# Patient Record
Sex: MALE | Race: WHITE | NOT HISPANIC OR LATINO | Employment: OTHER | URBAN - METROPOLITAN AREA
[De-identification: names, ages, dates, MRNs, and addresses within clinical notes are randomized per-mention and may not be internally consistent; named-entity substitution may affect disease eponyms.]

---

## 2017-02-02 RX ORDER — ALLOPURINOL 300 MG/1
TABLET ORAL
Qty: 30 TABLET | Refills: 11 | Status: SHIPPED | OUTPATIENT
Start: 2017-02-02 | End: 2018-03-18 | Stop reason: SDUPTHER

## 2017-04-06 ENCOUNTER — OFFICE VISIT (OUTPATIENT)
Dept: FAMILY MEDICINE | Facility: CLINIC | Age: 67
End: 2017-04-06
Payer: COMMERCIAL

## 2017-04-06 VITALS
WEIGHT: 234 LBS | BODY MASS INDEX: 31.01 KG/M2 | HEART RATE: 73 BPM | TEMPERATURE: 99 F | HEIGHT: 73 IN | DIASTOLIC BLOOD PRESSURE: 89 MMHG | SYSTOLIC BLOOD PRESSURE: 162 MMHG | OXYGEN SATURATION: 96 %

## 2017-04-06 DIAGNOSIS — J06.9 UPPER RESPIRATORY TRACT INFECTION, UNSPECIFIED TYPE: ICD-10-CM

## 2017-04-06 DIAGNOSIS — H66.92 LEFT OTITIS MEDIA, UNSPECIFIED CHRONICITY, UNSPECIFIED OTITIS MEDIA TYPE: Primary | ICD-10-CM

## 2017-04-06 PROCEDURE — 1157F ADVNC CARE PLAN IN RCRD: CPT | Mod: S$GLB,,, | Performed by: NURSE PRACTITIONER

## 2017-04-06 PROCEDURE — 3077F SYST BP >= 140 MM HG: CPT | Mod: S$GLB,,, | Performed by: NURSE PRACTITIONER

## 2017-04-06 PROCEDURE — 3079F DIAST BP 80-89 MM HG: CPT | Mod: S$GLB,,, | Performed by: NURSE PRACTITIONER

## 2017-04-06 PROCEDURE — 99213 OFFICE O/P EST LOW 20 MIN: CPT | Mod: S$GLB,,, | Performed by: NURSE PRACTITIONER

## 2017-04-06 PROCEDURE — 1160F RVW MEDS BY RX/DR IN RCRD: CPT | Mod: S$GLB,,, | Performed by: NURSE PRACTITIONER

## 2017-04-06 PROCEDURE — 1126F AMNT PAIN NOTED NONE PRSNT: CPT | Mod: S$GLB,,, | Performed by: NURSE PRACTITIONER

## 2017-04-06 PROCEDURE — 99999 PR PBB SHADOW E&M-EST. PATIENT-LVL IV: CPT | Mod: PBBFAC,,, | Performed by: NURSE PRACTITIONER

## 2017-04-06 PROCEDURE — 1159F MED LIST DOCD IN RCRD: CPT | Mod: S$GLB,,, | Performed by: NURSE PRACTITIONER

## 2017-04-06 RX ORDER — DOXYCYCLINE 100 MG/1
100 CAPSULE ORAL 2 TIMES DAILY
Qty: 20 CAPSULE | Refills: 0 | Status: CANCELLED | OUTPATIENT
Start: 2017-04-06 | End: 2017-04-16

## 2017-04-06 RX ORDER — DOXYCYCLINE 100 MG/1
100 CAPSULE ORAL 2 TIMES DAILY
Qty: 20 CAPSULE | Refills: 0 | Status: SHIPPED | OUTPATIENT
Start: 2017-04-06 | End: 2017-04-16

## 2017-04-06 NOTE — MR AVS SNAPSHOT
[facility]  [address]  [place] [address]  Phone: [phone]  Fax: [phone]                  Roshan Menard   2017 10:00 AM   Office Visit    Description:  Male : 1950   Provider:  Sweetie Mckeon NP   Department:  [facility]           Reason for Visit     Ear Fullness     Nasal Congestion     Cough           Diagnoses this Visit        Comments    Left otitis media, unspecified chronicity, unspecified otitis media type    -  Primary     Upper respiratory tract infection, unspecified type                To Do List           Goals (5 Years of Data)     None       These Medications        Disp Refills Start End    doxycycline (MONODOX) 100 MG capsule 20 capsule 0 2017    Take 1 capsule (100 mg total) by mouth 2 (two) times daily. - Oral    Pharmacy: [id number]  [address] SANCHEZ [address]  [place] Ph #: [phone]         [facility]     [facility] Nurse Care Line -  Assistance  Unless otherwise directed by your provider, please contact [facility], our nurse care line that is available for  assistance.     Registered nurses in the [facility] provide: appointment scheduling, clinical advisement, health education, and other advisory services.  Call: [phone] (toll free)               Medications           Message regarding Medications     Verify the changes and/or additions to your medication regime listed below are the same as discussed with your clinician today.  If any of these changes or additions are incorrect, please notify your healthcare provider.        START taking these NEW medications        Refills    doxycycline (MONODOX) 100 MG capsule 0    Sig: Take 1 capsule (100 mg total) by mouth 2 (two) times daily.    Class: Normal    Route: Oral           Verify that the below list of medications is an accurate representation of  "the medications you are currently taking.  If none reported, the list may be blank. If incorrect, please contact your healthcare provider. Carry this list with you in case of emergency.           Current Medications     albuterol (PROVENTIL/VENTOLIN) 90 mcg/actuation inhaler Inhale 2 puffs into the lungs every 4 (four) hours as needed for Wheezing.    allopurinol (ZYLOPRIM) 300 MG tablet TAKE 1 TABLET BY MOUTH EVERY DAY    aspirin 325 MG tablet Take 162 mg by mouth once. Every day    atenolol (TENORMIN) 50 MG tablet Take 25 mg by mouth once daily.     cetirizine (ZYRTEC) 10 MG tablet Take 1 tablet (10 mg total) by mouth once daily.    clopidogrel (PLAVIX) 75 mg tablet Take 75 mg by mouth once daily.    fish oil-omega-3 fatty acids 300-1,000 mg capsule Take 2 g by mouth once daily.    fluticasone (FLONASE) 50 mcg/actuation nasal spray     fluticasone-salmeterol 100-50 mcg/dose (ADVAIR DISKUS) 100-50 mcg/dose diskus inhaler INHALE ONE PUFF BY MOUTH INTO THE LUNGS TWICE DAILY    furosemide (LASIX) 20 MG tablet Take 20 mg by mouth once daily.     guaifenesin (MUCINEX) 600 mg 12 hr tablet Take 1,200 mg by mouth 2 (two) times daily.    losartan (COZAAR) 100 MG tablet TAKE 1 TABLET BY MOUTH DAILY    multivitamin (ONE DAILY MULTIVITAMIN) per tablet Take 1 tablet by mouth once daily.    naproxen (NAPROSYN) 500 MG tablet Take 1 tablet (500 mg total) by mouth 2 (two) times daily.    NEXIUM 40 mg capsule TAKE 1 CAPSULE BY MOUTH BEFORE BREAKFAST EVERY DAY    potassium chloride SA (K-DUR,KLOR-CON) 10 MEQ tablet     pravastatin (PRAVACHOL) 40 MG tablet Take 40 mg by mouth once daily.    triamcinolone acetonide 0.1% (KENALOG) 0.1 % cream Apply topically 2 (two) times daily.    doxycycline (MONODOX) 100 MG capsule Take 1 capsule (100 mg total) by mouth 2 (two) times daily.           Clinical Reference Information           Your Vitals Were     BP Pulse Temp Height Weight SpO2    162/89 73 98.6 °F (37 °C) 6' 1" (1.854 m) 106.1 kg " (234 lb 0.3 oz) 96%    BMI                30.87 kg/m2          Blood Pressure          Most Recent Value    BP  (!)  162/89      Allergies as of 4/6/2017     Azithromycin    Cefaclor    Iodine And Iodide Containing Products    Ivp Dye  [Iodinated Contrast Media - Iv Dye]    Sulfa (Sulfonamide Antibiotics)      Immunizations Administered on Date of Encounter - 4/6/2017     None      Instructions    Flonase as prescribed  RTC as needed       Language Assistance Services     ATTENTION: Language assistance services are available, free of charge. Please call [phone].      ATENCIÓN: Si habla katlyn, tiene a feliz disposición servicios gratuitos de asistencia lingüística. Llame al [phone].     LYNN Ý: N?u b?n nói Ti?ng Vi?t, có các d?ch v? h? tr? ngôn ng? mi?n phí dành cho b?n. G?i s? [phone].         [facility] complies with applicable Federal civil rights laws and does not discriminate on the basis of race, color, national origin, age, disability, or sex.

## 2017-04-06 NOTE — PROGRESS NOTES
Subjective:       Patient ID: Roshan Menard is a 67 y.o. male.    Chief Complaint: Ear Fullness; Nasal Congestion; and Cough    Otalgia    There is pain in the left ear. This is a new problem. The current episode started in the past 7 days. The problem occurs constantly. The problem has been unchanged. There has been no fever. The pain is mild. Associated symptoms include coughing and rhinorrhea. Pertinent negatives include no abdominal pain, diarrhea, ear discharge, headaches, hearing loss, neck pain, rash, sore throat or vomiting. Treatments tried: cipro. The treatment provided moderate relief. There is no history of a chronic ear infection, hearing loss or a tympanostomy tube.   Sinus Problem   This is a new problem. The current episode started in the past 7 days. The problem is unchanged. There has been no fever. He is experiencing no pain. Associated symptoms include coughing and ear pain. Pertinent negatives include no chills, congestion, diaphoresis, headaches, hoarse voice, neck pain, shortness of breath, sinus pressure, sneezing, sore throat or swollen glands. Past treatments include nothing. The treatment provided no relief.     Past Medical History:   Diagnosis Date    Allergy     Arthritis     Atrial fibrillation     Bronchitis     Cataract     GERD (gastroesophageal reflux disease)     Gout, chronic     Hypertension      Social History     Social History    Marital status:      Spouse name: N/A    Number of children: N/A    Years of education: N/A     Occupational History     [employer]     Social History Main Topics    Smoking status: Former Smoker     Packs/day: 1.50     Years: 22.00     Quit date: 5/7/1992    Smokeless tobacco: Not on file    Alcohol use No    Drug use: No    Sexual activity: Not on file     Social History Narrative     Past Surgical History:   Procedure Laterality Date    BACK SURGERY      CHOLECYSTECTOMY      CORONARY ANGIOPLASTY WITH  STENT PLACEMENT      TONSILLECTOMY      TOTAL KNEE ARTHROPLASTY      VASECTOMY         Review of Systems   Constitutional: Negative.  Negative for chills and diaphoresis.   HENT: Positive for ear pain and rhinorrhea. Negative for congestion, ear discharge, hearing loss, hoarse voice, sinus pressure, sneezing and sore throat.    Eyes: Negative.    Respiratory: Positive for cough. Negative for shortness of breath.    Cardiovascular: Negative.    Gastrointestinal: Negative.  Negative for abdominal pain, diarrhea and vomiting.   Endocrine: Negative.    Genitourinary: Negative.    Musculoskeletal: Negative.  Negative for neck pain.   Skin: Negative.  Negative for rash.   Allergic/Immunologic: Negative.    Neurological: Negative.  Negative for headaches.   Psychiatric/Behavioral: Negative.        Objective:      Physical Exam   Constitutional: He is oriented to person, place, and time. He appears well-developed and well-nourished.   HENT:   Head: Normocephalic.   Right Ear: Hearing, tympanic membrane, external ear and ear canal normal.   Left Ear: Hearing, external ear and ear canal normal. Tympanic membrane is injected.   Nose: Rhinorrhea present. Right sinus exhibits no maxillary sinus tenderness and no frontal sinus tenderness. Left sinus exhibits no maxillary sinus tenderness and no frontal sinus tenderness.   Mouth/Throat: Uvula is midline, oropharynx is clear and moist and mucous membranes are normal.   Eyes: Conjunctivae are normal. Pupils are equal, round, and reactive to light.   Neck: Normal range of motion. Neck supple.   Cardiovascular: Normal rate, regular rhythm and normal heart sounds.    Pulmonary/Chest: Effort normal and breath sounds normal.   Abdominal: Soft. Bowel sounds are normal.   Musculoskeletal: Normal range of motion.   Neurological: He is alert and oriented to person, place, and time.   Skin: Skin is warm and dry.   Psychiatric: He has a normal mood and affect. His behavior is normal.  Judgment and thought content normal.   Nursing note reviewed.      Assessment:       1. Left otitis media, unspecified chronicity, unspecified otitis media type    2. Upper respiratory tract infection, unspecified type        Plan:           Roshan was seen today for ear fullness, nasal congestion and cough.    Diagnoses and all orders for this visit:    Left otitis media, unspecified chronicity, unspecified otitis media type  Upper respiratory tract infection, unspecified type  -     doxycycline (MONODOX) 100 MG capsule; Take 1 capsule (100 mg total) by mouth 2 (two) times daily.  Flonase OTC as prescribed  RTC as needed

## 2017-05-16 ENCOUNTER — OFFICE VISIT (OUTPATIENT)
Dept: FAMILY MEDICINE | Facility: CLINIC | Age: 67
End: 2017-05-16
Payer: COMMERCIAL

## 2017-05-16 VITALS
BODY MASS INDEX: 31.18 KG/M2 | WEIGHT: 235.25 LBS | TEMPERATURE: 99 F | DIASTOLIC BLOOD PRESSURE: 78 MMHG | HEIGHT: 73 IN | SYSTOLIC BLOOD PRESSURE: 145 MMHG | HEART RATE: 59 BPM

## 2017-05-16 DIAGNOSIS — H92.02 OTALGIA, LEFT: ICD-10-CM

## 2017-05-16 DIAGNOSIS — H66.92 RECURRENT OTITIS MEDIA, LEFT: Primary | ICD-10-CM

## 2017-05-16 DIAGNOSIS — H91.92 DECREASED HEARING OF LEFT EAR: ICD-10-CM

## 2017-05-16 PROCEDURE — 1159F MED LIST DOCD IN RCRD: CPT | Mod: S$GLB,,, | Performed by: NURSE PRACTITIONER

## 2017-05-16 PROCEDURE — 99999 PR PBB SHADOW E&M-EST. PATIENT-LVL V: CPT | Mod: PBBFAC,,, | Performed by: NURSE PRACTITIONER

## 2017-05-16 PROCEDURE — 1157F ADVNC CARE PLAN IN RCRD: CPT | Mod: 8P,S$GLB,, | Performed by: NURSE PRACTITIONER

## 2017-05-16 PROCEDURE — 1160F RVW MEDS BY RX/DR IN RCRD: CPT | Mod: S$GLB,,, | Performed by: NURSE PRACTITIONER

## 2017-05-16 PROCEDURE — 3077F SYST BP >= 140 MM HG: CPT | Mod: S$GLB,,, | Performed by: NURSE PRACTITIONER

## 2017-05-16 PROCEDURE — 1125F AMNT PAIN NOTED PAIN PRSNT: CPT | Mod: S$GLB,,, | Performed by: NURSE PRACTITIONER

## 2017-05-16 PROCEDURE — 99213 OFFICE O/P EST LOW 20 MIN: CPT | Mod: S$GLB,,, | Performed by: NURSE PRACTITIONER

## 2017-05-16 PROCEDURE — 3078F DIAST BP <80 MM HG: CPT | Mod: S$GLB,,, | Performed by: NURSE PRACTITIONER

## 2017-05-16 RX ORDER — AMOXICILLIN AND CLAVULANATE POTASSIUM 875; 125 MG/1; MG/1
1 TABLET, FILM COATED ORAL 2 TIMES DAILY
Qty: 20 TABLET | Refills: 0 | Status: SHIPPED | OUTPATIENT
Start: 2017-05-16 | End: 2017-05-26

## 2017-05-16 NOTE — MR AVS SNAPSHOT
[facility] [place]  [address]  [place] [address]  Phone: [phone]  Fax: [phone]                  Roshan Menard   2017 2:00 PM   Office Visit    Description:  Male : 1950   Provider:  Sweetie Mckeon NP   Department:  [facility] [place]           Reason for Visit     Otalgia           Diagnoses this Visit        Comments    Recurrent otitis media, left    -  Primary     Decreased hearing of left ear         Otalgia, left                To Do List           Goals (5 Years of Data)     None       These Medications        Disp Refills Start End    amoxicillin-clavulanate 875-125mg (AUGMENTIN) 875-125 mg per tablet 20 tablet 0 2017    Take 1 tablet by mouth 2 (two) times daily. - Oral    Pharmacy: [facility] [id number]  [place] [address] [facility] [address]  [place] Ph #: [phone]         [facility]     [facility] Nurse Care Line -  Assistance  Unless otherwise directed by your provider, please contact [facility], our nurse care line that is available for  assistance.     Registered nurses in the [facility] provide: appointment scheduling, clinical advisement, health education, and other advisory services.  Call: [phone] (toll free)               Medications           Message regarding Medications     Verify the changes and/or additions to your medication regime listed below are the same as discussed with your clinician today.  If any of these changes or additions are incorrect, please notify your healthcare provider.        START taking these NEW medications        Refills    amoxicillin-clavulanate 875-125mg (AUGMENTIN) 875-125 mg per tablet 0    Sig: Take 1 tablet by mouth 2 (two) times daily.    Class: Normal    Route: Oral           Verify that the below list of medications is an accurate representation of the medications you are currently taking.  If none  "reported, the list may be blank. If incorrect, please contact your healthcare provider. Carry this list with you in case of emergency.           Current Medications     albuterol (PROVENTIL/VENTOLIN) 90 mcg/actuation inhaler Inhale 2 puffs into the lungs every 4 (four) hours as needed for Wheezing.    allopurinol (ZYLOPRIM) 300 MG tablet TAKE 1 TABLET BY MOUTH EVERY DAY    aspirin 325 MG tablet Take 162 mg by mouth once. Every day    atenolol (TENORMIN) 50 MG tablet Take 25 mg by mouth once daily.     cetirizine (ZYRTEC) 10 MG tablet Take 1 tablet (10 mg total) by mouth once daily.    clopidogrel (PLAVIX) 75 mg tablet Take 75 mg by mouth once daily.    fish oil-omega-3 fatty acids 300-1,000 mg capsule Take 2 g by mouth once daily.    fluticasone (FLONASE) 50 mcg/actuation nasal spray     fluticasone-salmeterol 100-50 mcg/dose (ADVAIR DISKUS) 100-50 mcg/dose diskus inhaler INHALE ONE PUFF BY MOUTH INTO THE LUNGS TWICE DAILY    furosemide (LASIX) 20 MG tablet Take 20 mg by mouth once daily.     guaifenesin (MUCINEX) 600 mg 12 hr tablet Take 1,200 mg by mouth 2 (two) times daily.    losartan (COZAAR) 100 MG tablet TAKE 1 TABLET BY MOUTH DAILY    multivitamin (ONE DAILY MULTIVITAMIN) per tablet Take 1 tablet by mouth once daily.    naproxen (NAPROSYN) 500 MG tablet Take 1 tablet (500 mg total) by mouth 2 (two) times daily.    NEXIUM 40 mg capsule TAKE 1 CAPSULE BY MOUTH BEFORE BREAKFAST EVERY DAY    potassium chloride SA (K-DUR,KLOR-CON) 10 MEQ tablet     pravastatin (PRAVACHOL) 40 MG tablet Take 40 mg by mouth once daily.    triamcinolone acetonide 0.1% (KENALOG) 0.1 % cream Apply topically 2 (two) times daily.    amoxicillin-clavulanate 875-125mg (AUGMENTIN) 875-125 mg per tablet Take 1 tablet by mouth 2 (two) times daily.           Clinical Reference Information           Your Vitals Were     BP Pulse Temp Height Weight BMI    145/78 59 98.9 °F (37.2 °C) 6' 1" (1.854 m) 106.7 kg (235 lb 3.7 oz) 31.03 kg/m2      Blood " Pressure          Most Recent Value    BP  (!)  145/78      Allergies as of 5/16/2017     Azithromycin    Cefaclor    Iodine And Iodide Containing Products    Ivp Dye  [Iodinated Contrast Media - Oral And Iv Dye]    Sulfa (Sulfonamide Antibiotics)      Immunizations Administered on Date of Encounter - 5/16/2017     None      Orders Placed During Today's Visit      Normal Orders This Visit    Ambulatory referral to ENT       Language Assistance Services     ATTENTION: Language assistance services are available, free of charge. Please call [phone].      ATENCIÓN: Si ralphla katlyn, tiene a feliz disposición servicios gratuitos de asistencia lingüística. Llame al [phone].     LYNN Ý: N?u b?n nói Ti?ng Vi?t, có các d?ch v? h? tr? ngôn ng? mi?n phí dành cho b?n. G?i s? [phone].         [facility] complies with applicable Federal civil rights laws and does not discriminate on the basis of race, color, national origin, age, disability, or sex.

## 2017-05-16 NOTE — PROGRESS NOTES
Subjective:       Patient ID: Roshan Menard is a 67 y.o. male.    Chief Complaint: Otalgia    Otalgia    There is pain in the left ear. This is a recurrent problem. The current episode started more than 1 month ago (Returned on Sunday). The problem occurs constantly. The problem has been unchanged. There has been no fever. The pain is mild. Pertinent negatives include no abdominal pain, coughing, diarrhea, ear discharge, headaches, hearing loss, neck pain, rash, rhinorrhea, sore throat or vomiting. He has tried antibiotics for the symptoms. The treatment provided mild relief. There is no history of a chronic ear infection, hearing loss or a tympanostomy tube.   Pt states has taken augmentin before, with no side effects/adverse reactions.   Past Medical History:   Diagnosis Date    Allergy     Arthritis     Atrial fibrillation     Bronchitis     Cataract     GERD (gastroesophageal reflux disease)     Gout, chronic     Hypertension      Social History     Social History    Marital status:      Spouse name: N/A    Number of children: N/A    Years of education: N/A     Occupational History     [employer]     Social History Main Topics    Smoking status: Former Smoker     Packs/day: 1.50     Years: 22.00     Quit date: 5/7/1992    Smokeless tobacco: Not on file    Alcohol use No    Drug use: No    Sexual activity: Not on file     Social History Narrative     Past Surgical History:   Procedure Laterality Date    BACK SURGERY      CHOLECYSTECTOMY      CORONARY ANGIOPLASTY WITH STENT PLACEMENT      TONSILLECTOMY      TOTAL KNEE ARTHROPLASTY      VASECTOMY         Review of Systems   Constitutional: Negative.    HENT: Positive for ear pain. Negative for ear discharge, hearing loss, rhinorrhea and sore throat.    Eyes: Negative.    Respiratory: Negative.  Negative for cough.    Cardiovascular: Negative.    Gastrointestinal: Negative.  Negative for abdominal pain, diarrhea and  vomiting.   Endocrine: Negative.    Genitourinary: Negative.    Musculoskeletal: Negative.  Negative for neck pain.   Skin: Negative.  Negative for rash.   Allergic/Immunologic: Negative.    Neurological: Negative.  Negative for headaches.   Psychiatric/Behavioral: Negative.        Objective:      Physical Exam   Constitutional: He is oriented to person, place, and time. He appears well-developed and well-nourished.   HENT:   Head: Normocephalic.   Right Ear: Hearing, tympanic membrane, external ear and ear canal normal.   Left Ear: External ear and ear canal normal. Tympanic membrane is injected and bulging. Decreased hearing is noted.   Nose: Nose normal.   Mouth/Throat: Oropharynx is clear and moist.   Eyes: Conjunctivae are normal. Pupils are equal, round, and reactive to light.   Neck: Normal range of motion. Neck supple.   Cardiovascular: Normal rate, regular rhythm and normal heart sounds.    Pulmonary/Chest: Effort normal and breath sounds normal.   Abdominal: Soft. Bowel sounds are normal.   Musculoskeletal: Normal range of motion.   Neurological: He is alert and oriented to person, place, and time.   Skin: Skin is warm and dry.   Psychiatric: He has a normal mood and affect. His behavior is normal. Judgment and thought content normal.   Nursing note and vitals reviewed.      Assessment:       1. Recurrent otitis media, left    2. Decreased hearing of left ear    3. Otalgia, left        Plan:           Roshan was seen today for otalgia.    Diagnoses and all orders for this visit:    Recurrent otitis media, left  Decreased hearing of left ear  Otalgia, left  -     Ambulatory referral to ENT  -     amoxicillin-clavulanate 875-125mg (AUGMENTIN) 875-125 mg per tablet; Take 1 tablet by mouth 2 (two) times daily.

## 2017-06-22 RX ORDER — FLUTICASONE PROPIONATE 50 MCG
1 SPRAY, SUSPENSION (ML) NASAL DAILY
Qty: 16 G | Refills: 12 | Status: SHIPPED | OUTPATIENT
Start: 2017-06-22 | End: 2018-08-01 | Stop reason: SDUPTHER

## 2017-06-22 NOTE — TELEPHONE ENCOUNTER
----- Message from Daniel Amezquitafield sent at 6/22/2017  2:24 PM CDT -----  Contact: PT  Pt is calling nurse staff regarding a refill RX  1. What is the name of the medication you are requesting? Flutacasone  2. What is the dose?  50 mcg  3. How do you take the medication? Orally, topically, etc?  Orally  4. How often do you take this medication?  Once a day  5. Do you need a 30 day or 90 day supply? 90 day supply  6. How many refills are you requesting? 3 months  7. What is your preferred pharmacy and location of the pharmacy?  [facility].   8. Who can we contact with further questions?  [phone] to be advised

## 2017-07-05 ENCOUNTER — TELEPHONE (OUTPATIENT)
Dept: FAMILY MEDICINE | Facility: CLINIC | Age: 67
End: 2017-07-05

## 2017-07-05 NOTE — TELEPHONE ENCOUNTER
----- Message from Cecily Dinh sent at 7/5/2017  9:09 AM CDT -----  pls work pt in prior to 8/3 for reoccurring ear pain..[phone] (home)

## 2017-07-07 ENCOUNTER — OFFICE VISIT (OUTPATIENT)
Dept: FAMILY MEDICINE | Facility: CLINIC | Age: 67
End: 2017-07-07
Payer: COMMERCIAL

## 2017-07-07 VITALS — WEIGHT: 234.44 LBS | BODY MASS INDEX: 31.07 KG/M2 | HEART RATE: 64 BPM | HEIGHT: 73 IN

## 2017-07-07 DIAGNOSIS — H69.90 ETD (EUSTACHIAN TUBE DYSFUNCTION), UNSPECIFIED LATERALITY: ICD-10-CM

## 2017-07-07 DIAGNOSIS — J32.9 SINUSITIS, UNSPECIFIED CHRONICITY, UNSPECIFIED LOCATION: ICD-10-CM

## 2017-07-07 DIAGNOSIS — J06.9 UPPER RESPIRATORY TRACT INFECTION, UNSPECIFIED TYPE: Primary | ICD-10-CM

## 2017-07-07 PROCEDURE — 1159F MED LIST DOCD IN RCRD: CPT | Mod: S$GLB,,, | Performed by: FAMILY MEDICINE

## 2017-07-07 PROCEDURE — 99214 OFFICE O/P EST MOD 30 MIN: CPT | Mod: S$GLB,,, | Performed by: FAMILY MEDICINE

## 2017-07-07 PROCEDURE — 99999 PR PBB SHADOW E&M-EST. PATIENT-LVL III: CPT | Mod: PBBFAC,,, | Performed by: FAMILY MEDICINE

## 2017-07-07 RX ORDER — PREDNISONE 20 MG/1
20 TABLET ORAL 3 TIMES DAILY
Qty: 20 TABLET | Refills: 0 | Status: SHIPPED | OUTPATIENT
Start: 2017-07-07 | End: 2017-07-17

## 2017-07-07 RX ORDER — ACETAMINOPHEN 160 MG/5ML
200 SUSPENSION, ORAL (FINAL DOSE FORM) ORAL DAILY
COMMUNITY
End: 2022-12-05

## 2017-07-07 RX ORDER — ATORVASTATIN CALCIUM 10 MG/1
TABLET, FILM COATED ORAL
COMMUNITY
Start: 2017-06-13 | End: 2021-09-22 | Stop reason: SDUPTHER

## 2017-07-07 RX ORDER — DOXYCYCLINE HYCLATE 100 MG
100 TABLET ORAL 2 TIMES DAILY
Qty: 20 TABLET | Refills: 0 | Status: SHIPPED | OUTPATIENT
Start: 2017-07-07 | End: 2017-07-17

## 2017-07-07 NOTE — PROGRESS NOTES
Roshan Menard presents with moderate sinusitis ETD and ear pain     Past Medical History:   Diagnosis Date    Allergy     Arthritis     Atrial fibrillation     Bronchitis     Cataract     GERD (gastroesophageal reflux disease)     Gout, chronic     Hypertension      Past Surgical History:   Procedure Laterality Date    BACK SURGERY      CHOLECYSTECTOMY      CORONARY ANGIOPLASTY WITH STENT PLACEMENT      TONSILLECTOMY      TOTAL KNEE ARTHROPLASTY      VASECTOMY       Review of patient's allergies indicates:   Allergen Reactions    Azithromycin     Cefaclor Other (See Comments)     Other reaction(s): Hives    Iodine and iodide containing products      Other reaction(s): Unknown    Ivp dye  [iodinated contrast- oral and iv dye] Other (See Comments)    Sulfa (sulfonamide antibiotics)      Current Outpatient Prescriptions on File Prior to Visit   Medication Sig Dispense Refill    albuterol (PROVENTIL/VENTOLIN) 90 mcg/actuation inhaler Inhale 2 puffs into the lungs every 4 (four) hours as needed for Wheezing.      allopurinol (ZYLOPRIM) 300 MG tablet TAKE 1 TABLET BY MOUTH EVERY DAY 30 tablet 11    atenolol (TENORMIN) 50 MG tablet Take 25 mg by mouth once daily.       clopidogrel (PLAVIX) 75 mg tablet Take 75 mg by mouth once daily.      fish oil-omega-3 fatty acids 300-1,000 mg capsule Take 2 g by mouth once daily.      fluticasone (FLONASE) 50 mcg/actuation nasal spray 1 spray by Each Nare route once daily. 16 g 12    fluticasone-salmeterol 100-50 mcg/dose (ADVAIR DISKUS) 100-50 mcg/dose diskus inhaler INHALE ONE PUFF BY MOUTH INTO THE LUNGS TWICE DAILY 60 each 11    furosemide (LASIX) 20 MG tablet Take 20 mg by mouth once daily.       losartan (COZAAR) 100 MG tablet TAKE 1 TABLET BY MOUTH DAILY 30 tablet 11    multivitamin (ONE DAILY MULTIVITAMIN) per tablet Take 1 tablet by mouth once daily.      naproxen (NAPROSYN) 500 MG tablet Take 1 tablet (500 mg total) by mouth 2 (two) times  daily. 60 tablet 11    NEXIUM 40 mg capsule TAKE 1 CAPSULE BY MOUTH BEFORE BREAKFAST EVERY DAY 30 capsule 10    potassium chloride SA (K-DUR,KLOR-CON) 10 MEQ tablet   4    cetirizine (ZYRTEC) 10 MG tablet Take 1 tablet (10 mg total) by mouth once daily.  0    [DISCONTINUED] aspirin 325 MG tablet Take 162 mg by mouth once. Every day      [DISCONTINUED] guaifenesin (MUCINEX) 600 mg 12 hr tablet Take 1,200 mg by mouth 2 (two) times daily.      [DISCONTINUED] pravastatin (PRAVACHOL) 40 MG tablet Take 40 mg by mouth once daily.      [DISCONTINUED] triamcinolone acetonide 0.1% (KENALOG) 0.1 % cream Apply topically 2 (two) times daily.       No current facility-administered medications on file prior to visit.      Social History     Social History    Marital status:      Spouse name: N/A    Number of children: N/A    Years of education: N/A     Occupational History     [employer]     Social History Main Topics    Smoking status: Former Smoker     Packs/day: 1.50     Years: 22.00     Quit date: 5/7/1992    Smokeless tobacco: Not on file    Alcohol use No    Drug use: No    Sexual activity: Not on file     Other Topics Concern    Not on file     Social History Narrative    No narrative on file     Family History   Problem Relation Age of Onset    Cancer Mother     Early death Mother     Arthritis Father     Diabetes Father     Heart disease Father     Hyperlipidemia Father     Hypertension Father          ROS:  SKIN: No rashes, itching or changes in color or texture of skin.  EYES: Visual acuity fine. No photophobia, ocular pain or diplopia.EARS: Denies ear pain, discharge or vertigo.NOSE: No loss of smell, no epistaxis some postnasal drip.MOUTH & THROAT: No hoarseness or change in voice. No excessive gum bleeding.CHEST: Denies GLASER, cyanosis, wheezing  CARDIOVASCULAR: Denies chest pain, PND, orthopnea or reduced exercise tolerance.  ABDOMEN:  No weight loss.No abdominal pain, no  hematemesis or blood in stool.  URINARY: No flank pain, dysuria or hematuria.  PERIPHERAL VASCULAR: No claudication or cyanosis.  MUSCULOSKELETAL: Negative   NEUROLOGIC: No history of seizures, paralysis, alteration of gait or coordination.    PE: Vital signs as noted  Heent:Normocephalic with no recent cranial trauma,PERRLA,EOMI,conjunctiva clear,fundi reveal no hemmorhage exudate or papilledema.Otic canals clear, tympanic membranes slightly dull bilaterally.Nasal mucosa slightly red and edematous.Posterior pharynx slightly red but without exudate.  Neck:Supple with minimal anterior cervical adenopathy.  Chest:Clear bilateral breath sounds with mild scattered ronchi  Heart:Regular rhthym without murmer  Abdomen:Soft, non tender,no masses, no hepatosplenomegalyExtremeties and Neurologic:Grossly within normal limits  Impression: ETD  Plan: Decong preed taper montelukast   ENT fu   40 min ov rev tx options and later may consider H1 H2 and Montelukast

## 2017-08-14 RX ORDER — ATENOLOL 50 MG/1
TABLET ORAL
Qty: 30 TABLET | Refills: 12 | Status: SHIPPED | OUTPATIENT
Start: 2017-08-14 | End: 2017-11-17

## 2017-08-15 ENCOUNTER — TELEPHONE (OUTPATIENT)
Dept: FAMILY MEDICINE | Facility: CLINIC | Age: 67
End: 2017-08-15

## 2017-08-15 RX ORDER — METOPROLOL SUCCINATE 50 MG/1
50 TABLET, EXTENDED RELEASE ORAL DAILY
Qty: 30 TABLET | Refills: 11 | Status: SHIPPED | OUTPATIENT
Start: 2017-08-15 | End: 2018-08-17 | Stop reason: SDUPTHER

## 2017-08-15 NOTE — TELEPHONE ENCOUNTER
----- Message from Charity Lombardo sent at 8/15/2017  2:01 PM CDT -----  Contact: [facility]  [facility] [phone] ,,, calling about pt atenolol is on back order and they calling to see if their is something else they can use in place,, please call back

## 2017-11-17 ENCOUNTER — HOSPITAL ENCOUNTER (OUTPATIENT)
Dept: RADIOLOGY | Facility: HOSPITAL | Age: 67
Discharge: HOME | End: 2017-11-17
Attending: NURSE PRACTITIONER
Payer: COMMERCIAL

## 2017-11-17 ENCOUNTER — OFFICE VISIT (OUTPATIENT)
Dept: FAMILY MEDICINE | Facility: CLINIC | Age: 67
End: 2017-11-17
Payer: COMMERCIAL

## 2017-11-17 VITALS
DIASTOLIC BLOOD PRESSURE: 80 MMHG | WEIGHT: 238.19 LBS | SYSTOLIC BLOOD PRESSURE: 140 MMHG | BODY MASS INDEX: 31.57 KG/M2 | HEART RATE: 68 BPM | HEIGHT: 73 IN | TEMPERATURE: 98 F

## 2017-11-17 DIAGNOSIS — R06.2 WHEEZING: ICD-10-CM

## 2017-11-17 DIAGNOSIS — J02.9 PHARYNGITIS, UNSPECIFIED ETIOLOGY: ICD-10-CM

## 2017-11-17 DIAGNOSIS — J40 BRONCHITIS: Primary | ICD-10-CM

## 2017-11-17 DIAGNOSIS — R04.2 BLOOD IN SPUTUM: ICD-10-CM

## 2017-11-17 DIAGNOSIS — Z91.09 ENVIRONMENTAL ALLERGIES: ICD-10-CM

## 2017-11-17 PROCEDURE — 71020 XR CHEST PA AND LATERAL: CPT | Mod: TC,PO

## 2017-11-17 PROCEDURE — 99213 OFFICE O/P EST LOW 20 MIN: CPT | Mod: S$GLB,,, | Performed by: NURSE PRACTITIONER

## 2017-11-17 PROCEDURE — 99999 PR PBB SHADOW E&M-EST. PATIENT-LVL V: CPT | Mod: PBBFAC,,, | Performed by: NURSE PRACTITIONER

## 2017-11-17 PROCEDURE — 71020 XR CHEST PA AND LATERAL: CPT | Mod: 26,,, | Performed by: RADIOLOGY

## 2017-11-17 RX ORDER — DOXYCYCLINE 100 MG/1
100 CAPSULE ORAL 2 TIMES DAILY
Qty: 20 CAPSULE | Refills: 0 | Status: SHIPPED | OUTPATIENT
Start: 2017-11-17 | End: 2017-11-27

## 2017-11-17 RX ORDER — METHYLPREDNISOLONE 4 MG/1
TABLET ORAL
Qty: 1 PACKAGE | Refills: 0 | Status: SHIPPED | OUTPATIENT
Start: 2017-11-17 | End: 2017-12-08

## 2017-11-17 NOTE — PROGRESS NOTES
Subjective:       Patient ID: Roshan Menard is a 67 y.o. male.    Chief Complaint: Cough; Nasal Congestion; and Chest Congestion    Wheezing    This is a new problem. The current episode started 1 to 4 weeks ago. The problem occurs daily. The problem has been unchanged. Associated symptoms include ear pain, hemoptysis (takes anticoagulant), rhinorrhea, a sore throat and sputum production. Pertinent negatives include no abdominal pain, chest pain, chills, coryza, coughing, diarrhea, fever, headaches, neck pain, rash, shortness of breath, swollen glands or vomiting. Nothing aggravates the symptoms. He has tried steroid inhaler (States has not used albuterol) for the symptoms. The treatment provided no relief. His past medical history is significant for CAD, chronic lung disease and pneumonia. There is no history of asthma, bronchiolitis, COPD, DVT, heart failure, past MI or PE.   Sinus Problem   This is a chronic problem. The current episode started more than 1 month ago. The problem has been waxing and waning since onset. There has been no fever. The pain is mild. Associated symptoms include congestion, ear pain, sinus pressure and a sore throat. Pertinent negatives include no chills, coughing, diaphoresis, headaches, hoarse voice, neck pain, shortness of breath, sneezing or swollen glands. Past treatments include nothing. The treatment provided no relief (Has seen ENT; states has sinus symptoms constantly).     Past Medical History:   Diagnosis Date    Allergy     Arthritis     Atrial fibrillation     Bronchitis     Cataract     GERD (gastroesophageal reflux disease)     Gout, chronic     Hypertension      Social History     Social History    Marital status:      Spouse name: N/A    Number of children: N/A    Years of education: N/A     Occupational History     [employer]     Social History Main Topics    Smoking status: Former Smoker     Packs/day: 1.50     Years: 22.00     Quit  date: 5/7/1992    Smokeless tobacco: Not on file    Alcohol use No    Drug use: No    Sexual activity: Not on file     Past Surgical History:   Procedure Laterality Date    BACK SURGERY      CHOLECYSTECTOMY      CORONARY ANGIOPLASTY WITH STENT PLACEMENT      TONSILLECTOMY      TOTAL KNEE ARTHROPLASTY      VASECTOMY         Review of Systems   Constitutional: Negative.  Negative for chills, diaphoresis and fever.   HENT: Positive for congestion, ear pain, rhinorrhea, sinus pressure and sore throat. Negative for hoarse voice and sneezing.    Eyes: Negative.    Respiratory: Positive for hemoptysis (takes anticoagulant), sputum production and wheezing. Negative for cough and shortness of breath.    Cardiovascular: Negative.  Negative for chest pain.   Gastrointestinal: Negative.  Negative for abdominal pain, diarrhea and vomiting.   Endocrine: Negative.    Genitourinary: Negative.    Musculoskeletal: Negative.  Negative for neck pain.   Skin: Negative.  Negative for rash.   Allergic/Immunologic: Negative.    Neurological: Negative.  Negative for headaches.   Psychiatric/Behavioral: Negative.        Objective:      Physical Exam   Constitutional: He is oriented to person, place, and time. He appears well-developed and well-nourished.   HENT:   Head: Normocephalic.   Right Ear: Hearing, tympanic membrane, external ear and ear canal normal.   Left Ear: Hearing, tympanic membrane, external ear and ear canal normal.   Nose: Mucosal edema and rhinorrhea present. Right sinus exhibits no maxillary sinus tenderness and no frontal sinus tenderness. Left sinus exhibits no maxillary sinus tenderness.   Mouth/Throat: Uvula is midline and mucous membranes are normal. Posterior oropharyngeal erythema present.   Eyes: Conjunctivae are normal. Pupils are equal, round, and reactive to light.   Neck: Normal range of motion. Neck supple.   Cardiovascular: Normal rate, regular rhythm and normal heart sounds.    Pulmonary/Chest:  Effort normal. He has wheezes in the right upper field, the right lower field, the left upper field and the left lower field.   Abdominal: Soft. Bowel sounds are normal.   Musculoskeletal: Normal range of motion.   Neurological: He is alert and oriented to person, place, and time.   Skin: Skin is warm and dry. Capillary refill takes 2 to 3 seconds.   Psychiatric: He has a normal mood and affect. His behavior is normal. Judgment and thought content normal.   Nursing note and vitals reviewed.      Assessment:       1. Wheezing    2. Blood in sputum    3. Environmental allergies    4.      Pharyngitis, unspecified etiology  5.      Bronchitis  Plan:           Roshan was seen today for cough, nasal congestion and chest congestion.    Diagnoses and all orders for this visit:  Bronchitis  Pharyngitis, unspecified etiology  Wheezing  Blood in sputum  -     X-Ray Chest PA And Lateral; Future  -     CBC auto differential; Future  -     (Magic mouthwash) 1:1:1 Benadryl 12.5mg/5ml liq, aluminum & magnesium hydroxide-simehticone (Maalox), lidocaine viscous 2%; Swish and spit 5 mLs every 8 (eight) hours as needed. Gargle and spit. DO NOT SWALLOW.  -     methylPREDNISolone (MEDROL DOSEPACK) 4 mg tablet; use as directed  -     doxycycline (MONODOX) 100 MG capsule; Take 1 capsule (100 mg total) by mouth 2 (two) times daily.  Albuterol as prescribed  Report to ER immediately if symptoms      Environmental allergies  -     Ambulatory referral to Allergy

## 2017-11-20 ENCOUNTER — OFFICE VISIT (OUTPATIENT)
Dept: FAMILY MEDICINE | Facility: CLINIC | Age: 67
End: 2017-11-20
Payer: COMMERCIAL

## 2017-11-20 VITALS
SYSTOLIC BLOOD PRESSURE: 136 MMHG | WEIGHT: 236.56 LBS | HEART RATE: 63 BPM | HEIGHT: 73 IN | DIASTOLIC BLOOD PRESSURE: 88 MMHG | BODY MASS INDEX: 31.35 KG/M2 | TEMPERATURE: 98 F

## 2017-11-20 DIAGNOSIS — R04.2 HEMOPTYSIS: Primary | ICD-10-CM

## 2017-11-20 PROCEDURE — 99213 OFFICE O/P EST LOW 20 MIN: CPT | Mod: S$GLB,,, | Performed by: FAMILY MEDICINE

## 2017-11-20 PROCEDURE — 99999 PR PBB SHADOW E&M-EST. PATIENT-LVL IV: CPT | Mod: PBBFAC,,, | Performed by: FAMILY MEDICINE

## 2017-11-20 RX ORDER — LEVOFLOXACIN 500 MG/1
500 TABLET, FILM COATED ORAL DAILY
Qty: 7 TABLET | Refills: 0 | Status: SHIPPED | OUTPATIENT
Start: 2017-11-20 | End: 2017-12-21

## 2017-11-20 NOTE — PROGRESS NOTES
Roshan Menard presents with moderate upper respiratory congestion,rhinnorhea,moderate cough past 2-3 days. OTC help slightly. Denies nausea,vomiting,diarrhea or significant fever.    Past Medical History:   Diagnosis Date    Allergy     Arthritis     Atrial fibrillation     Bronchitis     Cataract     GERD (gastroesophageal reflux disease)     Gout, chronic     Hypertension      Past Surgical History:   Procedure Laterality Date    BACK SURGERY      CHOLECYSTECTOMY      CORONARY ANGIOPLASTY WITH STENT PLACEMENT      TONSILLECTOMY      TOTAL KNEE ARTHROPLASTY      VASECTOMY       Review of patient's allergies indicates:   Allergen Reactions    Azithromycin     Cefaclor Other (See Comments)     Other reaction(s): Hives    Iodine and iodide containing products      Other reaction(s): Unknown    Ivp dye  [iodinated contrast- oral and iv dye] Other (See Comments)    Sulfa (sulfonamide antibiotics)      Current Outpatient Prescriptions on File Prior to Visit   Medication Sig Dispense Refill    (Magic mouthwash) 1:1:1 Benadryl 12.5mg/5ml liq, aluminum & magnesium hydroxide-simehticone (Maalox), lidocaine viscous 2% Swish and spit 5 mLs every 8 (eight) hours as needed. Gargle and spit. DO NOT SWALLOW. 360 mL 0    albuterol (PROVENTIL/VENTOLIN) 90 mcg/actuation inhaler Inhale 2 puffs into the lungs every 4 (four) hours as needed for Wheezing.      allopurinol (ZYLOPRIM) 300 MG tablet TAKE 1 TABLET BY MOUTH EVERY DAY 30 tablet 11    atorvastatin (LIPITOR) 10 MG tablet TK 1 T PO QD      clopidogrel (PLAVIX) 75 mg tablet Take 75 mg by mouth once daily.      coenzyme Q10 200 mg capsule Take 200 mg by mouth once daily.      doxycycline (MONODOX) 100 MG capsule Take 1 capsule (100 mg total) by mouth 2 (two) times daily. 20 capsule 0    fish oil-omega-3 fatty acids 300-1,000 mg capsule Take 2 g by mouth once daily.      fluticasone (FLONASE) 50 mcg/actuation nasal spray 1 spray by Each Nare route once  daily. 16 g 12    fluticasone-salmeterol 100-50 mcg/dose (ADVAIR DISKUS) 100-50 mcg/dose diskus inhaler INHALE ONE PUFF BY MOUTH INTO THE LUNGS TWICE DAILY 60 each 11    furosemide (LASIX) 20 MG tablet Take 20 mg by mouth once daily.       losartan (COZAAR) 100 MG tablet TAKE 1 TABLET BY MOUTH DAILY 30 tablet 11    methylPREDNISolone (MEDROL DOSEPACK) 4 mg tablet use as directed 1 Package 0    metoprolol succinate (TOPROL-XL) 50 MG 24 hr tablet Take 1 tablet (50 mg total) by mouth once daily. 30 tablet 11    multivitamin (ONE DAILY MULTIVITAMIN) per tablet Take 1 tablet by mouth once daily.      naproxen (NAPROSYN) 500 MG tablet Take 1 tablet (500 mg total) by mouth 2 (two) times daily. 60 tablet 11    NEXIUM 40 mg capsule TAKE 1 CAPSULE BY MOUTH BEFORE BREAKFAST EVERY DAY 30 capsule 10    potassium chloride SA (K-DUR,KLOR-CON) 10 MEQ tablet   4     No current facility-administered medications on file prior to visit.      Social History     Social History    Marital status:      Spouse name: N/A    Number of children: N/A    Years of education: N/A     Occupational History     [employer]     Social History Main Topics    Smoking status: Former Smoker     Packs/day: 1.50     Years: 22.00     Quit date: 5/7/1992    Smokeless tobacco: Not on file    Alcohol use No    Drug use: No    Sexual activity: Not on file     Other Topics Concern    Not on file     Social History Narrative    No narrative on file     Family History   Problem Relation Age of Onset    Cancer Mother     Early death Mother     Arthritis Father     Diabetes Father     Heart disease Father     Hyperlipidemia Father     Hypertension Father          ROS:  SKIN: No rashes, itching or changes in color or texture of skin.  EYES: Visual acuity fine. No photophobia, ocular pain or diplopia.EARS: Denies ear pain, discharge or vertigo.NOSE: No loss of smell, no epistaxis some postnasal drip.MOUTH & THROAT: No  hoarseness or change in voice. No excessive gum bleeding.CHEST: Denies GLASER, cyanosis, wheezing  CARDIOVASCULAR: Denies chest pain, PND, orthopnea or reduced exercise tolerance.  ABDOMEN:  No weight loss.No abdominal pain, no hematemesis or blood in stool.  URINARY: No flank pain, dysuria or hematuria.  PERIPHERAL VASCULAR: No claudication or cyanosis.  MUSCULOSKELETAL: Negative   NEUROLOGIC: No history of seizures, paralysis, alteration of gait or coordination.    PE: Vital signs as noted  Heent:Normocephalic with no recent cranial trauma,PERRLA,EOMI,conjunctiva clear,fundi reveal no hemmorhage exudate or papilledema.Otic canals clear, tympanic membranes slightly dull bilaterally.Nasal mucosa slightly red and edematous.Posterior pharynx slightly red but without exudate.  Neck:Supple with minimal anterior cervical adenopathy.  Chest:Clear bilateral breath sounds with mild scattered ronchi  Heart:Regular rhthym without murmer  Abdomen:Soft, non tender,no masses, no hepatosplenomegalyExtremeties and Neurologic:Grossly within normal limits  Impression: Upper Respiratory Infection. 465.9  Hemoptysis   Plan: Cont obs-if URI resolves and hemoptysis does will rec CT   May be allegic to doxy-thin red rash

## 2017-11-28 ENCOUNTER — TELEPHONE (OUTPATIENT)
Dept: FAMILY MEDICINE | Facility: CLINIC | Age: 67
End: 2017-11-28

## 2017-11-28 NOTE — TELEPHONE ENCOUNTER
----- Message from Naheed Knutson sent at 11/28/2017 10:34 AM CST -----  Contact: pt  States he needs to speak to [facility] regarding a referral for the pulmonologist. States he spoke with you earlier, and they haven't received the referral. Please call pt at [phone]. Thank you

## 2017-12-18 RX ORDER — LOSARTAN POTASSIUM 100 MG/1
TABLET ORAL
Qty: 30 TABLET | Refills: 12 | Status: SHIPPED | OUTPATIENT
Start: 2017-12-18 | End: 2019-01-07 | Stop reason: SDUPTHER

## 2017-12-18 RX ORDER — NAPROXEN 500 MG/1
TABLET ORAL
Qty: 60 TABLET | Refills: 0 | Status: SHIPPED | OUTPATIENT
Start: 2017-12-18 | End: 2018-03-13 | Stop reason: SDUPTHER

## 2017-12-21 ENCOUNTER — OFFICE VISIT (OUTPATIENT)
Dept: FAMILY MEDICINE | Facility: CLINIC | Age: 67
End: 2017-12-21
Payer: COMMERCIAL

## 2017-12-21 ENCOUNTER — TELEPHONE (OUTPATIENT)
Dept: FAMILY MEDICINE | Facility: CLINIC | Age: 67
End: 2017-12-21

## 2017-12-21 VITALS
HEART RATE: 80 BPM | HEIGHT: 73 IN | SYSTOLIC BLOOD PRESSURE: 107 MMHG | BODY MASS INDEX: 31.35 KG/M2 | DIASTOLIC BLOOD PRESSURE: 59 MMHG | WEIGHT: 236.56 LBS | TEMPERATURE: 98 F

## 2017-12-21 DIAGNOSIS — H66.002 ACUTE SUPPURATIVE OTITIS MEDIA OF LEFT EAR WITHOUT SPONTANEOUS RUPTURE OF TYMPANIC MEMBRANE, RECURRENCE NOT SPECIFIED: ICD-10-CM

## 2017-12-21 DIAGNOSIS — H69.92 EUSTACHIAN TUBE DYSFUNCTION, LEFT: Primary | ICD-10-CM

## 2017-12-21 PROCEDURE — 99213 OFFICE O/P EST LOW 20 MIN: CPT | Mod: S$GLB,,, | Performed by: NURSE PRACTITIONER

## 2017-12-21 PROCEDURE — 99999 PR PBB SHADOW E&M-EST. PATIENT-LVL III: CPT | Mod: PBBFAC,,, | Performed by: NURSE PRACTITIONER

## 2017-12-21 RX ORDER — AMOXICILLIN AND CLAVULANATE POTASSIUM 875; 125 MG/1; MG/1
1 TABLET, FILM COATED ORAL EVERY 12 HOURS
Qty: 20 TABLET | Refills: 0 | Status: SHIPPED | OUTPATIENT
Start: 2017-12-21 | End: 2018-01-04 | Stop reason: SDUPTHER

## 2017-12-21 RX ORDER — POTASSIUM CHLORIDE 750 MG/1
TABLET, EXTENDED RELEASE ORAL
Refills: 1 | COMMUNITY
Start: 2017-12-20 | End: 2021-09-22 | Stop reason: SDUPTHER

## 2017-12-21 RX ORDER — FLUTICASONE FUROATE AND VILANTEROL TRIFENATATE 200; 25 UG/1; UG/1
POWDER RESPIRATORY (INHALATION)
Refills: 6 | COMMUNITY
Start: 2017-11-30 | End: 2020-08-18

## 2017-12-21 RX ORDER — PREDNISONE 20 MG/1
20 TABLET ORAL 2 TIMES DAILY
Qty: 10 TABLET | Refills: 0 | Status: SHIPPED | OUTPATIENT
Start: 2017-12-21 | End: 2017-12-26

## 2017-12-21 RX ORDER — MONTELUKAST SODIUM 10 MG/1
TABLET ORAL
Refills: 6 | COMMUNITY
Start: 2017-11-29 | End: 2019-03-20

## 2017-12-21 NOTE — TELEPHONE ENCOUNTER
----- Message from Charity Lombardo sent at 12/21/2017  6:44 AM CST -----  Contact: pt   Pt is requesting a call to discuss his ear infection,,, Please call pt back at [phone] (m)

## 2017-12-21 NOTE — PROGRESS NOTES
Subjective:       Patient ID: Roshan Menard is a 67 y.o. male.    Chief Complaint: Otalgia and Sinus Problem    Otalgia    There is pain in the left ear. This is a new problem. The current episode started in the past 7 days. The problem occurs constantly. The problem has been gradually worsening. The pain is moderate. Associated symptoms include hearing loss and rhinorrhea. Pertinent negatives include no abdominal pain, coughing, diarrhea, headaches, rash, sore throat or vomiting. Associated symptoms comments: congestion. He has tried acetaminophen for the symptoms. The treatment provided no relief.       Review of Systems   Constitutional: Negative for fatigue, fever and unexpected weight change.   HENT: Positive for ear pain, hearing loss and rhinorrhea. Negative for sore throat.    Eyes: Negative.  Negative for pain and visual disturbance.   Respiratory: Negative for cough and shortness of breath.    Cardiovascular: Negative for chest pain and palpitations.   Gastrointestinal: Negative for abdominal pain, diarrhea, nausea and vomiting.   Genitourinary: Negative for dysuria and frequency.   Musculoskeletal: Negative for arthralgias and myalgias.   Skin: Negative for color change and rash.   Neurological: Negative for dizziness and headaches.   Psychiatric/Behavioral: Negative for sleep disturbance. The patient is not nervous/anxious.        Vitals:    12/21/17 1604   BP: (!) 107/59   Pulse: 80   Temp: 98.3 °F (36.8 °C)       Objective:     Current Outpatient Prescriptions   Medication Sig Dispense Refill    albuterol (PROVENTIL/VENTOLIN) 90 mcg/actuation inhaler Inhale 2 puffs into the lungs every 4 (four) hours as needed for Wheezing.      allopurinol (ZYLOPRIM) 300 MG tablet TAKE 1 TABLET BY MOUTH EVERY DAY 30 tablet 11    atorvastatin (LIPITOR) 10 MG tablet TK 1 T PO QD      BREO ELLIPTA 200-25 mcg/dose DsDv diskus inhaler INL 1 PUFF PO AT THE SAME TIME QD  6    clopidogrel (PLAVIX) 75 mg tablet Take 75  mg by mouth once daily.      coenzyme Q10 200 mg capsule Take 200 mg by mouth once daily.      fish oil-omega-3 fatty acids 300-1,000 mg capsule Take 2 g by mouth once daily.      fluticasone (FLONASE) 50 mcg/actuation nasal spray 1 spray by Each Nare route once daily. 16 g 12    furosemide (LASIX) 20 MG tablet Take 20 mg by mouth once daily.       losartan (COZAAR) 100 MG tablet TAKE 1 TABLET BY MOUTH DAILY 30 tablet 12    metoprolol succinate (TOPROL-XL) 50 MG 24 hr tablet Take 1 tablet (50 mg total) by mouth once daily. 30 tablet 11    montelukast (SINGULAIR) 10 mg tablet   6    multivitamin (ONE DAILY MULTIVITAMIN) per tablet Take 1 tablet by mouth once daily.      naproxen (NAPROSYN) 500 MG tablet TAKE 1 TABLET(500 MG) BY MOUTH TWICE DAILY 60 tablet 0    NEXIUM 40 mg capsule TAKE 1 CAPSULE BY MOUTH BEFORE BREAKFAST EVERY DAY 30 capsule 10    potassium chloride (KLOR-CON) 10 MEQ TbSR TK 1 T PO ONCE A DAY.  1    amoxicillin-clavulanate 875-125mg (AUGMENTIN) 875-125 mg per tablet Take 1 tablet by mouth every 12 (twelve) hours. 20 tablet 0    potassium chloride SA (K-DUR,KLOR-CON) 10 MEQ tablet   4    predniSONE (DELTASONE) 20 MG tablet Take 1 tablet (20 mg total) by mouth 2 (two) times daily. 10 tablet 0     No current facility-administered medications for this visit.        Physical Exam   Constitutional: He is oriented to person, place, and time. He appears well-developed. No distress.   HENT:   Head: Normocephalic and atraumatic.   Right Ear: Tympanic membrane normal.   Left Ear: Tympanic membrane is erythematous and bulging.   Mouth/Throat: Posterior oropharyngeal edema (post nasal mucus) present.   Eyes: EOM are normal. Pupils are equal, round, and reactive to light.   Neck: Normal range of motion. Neck supple.   Cardiovascular: Normal rate and regular rhythm.    Pulmonary/Chest: Effort normal and breath sounds normal.   Musculoskeletal: Normal range of motion.   Neurological: He is alert and  oriented to person, place, and time.   Skin: Skin is warm and dry. No rash noted.   Psychiatric: He has a normal mood and affect. Thought content normal.   Nursing note and vitals reviewed.      Assessment:       1. Eustachian tube dysfunction, left    2. Acute suppurative otitis media of left ear without spontaneous rupture of tympanic membrane, recurrence not specified        Plan:   Eustachian tube dysfunction, left    Acute suppurative otitis media of left ear without spontaneous rupture of tympanic membrane, recurrence not specified    Other orders  -     predniSONE (DELTASONE) 20 MG tablet; Take 1 tablet (20 mg total) by mouth 2 (two) times daily.  Dispense: 10 tablet; Refill: 0  -     amoxicillin-clavulanate 875-125mg (AUGMENTIN) 875-125 mg per tablet; Take 1 tablet by mouth every 12 (twelve) hours.  Dispense: 20 tablet; Refill: 0        Return if symptoms worsen or fail to improve.

## 2018-01-04 ENCOUNTER — OFFICE VISIT (OUTPATIENT)
Dept: FAMILY MEDICINE | Facility: CLINIC | Age: 68
End: 2018-01-04
Payer: COMMERCIAL

## 2018-01-04 VITALS
BODY MASS INDEX: 31.47 KG/M2 | TEMPERATURE: 98 F | HEART RATE: 72 BPM | DIASTOLIC BLOOD PRESSURE: 87 MMHG | SYSTOLIC BLOOD PRESSURE: 137 MMHG | WEIGHT: 237.44 LBS | HEIGHT: 73 IN

## 2018-01-04 DIAGNOSIS — L03.90 CELLULITIS, UNSPECIFIED CELLULITIS SITE: Primary | ICD-10-CM

## 2018-01-04 DIAGNOSIS — J06.9 UPPER RESPIRATORY TRACT INFECTION, UNSPECIFIED TYPE: ICD-10-CM

## 2018-01-04 PROCEDURE — 99213 OFFICE O/P EST LOW 20 MIN: CPT | Mod: S$GLB,,, | Performed by: FAMILY MEDICINE

## 2018-01-04 PROCEDURE — 99999 PR PBB SHADOW E&M-EST. PATIENT-LVL III: CPT | Mod: PBBFAC,,, | Performed by: FAMILY MEDICINE

## 2018-01-04 RX ORDER — AMOXICILLIN AND CLAVULANATE POTASSIUM 875; 125 MG/1; MG/1
1 TABLET, FILM COATED ORAL EVERY 12 HOURS
Qty: 20 TABLET | Refills: 0 | Status: SHIPPED | OUTPATIENT
Start: 2018-01-04 | End: 2018-04-05 | Stop reason: SDUPTHER

## 2018-01-04 NOTE — PROGRESS NOTES
Roshan Menard presents with moderate red tender swollen middle toe Also co  upper respiratory congestion,rhinnorhea,moderate cough past 2-3 days. OTC help slightly. Denies nausea,vomiting,diarrhea or significant fever.    Past Medical History:   Diagnosis Date    Allergy     Arthritis     Atrial fibrillation     Bronchitis     Cataract     GERD (gastroesophageal reflux disease)     Gout, chronic     Hypertension      Past Surgical History:   Procedure Laterality Date    BACK SURGERY      CHOLECYSTECTOMY      CORONARY ANGIOPLASTY WITH STENT PLACEMENT      TONSILLECTOMY      TOTAL KNEE ARTHROPLASTY      VASECTOMY       Review of patient's allergies indicates:   Allergen Reactions    Azithromycin     Cefaclor Other (See Comments)     Other reaction(s): Hives    Iodine and iodide containing products      Other reaction(s): Unknown    Ivp dye  [iodinated contrast- oral and iv dye] Other (See Comments)    Sulfa (sulfonamide antibiotics)     Doxycycline Rash     Current Outpatient Prescriptions on File Prior to Visit   Medication Sig Dispense Refill    albuterol (PROVENTIL/VENTOLIN) 90 mcg/actuation inhaler Inhale 2 puffs into the lungs every 4 (four) hours as needed for Wheezing.      allopurinol (ZYLOPRIM) 300 MG tablet TAKE 1 TABLET BY MOUTH EVERY DAY 30 tablet 11    atorvastatin (LIPITOR) 10 MG tablet TK 1 T PO QD      BREO ELLIPTA 200-25 mcg/dose DsDv diskus inhaler INL 1 PUFF PO AT THE SAME TIME QD  6    clopidogrel (PLAVIX) 75 mg tablet Take 75 mg by mouth once daily.      coenzyme Q10 200 mg capsule Take 200 mg by mouth once daily.      fish oil-omega-3 fatty acids 300-1,000 mg capsule Take 2 g by mouth once daily.      fluticasone (FLONASE) 50 mcg/actuation nasal spray 1 spray by Each Nare route once daily. 16 g 12    furosemide (LASIX) 20 MG tablet Take 20 mg by mouth once daily.       losartan (COZAAR) 100 MG tablet TAKE 1 TABLET BY MOUTH DAILY 30 tablet 12    metoprolol  succinate (TOPROL-XL) 50 MG 24 hr tablet Take 1 tablet (50 mg total) by mouth once daily. 30 tablet 11    montelukast (SINGULAIR) 10 mg tablet   6    multivitamin (ONE DAILY MULTIVITAMIN) per tablet Take 1 tablet by mouth once daily.      naproxen (NAPROSYN) 500 MG tablet TAKE 1 TABLET(500 MG) BY MOUTH TWICE DAILY 60 tablet 0    NEXIUM 40 mg capsule TAKE 1 CAPSULE BY MOUTH BEFORE BREAKFAST EVERY DAY 30 capsule 10    potassium chloride (KLOR-CON) 10 MEQ TbSR TK 1 T PO ONCE A DAY.  1    amoxicillin-clavulanate 875-125mg (AUGMENTIN) 875-125 mg per tablet Take 1 tablet by mouth every 12 (twelve) hours. 20 tablet 0    [DISCONTINUED] potassium chloride SA (K-DUR,KLOR-CON) 10 MEQ tablet   4     No current facility-administered medications on file prior to visit.      Social History     Social History    Marital status:      Spouse name: N/A    Number of children: N/A    Years of education: N/A     Occupational History     [employer]     Social History Main Topics    Smoking status: Former Smoker     Packs/day: 1.50     Years: 22.00     Quit date: 5/7/1992    Smokeless tobacco: Not on file    Alcohol use No    Drug use: No    Sexual activity: Not on file     Other Topics Concern    Not on file     Social History Narrative    No narrative on file     Family History   Problem Relation Age of Onset    Cancer Mother     Early death Mother     Arthritis Father     Diabetes Father     Heart disease Father     Hyperlipidemia Father     Hypertension Father          ROS:  SKIN: No rashes, itching or changes in color or texture of skin.  EYES: Visual acuity fine. No photophobia, ocular pain or diplopia.EARS: Denies ear pain, discharge or vertigo.NOSE: No loss of smell, no epistaxis some postnasal drip.MOUTH & THROAT: No hoarseness or change in voice. No excessive gum bleeding.CHEST: Denies GLASER, cyanosis, wheezing  CARDIOVASCULAR: Denies chest pain, PND, orthopnea or reduced exercise  tolerance.  ABDOMEN:  No weight loss.No abdominal pain, no hematemesis or blood in stool.  URINARY: No flank pain, dysuria or hematuria.  PERIPHERAL VASCULAR: No claudication or cyanosis.  MUSCULOSKELETAL: Negative   NEUROLOGIC: No history of seizures, paralysis, alteration of gait or coordination.    PE: Vital signs as noted  Heent:Normocephalic with no recent cranial trauma,PERRLA,EOMI,conjunctiva clear,fundi reveal no hemmorhage exudate or papilledema.Otic canals clear, tympanic membranes slightly dull bilaterally.Nasal mucosa slightly red and edematous.Posterior pharynx slightly red but without exudate.  Neck:Supple with minimal anterior cervical adenopathy.  Chest:Clear bilateral breath sounds with mild scattered ronchi  Heart:Regular rhthym without murmer  Abdomen:Soft, non tender,no masses, no hepatosplenomegalyExtremeties and Neurologic:Grossly within normal limits  Impression: Cellulitis  Upper Respiratory Infection. 465.9  Plan:

## 2018-03-13 RX ORDER — NAPROXEN 500 MG/1
TABLET ORAL
Qty: 60 TABLET | Refills: 12 | Status: SHIPPED | OUTPATIENT
Start: 2018-03-13 | End: 2019-07-10 | Stop reason: SDUPTHER

## 2018-03-19 RX ORDER — ALLOPURINOL 300 MG/1
TABLET ORAL
Qty: 30 TABLET | Refills: 12 | Status: SHIPPED | OUTPATIENT
Start: 2018-03-19 | End: 2019-04-18 | Stop reason: SDUPTHER

## 2018-04-05 ENCOUNTER — OFFICE VISIT (OUTPATIENT)
Dept: FAMILY MEDICINE | Facility: CLINIC | Age: 68
End: 2018-04-05
Payer: COMMERCIAL

## 2018-04-05 VITALS
DIASTOLIC BLOOD PRESSURE: 89 MMHG | SYSTOLIC BLOOD PRESSURE: 151 MMHG | HEIGHT: 73 IN | BODY MASS INDEX: 32.21 KG/M2 | TEMPERATURE: 98 F | HEART RATE: 74 BPM | WEIGHT: 243.06 LBS

## 2018-04-05 DIAGNOSIS — L97.519 ULCER OF TOE OF RIGHT FOOT, UNSPECIFIED ULCER STAGE: ICD-10-CM

## 2018-04-05 DIAGNOSIS — L03.031 CELLULITIS OF RIGHT TOE: Primary | ICD-10-CM

## 2018-04-05 PROCEDURE — 3077F SYST BP >= 140 MM HG: CPT | Mod: CPTII,S$GLB,, | Performed by: NURSE PRACTITIONER

## 2018-04-05 PROCEDURE — 99214 OFFICE O/P EST MOD 30 MIN: CPT | Mod: S$GLB,,, | Performed by: NURSE PRACTITIONER

## 2018-04-05 PROCEDURE — 3079F DIAST BP 80-89 MM HG: CPT | Mod: CPTII,S$GLB,, | Performed by: NURSE PRACTITIONER

## 2018-04-05 PROCEDURE — 99999 PR PBB SHADOW E&M-EST. PATIENT-LVL III: CPT | Mod: PBBFAC,,, | Performed by: NURSE PRACTITIONER

## 2018-04-05 RX ORDER — AMOXICILLIN AND CLAVULANATE POTASSIUM 875; 125 MG/1; MG/1
1 TABLET, FILM COATED ORAL EVERY 12 HOURS
Qty: 20 TABLET | Refills: 0 | Status: SHIPPED | OUTPATIENT
Start: 2018-04-05 | End: 2018-07-23

## 2018-04-05 RX ORDER — UREA 40 %
CREAM (GRAM) TOPICAL 2 TIMES DAILY PRN
Refills: 0 | COMMUNITY
Start: 2018-01-22

## 2018-04-05 RX ORDER — MUPIROCIN 20 MG/G
OINTMENT TOPICAL
Refills: 0 | COMMUNITY
Start: 2018-01-19 | End: 2019-03-20

## 2018-04-05 NOTE — PROGRESS NOTES
Subjective:       Patient ID: Roshan Menard is a 68 y.o. male.    Chief Complaint: Toe Pain    Toe Pain    The incident occurred 12 to 24 hours ago. There was no injury mechanism. Pain location: left 2nd toe. The quality of the pain is described as aching. The pain is moderate. The pain has been constant since onset. Associated symptoms comments: Erythema, edema. The symptoms are aggravated by weight bearing and palpation. He has tried nothing for the symptoms.       Review of Systems   Constitutional: Negative for fatigue, fever and unexpected weight change.   HENT: Negative for ear pain and sore throat.    Eyes: Negative.  Negative for pain and visual disturbance.   Respiratory: Negative for cough and shortness of breath.    Cardiovascular: Negative for chest pain and palpitations.   Gastrointestinal: Negative for abdominal pain, diarrhea, nausea and vomiting.   Genitourinary: Negative for dysuria and frequency.   Musculoskeletal: Positive for arthralgias. Negative for myalgias.   Skin: Positive for wound ( left 2nd toe). Negative for color change and rash.   Neurological: Negative for dizziness and headaches.   Psychiatric/Behavioral: Negative for sleep disturbance. The patient is not nervous/anxious.        Vitals:    04/05/18 1528   BP: (!) 151/89   Pulse: 74   Temp: 97.9 °F (36.6 °C)       Objective:     Current Outpatient Prescriptions   Medication Sig Dispense Refill    albuterol (PROVENTIL/VENTOLIN) 90 mcg/actuation inhaler Inhale 2 puffs into the lungs every 4 (four) hours as needed for Wheezing.      allopurinol (ZYLOPRIM) 300 MG tablet TAKE 1 TABLET BY MOUTH EVERY DAY 30 tablet 12    atorvastatin (LIPITOR) 10 MG tablet TK 1 T PO QD      BREO ELLIPTA 200-25 mcg/dose DsDv diskus inhaler INL 1 PUFF PO AT THE SAME TIME QD  6    clopidogrel (PLAVIX) 75 mg tablet Take 75 mg by mouth once daily.      coenzyme Q10 200 mg capsule Take 200 mg by mouth once daily.      fish oil-omega-3 fatty acids 300-1,000  mg capsule Take 2 g by mouth once daily.      fluticasone (FLONASE) 50 mcg/actuation nasal spray 1 spray by Each Nare route once daily. 16 g 12    furosemide (LASIX) 20 MG tablet Take 20 mg by mouth once daily.       losartan (COZAAR) 100 MG tablet TAKE 1 TABLET BY MOUTH DAILY 30 tablet 12    metoprolol succinate (TOPROL-XL) 50 MG 24 hr tablet Take 1 tablet (50 mg total) by mouth once daily. 30 tablet 11    montelukast (SINGULAIR) 10 mg tablet   6    multivitamin (ONE DAILY MULTIVITAMIN) per tablet Take 1 tablet by mouth once daily.      mupirocin (BACTROBAN) 2 % ointment DIAMANTE AA ON THE SKIN TID  0    naproxen (NAPROSYN) 500 MG tablet TAKE 1 TABLET(500 MG) BY MOUTH TWICE DAILY 60 tablet 12    NEXIUM 40 mg capsule TAKE 1 CAPSULE BY MOUTH BEFORE BREAKFAST EVERY DAY 30 capsule 10    potassium chloride (KLOR-CON) 10 MEQ TbSR TK 1 T PO ONCE A DAY.  1    urea (CARMOL) 40 % Crea DIAMANTE AA ON SKIN BID  0    amoxicillin-clavulanate 875-125mg (AUGMENTIN) 875-125 mg per tablet Take 1 tablet by mouth every 12 (twelve) hours. 20 tablet 0     No current facility-administered medications for this visit.        Physical Exam   Constitutional: He is oriented to person, place, and time. He appears well-developed. No distress.   HENT:   Head: Normocephalic and atraumatic.   Eyes: EOM are normal. Pupils are equal, round, and reactive to light.   Neck: Normal range of motion. Neck supple.   Cardiovascular: Normal rate and regular rhythm.    Pulmonary/Chest: Effort normal and breath sounds normal.   Musculoskeletal: Normal range of motion.   Feet:   Left Foot:   Skin Integrity: Positive for skin breakdown and erythema.   Neurological: He is alert and oriented to person, place, and time.   Skin: Skin is warm and dry. No rash noted.   Psychiatric: He has a normal mood and affect. Thought content normal.   Nursing note and vitals reviewed.                  Assessment:       1. Cellulitis of right toe    2. Ulcer of toe of right  foot, unspecified ulcer stage        Plan:   Cellulitis of right toe  -     Ambulatory referral to Podiatry    Ulcer of toe of right foot, unspecified ulcer stage  -     Ambulatory referral to Podiatry    Other orders  -     amoxicillin-clavulanate 875-125mg (AUGMENTIN) 875-125 mg per tablet; Take 1 tablet by mouth every 12 (twelve) hours.  Dispense: 20 tablet; Refill: 0        Follow-up if symptoms worsen or fail to improve.

## 2018-04-07 ENCOUNTER — OFFICE VISIT (OUTPATIENT)
Dept: FAMILY MEDICINE | Facility: CLINIC | Age: 68
End: 2018-04-07
Payer: COMMERCIAL

## 2018-04-07 VITALS
TEMPERATURE: 98 F | HEART RATE: 79 BPM | BODY MASS INDEX: 32.23 KG/M2 | WEIGHT: 238 LBS | SYSTOLIC BLOOD PRESSURE: 140 MMHG | HEIGHT: 72 IN | DIASTOLIC BLOOD PRESSURE: 74 MMHG

## 2018-04-07 DIAGNOSIS — I25.10 CORONARY ARTERY DISEASE INVOLVING NATIVE HEART WITHOUT ANGINA PECTORIS, UNSPECIFIED VESSEL OR LESION TYPE: ICD-10-CM

## 2018-04-07 DIAGNOSIS — L97.519 ULCER OF TOE OF RIGHT FOOT, UNSPECIFIED ULCER STAGE: Primary | ICD-10-CM

## 2018-04-07 DIAGNOSIS — L03.031 CELLULITIS OF RIGHT TOE: ICD-10-CM

## 2018-04-07 PROCEDURE — 99999 PR PBB SHADOW E&M-EST. PATIENT-LVL IV: CPT | Mod: PBBFAC,,, | Performed by: FAMILY MEDICINE

## 2018-04-07 PROCEDURE — 99214 OFFICE O/P EST MOD 30 MIN: CPT | Mod: S$GLB,,, | Performed by: FAMILY MEDICINE

## 2018-04-07 PROCEDURE — 3077F SYST BP >= 140 MM HG: CPT | Mod: CPTII,S$GLB,, | Performed by: FAMILY MEDICINE

## 2018-04-07 PROCEDURE — 3078F DIAST BP <80 MM HG: CPT | Mod: CPTII,S$GLB,, | Performed by: FAMILY MEDICINE

## 2018-04-07 RX ORDER — CLINDAMYCIN HYDROCHLORIDE 300 MG/1
300 CAPSULE ORAL 3 TIMES DAILY
Qty: 30 CAPSULE | Refills: 0 | Status: SHIPPED | OUTPATIENT
Start: 2018-04-07 | End: 2018-07-23

## 2018-04-07 NOTE — PROGRESS NOTES
He believes he had a contusion on the toe right foot back in January.  He was also in a cold area and thought he may have had frostbite on the second toe.  He states he also had a biopsy by the dermatologist on the toe.  Since then he has had a persistent lesion.  He's had some worsening of symptoms with some redness and swelling at the second toe during the past week.  Started on antibiotics by the nurse practitioner 2 days ago.  He has an appointment pending Monday with the podiatrist.  He does have chronic numbness in the right foot which   he relates to previous back problem. states he has hemoglobin A1c about 6 months ago which was 5.3. He apparently had some elevation of glucose in the past associated with steroids for an unrelated illness, but apparently has not been diagnosed with diabetes.  He does have coronary disease and previous smoking history.  He denies obvious claudication.  No fever or chills.    Past Medical History:  Past Medical History:   Diagnosis Date    Allergy     Arthritis     Atrial fibrillation     Bronchitis     Cataract     Coronary artery disease     GERD (gastroesophageal reflux disease)     Gout, chronic     Hypertension      Past Surgical History:   Procedure Laterality Date    BACK SURGERY      CHOLECYSTECTOMY      CORONARY ANGIOPLASTY WITH STENT PLACEMENT      TONSILLECTOMY      TOTAL KNEE ARTHROPLASTY      VASECTOMY       Social History     Social History    Marital status:      Spouse name: N/A    Number of children: N/A    Years of education: N/A     Occupational History     [employer]     Social History Main Topics    Smoking status: Former Smoker     Packs/day: 1.50     Years: 22.00     Quit date: 5/7/1992    Smokeless tobacco: Not on file    Alcohol use No    Drug use: No    Sexual activity: Not on file     Other Topics Concern    Not on file     Social History Narrative    No narrative on file     Family History   Problem  Relation Age of Onset    Cancer Mother     Early death Mother     Arthritis Father     Diabetes Father     Heart disease Father     Hyperlipidemia Father     Hypertension Father      Review of patient's allergies indicates:   Allergen Reactions    Azithromycin     Cefaclor Other (See Comments)     Other reaction(s): Hives    Iodine and iodide containing products      Other reaction(s): Unknown    Ivp dye  [iodinated contrast- oral and iv dye] Other (See Comments)    Sulfa (sulfonamide antibiotics)     Doxycycline Rash     Current Outpatient Prescriptions on File Prior to Visit   Medication Sig Dispense Refill    albuterol (PROVENTIL/VENTOLIN) 90 mcg/actuation inhaler Inhale 2 puffs into the lungs every 4 (four) hours as needed for Wheezing.      allopurinol (ZYLOPRIM) 300 MG tablet TAKE 1 TABLET BY MOUTH EVERY DAY 30 tablet 12    amoxicillin-clavulanate 875-125mg (AUGMENTIN) 875-125 mg per tablet Take 1 tablet by mouth every 12 (twelve) hours. 20 tablet 0    atorvastatin (LIPITOR) 10 MG tablet TK 1 T PO QD      BREO ELLIPTA 200-25 mcg/dose DsDv diskus inhaler INL 1 PUFF PO AT THE SAME TIME QD  6    clopidogrel (PLAVIX) 75 mg tablet Take 75 mg by mouth once daily.      coenzyme Q10 200 mg capsule Take 200 mg by mouth once daily.      fish oil-omega-3 fatty acids 300-1,000 mg capsule Take 2 g by mouth once daily.      fluticasone (FLONASE) 50 mcg/actuation nasal spray 1 spray by Each Nare route once daily. 16 g 12    furosemide (LASIX) 20 MG tablet Take 20 mg by mouth once daily.       losartan (COZAAR) 100 MG tablet TAKE 1 TABLET BY MOUTH DAILY 30 tablet 12    metoprolol succinate (TOPROL-XL) 50 MG 24 hr tablet Take 1 tablet (50 mg total) by mouth once daily. 30 tablet 11    montelukast (SINGULAIR) 10 mg tablet   6    multivitamin (ONE DAILY MULTIVITAMIN) per tablet Take 1 tablet by mouth once daily.      mupirocin (BACTROBAN) 2 % ointment DIAMANTE AA ON THE SKIN TID  0    naproxen (NAPROSYN)  500 MG tablet TAKE 1 TABLET(500 MG) BY MOUTH TWICE DAILY 60 tablet 12    NEXIUM 40 mg capsule TAKE 1 CAPSULE BY MOUTH BEFORE BREAKFAST EVERY DAY 30 capsule 10    potassium chloride (KLOR-CON) 10 MEQ TbSR TK 1 T PO ONCE A DAY.  1    urea (CARMOL) 40 % Crea DIAMANTE AA ON SKIN BID  0     No current facility-administered medications on file prior to visit.            ROS:  GENERAL: No fever, chills,  or significant weight changes.   CARDIOVASCULAR: Denies chest pain, PND, orthopnea.      OBJECTIVE:   Vitals:    04/07/18 0957   BP: (!) 140/74   Pulse: 79   Temp: 97.8 °F (36.6 °C)   Weight: 108 kg (238 lb)   Height: 6' (1.829 m)     Wt Readings from Last 3 Encounters:   04/07/18 108 kg (238 lb)   04/05/18 110.2 kg (243 lb 0.9 oz)   01/04/18 107.7 kg (237 lb 7 oz)       APPEARANCE: Well nourished, well developed, in no acute distress.   MENTAL STATUS: Alert. Oriented x 3.   He has diminished sensation at the right foot.  He has palpable posterior tibial pulse.  Unable to palpate dorsalis pedis pulse.  He has swelling and redness and erythema of the second toe.  Not tender to palpation.  He does have onychomycosis changes.  Ulceration on the tip of the toe with eschar          Roshan was seen today for infection.    Diagnoses and all orders for this visit:    Ulcer of toe of right foot, unspecified ulcer stage  -     Hemoglobin A1c; Future  -     Basic metabolic panel; Future  -     US Lower Extrem Arteries Bilat with VY (xpd); Future  -     Hemoglobin A1c  -     Basic metabolic panel    Cellulitis of right toe  -     Hemoglobin A1c; Future  -     Basic metabolic panel; Future  -     US Lower Extrem Arteries Bilat with VY (xpd); Future  -     Hemoglobin A1c  -     Basic metabolic panel    Coronary artery disease involving native heart without angina pectoris, unspecified vessel or lesion type    Other orders  -     clindamycin (CLEOCIN) 300 MG capsule; Take 1 capsule (300 mg total) by mouth 3 (three) times daily.  -      lactobacillus comb no.10 (PROBIOTIC) 20 billion cell Cap; Take 1 capsule by mouth once daily. Or as directed on bottle      We'll add clindamycin for coverage for possible MRSA.  Continue Augmentin.  Add probiotic.  Vascular and laboratory evaluation as above.  We don't have x-ray today Saturday available in the clinic however he already has an appointment with the podiatrist Monday. Follow-up ER if increasing symptoms or fever.

## 2018-04-09 ENCOUNTER — HOSPITAL ENCOUNTER (OUTPATIENT)
Dept: RADIOLOGY | Facility: HOSPITAL | Age: 68
Discharge: HOME | End: 2018-04-09
Attending: PODIATRIST
Payer: COMMERCIAL

## 2018-04-09 ENCOUNTER — OFFICE VISIT (OUTPATIENT)
Dept: PODIATRY | Facility: CLINIC | Age: 68
End: 2018-04-09
Payer: COMMERCIAL

## 2018-04-09 VITALS — HEIGHT: 73 IN | BODY MASS INDEX: 32.2 KG/M2 | WEIGHT: 242.94 LBS

## 2018-04-09 DIAGNOSIS — L97.512 RIGHT SECOND TOE ULCER, WITH FAT LAYER EXPOSED: ICD-10-CM

## 2018-04-09 DIAGNOSIS — L03.031 CELLULITIS OF RIGHT TOE: ICD-10-CM

## 2018-04-09 DIAGNOSIS — G60.9 IDIOPATHIC PERIPHERAL NEUROPATHY: ICD-10-CM

## 2018-04-09 DIAGNOSIS — L03.031 CELLULITIS OF RIGHT TOE: Primary | ICD-10-CM

## 2018-04-09 LAB
BUN SERPL-MCNC: 19 MG/DL (ref 7–25)
BUN/CREAT SERPL: ABNORMAL (CALC) (ref 6–22)
CALCIUM SERPL-MCNC: 9.6 MG/DL (ref 8.6–10.3)
CHLORIDE SERPL-SCNC: 103 MMOL/L (ref 98–110)
CO2 SERPL-SCNC: 27 MMOL/L (ref 20–31)
CREAT SERPL-MCNC: 1.02 MG/DL (ref 0.7–1.25)
GFR SERPL CREATININE-BSD FRML MDRD: 76 ML/MIN/1.73M2
GLUCOSE SERPL-MCNC: 105 MG/DL (ref 65–99)
HBA1C MFR BLD: 5.1 % OF TOTAL HGB
POTASSIUM SERPL-SCNC: 4.5 MMOL/L (ref 3.5–5.3)
SODIUM SERPL-SCNC: 140 MMOL/L (ref 135–146)

## 2018-04-09 PROCEDURE — 99999 PR PBB SHADOW E&M-EST. PATIENT-LVL III: CPT | Mod: PBBFAC,,, | Performed by: PODIATRIST

## 2018-04-09 PROCEDURE — 99213 OFFICE O/P EST LOW 20 MIN: CPT | Mod: 25,S$GLB,, | Performed by: PODIATRIST

## 2018-04-09 PROCEDURE — 73630 X-RAY EXAM OF FOOT: CPT | Mod: TC,PO,RT

## 2018-04-09 PROCEDURE — 11042 DBRDMT SUBQ TIS 1ST 20SQCM/<: CPT | Mod: T6,S$GLB,, | Performed by: PODIATRIST

## 2018-04-09 PROCEDURE — 73630 X-RAY EXAM OF FOOT: CPT | Mod: 26,RT,, | Performed by: RADIOLOGY

## 2018-04-09 NOTE — LETTER
April 13, 2018      Juanjose Nair NP at [facility]  [address] [address] [facility] [address]           [facility]  [address] [facility] [address] [place] [address]  Phone: [phone]          Patient: Roshan Menard   MR Number: [id number]   YOB: 1950   Date of Visit: 4/9/2018       Dear Juanjose Nair:    Thank you for referring Roshan Menard to me for evaluation. Attached you will find relevant portions of my assessment and plan of care.    If you have questions, please do not hesitate to call me. I look forward to following Roshan Menard along with you.    Sincerely,    Alfonso Rivera, DOLORES    Enclosure  CC:  No Recipients    If you would like to receive this communication electronically, please contact [email or web address] or [phone] to request more information on [employer] access.    For providers and/or their staff who would like to refer a patient to [facility], please contact us through our one-stop-shop provider referral line, [facility], at [phone].    If you feel you have received this communication in error or would no longer like to receive these types of communications, please e-mail [email or web address]

## 2018-04-09 NOTE — LETTER
April 9, 2018    Roshan Menard  [address]  [facility] [address]         [facility]  [address] [facility] [address] [place] [address]  Phone: [phone] April 9, 2018     Patient: Roshan Menard   YOB: 1950   Date of Visit: 4/9/2018       To Whom It May Concern:    It is my medical opinion that Roshan Menard may return to light duty immediately with the following restrictions: right foot will need to wear post op shoe and walk only short distances at a time for the next week.    If you have any questions or concerns, please don't hesitate to call.    Sincerely,        Alfonso Rivera DPM

## 2018-04-10 ENCOUNTER — TELEPHONE (OUTPATIENT)
Dept: RADIOLOGY | Facility: HOSPITAL | Age: 68
End: 2018-04-10

## 2018-04-11 ENCOUNTER — HOSPITAL ENCOUNTER (OUTPATIENT)
Dept: RADIOLOGY | Facility: HOSPITAL | Age: 68
Discharge: HOME | End: 2018-04-11
Attending: FAMILY MEDICINE
Payer: COMMERCIAL

## 2018-04-11 DIAGNOSIS — L03.031 CELLULITIS OF RIGHT TOE: ICD-10-CM

## 2018-04-11 DIAGNOSIS — L97.519 ULCER OF TOE OF RIGHT FOOT, UNSPECIFIED ULCER STAGE: ICD-10-CM

## 2018-04-11 PROCEDURE — 93922 UPR/L XTREMITY ART 2 LEVELS: CPT | Mod: 26,,, | Performed by: RADIOLOGY

## 2018-04-11 PROCEDURE — 93922 UPR/L XTREMITY ART 2 LEVELS: CPT | Mod: TC,PO

## 2018-04-11 PROCEDURE — 93925 LOWER EXTREMITY STUDY: CPT | Mod: 26,,, | Performed by: RADIOLOGY

## 2018-04-13 NOTE — PROGRESS NOTES
Subjective:      Patient ID: Roshan Menard is a 68 y.o. male.    Chief Complaint: Foot Problem (Cellulitis of right toe, PCP-Juanjose Nair NP, A1c-5.8-10/07/2014)    Roshan is a 68 y.o. male who presents to the podiatry clinic  with complaint of  right foot pain and redness to the second toe. He first hit the toe back in December and the end of the toe turned dark, he though maybe he developed frostbite or gangrene. He saw a dermatologist who took a skin biopsy and sent him to wound care. Within the last couple of days the toe become more swollen and red and he was started on augmentin and clindamycin.         Review of Systems   Constitution: Negative for chills and fever.   Cardiovascular: Negative for claudication and leg swelling.   Respiratory: Negative for shortness of breath.    Skin: Positive for nail changes and poor wound healing. Negative for itching and rash.   Musculoskeletal: Negative for muscle cramps, muscle weakness and myalgias.   Gastrointestinal: Negative for nausea and vomiting.   Neurological: Positive for numbness. Negative for focal weakness and loss of balance.           Objective:      Physical Exam   Constitutional: He is oriented to person, place, and time. He appears well-developed and well-nourished. No distress.   Cardiovascular:   Pulses:       Dorsalis pedis pulses are 2+ on the right side, and 2+ on the left side.        Posterior tibial pulses are 2+ on the right side, and 2+ on the left side.   < 3 sec capillary refill time to toes 1-5 bilateral. Toes and feet are warm to touch proximally with normal distal cooling b/l. There is some hair growth on the feet and toes b/l. There is no edema b/l. No spider veins or varicosities present b/l.      Musculoskeletal:   Semi reducible contractures noted at the PIPJ and MTPJ of toes 2-5 bilateral.    Equinus noted b/l ankles with < 10 deg DF noted. MMT 5/5 in DF/PF/Inv/Ev resistance with no reproduction of pain in any direction. Passive  range of motion of ankle and pedal joints is painless b/l.     Feet:   Right Foot:   Protective Sensation: 10 sites tested. 4 sites sensed.   Left Foot:   Protective Sensation: 10 sites tested. 5 sites sensed.   Neurological: He is alert and oriented to person, place, and time. He has normal strength. He displays no atrophy and no tremor. A sensory deficit is present. He exhibits normal muscle tone.   Negative tinel sign bilateral. Decreased vibratory sensation bilateral.   Skin: Skin is warm and dry. No abrasion, no bruising, no burn, no ecchymosis, no laceration, no lesion, no petechiae and no rash noted. He is not diaphoretic. There is erythema. No cyanosis. No pallor. Nails show no clubbing.   Skin temperature, texture and turgor within normal limits.    Right second toe there is a distal hemorrhagic blister/callus formation with a lytic nail only attached by a thin layer of surrounding skin, there is underlying serous drainage. After debridement the nail came off and there was a distal ulceration 0.3x0.3x0.1 cm with serous drainage and slight surrounding erythema and edema, the wound did not probe deep.    Psychiatric: He has a normal mood and affect. His behavior is normal.             Assessment:       Encounter Diagnoses   Name Primary?    Cellulitis of right toe Yes    Idiopathic peripheral neuropathy     Right second toe ulcer, with fat layer exposed          Plan:       Roshan was seen today for foot problem.    Diagnoses and all orders for this visit:    Cellulitis of right toe  -     X-Ray Foot Complete Right; Future    Idiopathic peripheral neuropathy    Right second toe ulcer, with fat layer exposed  -     Debridement      I counseled the patient on his conditions, their implications and medical management.    Shoe inspection. Patient instructed on proper foot hygeine. We discussed wearing proper shoe gear, daily foot inspections, never walking without protective shoe gear, never putting sharp  instruments to feet    Wound was debrided at bedside, see attached procedure note.    Dressed with neosporin and gauze/coban. Using a crest pad to offload the end of the toe.     He will change dressings daily with band aid and neosporin, take the antibiotics as prescribed. X-ray to rule out signs of osteo.     Ambulate in darco shoe for offloading.     Return in 1 week for follow up wound care.    Alfonso Rivera DPM

## 2018-04-13 NOTE — PROCEDURES
"Wound Debridement  Date/Time: 4/9/2018 10:00 AM  Performed by: BO RAMOS  Authorized by: BO RAMOS     Time out: Immediately prior to procedure a "time out" was called to verify the correct patient, procedure, equipment, support staff and site/side marked as required.    Consent Done?:  Yes (Verbal)  Local anesthesia used?: No      Wound Details:    Location:  Right foot    Location:  Right 2nd Toe    Type of Debridement:  Excisional       Length (cm):  0.3       Area (sq cm):  0.09       Width (cm):  0.3       Percent Debrided (%):  100       Depth (cm):  0.1       Total Area Debrided (sq cm):  0.09    Depth of debridement:  Subcutaneous tissue    Tissue debrided:  Dermis and Subcutaneous    Devitalized tissue debrided:  Sough and Callus (nail)    Instruments:  Nippers, Blade and Forceps    Bleeding:  Minimal  Hemostasis Achieved: Yes    Method Used:  Pressure  Patient tolerance:  Patient tolerated the procedure well with no immediate complications      "

## 2018-04-16 ENCOUNTER — OFFICE VISIT (OUTPATIENT)
Dept: PODIATRY | Facility: CLINIC | Age: 68
End: 2018-04-16
Payer: COMMERCIAL

## 2018-04-16 VITALS — HEIGHT: 73 IN | WEIGHT: 242.94 LBS | BODY MASS INDEX: 32.2 KG/M2

## 2018-04-16 DIAGNOSIS — Z87.2 HEALED ULCER OF RIGHT FOOT ON EXAMINATION: ICD-10-CM

## 2018-04-16 DIAGNOSIS — G60.9 IDIOPATHIC PERIPHERAL NEUROPATHY: ICD-10-CM

## 2018-04-16 DIAGNOSIS — B35.1 ONYCHOMYCOSIS DUE TO DERMATOPHYTE: ICD-10-CM

## 2018-04-16 DIAGNOSIS — M76.822 POSTERIOR TIBIAL TENDON DYSFUNCTION (PTTD) OF LEFT LOWER EXTREMITY: ICD-10-CM

## 2018-04-16 DIAGNOSIS — L03.031 CELLULITIS OF RIGHT TOE: Primary | ICD-10-CM

## 2018-04-16 DIAGNOSIS — M21.6X1 ACQUIRED EQUINUS DEFORMITY OF BOTH FEET: ICD-10-CM

## 2018-04-16 DIAGNOSIS — M21.6X2 ACQUIRED EQUINUS DEFORMITY OF BOTH FEET: ICD-10-CM

## 2018-04-16 PROCEDURE — 99213 OFFICE O/P EST LOW 20 MIN: CPT | Mod: 25,S$GLB,, | Performed by: PODIATRIST

## 2018-04-16 PROCEDURE — 99999 PR PBB SHADOW E&M-EST. PATIENT-LVL III: CPT | Mod: PBBFAC,,, | Performed by: PODIATRIST

## 2018-04-16 PROCEDURE — 11721 DEBRIDE NAIL 6 OR MORE: CPT | Mod: Q9,S$GLB,, | Performed by: PODIATRIST

## 2018-04-16 NOTE — PROGRESS NOTES
Subjective:      Patient ID: Roshan Menard is a 68 y.o. male.    Chief Complaint: Follow-up (1 week recheck-right 2nd toe wound, PCP-(-4/7/18), A1c-5.8-10/07/14)    Roshan is a 68 y.o. male who presents to the podiatry clinic  with complaint of  right foot pain and redness to the second toe. He first hit the toe back in December and the end of the toe turned dark, he though maybe he developed frostbite or gangrene. He saw a dermatologist who took a skin biopsy and sent him to wound care. Within the last couple of days the toe become more swollen and red and he was started on augmentin and clindamycin.     4/16/18: Patient returns for 1 week follow up right second toe ulcer. He has been putting a band aid and neosporin on the area daily. Still has about 2 days of antibiotics. Overll he feels it is much improved, he is doing well. He does relate having difficult to cut thick and elongated nails. He also relates that he has a left foot collapsing arches and is looking for recommendations, no acute pain noted.      Review of Systems   Constitution: Negative for chills and fever.   Cardiovascular: Negative for claudication and leg swelling.   Respiratory: Negative for shortness of breath.    Skin: Positive for nail changes and poor wound healing. Negative for itching and rash.   Musculoskeletal: Negative for muscle cramps, muscle weakness and myalgias.   Gastrointestinal: Negative for nausea and vomiting.   Neurological: Positive for numbness. Negative for focal weakness and loss of balance.           Objective:      Physical Exam   Constitutional: He is oriented to person, place, and time. He appears well-developed and well-nourished. No distress.   Cardiovascular:   Pulses:       Dorsalis pedis pulses are 2+ on the right side, and 2+ on the left side.        Posterior tibial pulses are 2+ on the right side, and 2+ on the left side.   < 3 sec capillary refill time to toes 1-5 bilateral. Toes and feet are  warm to touch proximally distal cooling b/l. There is no hair growth on the feet and toes b/l. There is mild edema b/l with varicosities present b/l.      Musculoskeletal:   Semi reducible contractures noted at the PIPJ and MTPJ of toes 2-5 bilateral.    Left foot medial arch collapse with abducted forefoot, non reducible.Prominent medial talar head. No pain with palpation along the PT tendon or with ROM of STJ or ankle joint.     Equinus noted b/l ankles with < 10 deg DF noted. MMT 5/5 in DF/PF/Inv/Ev resistance with no reproduction of pain in any direction. Passive range of motion of ankle and pedal joints is painless b/l.     Feet:   Right Foot:   Protective Sensation: 10 sites tested. 4 sites sensed.   Left Foot:   Protective Sensation: 10 sites tested. 5 sites sensed.   Neurological: He is alert and oriented to person, place, and time. He has normal strength. He displays no atrophy and no tremor. A sensory deficit is present. He exhibits normal muscle tone.   Negative tinel sign bilateral. Decreased vibratory sensation bilateral.   Skin: Skin is warm and dry. No abrasion, no bruising, no burn, no ecchymosis, no laceration, no lesion, no petechiae and no rash noted. He is not diaphoretic. There is erythema. No cyanosis. No pallor. Nails show no clubbing.   Skin is thin and atrophic bilateral.    Right second the distal ulcer is now healed with fragile epithelial skin covering. The nail has been removed and the underlying bed has healed well. There is minor resolving erythema to the toe. No drainage or malodor.     Nails 1-5 left and 1,3-5 right are thick 3-4 mm, long 3-6 mm, and discolored with subungual debris     Psychiatric: He has a normal mood and affect. His behavior is normal.             Assessment:       Encounter Diagnoses   Name Primary?    Cellulitis of right toe Yes    Idiopathic peripheral neuropathy     Healed ulcer of right foot on examination     Posterior tibial tendon dysfunction (PTTD) of  left lower extremity     Acquired equinus deformity of both feet     Onychomycosis due to dermatophyte          Plan:       Roshan was seen today for follow-up.    Diagnoses and all orders for this visit:    Cellulitis of right toe    Idiopathic peripheral neuropathy    Healed ulcer of right foot on examination    Posterior tibial tendon dysfunction (PTTD) of left lower extremity    Acquired equinus deformity of both feet    Onychomycosis due to dermatophyte      I counseled the patient on his conditions, their implications and medical management.    Shoe inspection. Patient instructed on proper foot hygeine. We discussed wearing proper shoe gear, daily foot inspections, never walking without protective shoe gear, never putting sharp instruments to feet    Will use Crest pads to offload the hammertoes.    May return to normal shoe    Finish prescribed antibiotics    Recommend supportive shoe, discussed getting prescription AFO if the flattening progresses or he starts experiencing side effects.    Shoe inspection. Patient instructed on proper foot hygeine. We discussed wearing proper shoe gear, daily foot inspections, never walking without protective shoe gear, never putting sharp instruments to feet    - With patient's permission, nails were aggressively reduced and debrided x 9 to their soft tissue attachment mechanically and with electric , removing all offending nail and debris. Patient relates relief following the procedure. He will continue to monitor the areas daily, inspect his feet, wear protective shoe gear when ambulatory, moisturizer to maintain skin integrity and follow in this office in approximately 3 months, sooner p.r.n.    Return in 3 months for routine care, sooner SHAHLA Rivera DPM

## 2018-07-23 ENCOUNTER — OFFICE VISIT (OUTPATIENT)
Dept: PODIATRY | Facility: CLINIC | Age: 68
End: 2018-07-23
Payer: COMMERCIAL

## 2018-07-23 VITALS — BODY MASS INDEX: 31.38 KG/M2 | HEIGHT: 73 IN | WEIGHT: 236.75 LBS

## 2018-07-23 DIAGNOSIS — M20.42 HAMMER TOES OF BOTH FEET: ICD-10-CM

## 2018-07-23 DIAGNOSIS — I73.9 PERIPHERAL VASCULAR DISEASE: ICD-10-CM

## 2018-07-23 DIAGNOSIS — L84 CORN OR CALLUS: ICD-10-CM

## 2018-07-23 DIAGNOSIS — M21.6X1 ACQUIRED EQUINUS DEFORMITY OF BOTH FEET: ICD-10-CM

## 2018-07-23 DIAGNOSIS — M21.6X2 ACQUIRED EQUINUS DEFORMITY OF BOTH FEET: ICD-10-CM

## 2018-07-23 DIAGNOSIS — M20.41 HAMMER TOES OF BOTH FEET: ICD-10-CM

## 2018-07-23 DIAGNOSIS — G60.9 IDIOPATHIC PERIPHERAL NEUROPATHY: Primary | ICD-10-CM

## 2018-07-23 DIAGNOSIS — B35.1 ONYCHOMYCOSIS DUE TO DERMATOPHYTE: ICD-10-CM

## 2018-07-23 DIAGNOSIS — M21.42 ACQUIRED RIGID FLAT FOOT, LEFT: ICD-10-CM

## 2018-07-23 PROCEDURE — 99499 UNLISTED E&M SERVICE: CPT | Mod: S$GLB,,, | Performed by: PODIATRIST

## 2018-07-23 PROCEDURE — 99999 PR PBB SHADOW E&M-EST. PATIENT-LVL III: CPT | Mod: PBBFAC,,, | Performed by: PODIATRIST

## 2018-07-23 PROCEDURE — 11057 PARNG/CUTG B9 HYPRKR LES >4: CPT | Mod: Q9,S$GLB,, | Performed by: PODIATRIST

## 2018-07-23 PROCEDURE — 11721 DEBRIDE NAIL 6 OR MORE: CPT | Mod: 59,Q9,S$GLB, | Performed by: PODIATRIST

## 2018-07-23 NOTE — PROGRESS NOTES
Subjective:      Patient ID: Roshan Menard is a 68 y.o. male.    Chief Complaint: Nail Care; Foot Problem (f/u - hammertoe - right 2nd ); and Other Misc (PCP:  Dr Medrano (Dr Burgess) 4/7/18)    Roshan is a 68 y.o. male who presents to the podiatry clinic  with complaint of  right foot pain and redness to the second toe. He first hit the toe back in December and the end of the toe turned dark, he though maybe he developed frostbite or gangrene. He saw a dermatologist who took a skin biopsy and sent him to wound care. Within the last couple of days the toe become more swollen and red and he was started on augmentin and clindamycin.     4/16/18: Patient returns for 1 week follow up right second toe ulcer. He has been putting a band aid and neosporin on the area daily. Still has about 2 days of antibiotics. Overll he feels it is much improved, he is doing well. He does relate having difficult to cut thick and elongated nails. He also relates that he has a left foot collapsing arches and is looking for recommendations, no acute pain noted.    7/23/18: Return for routine care secondary to peripheral neuropathy with a history of ulcerations to the right second toe. No new pedal complaints.       Review of Systems   Constitution: Negative for chills and fever.   Cardiovascular: Negative for claudication and leg swelling.   Respiratory: Negative for shortness of breath.    Skin: Positive for nail changes, poor wound healing and suspicious lesions. Negative for itching and rash.   Musculoskeletal: Negative for muscle cramps, muscle weakness and myalgias.   Gastrointestinal: Negative for nausea and vomiting.   Neurological: Positive for numbness. Negative for focal weakness and loss of balance.           Objective:      Physical Exam   Constitutional: He is oriented to person, place, and time. He appears well-developed and well-nourished. No distress.   Cardiovascular:   Pulses:       Dorsalis pedis pulses are 2+ on the right  side, and 2+ on the left side.        Posterior tibial pulses are 2+ on the right side, and 2+ on the left side.   < 3 sec capillary refill time to toes 1-5 bilateral. Toes and feet are warm to touch proximally distal cooling b/l. There is no hair growth on the feet and toes b/l. There is mild edema b/l with varicosities present b/l.      Musculoskeletal:   Semi reducible contractures noted at the PIPJ and MTPJ of toes 2-5 bilateral.    Left foot medial arch collapse with abducted forefoot, non reducible.Prominent medial talar head. No pain with palpation along the PT tendon or with ROM of STJ or ankle joint.     Equinus noted b/l ankles with < 10 deg DF noted. MMT 5/5 in DF/PF/Inv/Ev resistance with no reproduction of pain in any direction. Passive range of motion of ankle and pedal joints is painless b/l.     Feet:   Right Foot:   Protective Sensation: 10 sites tested. 4 sites sensed.   Left Foot:   Protective Sensation: 10 sites tested. 5 sites sensed.   Neurological: He is alert and oriented to person, place, and time. He has normal strength. He displays no atrophy and no tremor. A sensory deficit is present. He exhibits normal muscle tone.   Negative tinel sign bilateral. Decreased vibratory sensation bilateral.   Skin: Skin is warm and dry. Lesion noted. No abrasion, no bruising, no burn, no ecchymosis, no laceration, no petechiae and no rash noted. He is not diaphoretic. No cyanosis or erythema. No pallor. Nails show no clubbing.   Skin is thin and atrophic bilateral.    Hyperkeratotic lesions a the following points: Right distal second toe (with underlying discoloration but skin intact) and hallux and plantar first metatarsal head. Left plantar second metatarsal head and plantar midfoot. (5 total)    Nails 1-5 left and 1,3-5 right are thick 3-4 mm, long 3-6 mm, and discolored with subungual debris     Psychiatric: He has a normal mood and affect. His behavior is normal.             Assessment:        Encounter Diagnoses   Name Primary?    Idiopathic peripheral neuropathy Yes    Onychomycosis due to dermatophyte     Acquired equinus deformity of both feet     Acquired rigid flat foot, left     Corn or callus     Hammer toes of both feet     Peripheral vascular disease          Plan:       Roshan was seen today for nail care, foot problem and other misc.    Diagnoses and all orders for this visit:    Idiopathic peripheral neuropathy    Onychomycosis due to dermatophyte    Acquired equinus deformity of both feet    Acquired rigid flat foot, left    Corn or callus    Hammer toes of both feet    Peripheral vascular disease      I counseled the patient on his conditions, their implications and medical management.    Shoe inspection. Patient instructed on proper foot hygeine. We discussed wearing proper shoe gear, daily foot inspections, never walking without protective shoe gear, never putting sharp instruments to feet    Will use Crest pads to offload the hammertoes.    Recommend supportive shoe, discussed getting prescription AFO if the flattening progresses or he starts experiencing side effects.    Shoe inspection. Patient instructed on proper foot hygeine. We discussed wearing proper shoe gear, daily foot inspections, never walking without protective shoe gear, never putting sharp instruments to feet    - With patient's permission, nails were aggressively reduced and debrided x 10 to their soft tissue attachment mechanically and with electric , removing all offending nail and debris. Patient relates relief following the procedure. He will continue to monitor the areas daily, inspect his feet, wear protective shoe gear when ambulatory, moisturizer to maintain skin integrity and follow in this office in approximately 3 months, sooner p.r.n.    With patient's verbal consent the hyperkeratotic lesions x5 bilateral foot were trimmed to healthy appearing skin using a # 15 scalpel. Patient relates relief  of pain following the procedure. Patient tolerated the procedure well without complications. No anesthesia or hemostasis required. No blood loss.    Return in 3 months for routine care, sooner PRN    Alfonso Rivera DPM

## 2018-08-02 RX ORDER — FLUTICASONE PROPIONATE 50 UG/1
SPRAY, METERED NASAL
Qty: 15.8 ML | Refills: 12 | Status: SHIPPED | OUTPATIENT
Start: 2018-08-02 | End: 2022-02-15 | Stop reason: ALTCHOICE

## 2018-08-17 RX ORDER — METOPROLOL SUCCINATE 50 MG/1
TABLET, EXTENDED RELEASE ORAL
Qty: 30 TABLET | Refills: 12 | Status: SHIPPED | OUTPATIENT
Start: 2018-08-17 | End: 2019-03-20

## 2018-09-26 ENCOUNTER — TELEPHONE (OUTPATIENT)
Dept: PODIATRY | Facility: CLINIC | Age: 68
End: 2018-09-26

## 2018-09-26 NOTE — TELEPHONE ENCOUNTER
Staff message sent to Ene Forrester  () per Dr Rivera) Q9 Modifier should be on [id number].  Patient informed.

## 2018-09-26 NOTE — TELEPHONE ENCOUNTER
Patient requesting last visit be reviewed for charges as he received a bill for over $500 that the insurance will not cover.  Please advise.

## 2018-10-04 ENCOUNTER — OFFICE VISIT (OUTPATIENT)
Dept: FAMILY MEDICINE | Facility: CLINIC | Age: 68
End: 2018-10-04
Payer: COMMERCIAL

## 2018-10-04 VITALS
HEIGHT: 73 IN | WEIGHT: 232 LBS | BODY MASS INDEX: 30.75 KG/M2 | SYSTOLIC BLOOD PRESSURE: 126 MMHG | TEMPERATURE: 99 F | DIASTOLIC BLOOD PRESSURE: 77 MMHG | HEART RATE: 75 BPM | OXYGEN SATURATION: 95 %

## 2018-10-04 DIAGNOSIS — J06.9 UPPER RESPIRATORY TRACT INFECTION, UNSPECIFIED TYPE: Primary | ICD-10-CM

## 2018-10-04 DIAGNOSIS — H61.23 IMPACTED CERUMEN, BILATERAL: ICD-10-CM

## 2018-10-04 DIAGNOSIS — R05.9 COUGH: ICD-10-CM

## 2018-10-04 PROCEDURE — 99999 PR PBB SHADOW E&M-EST. PATIENT-LVL V: CPT | Mod: PBBFAC,,, | Performed by: NURSE PRACTITIONER

## 2018-10-04 PROCEDURE — 96372 THER/PROPH/DIAG INJ SC/IM: CPT | Mod: S$GLB,,, | Performed by: NURSE PRACTITIONER

## 2018-10-04 PROCEDURE — 99213 OFFICE O/P EST LOW 20 MIN: CPT | Mod: 25,S$GLB,, | Performed by: NURSE PRACTITIONER

## 2018-10-04 PROCEDURE — 3074F SYST BP LT 130 MM HG: CPT | Mod: CPTII,S$GLB,, | Performed by: NURSE PRACTITIONER

## 2018-10-04 PROCEDURE — 3078F DIAST BP <80 MM HG: CPT | Mod: CPTII,S$GLB,, | Performed by: NURSE PRACTITIONER

## 2018-10-04 PROCEDURE — 1101F PT FALLS ASSESS-DOCD LE1/YR: CPT | Mod: CPTII,S$GLB,, | Performed by: NURSE PRACTITIONER

## 2018-10-04 RX ORDER — DEXAMETHASONE SODIUM PHOSPHATE 4 MG/ML
8 INJECTION, SOLUTION INTRA-ARTICULAR; INTRALESIONAL; INTRAMUSCULAR; INTRAVENOUS; SOFT TISSUE
Status: COMPLETED | OUTPATIENT
Start: 2018-10-04 | End: 2018-10-04

## 2018-10-04 RX ADMIN — DEXAMETHASONE SODIUM PHOSPHATE 8 MG: 4 INJECTION, SOLUTION INTRA-ARTICULAR; INTRALESIONAL; INTRAMUSCULAR; INTRAVENOUS; SOFT TISSUE at 11:10

## 2018-10-04 NOTE — PROGRESS NOTES
Subjective:       Patient ID: Roshan Menard is a 68 y.o. male.    Chief Complaint: Fever; Cough; Sore Throat; and Nasal Congestion    URI    This is a new problem. The current episode started in the past 7 days (x 3 d). The problem has been unchanged. There has been no fever. Associated symptoms include congestion, coughing, a plugged ear sensation and wheezing. Pertinent negatives include no abdominal pain, chest pain, diarrhea, dysuria, ear pain, headaches, joint pain, joint swelling, nausea, neck pain, rash, rhinorrhea, sinus pain, sneezing, sore throat, swollen glands or vomiting. He has tried nothing for the symptoms. The treatment provided no relief.     Past Medical History:   Diagnosis Date    Allergy     Arthritis     Atrial fibrillation     Bronchitis     Cataract     Coronary artery disease     GERD (gastroesophageal reflux disease)     Gout, chronic     Hypertension      Social History     Socioeconomic History    Marital status:      Spouse name: Not on file    Number of children: Not on file    Years of education: Not on file    Highest education level: Not on file   Social Needs    Financial resource strain: Not on file    Food insecurity - worry: Not on file    Food insecurity - inability: Not on file    Transportation needs - medical: Not on file    Transportation needs - non-medical: Not on file   Occupational History     Employer: [employer]   Tobacco Use    Smoking status: Former Smoker     Packs/day: 1.50     Years: 22.00     Pack years: 33.00     Last attempt to quit: 1992     Years since quittin.4   Substance and Sexual Activity    Alcohol use: No    Drug use: No    Sexual activity: Not on file   Other Topics Concern    Not on file   Social History Narrative    Not on file     Past Surgical History:   Procedure Laterality Date    BACK SURGERY      CHOLECYSTECTOMY      CORONARY ANGIOPLASTY WITH STENT PLACEMENT      TONSILLECTOMY       TOTAL KNEE ARTHROPLASTY      VASECTOMY         Review of Systems   Constitutional: Negative.    HENT: Positive for congestion. Negative for ear pain, rhinorrhea, sinus pain, sneezing and sore throat.    Eyes: Negative.    Respiratory: Positive for cough and wheezing.    Cardiovascular: Negative.  Negative for chest pain.   Gastrointestinal: Negative.  Negative for abdominal pain, diarrhea, nausea and vomiting.   Endocrine: Negative.    Genitourinary: Negative.  Negative for dysuria.   Musculoskeletal: Negative.  Negative for joint pain and neck pain.   Skin: Negative.  Negative for rash.   Allergic/Immunologic: Negative.    Neurological: Negative.  Negative for headaches.   Psychiatric/Behavioral: Negative.        Objective:      Physical Exam   Constitutional: He is oriented to person, place, and time. He appears well-developed and well-nourished.   HENT:   Head: Normocephalic.   Right Ear: External ear normal.   Left Ear: External ear normal.   Nose: Nose normal.   Mouth/Throat: Oropharynx is clear and moist.   Eyes: Conjunctivae are normal. Pupils are equal, round, and reactive to light.   Neck: Normal range of motion. Neck supple.   Cardiovascular: Normal rate, regular rhythm and normal heart sounds.   Pulmonary/Chest: Effort normal. He has wheezes in the right upper field, the right lower field, the left upper field and the left lower field.   Abdominal: Soft. Bowel sounds are normal.   Musculoskeletal: Normal range of motion.   Neurological: He is alert and oriented to person, place, and time.   Skin: Skin is warm and dry. Capillary refill takes 2 to 3 seconds.   Psychiatric: He has a normal mood and affect. His behavior is normal. Judgment and thought content normal.   Nursing note and vitals reviewed.      Assessment:       1. Upper respiratory tract infection, unspecified type    2. Cough    3. Impacted cerumen, bilateral        Plan:           Roshan was seen today for fever, cough, sore throat and nasal  congestion.    Diagnoses and all orders for this visit:    Upper respiratory tract infection, unspecified type  Cough  -     dexamethasone injection 8 mg; Inject 2 mLs (8 mg total) into the muscle one time.        Declines medication for cough    Impacted cerumen, bilateral  -     Ear wax removal

## 2018-10-08 ENCOUNTER — TELEPHONE (OUTPATIENT)
Dept: FAMILY MEDICINE | Facility: CLINIC | Age: 68
End: 2018-10-08

## 2018-10-08 RX ORDER — CLINDAMYCIN HYDROCHLORIDE 150 MG/1
150 CAPSULE ORAL EVERY 12 HOURS
Qty: 14 CAPSULE | Refills: 0 | Status: SHIPPED | OUTPATIENT
Start: 2018-10-08 | End: 2018-10-15

## 2018-10-08 NOTE — TELEPHONE ENCOUNTER
----- Message from Cecily Dinh sent at 10/8/2018  8:39 AM CDT -----  states that he did have fever, needs rx called in...[phone]    [facility] [employer] [id number] - [place] - [address] AT SEC OF [facility] [address] [facility]  [address]  [place] [id number]  Phone: [phone] Fax: [phone]

## 2018-10-08 NOTE — TELEPHONE ENCOUNTER
----- Message from Rody Islas sent at 10/8/2018 11:01 AM CDT -----  Pt is returning a call from nurse in regards to medication.          Please call pt back at [phone]

## 2018-11-09 ENCOUNTER — OFFICE VISIT (OUTPATIENT)
Dept: FAMILY MEDICINE | Facility: CLINIC | Age: 68
End: 2018-11-09
Payer: COMMERCIAL

## 2018-11-09 ENCOUNTER — LAB VISIT (OUTPATIENT)
Dept: LAB | Facility: HOSPITAL | Age: 68
End: 2018-11-09
Attending: FAMILY MEDICINE
Payer: COMMERCIAL

## 2018-11-09 VITALS
BODY MASS INDEX: 32.1 KG/M2 | TEMPERATURE: 98 F | SYSTOLIC BLOOD PRESSURE: 140 MMHG | WEIGHT: 237 LBS | HEART RATE: 66 BPM | DIASTOLIC BLOOD PRESSURE: 73 MMHG | HEIGHT: 72 IN

## 2018-11-09 DIAGNOSIS — M54.5 LOW BACK PAIN, UNSPECIFIED BACK PAIN LATERALITY, UNSPECIFIED CHRONICITY, WITH SCIATICA PRESENCE UNSPECIFIED: ICD-10-CM

## 2018-11-09 DIAGNOSIS — M54.5 LOW BACK PAIN, UNSPECIFIED BACK PAIN LATERALITY, UNSPECIFIED CHRONICITY, WITH SCIATICA PRESENCE UNSPECIFIED: Primary | ICD-10-CM

## 2018-11-09 LAB
ANION GAP SERPL CALC-SCNC: 8 MMOL/L
BACTERIA #/AREA URNS HPF: NORMAL /HPF
BILIRUB UR QL STRIP: NEGATIVE
BUN SERPL-MCNC: 13 MG/DL
CALCIUM SERPL-MCNC: 9.5 MG/DL
CHLORIDE SERPL-SCNC: 104 MMOL/L
CLARITY UR: CLEAR
CO2 SERPL-SCNC: 28 MMOL/L
COLOR UR: YELLOW
CREAT SERPL-MCNC: 1 MG/DL
EST. GFR  (AFRICAN AMERICAN): >60 ML/MIN/1.73 M^2
EST. GFR  (NON AFRICAN AMERICAN): >60 ML/MIN/1.73 M^2
GLUCOSE SERPL-MCNC: 95 MG/DL
GLUCOSE UR QL STRIP: NEGATIVE
HGB UR QL STRIP: NEGATIVE
KETONES UR QL STRIP: NEGATIVE
LEUKOCYTE ESTERASE UR QL STRIP: ABNORMAL
MICROSCOPIC COMMENT: NORMAL
NITRITE UR QL STRIP: NEGATIVE
PH UR STRIP: 6 [PH] (ref 5–8)
POTASSIUM SERPL-SCNC: 4.4 MMOL/L
PROT UR QL STRIP: NEGATIVE
RBC #/AREA URNS HPF: 0 /HPF (ref 0–4)
SODIUM SERPL-SCNC: 140 MMOL/L
SP GR UR STRIP: 1.01 (ref 1–1.03)
SQUAMOUS #/AREA URNS HPF: NORMAL /HPF
URN SPEC COLLECT METH UR: ABNORMAL
WBC #/AREA URNS HPF: 3 /HPF (ref 0–5)

## 2018-11-09 PROCEDURE — 81000 URINALYSIS NONAUTO W/SCOPE: CPT | Mod: PO

## 2018-11-09 PROCEDURE — 99213 OFFICE O/P EST LOW 20 MIN: CPT | Mod: S$GLB,,, | Performed by: NURSE PRACTITIONER

## 2018-11-09 PROCEDURE — 99999 PR PBB SHADOW E&M-EST. PATIENT-LVL V: CPT | Mod: PBBFAC,,, | Performed by: NURSE PRACTITIONER

## 2018-11-09 PROCEDURE — 3078F DIAST BP <80 MM HG: CPT | Mod: CPTII,S$GLB,, | Performed by: NURSE PRACTITIONER

## 2018-11-09 PROCEDURE — 36415 COLL VENOUS BLD VENIPUNCTURE: CPT | Mod: PO

## 2018-11-09 PROCEDURE — 87086 URINE CULTURE/COLONY COUNT: CPT

## 2018-11-09 PROCEDURE — 80048 BASIC METABOLIC PNL TOTAL CA: CPT

## 2018-11-09 PROCEDURE — 1101F PT FALLS ASSESS-DOCD LE1/YR: CPT | Mod: CPTII,S$GLB,, | Performed by: NURSE PRACTITIONER

## 2018-11-09 PROCEDURE — 3077F SYST BP >= 140 MM HG: CPT | Mod: CPTII,S$GLB,, | Performed by: NURSE PRACTITIONER

## 2018-11-09 RX ORDER — NITROFURANTOIN 25; 75 MG/1; MG/1
100 CAPSULE ORAL 2 TIMES DAILY
Qty: 6 CAPSULE | Refills: 0 | Status: SHIPPED | OUTPATIENT
Start: 2018-11-09 | End: 2018-11-12

## 2018-11-09 NOTE — PROGRESS NOTES
Subjective:       Patient ID: Roshan Menard is a 68 y.o. male.    Chief Complaint: Back Pain    Back Pain   This is a new problem. The current episode started 1 to 4 weeks ago (x 2 w). The problem occurs daily. The problem has been gradually improving since onset. Pain location: left flank. The quality of the pain is described as aching. The pain does not radiate. The pain is at a severity of 2/10. The pain is mild. The symptoms are aggravated by position. Pertinent negatives include no abdominal pain, bladder incontinence, bowel incontinence, chest pain, dysuria, fever, headaches, leg pain, numbness, paresis, paresthesias, pelvic pain, perianal numbness, tingling, weakness or weight loss. He has tried nothing for the symptoms. The treatment provided no relief (States saw urology 1 m ago and had US renal done; states renal systs were stable).     Past Medical History:   Diagnosis Date    Allergy     Arthritis     Atrial fibrillation     Bronchitis     Cataract     Coronary artery disease     GERD (gastroesophageal reflux disease)     Gout, chronic     Hypertension      Social History     Socioeconomic History    Marital status:      Spouse name: Not on file    Number of children: Not on file    Years of education: Not on file    Highest education level: Not on file   Social Needs    Financial resource strain: Not on file    Food insecurity - worry: Not on file    Food insecurity - inability: Not on file    Transportation needs - medical: Not on file    Transportation needs - non-medical: Not on file   Occupational History     Employer: [employer]   Tobacco Use    Smoking status: Former Smoker     Packs/day: 1.50     Years: 22.00     Pack years: 33.00     Last attempt to quit: 1992     Years since quittin.5   Substance and Sexual Activity    Alcohol use: No    Drug use: No    Sexual activity: Not on file   Other Topics Concern    Not on file   Social History  Narrative    Not on file     Past Surgical History:   Procedure Laterality Date    BACK SURGERY      CHOLECYSTECTOMY      CORONARY ANGIOPLASTY WITH STENT PLACEMENT      TONSILLECTOMY      TOTAL KNEE ARTHROPLASTY      VASECTOMY         Review of Systems   Constitutional: Negative.  Negative for fever and weight loss.   HENT: Negative.    Eyes: Negative.    Respiratory: Negative.    Cardiovascular: Negative.  Negative for chest pain.   Gastrointestinal: Negative.  Negative for abdominal pain and bowel incontinence.   Endocrine: Negative.    Genitourinary: Negative.  Negative for bladder incontinence, dysuria and pelvic pain.   Musculoskeletal: Positive for back pain.   Skin: Negative.    Allergic/Immunologic: Negative.    Neurological: Negative.  Negative for tingling, weakness, numbness, headaches and paresthesias.   Psychiatric/Behavioral: Negative.        Objective:      Physical Exam   Constitutional: He is oriented to person, place, and time. He appears well-developed and well-nourished.   HENT:   Head: Normocephalic.   Right Ear: External ear normal.   Left Ear: External ear normal.   Nose: Nose normal.   Mouth/Throat: Oropharynx is clear and moist.   Eyes: Conjunctivae are normal. Pupils are equal, round, and reactive to light.   Neck: Normal range of motion. Neck supple.   Cardiovascular: Normal rate, regular rhythm and normal heart sounds.   Pulmonary/Chest: Effort normal and breath sounds normal.   Abdominal: Soft. Bowel sounds are normal. There is no tenderness. There is no CVA tenderness.   Musculoskeletal: Normal range of motion.        Arms:  Neurological: He is alert and oriented to person, place, and time.   Skin: Skin is warm and dry. Capillary refill takes 2 to 3 seconds.   Psychiatric: He has a normal mood and affect. His behavior is normal. Judgment and thought content normal.   Nursing note and vitals reviewed.      Assessment:       1. Low back pain, unspecified back pain laterality,  unspecified chronicity, with sciatica presence unspecified        Plan:           Roshan was seen today for back pain.    Diagnoses and all orders for this visit:    Low back pain, unspecified back pain laterality, unspecified chronicity, with sciatica presence unspecified  -     Urinalysis  -     Urine culture  -     Basic metabolic panel; Future

## 2018-11-10 LAB — BACTERIA UR CULT: NO GROWTH

## 2019-01-03 ENCOUNTER — PATIENT OUTREACH (OUTPATIENT)
Dept: ADMINISTRATIVE | Facility: HOSPITAL | Age: 69
End: 2019-01-03

## 2019-01-03 ENCOUNTER — OFFICE VISIT (OUTPATIENT)
Dept: FAMILY MEDICINE | Facility: CLINIC | Age: 69
End: 2019-01-03
Payer: COMMERCIAL

## 2019-01-03 ENCOUNTER — HOSPITAL ENCOUNTER (OUTPATIENT)
Dept: RADIOLOGY | Facility: HOSPITAL | Age: 69
Discharge: HOME | End: 2019-01-03
Attending: NURSE PRACTITIONER
Payer: COMMERCIAL

## 2019-01-03 VITALS
TEMPERATURE: 98 F | WEIGHT: 237.19 LBS | DIASTOLIC BLOOD PRESSURE: 75 MMHG | BODY MASS INDEX: 31.44 KG/M2 | HEIGHT: 73 IN | SYSTOLIC BLOOD PRESSURE: 146 MMHG | HEART RATE: 67 BPM

## 2019-01-03 DIAGNOSIS — I10 ESSENTIAL HYPERTENSION: ICD-10-CM

## 2019-01-03 DIAGNOSIS — J20.9 ACUTE BRONCHITIS, UNSPECIFIED ORGANISM: Primary | ICD-10-CM

## 2019-01-03 DIAGNOSIS — R05.9 COUGH: ICD-10-CM

## 2019-01-03 PROCEDURE — 99999 PR PBB SHADOW E&M-EST. PATIENT-LVL III: ICD-10-PCS | Mod: PBBFAC,,, | Performed by: NURSE PRACTITIONER

## 2019-01-03 PROCEDURE — 71046 X-RAY EXAM CHEST 2 VIEWS: CPT | Mod: TC,PO

## 2019-01-03 PROCEDURE — 1101F PR PT FALLS ASSESS DOC 0-1 FALLS W/OUT INJ PAST YR: ICD-10-PCS | Mod: CPTII,S$GLB,, | Performed by: NURSE PRACTITIONER

## 2019-01-03 PROCEDURE — 99213 PR OFFICE/OUTPT VISIT, EST, LEVL III, 20-29 MIN: ICD-10-PCS | Mod: S$GLB,,, | Performed by: NURSE PRACTITIONER

## 2019-01-03 PROCEDURE — 71046 X-RAY EXAM CHEST 2 VIEWS: CPT | Mod: 26,,, | Performed by: RADIOLOGY

## 2019-01-03 PROCEDURE — 3077F SYST BP >= 140 MM HG: CPT | Mod: CPTII,S$GLB,, | Performed by: NURSE PRACTITIONER

## 2019-01-03 PROCEDURE — 71046 XR CHEST PA AND LATERAL: ICD-10-PCS | Mod: 26,,, | Performed by: RADIOLOGY

## 2019-01-03 PROCEDURE — 1101F PT FALLS ASSESS-DOCD LE1/YR: CPT | Mod: CPTII,S$GLB,, | Performed by: NURSE PRACTITIONER

## 2019-01-03 PROCEDURE — 3077F PR MOST RECENT SYSTOLIC BLOOD PRESSURE >= 140 MM HG: ICD-10-PCS | Mod: CPTII,S$GLB,, | Performed by: NURSE PRACTITIONER

## 2019-01-03 PROCEDURE — 99213 OFFICE O/P EST LOW 20 MIN: CPT | Mod: S$GLB,,, | Performed by: NURSE PRACTITIONER

## 2019-01-03 PROCEDURE — 99999 PR PBB SHADOW E&M-EST. PATIENT-LVL III: CPT | Mod: PBBFAC,,, | Performed by: NURSE PRACTITIONER

## 2019-01-03 PROCEDURE — 3078F PR MOST RECENT DIASTOLIC BLOOD PRESSURE < 80 MM HG: ICD-10-PCS | Mod: CPTII,S$GLB,, | Performed by: NURSE PRACTITIONER

## 2019-01-03 PROCEDURE — 3078F DIAST BP <80 MM HG: CPT | Mod: CPTII,S$GLB,, | Performed by: NURSE PRACTITIONER

## 2019-01-03 RX ORDER — ZOSTER VACCINE RECOMBINANT, ADJUVANTED 50 MCG/0.5
KIT INTRAMUSCULAR
Refills: 0 | COMMUNITY
Start: 2018-11-23 | End: 2019-03-20

## 2019-01-03 RX ORDER — AMOXICILLIN AND CLAVULANATE POTASSIUM 875; 125 MG/1; MG/1
1 TABLET, FILM COATED ORAL EVERY 12 HOURS
Qty: 14 TABLET | Refills: 0 | Status: SHIPPED | OUTPATIENT
Start: 2019-01-03 | End: 2019-01-10

## 2019-01-03 NOTE — PROGRESS NOTES
"Subjective:      Patient ID: Roshan Menard is a 68 y.o. male.    Chief Complaint: Sinus Problem; Cough; and Dizziness    Cough   This is a new problem. The current episode started 1 to 4 weeks ago. The problem has been gradually improving. The cough is productive of sputum. Associated symptoms include postnasal drip, rhinorrhea and wheezing. Pertinent negatives include no chest pain, chills, ear congestion, ear pain, fever, headaches, nasal congestion, rash, sore throat, shortness of breath, sweats or weight loss. Associated symptoms comments: Dizziness described as a "spinning" sensation 2 days ago.  Now resolved. Nothing aggravates the symptoms. He has tried a beta-agonist inhaler and steroid inhaler (has taken a partial rx of Augmentin.  Saline sinus rinse) for the symptoms. The treatment provided mild relief. His past medical history is significant for bronchitis.     Review of Systems   Constitutional: Negative for chills, fatigue, fever and weight loss.   HENT: Positive for postnasal drip and rhinorrhea. Negative for congestion, ear pain, sinus pressure, sinus pain and sore throat.    Respiratory: Positive for cough and wheezing. Negative for shortness of breath.    Cardiovascular: Negative for chest pain, palpitations and leg swelling.   Skin: Negative for rash and wound.   Neurological: Positive for dizziness (resolved now). Negative for weakness, numbness and headaches.   Psychiatric/Behavioral: The patient is not nervous/anxious.        Objective:     BP (!) 146/75   Pulse 67   Temp 97.8 °F (36.6 °C) (Oral)   Ht 6' 1" (1.854 m)   Wt 107.6 kg (237 lb 3.2 oz)   BMI 31.29 kg/m²     Physical Exam   Constitutional: He is oriented to person, place, and time. He appears well-developed and well-nourished.   HENT:   Head: Normocephalic.   Right Ear: Tympanic membrane normal.   Left Ear: Tympanic membrane normal.   Nose: Rhinorrhea present. No mucosal edema. Right sinus exhibits no maxillary sinus tenderness " and no frontal sinus tenderness. Left sinus exhibits no maxillary sinus tenderness and no frontal sinus tenderness.   Mouth/Throat: Uvula is midline, oropharynx is clear and moist and mucous membranes are normal.   Clear postnasal drainage noted to posterior oropharynx   Eyes: Pupils are equal, round, and reactive to light.   Neck: Normal range of motion. Neck supple.   Cardiovascular: Normal rate, regular rhythm and normal heart sounds.   Pulmonary/Chest: Effort normal. No respiratory distress. He has no decreased breath sounds. He has wheezes in the right upper field, the right middle field, the right lower field and the left lower field. He has rhonchi in the right middle field. He has no rales.   Neurological: He is alert and oriented to person, place, and time.   Skin: Skin is warm and dry. No rash noted.   Psychiatric: He has a normal mood and affect. His behavior is normal. Judgment and thought content normal.   Vitals reviewed.    Assessment:     1. Acute bronchitis, unspecified organism    2. Essential hypertension        Plan:     Problem List Items Addressed This Visit     None      Visit Diagnoses     Acute bronchitis, unspecified organism    -  Primary    Relevant Medications    amoxicillin-clavulanate 875-125mg (AUGMENTIN) 875-125 mg per tablet    Other Relevant Orders    X-Ray Chest PA And Lateral    Essential hypertension          Mucinex 1200 mg BID  Reduce sodium intake and return to clinic in 2 weeks for blood pressure reevaluation  Report to ER if symptoms worsen    Follow-up in about 2 weeks (around 1/17/2019), or if symptoms worsen or fail to improve, for blood pressure evaluation.        Parts of this note was dictated using voice recognition software. Please excuse any grammatical or typographical errors.

## 2019-01-03 NOTE — LETTER
January 9, 2019        [address]  [facility] [address]      Dear Mr. Menard,    You have an upcoming appointment with Cristopher Medrano MD on 1/17/19 @ 7:20 am.      Your chart is indicating you may be due for the following and I will be happy to assist you in scheduling any needed appointments:  Health Maintenance Due   Topic    TETANUS VACCINE     Abdominal Aortic Aneurysm Screening     Colonoscopy      If you have had any of the above done at another facility, please bring the records or information with you so that your record at [facility] will be complete.    We will be happy to assist you with scheduling any necessary appointments or you may contact the [facility] appointment desk at [phone] to schedule at your convenience.     Thank you for choosing [facility] for your healthcare needs,      BRYCE Mix  Care Coordination Department  [facility] [address]  [phone]

## 2019-01-03 NOTE — PROGRESS NOTES
Health Maintenance reviewed. Pre visit chart audit letter sent. Colonoscopy referral pended.

## 2019-01-07 RX ORDER — LOSARTAN POTASSIUM 100 MG/1
TABLET ORAL
Qty: 30 TABLET | Refills: 12 | Status: SHIPPED | OUTPATIENT
Start: 2019-01-07 | End: 2020-01-13

## 2019-01-09 NOTE — PROGRESS NOTES
Portal letter unread concerning overdue health maintenance. Pre visit chart audit and appt letter mailed.

## 2019-01-26 LAB
CHOL/HDLC RATIO: 3.9
CHOLESTEROL, TOTAL: 140
HDLC SERPL-MCNC: 36 MG/DL
LDLC SERPL CALC-MCNC: 77 MG/DL
NONHDLC SERPL-MCNC: 104 MG/DL
TRIGL SERPL-MCNC: 170 MG/DL

## 2019-02-25 ENCOUNTER — OFFICE VISIT (OUTPATIENT)
Dept: FAMILY MEDICINE | Facility: CLINIC | Age: 69
End: 2019-02-25
Payer: COMMERCIAL

## 2019-02-25 ENCOUNTER — HOSPITAL ENCOUNTER (OUTPATIENT)
Dept: RADIOLOGY | Facility: HOSPITAL | Age: 69
Discharge: HOME | End: 2019-02-25
Attending: NURSE PRACTITIONER
Payer: COMMERCIAL

## 2019-02-25 VITALS
SYSTOLIC BLOOD PRESSURE: 138 MMHG | HEIGHT: 73 IN | DIASTOLIC BLOOD PRESSURE: 74 MMHG | BODY MASS INDEX: 30.62 KG/M2 | WEIGHT: 231 LBS | TEMPERATURE: 99 F | HEART RATE: 97 BPM | OXYGEN SATURATION: 96 %

## 2019-02-25 DIAGNOSIS — R06.2 WHEEZING: ICD-10-CM

## 2019-02-25 DIAGNOSIS — J18.9 PNEUMONIA OF LEFT LUNG DUE TO INFECTIOUS ORGANISM, UNSPECIFIED PART OF LUNG: Primary | ICD-10-CM

## 2019-02-25 DIAGNOSIS — Z87.01 HISTORY OF PNEUMONIA: ICD-10-CM

## 2019-02-25 DIAGNOSIS — R50.9 FEVER, UNSPECIFIED FEVER CAUSE: ICD-10-CM

## 2019-02-25 DIAGNOSIS — R05.9 COUGH: ICD-10-CM

## 2019-02-25 LAB
INFLUENZA A, MOLECULAR: NEGATIVE
INFLUENZA B, MOLECULAR: NEGATIVE
SPECIMEN SOURCE: NORMAL

## 2019-02-25 PROCEDURE — 3075F SYST BP GE 130 - 139MM HG: CPT | Mod: CPTII,S$GLB,, | Performed by: NURSE PRACTITIONER

## 2019-02-25 PROCEDURE — 3078F DIAST BP <80 MM HG: CPT | Mod: CPTII,S$GLB,, | Performed by: NURSE PRACTITIONER

## 2019-02-25 PROCEDURE — 1101F PT FALLS ASSESS-DOCD LE1/YR: CPT | Mod: CPTII,S$GLB,, | Performed by: NURSE PRACTITIONER

## 2019-02-25 PROCEDURE — 1101F PR PT FALLS ASSESS DOC 0-1 FALLS W/OUT INJ PAST YR: ICD-10-PCS | Mod: CPTII,S$GLB,, | Performed by: NURSE PRACTITIONER

## 2019-02-25 PROCEDURE — 99213 OFFICE O/P EST LOW 20 MIN: CPT | Mod: S$GLB,,, | Performed by: NURSE PRACTITIONER

## 2019-02-25 PROCEDURE — 99999 PR PBB SHADOW E&M-EST. PATIENT-LVL V: ICD-10-PCS | Mod: PBBFAC,,, | Performed by: NURSE PRACTITIONER

## 2019-02-25 PROCEDURE — 99213 PR OFFICE/OUTPT VISIT, EST, LEVL III, 20-29 MIN: ICD-10-PCS | Mod: S$GLB,,, | Performed by: NURSE PRACTITIONER

## 2019-02-25 PROCEDURE — 3078F PR MOST RECENT DIASTOLIC BLOOD PRESSURE < 80 MM HG: ICD-10-PCS | Mod: CPTII,S$GLB,, | Performed by: NURSE PRACTITIONER

## 2019-02-25 PROCEDURE — 3075F PR MOST RECENT SYSTOLIC BLOOD PRESS GE 130-139MM HG: ICD-10-PCS | Mod: CPTII,S$GLB,, | Performed by: NURSE PRACTITIONER

## 2019-02-25 PROCEDURE — 71046 X-RAY EXAM CHEST 2 VIEWS: CPT | Mod: TC,PO

## 2019-02-25 PROCEDURE — 99999 PR PBB SHADOW E&M-EST. PATIENT-LVL V: CPT | Mod: PBBFAC,,, | Performed by: NURSE PRACTITIONER

## 2019-02-25 PROCEDURE — 87502 INFLUENZA DNA AMP PROBE: CPT | Mod: PO

## 2019-02-25 PROCEDURE — 71046 XR CHEST PA AND LATERAL: ICD-10-PCS | Mod: 26,,, | Performed by: RADIOLOGY

## 2019-02-25 PROCEDURE — 71046 X-RAY EXAM CHEST 2 VIEWS: CPT | Mod: 26,,, | Performed by: RADIOLOGY

## 2019-02-25 RX ORDER — AMOXICILLIN AND CLAVULANATE POTASSIUM 875; 125 MG/1; MG/1
1 TABLET, FILM COATED ORAL EVERY 12 HOURS
Qty: 20 TABLET | Refills: 0 | Status: SHIPPED | OUTPATIENT
Start: 2019-02-25 | End: 2019-02-25

## 2019-02-25 RX ORDER — LEVOFLOXACIN 500 MG/1
500 TABLET, FILM COATED ORAL DAILY
Qty: 10 TABLET | Refills: 0 | Status: SHIPPED | OUTPATIENT
Start: 2019-02-25 | End: 2019-03-07

## 2019-02-25 RX ORDER — BENZONATATE 200 MG/1
200 CAPSULE ORAL 3 TIMES DAILY PRN
Qty: 30 CAPSULE | Refills: 0 | Status: SHIPPED | OUTPATIENT
Start: 2019-02-25 | End: 2019-03-07

## 2019-02-25 NOTE — PROGRESS NOTES
Subjective:       Patient ID: Roshan Menard is a 69 y.o. male.    Chief Complaint: Cough; Nasal Congestion; Fever; and Generalized Body Aches    Fever    This is a new problem. The current episode started in the past 7 days. The problem occurs intermittently. The problem has been resolved. The maximum temperature noted was 100 to 100.9 F. The temperature was taken using an oral thermometer. Associated symptoms include congestion, coughing, muscle aches and wheezing. Pertinent negatives include no abdominal pain, chest pain, diarrhea, ear pain, headaches, nausea, rash, sleepiness, sore throat, urinary pain or vomiting. He has tried nothing for the symptoms. The treatment provided no relief.   Risk factors: no contaminated food, no contaminated water, no hx of cancer, no immunosuppression, no occupational exposure, no recent sickness, no recent travel and no sick contacts      Past Medical History:   Diagnosis Date    Allergy     Arthritis     Atrial fibrillation     Bronchitis     Cataract     Coronary artery disease     GERD (gastroesophageal reflux disease)     Gout, chronic     Hypertension      Social History     Socioeconomic History    Marital status:      Spouse name: Not on file    Number of children: Not on file    Years of education: Not on file    Highest education level: Not on file   Social Needs    Financial resource strain: Not on file    Food insecurity - worry: Not on file    Food insecurity - inability: Not on file    Transportation needs - medical: Not on file    Transportation needs - non-medical: Not on file   Occupational History     Employer: [employer]   Tobacco Use    Smoking status: Former Smoker     Packs/day: 1.50     Years: 22.00     Pack years: 33.00     Last attempt to quit: 1992     Years since quittin.8   Substance and Sexual Activity    Alcohol use: No    Drug use: No    Sexual activity: Not on file   Other Topics Concern    Not  on file   Social History Narrative    Not on file     Past Surgical History:   Procedure Laterality Date    BACK SURGERY      CHOLECYSTECTOMY      CORONARY ANGIOPLASTY WITH STENT PLACEMENT      TONSILLECTOMY      TOTAL KNEE ARTHROPLASTY      VASECTOMY         Review of Systems   Constitutional: Positive for fever.   HENT: Positive for congestion, postnasal drip, rhinorrhea and sinus pressure. Negative for ear pain and sore throat.    Eyes: Negative.    Respiratory: Positive for cough and wheezing.    Cardiovascular: Negative.  Negative for chest pain.   Gastrointestinal: Negative.  Negative for abdominal pain, diarrhea, nausea and vomiting.   Endocrine: Negative.    Genitourinary: Negative.  Negative for dysuria.   Musculoskeletal: Negative.    Skin: Negative.  Negative for rash.   Allergic/Immunologic: Positive for environmental allergies.   Neurological: Negative.  Negative for headaches.   Psychiatric/Behavioral: Negative.        Objective:      Physical Exam   Constitutional: He is oriented to person, place, and time. He appears well-developed and well-nourished.   HENT:   Head: Normocephalic.   Right Ear: External ear normal.   Left Ear: External ear normal.   Mouth/Throat: Oropharynx is clear and moist.   Eyes: Conjunctivae are normal. Pupils are equal, round, and reactive to light.   Neck: Normal range of motion. Neck supple.   Cardiovascular: Normal rate, regular rhythm and normal heart sounds.   Pulmonary/Chest: Effort normal. He has wheezes.   Abdominal: Soft. Bowel sounds are normal.   Musculoskeletal: Normal range of motion.   Neurological: He is alert and oriented to person, place, and time.   Skin: Skin is warm and dry. Capillary refill takes 2 to 3 seconds.   Psychiatric: He has a normal mood and affect. His behavior is normal. Judgment and thought content normal.   Nursing note and vitals reviewed.      Assessment:       1. Pneumonia of left lung due to infectious organism, unspecified part of  lung   2.    Fever, unspecified fever cause  3.    Cough  4.    Wheezing  5.    History of pneumonia       Plan:           Roshan was seen today for cough, nasal congestion, fever and generalized body aches.    Diagnoses and all orders for this visit:    Pneumonia of left lung due to infectious organism, unspecified part of lung  Fever, unspecified fever cause  Cough  Wheezing  History of pneumonia  -     X-Ray Chest PA And Lateral; Future  -     Influenza A & B by Molecular        -     levoFLOXacin (LEVAQUIN) 500 MG tablet; Take 1 tablet (500 mg total) by mouth once daily. for 10 days  -     benzonatate (TESSALON) 200 MG capsule; Take 1 capsule (200 mg total) by mouth 3 (three) times daily as needed.   Albuterol as prescribed  Hydrate well  Report to ER immediately if symptoms worsen

## 2019-03-15 ENCOUNTER — OFFICE VISIT (OUTPATIENT)
Dept: FAMILY MEDICINE | Facility: CLINIC | Age: 69
End: 2019-03-15
Payer: COMMERCIAL

## 2019-03-15 VITALS
BODY MASS INDEX: 30.99 KG/M2 | SYSTOLIC BLOOD PRESSURE: 158 MMHG | HEIGHT: 73 IN | DIASTOLIC BLOOD PRESSURE: 81 MMHG | TEMPERATURE: 98 F | HEART RATE: 73 BPM | WEIGHT: 233.81 LBS

## 2019-03-15 DIAGNOSIS — R42 DIZZINESS: ICD-10-CM

## 2019-03-15 DIAGNOSIS — R42 VERTIGO: Primary | ICD-10-CM

## 2019-03-15 PROCEDURE — 99213 OFFICE O/P EST LOW 20 MIN: CPT | Mod: S$GLB,,, | Performed by: NURSE PRACTITIONER

## 2019-03-15 PROCEDURE — 1101F PT FALLS ASSESS-DOCD LE1/YR: CPT | Mod: CPTII,S$GLB,, | Performed by: NURSE PRACTITIONER

## 2019-03-15 PROCEDURE — 1101F PR PT FALLS ASSESS DOC 0-1 FALLS W/OUT INJ PAST YR: ICD-10-PCS | Mod: CPTII,S$GLB,, | Performed by: NURSE PRACTITIONER

## 2019-03-15 PROCEDURE — 3079F DIAST BP 80-89 MM HG: CPT | Mod: CPTII,S$GLB,, | Performed by: NURSE PRACTITIONER

## 2019-03-15 PROCEDURE — 3077F SYST BP >= 140 MM HG: CPT | Mod: CPTII,S$GLB,, | Performed by: NURSE PRACTITIONER

## 2019-03-15 PROCEDURE — 99213 PR OFFICE/OUTPT VISIT, EST, LEVL III, 20-29 MIN: ICD-10-PCS | Mod: S$GLB,,, | Performed by: NURSE PRACTITIONER

## 2019-03-15 PROCEDURE — 3077F PR MOST RECENT SYSTOLIC BLOOD PRESSURE >= 140 MM HG: ICD-10-PCS | Mod: CPTII,S$GLB,, | Performed by: NURSE PRACTITIONER

## 2019-03-15 PROCEDURE — 99999 PR PBB SHADOW E&M-EST. PATIENT-LVL V: CPT | Mod: PBBFAC,,, | Performed by: NURSE PRACTITIONER

## 2019-03-15 PROCEDURE — 99999 PR PBB SHADOW E&M-EST. PATIENT-LVL V: ICD-10-PCS | Mod: PBBFAC,,, | Performed by: NURSE PRACTITIONER

## 2019-03-15 PROCEDURE — 3079F PR MOST RECENT DIASTOLIC BLOOD PRESSURE 80-89 MM HG: ICD-10-PCS | Mod: CPTII,S$GLB,, | Performed by: NURSE PRACTITIONER

## 2019-03-15 RX ORDER — MECLIZINE HYDROCHLORIDE 25 MG/1
25 TABLET ORAL 2 TIMES DAILY PRN
Qty: 14 TABLET | Refills: 0 | Status: SHIPPED | OUTPATIENT
Start: 2019-03-15 | End: 2019-03-20

## 2019-03-15 NOTE — PROGRESS NOTES
Subjective:       Patient ID: Roshan Menard is a 69 y.o. male.    Chief Complaint: Dizziness    Dizziness:   Chronicity:  New (States began after sinus problems started last month)  Onset:  1 to 4 weeks ago  Progression since onset:  Waxing and waning  Frequency:  Every few hours  Severity:  Mild  Duration:  Very brief  Dizziness characteristics:  Height vertigo   Associated symptoms: ear congestion and aural fullness.no hearing loss, no ear pain, no fever, no headaches, no tinnitus, no nausea, no vomiting, no diaphoresis, no weakness, no visual disturbances, no light-headedness, no syncope, no palpitations, no panic, no facial weakness, no slurred speech, no numbness in extremities and no chest pain.  Aggravated by:  Position changes  Treatments tried:  Nothing  Improvements on treatment:  No reliefno strokes, no cardiac surgery, no neurologic disease, no head trauma, no balance testing, no ear trauma, no ear surgery, no head trauma, no ear infections, no anxiety, no ear tubes, no environmental allergies, no MRI head and no CT head.  Annual exam scheduled with PCP  Past Medical History:   Diagnosis Date    Allergy     Arthritis     Atrial fibrillation     Bronchitis     Cataract     Coronary artery disease     GERD (gastroesophageal reflux disease)     Gout, chronic     Hypertension      Social History     Socioeconomic History    Marital status:      Spouse name: Not on file    Number of children: Not on file    Years of education: Not on file    Highest education level: Not on file   Social Needs    Financial resource strain: Not on file    Food insecurity - worry: Not on file    Food insecurity - inability: Not on file    Transportation needs - medical: Not on file    Transportation needs - non-medical: Not on file   Occupational History     Employer: [employer]   Tobacco Use    Smoking status: Former Smoker     Packs/day: 1.50     Years: 22.00     Pack years: 33.00      Last attempt to quit: 1992     Years since quittin.8   Substance and Sexual Activity    Alcohol use: No    Drug use: No    Sexual activity: Not on file   Other Topics Concern    Not on file   Social History Narrative    Not on file     Past Surgical History:   Procedure Laterality Date    BACK SURGERY      CHOLECYSTECTOMY      CORONARY ANGIOPLASTY WITH STENT PLACEMENT      TONSILLECTOMY      TOTAL KNEE ARTHROPLASTY      VASECTOMY         Review of Systems   Constitutional: Negative.  Negative for diaphoresis and fever.   HENT: Positive for sinus pressure. Negative for ear pain, hearing loss and tinnitus.         Ear fullness   Eyes: Negative.    Respiratory: Negative.    Cardiovascular: Negative.  Negative for chest pain, palpitations and syncope.   Gastrointestinal: Negative.  Negative for nausea and vomiting.   Endocrine: Negative.    Genitourinary: Negative.    Musculoskeletal: Negative.    Skin: Negative.    Allergic/Immunologic: Negative.  Negative for environmental allergies.   Neurological: Positive for dizziness. Negative for weakness, light-headedness and headaches.   Psychiatric/Behavioral: Negative.        Objective:      Physical Exam   Constitutional: He is oriented to person, place, and time. He appears well-developed and well-nourished.   HENT:   Head: Normocephalic.   Right Ear: Hearing, tympanic membrane, external ear and ear canal normal.   Left Ear: Hearing, tympanic membrane, external ear and ear canal normal.   Nose: Rhinorrhea present. Right sinus exhibits no maxillary sinus tenderness and no frontal sinus tenderness. Left sinus exhibits no maxillary sinus tenderness and no frontal sinus tenderness.   Mouth/Throat: Uvula is midline, oropharynx is clear and moist and mucous membranes are normal.   Eyes: Conjunctivae are normal. Pupils are equal, round, and reactive to light.   Neck: Normal range of motion. Neck supple.   Cardiovascular: Normal rate, regular rhythm and normal  heart sounds.   Pulmonary/Chest: Effort normal and breath sounds normal.   Abdominal: Soft. Bowel sounds are normal.   Musculoskeletal: Normal range of motion.   Neurological: He is alert and oriented to person, place, and time.   Skin: Skin is warm and dry. Capillary refill takes 2 to 3 seconds.   Psychiatric: He has a normal mood and affect. His behavior is normal. Judgment and thought content normal.   Nursing note and vitals reviewed.      Assessment:       1. Vertigo    2. Dizziness        Plan:           Roshan was seen today for dizziness.    Diagnoses and all orders for this visit:    Vertigo  Dizziness  -     Ambulatory referral to ENT  -     meclizine (ANTIVERT) 25 mg tablet; Take 1 tablet (25 mg total) by mouth 2 (two) times daily as needed for Dizziness.  F/U with cardiology as scheduled  Report to ER immediately if symptoms worsen

## 2019-03-20 ENCOUNTER — OFFICE VISIT (OUTPATIENT)
Dept: FAMILY MEDICINE | Facility: CLINIC | Age: 69
End: 2019-03-20
Payer: COMMERCIAL

## 2019-03-20 VITALS
HEART RATE: 78 BPM | DIASTOLIC BLOOD PRESSURE: 79 MMHG | HEIGHT: 73 IN | WEIGHT: 235.88 LBS | BODY MASS INDEX: 31.26 KG/M2 | SYSTOLIC BLOOD PRESSURE: 139 MMHG

## 2019-03-20 DIAGNOSIS — Z00.00 ROUTINE CHECK-UP: Primary | ICD-10-CM

## 2019-03-20 DIAGNOSIS — Z13.6 SCREENING FOR AAA (ABDOMINAL AORTIC ANEURYSM): ICD-10-CM

## 2019-03-20 PROCEDURE — 99999 PR PBB SHADOW E&M-EST. PATIENT-LVL III: CPT | Mod: PBBFAC,,, | Performed by: FAMILY MEDICINE

## 2019-03-20 PROCEDURE — 3078F PR MOST RECENT DIASTOLIC BLOOD PRESSURE < 80 MM HG: ICD-10-PCS | Mod: CPTII,S$GLB,, | Performed by: FAMILY MEDICINE

## 2019-03-20 PROCEDURE — 99397 PR PREVENTIVE VISIT,EST,65 & OVER: ICD-10-PCS | Mod: S$GLB,,, | Performed by: FAMILY MEDICINE

## 2019-03-20 PROCEDURE — 3078F DIAST BP <80 MM HG: CPT | Mod: CPTII,S$GLB,, | Performed by: FAMILY MEDICINE

## 2019-03-20 PROCEDURE — 3075F PR MOST RECENT SYSTOLIC BLOOD PRESS GE 130-139MM HG: ICD-10-PCS | Mod: CPTII,S$GLB,, | Performed by: FAMILY MEDICINE

## 2019-03-20 PROCEDURE — 99397 PER PM REEVAL EST PAT 65+ YR: CPT | Mod: S$GLB,,, | Performed by: FAMILY MEDICINE

## 2019-03-20 PROCEDURE — 3075F SYST BP GE 130 - 139MM HG: CPT | Mod: CPTII,S$GLB,, | Performed by: FAMILY MEDICINE

## 2019-03-20 PROCEDURE — 99999 PR PBB SHADOW E&M-EST. PATIENT-LVL III: ICD-10-PCS | Mod: PBBFAC,,, | Performed by: FAMILY MEDICINE

## 2019-03-20 RX ORDER — ATENOLOL 100 MG/1
100 TABLET ORAL DAILY
Qty: 90 TABLET | Refills: 3 | Status: SHIPPED | OUTPATIENT
Start: 2019-03-20 | End: 2020-06-04

## 2019-03-20 RX ORDER — MONTELUKAST SODIUM 10 MG/1
10 TABLET ORAL NIGHTLY
Qty: 90 TABLET | Refills: 3 | Status: SHIPPED | OUTPATIENT
Start: 2019-03-20 | End: 2019-04-19

## 2019-03-20 NOTE — PROGRESS NOTES
The patient presents today for general health evaluation and counseling      Past Medical History:  Past Medical History:   Diagnosis Date    Allergy     Arthritis     Atrial fibrillation     Bronchitis     Cataract     Coronary artery disease     GERD (gastroesophageal reflux disease)     Gout, chronic     Hypertension      Past Surgical History:   Procedure Laterality Date    BACK SURGERY      CHOLECYSTECTOMY      CORONARY ANGIOPLASTY WITH STENT PLACEMENT      TONSILLECTOMY      TOTAL KNEE ARTHROPLASTY      VASECTOMY       Review of patient's allergies indicates:   Allergen Reactions    Azithromycin      Diarrhea      Cefaclor Other (See Comments)     Other reaction(s): Hives    Iodine and iodide containing products      Other reaction(s): Unknown    Ivp dye  [iodinated contrast- oral and iv dye] Other (See Comments)    Sulfa (sulfonamide antibiotics)     Doxycycline Rash     Current Outpatient Medications on File Prior to Visit   Medication Sig Dispense Refill    albuterol (PROVENTIL/VENTOLIN) 90 mcg/actuation inhaler Inhale 2 puffs into the lungs every 4 (four) hours as needed for Wheezing.      allopurinol (ZYLOPRIM) 300 MG tablet TAKE 1 TABLET BY MOUTH EVERY DAY 30 tablet 12    atorvastatin (LIPITOR) 10 MG tablet TK 1 T PO QD      BREO ELLIPTA 200-25 mcg/dose DsDv diskus inhaler INL 1 PUFF PO AT THE SAME TIME QD  6    clopidogrel (PLAVIX) 75 mg tablet Take 75 mg by mouth once daily.      coenzyme Q10 200 mg capsule Take 200 mg by mouth once daily.      fish oil-omega-3 fatty acids 300-1,000 mg capsule Take 2 g by mouth once daily.      FLONASE ALLERGY RELIEF 50 mcg/actuation nasal spray SHAKE LIQUID AND USE 1 SPRAY IN EACH NOSTRIL EVERY DAY 15.8 mL 12    furosemide (LASIX) 20 MG tablet Take 20 mg by mouth once daily.       lactobacillus comb no.10 (PROBIOTIC) 20 billion cell Cap Take 1 capsule by mouth once daily. Or as directed on bottle      losartan (COZAAR) 100 MG tablet  TAKE 1 TABLET BY MOUTH DAILY 30 tablet 12    metoprolol succinate (TOPROL-XL) 50 MG 24 hr tablet TAKE 1 TABLET(50 MG) BY MOUTH EVERY DAY 30 tablet 12    montelukast (SINGULAIR) 10 mg tablet   6    multivitamin (ONE DAILY MULTIVITAMIN) per tablet Take 1 tablet by mouth once daily.      naproxen (NAPROSYN) 500 MG tablet TAKE 1 TABLET(500 MG) BY MOUTH TWICE DAILY 60 tablet 12    NEXIUM 40 mg capsule TAKE 1 CAPSULE BY MOUTH BEFORE BREAKFAST EVERY DAY 30 capsule 10    potassium chloride (KLOR-CON) 10 MEQ TbSR TK 1 T PO ONCE A DAY.  1    urea (CARMOL) 40 % Crea DIAMANTE AA ON SKIN BID  0    [DISCONTINUED] meclizine (ANTIVERT) 25 mg tablet Take 1 tablet (25 mg total) by mouth 2 (two) times daily as needed for Dizziness. 14 tablet 0    [DISCONTINUED] mupirocin (BACTROBAN) 2 % ointment DIAMANTE AA ON THE SKIN TID  0    [DISCONTINUED] SHINGRIX, PF, 50 mcg/0.5 mL injection ADM 0.5ML IM UTD  0     No current facility-administered medications on file prior to visit.      Social History     Socioeconomic History    Marital status:      Spouse name: Not on file    Number of children: Not on file    Years of education: Not on file    Highest education level: Not on file   Social Needs    Financial resource strain: Not on file    Food insecurity - worry: Not on file    Food insecurity - inability: Not on file    Transportation needs - medical: Not on file    Transportation needs - non-medical: Not on file   Occupational History     Employer: [employer]   Tobacco Use    Smoking status: Former Smoker     Packs/day: 1.50     Years: 22.00     Pack years: 33.00     Last attempt to quit: 1992     Years since quittin.8   Substance and Sexual Activity    Alcohol use: No    Drug use: No    Sexual activity: Not on file   Other Topics Concern    Not on file   Social History Narrative    Not on file     Family History   Problem Relation Age of Onset    Cancer Mother     Early death Mother      Arthritis Father     Diabetes Father     Heart disease Father     Hyperlipidemia Father     Hypertension Father          ROS:GENERAL: No fever, chills, fatigability or weight loss.  SKIN: No rashes, itching or changes in color or texture of skin.  HEAD: No headaches or recent head trauma.EYES: Visual acuity fine. No photophobia, ocular pain or diplopia.EARS: Denies ear pain, discharge or vertigo.NOSE: No loss of smell, no epistaxis or postnasal drip.MOUTH & THROAT: No hoarseness or change in voice. No excessive gum bleeding.NODES: Denies swollen glands.  CHEST: Denies GLASER, cyanosis, wheezing, cough and sputum production.  CARDIOVASCULAR: Denies chest pain, PND, orthopnea or reduced exercise tolerance.  ABDOMEN: Appetite fine. No weight loss. Denies diarrhea, abdominal pain, hematemesis or blood in stool.  URINARY: No flank pain, dysuria or hematuria.  PERIPHERAL VASCULAR: No claudication or cyanosis.  MUSCULOSKELETAL: See above.  NEUROLOGIC: No history of seizures, paralysis, alteration of gait or coordination.  PE:   HEAD: Normocephalic, atraumatic.EYES: PERRL. EOMI.   EARS: TM's intact. Light reflex normal. No retraction or perforation.   NOSE: Mucosa pink. Airway clear.MOUTH & THROAT: No tonsillar enlargement. No pharyngeal erythema or exudate. No stridor.  NODES: No cervical, axillary or inguinal lymph node enlargement.  CHEST: Lungs clear to auscultation.  CARDIOVASCULAR: Normal S1, S2. No rubs, murmurs or gallops.  ABDOMEN: Bowel sounds normal. Not distended. Soft. No tenderness or masses.  MUSCULOSKELETAL: No palpable abnormality  NEUROLOGIC: Cranial Nerves: II-XII grossly intact.  Motor: 5/5 strength major flexors/extensors.  DTR's: Knees, Ankles 2+ and equal bilaterally; downgoing toes.  Sensory: Intact to light touch distally.  Gait & Posture: Normal gait and fine motion. No cerebellar signs.     Impression:Routine health check  Plan:Lab eval  Rec diet and ex recs  Rev age appropriate screenings     Health Maintenance Due   Topic Date Due    TETANUS VACCINE  01/08/1969    Abdominal Aortic Aneurysm Screening  01/08/2016    Colonoscopy  06/06/2016

## 2019-03-27 ENCOUNTER — HOSPITAL ENCOUNTER (OUTPATIENT)
Dept: RADIOLOGY | Facility: HOSPITAL | Age: 69
Discharge: HOME | End: 2019-03-27
Attending: FAMILY MEDICINE
Payer: COMMERCIAL

## 2019-03-27 DIAGNOSIS — Z13.6 SCREENING FOR AAA (ABDOMINAL AORTIC ANEURYSM): ICD-10-CM

## 2019-03-27 PROCEDURE — 76775 US EXAM ABDO BACK WALL LIM: CPT | Mod: TC,PO

## 2019-03-27 PROCEDURE — 76775 US EXAM ABDO BACK WALL LIM: CPT | Mod: 26,,, | Performed by: RADIOLOGY

## 2019-03-27 PROCEDURE — 76775 US ABDOMINAL AORTA: ICD-10-PCS | Mod: 26,,, | Performed by: RADIOLOGY

## 2019-03-28 ENCOUNTER — PATIENT OUTREACH (OUTPATIENT)
Dept: ADMINISTRATIVE | Facility: HOSPITAL | Age: 69
End: 2019-03-28

## 2019-04-15 ENCOUNTER — HOSPITAL ENCOUNTER (OUTPATIENT)
Dept: RADIOLOGY | Facility: HOSPITAL | Age: 69
Discharge: HOME | End: 2019-04-15
Attending: FAMILY MEDICINE
Payer: COMMERCIAL

## 2019-04-15 ENCOUNTER — OFFICE VISIT (OUTPATIENT)
Dept: FAMILY MEDICINE | Facility: CLINIC | Age: 69
End: 2019-04-15
Payer: COMMERCIAL

## 2019-04-15 VITALS
HEIGHT: 73 IN | DIASTOLIC BLOOD PRESSURE: 82 MMHG | HEART RATE: 50 BPM | BODY MASS INDEX: 31.28 KG/M2 | TEMPERATURE: 99 F | SYSTOLIC BLOOD PRESSURE: 134 MMHG | WEIGHT: 236 LBS

## 2019-04-15 DIAGNOSIS — R07.9 CHEST PAIN, UNSPECIFIED TYPE: ICD-10-CM

## 2019-04-15 DIAGNOSIS — J06.9 UPPER RESPIRATORY TRACT INFECTION, UNSPECIFIED TYPE: Primary | ICD-10-CM

## 2019-04-15 DIAGNOSIS — I48.0 PAROXYSMAL ATRIAL FIBRILLATION: ICD-10-CM

## 2019-04-15 PROCEDURE — 3075F PR MOST RECENT SYSTOLIC BLOOD PRESS GE 130-139MM HG: ICD-10-PCS | Mod: CPTII,S$GLB,, | Performed by: FAMILY MEDICINE

## 2019-04-15 PROCEDURE — 3079F DIAST BP 80-89 MM HG: CPT | Mod: CPTII,S$GLB,, | Performed by: FAMILY MEDICINE

## 2019-04-15 PROCEDURE — 71046 XR CHEST PA AND LATERAL: ICD-10-PCS | Mod: 26,,, | Performed by: RADIOLOGY

## 2019-04-15 PROCEDURE — 3075F SYST BP GE 130 - 139MM HG: CPT | Mod: CPTII,S$GLB,, | Performed by: FAMILY MEDICINE

## 2019-04-15 PROCEDURE — 99213 PR OFFICE/OUTPT VISIT, EST, LEVL III, 20-29 MIN: ICD-10-PCS | Mod: S$GLB,,, | Performed by: FAMILY MEDICINE

## 2019-04-15 PROCEDURE — 1101F PR PT FALLS ASSESS DOC 0-1 FALLS W/OUT INJ PAST YR: ICD-10-PCS | Mod: CPTII,S$GLB,, | Performed by: FAMILY MEDICINE

## 2019-04-15 PROCEDURE — 71046 X-RAY EXAM CHEST 2 VIEWS: CPT | Mod: 26,,, | Performed by: RADIOLOGY

## 2019-04-15 PROCEDURE — 99999 PR PBB SHADOW E&M-EST. PATIENT-LVL IV: CPT | Mod: PBBFAC,,, | Performed by: FAMILY MEDICINE

## 2019-04-15 PROCEDURE — 3079F PR MOST RECENT DIASTOLIC BLOOD PRESSURE 80-89 MM HG: ICD-10-PCS | Mod: CPTII,S$GLB,, | Performed by: FAMILY MEDICINE

## 2019-04-15 PROCEDURE — 99999 PR PBB SHADOW E&M-EST. PATIENT-LVL IV: ICD-10-PCS | Mod: PBBFAC,,, | Performed by: FAMILY MEDICINE

## 2019-04-15 PROCEDURE — 1101F PT FALLS ASSESS-DOCD LE1/YR: CPT | Mod: CPTII,S$GLB,, | Performed by: FAMILY MEDICINE

## 2019-04-15 PROCEDURE — 71046 X-RAY EXAM CHEST 2 VIEWS: CPT | Mod: TC,PO

## 2019-04-15 PROCEDURE — 99213 OFFICE O/P EST LOW 20 MIN: CPT | Mod: S$GLB,,, | Performed by: FAMILY MEDICINE

## 2019-04-15 NOTE — PROGRESS NOTES
Roshan Menard presents with moderate upper respiratory congestion,rhinnorhea,moderate cough past 2-3 days. OTC help slightly. Denies nausea,vomiting,diarrhea or significant fever.  Also co rt costal pain p contusion   Past Medical History:   Diagnosis Date    Allergy     Arthritis     Atrial fibrillation     Bronchitis     Cataract     Coronary artery disease     GERD (gastroesophageal reflux disease)     Gout, chronic     Hypertension      Past Surgical History:   Procedure Laterality Date    BACK SURGERY      CHOLECYSTECTOMY      CORONARY ANGIOPLASTY WITH STENT PLACEMENT      TONSILLECTOMY      TOTAL KNEE ARTHROPLASTY      VASECTOMY       Review of patient's allergies indicates:   Allergen Reactions    Azithromycin      Diarrhea      Cefaclor Other (See Comments)     Other reaction(s): Hives    Iodine and iodide containing products      Other reaction(s): Unknown    Ivp dye  [iodinated contrast- oral and iv dye] Other (See Comments)    Sulfa (sulfonamide antibiotics)     Doxycycline Rash     Current Outpatient Medications on File Prior to Visit   Medication Sig Dispense Refill    albuterol (PROVENTIL/VENTOLIN) 90 mcg/actuation inhaler Inhale 2 puffs into the lungs every 4 (four) hours as needed for Wheezing.      allopurinol (ZYLOPRIM) 300 MG tablet TAKE 1 TABLET BY MOUTH EVERY DAY 30 tablet 12    atenolol (TENORMIN) 100 MG tablet Take 1 tablet (100 mg total) by mouth once daily. 90 tablet 3    atorvastatin (LIPITOR) 10 MG tablet TK 1 T PO QD      BREO ELLIPTA 200-25 mcg/dose DsDv diskus inhaler INL 1 PUFF PO AT THE SAME TIME QD  6    clopidogrel (PLAVIX) 75 mg tablet Take 75 mg by mouth once daily.      coenzyme Q10 200 mg capsule Take 200 mg by mouth once daily.      fish oil-omega-3 fatty acids 300-1,000 mg capsule Take 2 g by mouth once daily.      FLONASE ALLERGY RELIEF 50 mcg/actuation nasal spray SHAKE LIQUID AND USE 1 SPRAY IN EACH NOSTRIL EVERY DAY 15.8 mL 12     furosemide (LASIX) 20 MG tablet Take 20 mg by mouth once daily.       lactobacillus comb no.10 (PROBIOTIC) 20 billion cell Cap Take 1 capsule by mouth once daily. Or as directed on bottle      losartan (COZAAR) 100 MG tablet TAKE 1 TABLET BY MOUTH DAILY 30 tablet 12    montelukast (SINGULAIR) 10 mg tablet Take 1 tablet (10 mg total) by mouth every evening. 90 tablet 3    multivitamin (ONE DAILY MULTIVITAMIN) per tablet Take 1 tablet by mouth once daily.      naproxen (NAPROSYN) 500 MG tablet TAKE 1 TABLET(500 MG) BY MOUTH TWICE DAILY 60 tablet 12    NEXIUM 40 mg capsule TAKE 1 CAPSULE BY MOUTH BEFORE BREAKFAST EVERY DAY 30 capsule 10    potassium chloride (KLOR-CON) 10 MEQ TbSR TK 1 T PO ONCE A DAY.  1    urea (CARMOL) 40 % Crea DIAMANTE AA ON SKIN BID  0     No current facility-administered medications on file prior to visit.      Social History     Socioeconomic History    Marital status:      Spouse name: Not on file    Number of children: Not on file    Years of education: Not on file    Highest education level: Not on file   Occupational History     Employer: [employer]   Social Needs    Financial resource strain: Not on file    Food insecurity:     Worry: Not on file     Inability: Not on file    Transportation needs:     Medical: Not on file     Non-medical: Not on file   Tobacco Use    Smoking status: Former Smoker     Packs/day: 1.50     Years: 22.00     Pack years: 33.00     Last attempt to quit: 1992     Years since quittin.9   Substance and Sexual Activity    Alcohol use: No    Drug use: No    Sexual activity: Not on file   Lifestyle    Physical activity:     Days per week: Not on file     Minutes per session: Not on file    Stress: Not on file   Relationships    Social connections:     Talks on phone: Not on file     Gets together: Not on file     Attends Judaism service: Not on file     Active member of club or organization: Not on file     Attends  meetings of clubs or organizations: Not on file     Relationship status: Not on file   Other Topics Concern    Not on file   Social History Narrative    Not on file     Family History   Problem Relation Age of Onset    Cancer Mother     Early death Mother     Arthritis Father     Diabetes Father     Heart disease Father     Hyperlipidemia Father     Hypertension Father          ROS:  SKIN: No rashes, itching or changes in color or texture of skin.  EYES: Visual acuity fine. No photophobia, ocular pain or diplopia.EARS: Denies ear pain, discharge or vertigo.NOSE: No loss of smell, no epistaxis some postnasal drip.MOUTH & THROAT: No hoarseness or change in voice. No excessive gum bleeding.CHEST: Denies GLASER, cyanosis, wheezing  CARDIOVASCULAR: Denies chest pain, PND, orthopnea or reduced exercise tolerance.  ABDOMEN:  No weight loss.No abdominal pain, no hematemesis or blood in stool.  URINARY: No flank pain, dysuria or hematuria.  PERIPHERAL VASCULAR: No claudication or cyanosis.  MUSCULOSKELETAL: Negative   NEUROLOGIC: No history of seizures, paralysis, alteration of gait or coordination.    PE: Vital signs as noted  Heent:Normocephalic with no recent cranial trauma,PERRLA,EOMI,conjunctiva clear,fundi reveal no hemmorhage exudate or papilledema.Otic canals clear, tympanic membranes slightly dull bilaterally.Nasal mucosa slightly red and edematous.Posterior pharynx slightly red but without exudate.  Neck:Supple with minimal anterior cervical adenopathy.  Chest:Clear bilateral breath sounds with mild scattered ronchi  Heart:Regular rhthym without murmer  Abdomen:Soft, non tender,no masses, no hepatosplenomegalyExtremeties and Neurologic:Grossly within normal limits  Impression: Upper Respiratory Infection. 465.9  Rib contusion   Plan: CXR  Rest and report any persistence

## 2019-04-19 RX ORDER — ALLOPURINOL 300 MG/1
TABLET ORAL
Qty: 30 TABLET | Refills: 12 | Status: SHIPPED | OUTPATIENT
Start: 2019-04-19 | End: 2020-04-13

## 2019-05-07 DIAGNOSIS — Z12.11 COLON CANCER SCREENING: ICD-10-CM

## 2019-05-14 ENCOUNTER — PATIENT OUTREACH (OUTPATIENT)
Dept: ADMINISTRATIVE | Facility: HOSPITAL | Age: 69
End: 2019-05-14

## 2019-05-14 NOTE — PROGRESS NOTES
Contacted patient to follow up on overdue fit kit. Left message requesting a call back.     Patient states that he will not be doing the fit kit. He has decided to schedule to a colonoscopy

## 2019-07-03 ENCOUNTER — OFFICE VISIT (OUTPATIENT)
Dept: FAMILY MEDICINE | Facility: CLINIC | Age: 69
End: 2019-07-03
Payer: COMMERCIAL

## 2019-07-03 VITALS
HEART RATE: 71 BPM | SYSTOLIC BLOOD PRESSURE: 157 MMHG | TEMPERATURE: 98 F | WEIGHT: 237 LBS | DIASTOLIC BLOOD PRESSURE: 81 MMHG | HEIGHT: 72 IN | BODY MASS INDEX: 32.1 KG/M2

## 2019-07-03 DIAGNOSIS — K57.92 DIVERTICULITIS: Primary | ICD-10-CM

## 2019-07-03 PROCEDURE — 1101F PR PT FALLS ASSESS DOC 0-1 FALLS W/OUT INJ PAST YR: ICD-10-PCS | Mod: CPTII,S$GLB,, | Performed by: NURSE PRACTITIONER

## 2019-07-03 PROCEDURE — 99999 PR PBB SHADOW E&M-EST. PATIENT-LVL III: ICD-10-PCS | Mod: PBBFAC,,, | Performed by: NURSE PRACTITIONER

## 2019-07-03 PROCEDURE — 3079F PR MOST RECENT DIASTOLIC BLOOD PRESSURE 80-89 MM HG: ICD-10-PCS | Mod: CPTII,S$GLB,, | Performed by: NURSE PRACTITIONER

## 2019-07-03 PROCEDURE — 99213 OFFICE O/P EST LOW 20 MIN: CPT | Mod: S$GLB,,, | Performed by: NURSE PRACTITIONER

## 2019-07-03 PROCEDURE — 99213 PR OFFICE/OUTPT VISIT, EST, LEVL III, 20-29 MIN: ICD-10-PCS | Mod: S$GLB,,, | Performed by: NURSE PRACTITIONER

## 2019-07-03 PROCEDURE — 1101F PT FALLS ASSESS-DOCD LE1/YR: CPT | Mod: CPTII,S$GLB,, | Performed by: NURSE PRACTITIONER

## 2019-07-03 PROCEDURE — 99999 PR PBB SHADOW E&M-EST. PATIENT-LVL III: CPT | Mod: PBBFAC,,, | Performed by: NURSE PRACTITIONER

## 2019-07-03 PROCEDURE — 3077F PR MOST RECENT SYSTOLIC BLOOD PRESSURE >= 140 MM HG: ICD-10-PCS | Mod: CPTII,S$GLB,, | Performed by: NURSE PRACTITIONER

## 2019-07-03 PROCEDURE — 3079F DIAST BP 80-89 MM HG: CPT | Mod: CPTII,S$GLB,, | Performed by: NURSE PRACTITIONER

## 2019-07-03 PROCEDURE — 3077F SYST BP >= 140 MM HG: CPT | Mod: CPTII,S$GLB,, | Performed by: NURSE PRACTITIONER

## 2019-07-03 RX ORDER — ACETAMINOPHEN 500 MG
2 TABLET ORAL
COMMUNITY
End: 2019-07-03

## 2019-07-03 RX ORDER — CIPROFLOXACIN 500 MG/1
500 TABLET ORAL 2 TIMES DAILY
Qty: 10 TABLET | Refills: 0 | Status: SHIPPED | OUTPATIENT
Start: 2019-07-03 | End: 2019-07-08

## 2019-07-03 RX ORDER — MONTELUKAST SODIUM 10 MG/1
TABLET ORAL
Refills: 3 | COMMUNITY
Start: 2019-06-26 | End: 2020-09-20

## 2019-07-03 NOTE — PROGRESS NOTES
Subjective:       Patient ID: Roshan Menard is a 69 y.o. male.    Chief Complaint: Abdominal Pain    Abdominal Pain   This is a new problem. The current episode started in the past 7 days. The onset quality is sudden. The problem occurs constantly. The problem has been waxing and waning. The pain is located in the RLQ. The pain is moderate. The quality of the pain is dull and a sensation of fullness. The abdominal pain does not radiate. Pertinent negatives include no arthralgias, diarrhea, dysuria, fever, frequency, headaches, myalgias, nausea or vomiting. The pain is relieved by eating. He has tried nothing for the symptoms.            Review of Systems   Constitutional: Negative for fatigue, fever and unexpected weight change.   HENT: Negative for ear pain and sore throat.    Eyes: Negative.  Negative for pain and visual disturbance.   Respiratory: Negative for cough and shortness of breath.    Cardiovascular: Negative for chest pain and palpitations.   Gastrointestinal: Positive for abdominal pain. Negative for diarrhea, nausea and vomiting.   Genitourinary: Negative for dysuria and frequency.   Musculoskeletal: Negative for arthralgias and myalgias.   Skin: Negative for color change and rash.   Neurological: Negative for dizziness and headaches.   Psychiatric/Behavioral: Negative for sleep disturbance. The patient is not nervous/anxious.        Vitals:    07/03/19 0715   BP: (!) 157/81   Pulse: 71   Temp: 97.8 °F (36.6 °C)       Objective:     Current Outpatient Medications   Medication Sig Dispense Refill    albuterol (PROVENTIL/VENTOLIN) 90 mcg/actuation inhaler Inhale 2 puffs into the lungs every 4 (four) hours as needed for Wheezing.      allopurinol (ZYLOPRIM) 300 MG tablet TAKE 1 TABLET BY MOUTH EVERY DAY 30 tablet 12    atenolol (TENORMIN) 100 MG tablet Take 1 tablet (100 mg total) by mouth once daily. 90 tablet 3    atorvastatin (LIPITOR) 10 MG tablet TK 1 T PO QD      BREO ELLIPTA 200-25 mcg/dose  DsDv diskus inhaler INL 1 PUFF PO AT THE SAME TIME QD  6    clopidogrel (PLAVIX) 75 mg tablet Take 75 mg by mouth once daily.      coenzyme Q10 200 mg capsule Take 200 mg by mouth once daily.      fish oil-omega-3 fatty acids 300-1,000 mg capsule Take 2 g by mouth once daily.      FLONASE ALLERGY RELIEF 50 mcg/actuation nasal spray SHAKE LIQUID AND USE 1 SPRAY IN EACH NOSTRIL EVERY DAY 15.8 mL 12    furosemide (LASIX) 20 MG tablet Take 20 mg by mouth once daily.       lactobacillus comb no.10 (PROBIOTIC) 20 billion cell Cap Take 1 capsule by mouth once daily. Or as directed on bottle      losartan (COZAAR) 100 MG tablet TAKE 1 TABLET BY MOUTH DAILY 30 tablet 12    montelukast (SINGULAIR) 10 mg tablet   3    multivitamin (ONE DAILY MULTIVITAMIN) per tablet Take 1 tablet by mouth once daily.      naproxen (NAPROSYN) 500 MG tablet TAKE 1 TABLET(500 MG) BY MOUTH TWICE DAILY 60 tablet 12    NEXIUM 40 mg capsule TAKE 1 CAPSULE BY MOUTH BEFORE BREAKFAST EVERY DAY 30 capsule 10    potassium chloride (KLOR-CON) 10 MEQ TbSR TK 1 T PO ONCE A DAY.  1    urea (CARMOL) 40 % Crea DIAMANTE AA ON SKIN BID  0    ciprofloxacin HCl (CIPRO) 500 MG tablet Take 1 tablet (500 mg total) by mouth 2 (two) times daily. for 5 days 10 tablet 0     No current facility-administered medications for this visit.        Physical Exam   Constitutional: He is oriented to person, place, and time. He appears well-developed. No distress.   HENT:   Head: Normocephalic and atraumatic.   Eyes: Pupils are equal, round, and reactive to light. EOM are normal.   Neck: Normal range of motion. Neck supple.   Cardiovascular: Normal rate and regular rhythm.   Pulmonary/Chest: Effort normal and breath sounds normal.   Abdominal: Soft. Normal appearance and bowel sounds are normal. There is tenderness in the right lower quadrant.   Musculoskeletal: Normal range of motion.   Neurological: He is alert and oriented to person, place, and time.   Skin: Skin is warm  and dry. No rash noted.   Psychiatric: He has a normal mood and affect. Thought content normal.   Nursing note and vitals reviewed.      Assessment:       1. Diverticulitis        Plan:   Diverticulitis    Other orders  -     ciprofloxacin HCl (CIPRO) 500 MG tablet; Take 1 tablet (500 mg total) by mouth 2 (two) times daily. for 5 days  Dispense: 10 tablet; Refill: 0    increase fluid intake    No follow-ups on file.    There are no Patient Instructions on file for this visit.

## 2019-07-10 RX ORDER — NAPROXEN 500 MG/1
TABLET ORAL
Qty: 60 TABLET | Refills: 12 | Status: SHIPPED | OUTPATIENT
Start: 2019-07-10 | End: 2022-04-29 | Stop reason: ALTCHOICE

## 2019-10-01 ENCOUNTER — PATIENT OUTREACH (OUTPATIENT)
Dept: ADMINISTRATIVE | Facility: HOSPITAL | Age: 69
End: 2019-10-01

## 2019-10-11 ENCOUNTER — PATIENT OUTREACH (OUTPATIENT)
Dept: ADMINISTRATIVE | Facility: HOSPITAL | Age: 69
End: 2019-10-11

## 2019-10-11 NOTE — PROGRESS NOTES
Spoke with pt regarding blood pressure check via HTN report.  Pt decline appt at this time stating he will call back to schedule.

## 2019-12-05 ENCOUNTER — TELEPHONE (OUTPATIENT)
Dept: FAMILY MEDICINE | Facility: CLINIC | Age: 69
End: 2019-12-05

## 2019-12-05 NOTE — TELEPHONE ENCOUNTER
----- Message from Katharine Merrill sent at 12/5/2019  4:38 PM CST -----  Contact: Pt  Type:  Patient Returning Call    Who Called:PT  Who Left Message for Patient:NURSE/MA  Does the patient know what this is regarding?:YES  Would the patient rather a call back or a response via [employer]? CALL BACK  Best Call Back Number:[phone]  Additional Information:

## 2019-12-05 NOTE — TELEPHONE ENCOUNTER
----- Message from Maddy Monica sent at 12/5/2019  3:58 PM CST -----  Type:  Needs Medical Advice    Who Called:  Pt  Roshan  Symptoms (please be specific):     How long has patient had these symptoms:     Pharmacy name and phone #:     Would the patient rather a call back or a response via [facility]?   Call back  Best Call Back Number:   [phone]  Additional Information:  Pt is calling regarding his medical records from outside drs//is calling to ask if you have received any of them//please call/mary ellen/kenny

## 2020-01-07 ENCOUNTER — OFFICE VISIT (OUTPATIENT)
Dept: FAMILY MEDICINE | Facility: CLINIC | Age: 70
End: 2020-01-07
Payer: COMMERCIAL

## 2020-01-07 VITALS
DIASTOLIC BLOOD PRESSURE: 84 MMHG | HEIGHT: 72 IN | WEIGHT: 235.81 LBS | BODY MASS INDEX: 31.94 KG/M2 | SYSTOLIC BLOOD PRESSURE: 137 MMHG | TEMPERATURE: 98 F | HEART RATE: 55 BPM

## 2020-01-07 DIAGNOSIS — M21.962 DEFORMITY OF LEFT FOOT: ICD-10-CM

## 2020-01-07 DIAGNOSIS — K21.9 GASTROESOPHAGEAL REFLUX DISEASE, ESOPHAGITIS PRESENCE NOT SPECIFIED: ICD-10-CM

## 2020-01-07 DIAGNOSIS — I25.10 CORONARY ARTERY DISEASE, ANGINA PRESENCE UNSPECIFIED, UNSPECIFIED VESSEL OR LESION TYPE, UNSPECIFIED WHETHER NATIVE OR TRANSPLANTED HEART: ICD-10-CM

## 2020-01-07 DIAGNOSIS — I48.0 PAROXYSMAL ATRIAL FIBRILLATION: ICD-10-CM

## 2020-01-07 DIAGNOSIS — Z00.00 ROUTINE CHECK-UP: Primary | ICD-10-CM

## 2020-01-07 DIAGNOSIS — M10.9 GOUT, UNSPECIFIED CAUSE, UNSPECIFIED CHRONICITY, UNSPECIFIED SITE: ICD-10-CM

## 2020-01-07 PROCEDURE — 99397 PR PREVENTIVE VISIT,EST,65 & OVER: ICD-10-PCS | Mod: S$GLB,,, | Performed by: FAMILY MEDICINE

## 2020-01-07 PROCEDURE — 3079F DIAST BP 80-89 MM HG: CPT | Mod: CPTII,S$GLB,, | Performed by: FAMILY MEDICINE

## 2020-01-07 PROCEDURE — 3075F SYST BP GE 130 - 139MM HG: CPT | Mod: CPTII,S$GLB,, | Performed by: FAMILY MEDICINE

## 2020-01-07 PROCEDURE — 99999 PR PBB SHADOW E&M-EST. PATIENT-LVL III: ICD-10-PCS | Mod: PBBFAC,,, | Performed by: FAMILY MEDICINE

## 2020-01-07 PROCEDURE — 3079F PR MOST RECENT DIASTOLIC BLOOD PRESSURE 80-89 MM HG: ICD-10-PCS | Mod: CPTII,S$GLB,, | Performed by: FAMILY MEDICINE

## 2020-01-07 PROCEDURE — 99999 PR PBB SHADOW E&M-EST. PATIENT-LVL III: CPT | Mod: PBBFAC,,, | Performed by: FAMILY MEDICINE

## 2020-01-07 PROCEDURE — 3075F PR MOST RECENT SYSTOLIC BLOOD PRESS GE 130-139MM HG: ICD-10-PCS | Mod: CPTII,S$GLB,, | Performed by: FAMILY MEDICINE

## 2020-01-07 PROCEDURE — 99397 PER PM REEVAL EST PAT 65+ YR: CPT | Mod: S$GLB,,, | Performed by: FAMILY MEDICINE

## 2020-01-07 NOTE — PROGRESS NOTES
The patient presents today for general health evaluation and counseling      Past Medical History:  Past Medical History:   Diagnosis Date    Allergy     Arthritis     Atrial fibrillation     Bronchitis     Cataract     Coronary artery disease     GERD (gastroesophageal reflux disease)     Gout, chronic     Hypertension      Past Surgical History:   Procedure Laterality Date    BACK SURGERY      CHOLECYSTECTOMY      CORONARY ANGIOPLASTY WITH STENT PLACEMENT      TONSILLECTOMY      TOTAL KNEE ARTHROPLASTY      VASECTOMY       Review of patient's allergies indicates:   Allergen Reactions    Azithromycin      Diarrhea      Cefaclor Other (See Comments)     Other reaction(s): Hives    Iodine and iodide containing products      Other reaction(s): Unknown    Ivp dye  [iodinated contrast media] Other (See Comments)    Sulfa (sulfonamide antibiotics)     Doxycycline Rash     Current Outpatient Medications on File Prior to Visit   Medication Sig Dispense Refill    albuterol (PROVENTIL/VENTOLIN) 90 mcg/actuation inhaler Inhale 2 puffs into the lungs every 4 (four) hours as needed for Wheezing.      allopurinol (ZYLOPRIM) 300 MG tablet TAKE 1 TABLET BY MOUTH EVERY DAY 30 tablet 12    atenolol (TENORMIN) 100 MG tablet Take 1 tablet (100 mg total) by mouth once daily. 90 tablet 3    atorvastatin (LIPITOR) 10 MG tablet TK 1 T PO QD      BREO ELLIPTA 200-25 mcg/dose DsDv diskus inhaler INL 1 PUFF PO AT THE SAME TIME QD  6    clopidogrel (PLAVIX) 75 mg tablet Take 75 mg by mouth once daily.      coenzyme Q10 200 mg capsule Take 200 mg by mouth once daily.      fish oil-omega-3 fatty acids 300-1,000 mg capsule Take 2 g by mouth once daily.      FLONASE ALLERGY RELIEF 50 mcg/actuation nasal spray SHAKE LIQUID AND USE 1 SPRAY IN EACH NOSTRIL EVERY DAY 15.8 mL 12    furosemide (LASIX) 20 MG tablet Take 20 mg by mouth once daily.       lactobacillus comb no.10 (PROBIOTIC) 20 billion cell Cap Take 1  capsule by mouth once daily. Or as directed on bottle      losartan (COZAAR) 100 MG tablet TAKE 1 TABLET BY MOUTH DAILY 30 tablet 12    montelukast (SINGULAIR) 10 mg tablet   3    multivitamin (ONE DAILY MULTIVITAMIN) per tablet Take 1 tablet by mouth once daily.      naproxen (NAPROSYN) 500 MG tablet TAKE 1 TABLET(500 MG) BY MOUTH TWICE DAILY 60 tablet 12    NEXIUM 40 mg capsule TAKE 1 CAPSULE BY MOUTH BEFORE BREAKFAST EVERY DAY 30 capsule 10    potassium chloride (KLOR-CON) 10 MEQ TbSR TK 1 T PO ONCE A DAY.  1    urea (CARMOL) 40 % Crea DIAMANTE AA ON SKIN BID  0     No current facility-administered medications on file prior to visit.      Social History     Socioeconomic History    Marital status:      Spouse name: Not on file    Number of children: Not on file    Years of education: Not on file    Highest education level: Not on file   Occupational History     Employer: [employer]   Social Needs    Financial resource strain: Not on file    Food insecurity:     Worry: Not on file     Inability: Not on file    Transportation needs:     Medical: Not on file     Non-medical: Not on file   Tobacco Use    Smoking status: Former Smoker     Packs/day: 1.50     Years: 22.00     Pack years: 33.00     Last attempt to quit: 1992     Years since quittin.6   Substance and Sexual Activity    Alcohol use: No    Drug use: No    Sexual activity: Not on file   Lifestyle    Physical activity:     Days per week: Not on file     Minutes per session: Not on file    Stress: Not on file   Relationships    Social connections:     Talks on phone: Not on file     Gets together: Not on file     Attends Judaism service: Not on file     Active member of club or organization: Not on file     Attends meetings of clubs or organizations: Not on file     Relationship status: Not on file   Other Topics Concern    Not on file   Social History Narrative    Not on file     Family History   Problem  Relation Age of Onset    Cancer Mother     Early death Mother     Arthritis Father     Diabetes Father     Heart disease Father     Hyperlipidemia Father     Hypertension Father          ROS:GENERAL: No fever, chills, fatigability or weight loss.  SKIN: No rashes, itching or changes in color or texture of skin.  HEAD: No headaches or recent head trauma.EYES: Visual acuity fine. No photophobia, ocular pain or diplopia.EARS: Denies ear pain, discharge or vertigo.NOSE: No loss of smell, no epistaxis or postnasal drip.MOUTH & THROAT: No hoarseness or change in voice. No excessive gum bleeding.NODES: Denies swollen glands.  CHEST: Denies GLASER, cyanosis, wheezing, cough and sputum production.  CARDIOVASCULAR: Denies chest pain, PND, orthopnea or reduced exercise tolerance.  ABDOMEN: Appetite fine. No weight loss. Denies diarrhea, abdominal pain, hematemesis or blood in stool.  URINARY: No flank pain, dysuria or hematuria.  PERIPHERAL VASCULAR: No claudication or cyanosis.  MUSCULOSKELETAL: See above.  NEUROLOGIC: No history of seizures, paralysis, alteration of gait or coordination.  PE:    HEAD: Normocephalic, atraumatic.EYES: PERRL. EOMI.   EARS: TM's intact. Light reflex normal. No retraction or perforation.   NOSE: Mucosa pink. Airway clear.MOUTH & THROAT: No tonsillar enlargement. No pharyngeal erythema or exudate. No stridor.  NODES: No cervical, axillary or inguinal lymph node enlargement.  CHEST: Lungs clear to auscultation.  CARDIOVASCULAR: Normal S1, S2. No rubs, murmurs or gallops.  ABDOMEN: Bowel sounds normal. Not distended. Soft. No tenderness or masses.  MUSCULOSKELETAL: Left mid foot plantar deformity w callous   NEUROLOGIC: Cranial Nerves: II-XII grossly intact.  Motor: 5/5 strength major flexors/extensors.  DTR's: Knees, Ankles 2+ and equal bilaterally; downgoing toes.  Sensory: Intact to light touch distally.  Gait & Posture: Normal gait and fine motion. No cerebellar signs.     Impression:Routine  health check  Roshan was seen today for annual exam.    Diagnoses and all orders for this visit:    Routine check-up    Coronary artery disease, angina presence unspecified, unspecified vessel or lesion type, unspecified whether native or transplanted heart    Paroxysmal atrial fibrillation    Gastroesophageal reflux disease, esophagitis presence not specified    Gout, unspecified cause, unspecified chronicity, unspecified site    Deformity of left foot      Plan:Lab eval  Rec diet and ex recs  Rev age appropriate screenings    Health Maintenance Due   Topic Date Due    TETANUS VACCINE  01/08/1968    Colonoscopy  06/06/2016

## 2020-01-13 RX ORDER — LOSARTAN POTASSIUM 100 MG/1
TABLET ORAL
Qty: 30 TABLET | Refills: 12 | Status: SHIPPED | OUTPATIENT
Start: 2020-01-13 | End: 2021-03-22

## 2020-02-20 PROBLEM — J45.40 MODERATE PERSISTENT ASTHMA WITHOUT COMPLICATION: Status: ACTIVE | Noted: 2020-02-20

## 2020-02-20 PROBLEM — Z87.891 FORMER SMOKER: Status: ACTIVE | Noted: 2020-02-20

## 2020-04-13 RX ORDER — ALLOPURINOL 300 MG/1
TABLET ORAL
Qty: 90 TABLET | Refills: 4 | Status: SHIPPED | OUTPATIENT
Start: 2020-04-13 | End: 2021-06-14

## 2020-08-04 ENCOUNTER — OFFICE VISIT (OUTPATIENT)
Dept: FAMILY MEDICINE | Facility: CLINIC | Age: 70
End: 2020-08-04
Payer: COMMERCIAL

## 2020-08-04 VITALS
BODY MASS INDEX: 31.74 KG/M2 | SYSTOLIC BLOOD PRESSURE: 164 MMHG | HEIGHT: 72 IN | TEMPERATURE: 99 F | DIASTOLIC BLOOD PRESSURE: 79 MMHG | WEIGHT: 234.38 LBS | HEART RATE: 67 BPM

## 2020-08-04 DIAGNOSIS — J06.9 UPPER RESPIRATORY TRACT INFECTION, UNSPECIFIED TYPE: Primary | ICD-10-CM

## 2020-08-04 DIAGNOSIS — M79.10 MUSCLE PAIN: ICD-10-CM

## 2020-08-04 DIAGNOSIS — R05.9 COUGH: ICD-10-CM

## 2020-08-04 DIAGNOSIS — R19.7 DIARRHEA: ICD-10-CM

## 2020-08-04 PROCEDURE — 1159F MED LIST DOCD IN RCRD: CPT | Mod: S$GLB,,, | Performed by: FAMILY MEDICINE

## 2020-08-04 PROCEDURE — U0003 INFECTIOUS AGENT DETECTION BY NUCLEIC ACID (DNA OR RNA); SEVERE ACUTE RESPIRATORY SYNDROME CORONAVIRUS 2 (SARS-COV-2) (CORONAVIRUS DISEASE [COVID-19]), AMPLIFIED PROBE TECHNIQUE, MAKING USE OF HIGH THROUGHPUT TECHNOLOGIES AS DESCRIBED BY CMS-2020-01-R: HCPCS

## 2020-08-04 PROCEDURE — 3077F SYST BP >= 140 MM HG: CPT | Mod: CPTII,S$GLB,, | Performed by: FAMILY MEDICINE

## 2020-08-04 PROCEDURE — 99213 OFFICE O/P EST LOW 20 MIN: CPT | Mod: S$GLB,,, | Performed by: FAMILY MEDICINE

## 2020-08-04 PROCEDURE — 1101F PT FALLS ASSESS-DOCD LE1/YR: CPT | Mod: CPTII,S$GLB,, | Performed by: FAMILY MEDICINE

## 2020-08-04 PROCEDURE — 99999 PR PBB SHADOW E&M-EST. PATIENT-LVL IV: ICD-10-PCS | Mod: PBBFAC,,, | Performed by: FAMILY MEDICINE

## 2020-08-04 PROCEDURE — 99213 PR OFFICE/OUTPT VISIT, EST, LEVL III, 20-29 MIN: ICD-10-PCS | Mod: S$GLB,,, | Performed by: FAMILY MEDICINE

## 2020-08-04 PROCEDURE — 99999 PR PBB SHADOW E&M-EST. PATIENT-LVL IV: CPT | Mod: PBBFAC,,, | Performed by: FAMILY MEDICINE

## 2020-08-04 PROCEDURE — 1126F AMNT PAIN NOTED NONE PRSNT: CPT | Mod: S$GLB,,, | Performed by: FAMILY MEDICINE

## 2020-08-04 PROCEDURE — 1101F PR PT FALLS ASSESS DOC 0-1 FALLS W/OUT INJ PAST YR: ICD-10-PCS | Mod: CPTII,S$GLB,, | Performed by: FAMILY MEDICINE

## 2020-08-04 PROCEDURE — 3077F PR MOST RECENT SYSTOLIC BLOOD PRESSURE >= 140 MM HG: ICD-10-PCS | Mod: CPTII,S$GLB,, | Performed by: FAMILY MEDICINE

## 2020-08-04 PROCEDURE — 1159F PR MEDICATION LIST DOCUMENTED IN MEDICAL RECORD: ICD-10-PCS | Mod: S$GLB,,, | Performed by: FAMILY MEDICINE

## 2020-08-04 PROCEDURE — 3008F BODY MASS INDEX DOCD: CPT | Mod: CPTII,S$GLB,, | Performed by: FAMILY MEDICINE

## 2020-08-04 PROCEDURE — 3078F DIAST BP <80 MM HG: CPT | Mod: CPTII,S$GLB,, | Performed by: FAMILY MEDICINE

## 2020-08-04 PROCEDURE — 1126F PR PAIN SEVERITY QUANTIFIED, NO PAIN PRESENT: ICD-10-PCS | Mod: S$GLB,,, | Performed by: FAMILY MEDICINE

## 2020-08-04 PROCEDURE — 3008F PR BODY MASS INDEX (BMI) DOCUMENTED: ICD-10-PCS | Mod: CPTII,S$GLB,, | Performed by: FAMILY MEDICINE

## 2020-08-04 PROCEDURE — 3078F PR MOST RECENT DIASTOLIC BLOOD PRESSURE < 80 MM HG: ICD-10-PCS | Mod: CPTII,S$GLB,, | Performed by: FAMILY MEDICINE

## 2020-08-04 RX ORDER — METHYLPREDNISOLONE 4 MG/1
TABLET ORAL
Qty: 1 PACKAGE | Refills: 0 | Status: SHIPPED | OUTPATIENT
Start: 2020-08-04 | End: 2020-08-25

## 2020-08-04 NOTE — PROGRESS NOTES
Roshan Menard presents with moderate upper respiratory congestion,rhinnorhea,moderate cough past 2-3 days. OTC help slightly. Denies nausea,vomiting,diarrhea or significant fever.    Past Medical History:   Diagnosis Date    Allergy     Arthritis     Atrial fibrillation     Bronchitis     Cataract     Coronary artery disease     GERD (gastroesophageal reflux disease)     Gout, chronic     Hypertension      Past Surgical History:   Procedure Laterality Date    BACK SURGERY      CHOLECYSTECTOMY      CORONARY ANGIOPLASTY WITH STENT PLACEMENT      TONSILLECTOMY      TOTAL KNEE ARTHROPLASTY      VASECTOMY       Review of patient's allergies indicates:   Allergen Reactions    Azithromycin      Diarrhea      Cefaclor Other (See Comments)     Other reaction(s): Hives    Iodine and iodide containing products      Other reaction(s): Unknown    Ivp dye  [iodinated contrast media] Other (See Comments)    Sulfa (sulfonamide antibiotics)     Doxycycline Rash     Current Outpatient Medications on File Prior to Visit   Medication Sig Dispense Refill    albuterol (PROVENTIL/VENTOLIN HFA) 90 mcg/actuation inhaler Inhale 2 puffs into the lungs every 6 (six) hours as needed for Wheezing or Shortness of Breath. Rescue 18 g 11    albuterol (PROVENTIL/VENTOLIN) 90 mcg/actuation inhaler Inhale 2 puffs into the lungs every 4 (four) hours as needed for Wheezing.      albuterol-ipratropium (DUO-NEB) 2.5 mg-0.5 mg/3 mL nebulizer solution Take 3 mLs by nebulization every 6 (six) hours as needed for Wheezing or Shortness of Breath. Rescue 120 vial 5    allopurinoL (ZYLOPRIM) 300 MG tablet TAKE 1 TABLET BY MOUTH EVERY DAY 90 tablet 4    atenoloL (TENORMIN) 100 MG tablet TAKE 1 TABLET(100 MG) BY MOUTH EVERY DAY 90 tablet 3    atorvastatin (LIPITOR) 10 MG tablet TK 1 T PO QD      BREO ELLIPTA 200-25 mcg/dose DsDv diskus inhaler INL 1 PUFF PO AT THE SAME TIME QD  6    clopidogrel (PLAVIX) 75 mg tablet Take 75 mg by  mouth once daily.      coenzyme Q10 200 mg capsule Take 200 mg by mouth once daily.      fish oil-omega-3 fatty acids 300-1,000 mg capsule Take by mouth once daily.       FLONASE ALLERGY RELIEF 50 mcg/actuation nasal spray SHAKE LIQUID AND USE 1 SPRAY IN EACH NOSTRIL EVERY DAY 15.8 mL 12    furosemide (LASIX) 20 MG tablet Take 10 mg by mouth once daily.       lactobacillus comb no.10 (PROBIOTIC) 20 billion cell Cap Take 1 capsule by mouth once daily. Or as directed on bottle      losartan (COZAAR) 100 MG tablet TAKE 1 TABLET BY MOUTH DAILY 30 tablet 12    montelukast (SINGULAIR) 10 mg tablet   3    multivitamin (ONE DAILY MULTIVITAMIN) per tablet Take 1 tablet by mouth once daily.      naproxen (NAPROSYN) 500 MG tablet TAKE 1 TABLET(500 MG) BY MOUTH TWICE DAILY 60 tablet 12    NEXIUM 40 mg capsule TAKE 1 CAPSULE BY MOUTH BEFORE BREAKFAST EVERY DAY 30 capsule 10    potassium chloride (KLOR-CON) 10 MEQ TbSR TK 1 T PO ONCE A DAY.  1    tiotropium bromide (SPIRIVA RESPIMAT) 2.5 mcg/actuation Mist Inhale 2 puffs into the lungs once daily. Controller 4 g 11    tiotropium bromide (SPIRIVA WITH HANDIHALER INHL) Inhale into the lungs.      urea (CARMOL) 40 % Crea DIAMANTE AA ON SKIN BID  0     No current facility-administered medications on file prior to visit.      Social History     Socioeconomic History    Marital status:      Spouse name: Not on file    Number of children: Not on file    Years of education: Not on file    Highest education level: Not on file   Occupational History     Employer: [employer]   Social Needs    Financial resource strain: Not on file    Food insecurity     Worry: Not on file     Inability: Not on file    Transportation needs     Medical: Not on file     Non-medical: Not on file   Tobacco Use    Smoking status: Former Smoker     Packs/day: 1.50     Years: 22.00     Pack years: 33.00     Quit date: 1992     Years since quittin.2   Substance and  Sexual Activity    Alcohol use: No    Drug use: No    Sexual activity: Not on file   Lifestyle    Physical activity     Days per week: Not on file     Minutes per session: Not on file    Stress: Not on file   Relationships    Social connections     Talks on phone: Not on file     Gets together: Not on file     Attends Religion service: Not on file     Active member of club or organization: Not on file     Attends meetings of clubs or organizations: Not on file     Relationship status: Not on file   Other Topics Concern    Not on file   Social History Narrative    Not on file     Family History   Problem Relation Age of Onset    Cancer Mother     Early death Mother     Arthritis Father     Diabetes Father     Heart disease Father     Hyperlipidemia Father     Hypertension Father          ROS:  SKIN: No rashes, itching or changes in color or texture of skin.  EYES: Visual acuity fine. No photophobia, ocular pain or diplopia.EARS: Denies ear pain, discharge or vertigo.NOSE: No loss of smell, no epistaxis some postnasal drip.MOUTH & THROAT: No hoarseness or change in voice. No excessive gum bleeding.CHEST: Denies GLASER, cyanosis, wheezing  CARDIOVASCULAR: Denies chest pain, PND, orthopnea or reduced exercise tolerance.  ABDOMEN:  No weight loss.No abdominal pain, no hematemesis or blood in stool.  URINARY: No flank pain, dysuria or hematuria.  PERIPHERAL VASCULAR: No claudication or cyanosis.  MUSCULOSKELETAL: Negative   NEUROLOGIC: No history of seizures, paralysis, alteration of gait or coordination.    PE: Vital signs as noted  Heent:Normocephalic with no recent cranial trauma,PERRLA,EOMI,conjunctiva clear,fundi reveal no hemmorhage exudate or papilledema.Otic canals clear, tympanic membranes slightly dull bilaterally.Nasal mucosa slightly red and edematous.Posterior pharynx slightly red but without exudate.  Neck:Supple with minimal anterior cervical adenopathy.  Chest:Clear bilateral breath sounds  with mild scattered ronchi  Heart:Regular rhthym without murmer  Abdomen:Soft, non tender,no masses, no hepatosplenomegalyExtremeties and Neurologic:Grossly within normal limits  Impression: Upper Respiratory Infection. 465.9  Plan: Covid test   Medrol dspk

## 2020-08-05 LAB — SARS-COV-2 RNA RESP QL NAA+PROBE: NOT DETECTED

## 2020-09-24 ENCOUNTER — PATIENT OUTREACH (OUTPATIENT)
Dept: ADMINISTRATIVE | Facility: HOSPITAL | Age: 70
End: 2020-09-24

## 2020-09-24 NOTE — PROGRESS NOTES
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  Talked to pt on phone, currently working. Will check bp and enter in portal when gets home.

## 2020-10-13 ENCOUNTER — PATIENT OUTREACH (OUTPATIENT)
Dept: ADMINISTRATIVE | Facility: HOSPITAL | Age: 70
End: 2020-10-13

## 2020-10-21 ENCOUNTER — PATIENT OUTREACH (OUTPATIENT)
Dept: ADMINISTRATIVE | Facility: HOSPITAL | Age: 70
End: 2020-10-21

## 2020-10-21 NOTE — PROGRESS NOTES
Working [employer] HTN Report:     Last primary care BP was > 140/90. Called pt for updated home reading. No answer, left message.

## 2020-11-07 ENCOUNTER — TELEPHONE (OUTPATIENT)
Dept: FAMILY MEDICINE | Facility: CLINIC | Age: 70
End: 2020-11-07

## 2020-11-07 NOTE — TELEPHONE ENCOUNTER
Called patient to get updated BP reading for [employer] uncontrolled HTN report. Patient reported his bp as 134/78. Remote BP entered

## 2021-01-06 ENCOUNTER — PATIENT MESSAGE (OUTPATIENT)
Dept: FAMILY MEDICINE | Facility: CLINIC | Age: 71
End: 2021-01-06

## 2021-01-10 ENCOUNTER — IMMUNIZATION (OUTPATIENT)
Dept: FAMILY MEDICINE | Facility: CLINIC | Age: 71
End: 2021-01-10
Payer: COMMERCIAL

## 2021-01-10 DIAGNOSIS — Z23 NEED FOR VACCINATION: ICD-10-CM

## 2021-01-10 PROCEDURE — 91300 COVID-19, MRNA, LNP-S, PF, 30 MCG/0.3 ML DOSE VACCINE: CPT | Mod: PBBFAC | Performed by: FAMILY MEDICINE

## 2021-01-31 ENCOUNTER — IMMUNIZATION (OUTPATIENT)
Dept: FAMILY MEDICINE | Facility: CLINIC | Age: 71
End: 2021-01-31
Payer: COMMERCIAL

## 2021-01-31 DIAGNOSIS — Z23 NEED FOR VACCINATION: Primary | ICD-10-CM

## 2021-01-31 PROCEDURE — 0002A COVID-19, MRNA, LNP-S, PF, 30 MCG/0.3 ML DOSE VACCINE: CPT | Mod: PBBFAC | Performed by: INTERNAL MEDICINE

## 2021-01-31 PROCEDURE — 91300 COVID-19, MRNA, LNP-S, PF, 30 MCG/0.3 ML DOSE VACCINE: CPT | Mod: PBBFAC | Performed by: INTERNAL MEDICINE

## 2021-02-18 ENCOUNTER — PATIENT MESSAGE (OUTPATIENT)
Dept: FAMILY MEDICINE | Facility: CLINIC | Age: 71
End: 2021-02-18

## 2021-02-25 ENCOUNTER — OFFICE VISIT (OUTPATIENT)
Dept: FAMILY MEDICINE | Facility: CLINIC | Age: 71
End: 2021-02-25
Payer: COMMERCIAL

## 2021-02-25 ENCOUNTER — LAB VISIT (OUTPATIENT)
Dept: LAB | Facility: HOSPITAL | Age: 71
End: 2021-02-25
Attending: INTERNAL MEDICINE
Payer: COMMERCIAL

## 2021-02-25 VITALS
WEIGHT: 236.38 LBS | BODY MASS INDEX: 32.02 KG/M2 | TEMPERATURE: 99 F | HEART RATE: 60 BPM | DIASTOLIC BLOOD PRESSURE: 78 MMHG | SYSTOLIC BLOOD PRESSURE: 155 MMHG | HEIGHT: 72 IN | OXYGEN SATURATION: 98 %

## 2021-02-25 DIAGNOSIS — I20.9 ANGINAL SYNDROME: ICD-10-CM

## 2021-02-25 DIAGNOSIS — E78.5 HYPERLIPEMIA: ICD-10-CM

## 2021-02-25 DIAGNOSIS — J45.40 MODERATE PERSISTENT ASTHMA WITHOUT COMPLICATION: ICD-10-CM

## 2021-02-25 DIAGNOSIS — Z01.818 PREOPERATIVE CLEARANCE: Primary | ICD-10-CM

## 2021-02-25 DIAGNOSIS — I48.0 PAROXYSMAL ATRIAL FIBRILLATION: ICD-10-CM

## 2021-02-25 DIAGNOSIS — G62.9 PERIPHERAL NERVE DISORDER: Primary | ICD-10-CM

## 2021-02-25 DIAGNOSIS — I10 ESSENTIAL HYPERTENSION: ICD-10-CM

## 2021-02-25 LAB
BASOPHILS # BLD AUTO: 0.08 K/UL (ref 0–0.2)
BASOPHILS NFR BLD: 1.1 % (ref 0–1.9)
DIFFERENTIAL METHOD: ABNORMAL
EOSINOPHIL # BLD AUTO: 0.3 K/UL (ref 0–0.5)
EOSINOPHIL NFR BLD: 3.6 % (ref 0–8)
ERYTHROCYTE [DISTWIDTH] IN BLOOD BY AUTOMATED COUNT: 14 % (ref 11.5–14.5)
ESTIMATED AVG GLUCOSE: 103 MG/DL (ref 68–131)
HBA1C MFR BLD: 5.2 % (ref 4–5.6)
HCT VFR BLD AUTO: 46.1 % (ref 40–54)
HGB BLD-MCNC: 14.5 G/DL (ref 14–18)
IMM GRANULOCYTES # BLD AUTO: 0.01 K/UL (ref 0–0.04)
IMM GRANULOCYTES NFR BLD AUTO: 0.1 % (ref 0–0.5)
LYMPHOCYTES # BLD AUTO: 1.1 K/UL (ref 1–4.8)
LYMPHOCYTES NFR BLD: 14 % (ref 18–48)
MCH RBC QN AUTO: 29.1 PG (ref 27–31)
MCHC RBC AUTO-ENTMCNC: 31.5 G/DL (ref 32–36)
MCV RBC AUTO: 93 FL (ref 82–98)
MONOCYTES # BLD AUTO: 0.9 K/UL (ref 0.3–1)
MONOCYTES NFR BLD: 11.4 % (ref 4–15)
NEUTROPHILS # BLD AUTO: 5.3 K/UL (ref 1.8–7.7)
NEUTROPHILS NFR BLD: 69.8 % (ref 38–73)
NRBC BLD-RTO: 0 /100 WBC
PLATELET # BLD AUTO: 268 K/UL (ref 150–350)
PMV BLD AUTO: 10.9 FL (ref 9.2–12.9)
RBC # BLD AUTO: 4.98 M/UL (ref 4.6–6.2)
WBC # BLD AUTO: 7.55 K/UL (ref 3.9–12.7)

## 2021-02-25 PROCEDURE — 99214 OFFICE O/P EST MOD 30 MIN: CPT | Mod: S$GLB,,, | Performed by: NURSE PRACTITIONER

## 2021-02-25 PROCEDURE — 1101F PT FALLS ASSESS-DOCD LE1/YR: CPT | Mod: CPTII,S$GLB,, | Performed by: NURSE PRACTITIONER

## 2021-02-25 PROCEDURE — 36415 COLL VENOUS BLD VENIPUNCTURE: CPT | Mod: PO

## 2021-02-25 PROCEDURE — 3077F SYST BP >= 140 MM HG: CPT | Mod: CPTII,S$GLB,, | Performed by: NURSE PRACTITIONER

## 2021-02-25 PROCEDURE — 85025 COMPLETE CBC W/AUTO DIFF WBC: CPT

## 2021-02-25 PROCEDURE — 1126F PR PAIN SEVERITY QUANTIFIED, NO PAIN PRESENT: ICD-10-PCS | Mod: S$GLB,,, | Performed by: NURSE PRACTITIONER

## 2021-02-25 PROCEDURE — 3288F PR FALLS RISK ASSESSMENT DOCUMENTED: ICD-10-PCS | Mod: CPTII,S$GLB,, | Performed by: NURSE PRACTITIONER

## 2021-02-25 PROCEDURE — 3078F PR MOST RECENT DIASTOLIC BLOOD PRESSURE < 80 MM HG: ICD-10-PCS | Mod: CPTII,S$GLB,, | Performed by: NURSE PRACTITIONER

## 2021-02-25 PROCEDURE — 1126F AMNT PAIN NOTED NONE PRSNT: CPT | Mod: S$GLB,,, | Performed by: NURSE PRACTITIONER

## 2021-02-25 PROCEDURE — 1101F PR PT FALLS ASSESS DOC 0-1 FALLS W/OUT INJ PAST YR: ICD-10-PCS | Mod: CPTII,S$GLB,, | Performed by: NURSE PRACTITIONER

## 2021-02-25 PROCEDURE — 82607 VITAMIN B-12: CPT

## 2021-02-25 PROCEDURE — 99999 PR PBB SHADOW E&M-EST. PATIENT-LVL V: ICD-10-PCS | Mod: PBBFAC,,, | Performed by: NURSE PRACTITIONER

## 2021-02-25 PROCEDURE — 99999 PR PBB SHADOW E&M-EST. PATIENT-LVL V: CPT | Mod: PBBFAC,,, | Performed by: NURSE PRACTITIONER

## 2021-02-25 PROCEDURE — 3078F DIAST BP <80 MM HG: CPT | Mod: CPTII,S$GLB,, | Performed by: NURSE PRACTITIONER

## 2021-02-25 PROCEDURE — 3008F BODY MASS INDEX DOCD: CPT | Mod: CPTII,S$GLB,, | Performed by: NURSE PRACTITIONER

## 2021-02-25 PROCEDURE — 3077F PR MOST RECENT SYSTOLIC BLOOD PRESSURE >= 140 MM HG: ICD-10-PCS | Mod: CPTII,S$GLB,, | Performed by: NURSE PRACTITIONER

## 2021-02-25 PROCEDURE — 3288F FALL RISK ASSESSMENT DOCD: CPT | Mod: CPTII,S$GLB,, | Performed by: NURSE PRACTITIONER

## 2021-02-25 PROCEDURE — 1159F MED LIST DOCD IN RCRD: CPT | Mod: S$GLB,,, | Performed by: NURSE PRACTITIONER

## 2021-02-25 PROCEDURE — 1159F PR MEDICATION LIST DOCUMENTED IN MEDICAL RECORD: ICD-10-PCS | Mod: S$GLB,,, | Performed by: NURSE PRACTITIONER

## 2021-02-25 PROCEDURE — 99214 PR OFFICE/OUTPT VISIT, EST, LEVL IV, 30-39 MIN: ICD-10-PCS | Mod: S$GLB,,, | Performed by: NURSE PRACTITIONER

## 2021-02-25 PROCEDURE — 80061 LIPID PANEL: CPT

## 2021-02-25 PROCEDURE — 80053 COMPREHEN METABOLIC PANEL: CPT

## 2021-02-25 PROCEDURE — 82746 ASSAY OF FOLIC ACID SERUM: CPT

## 2021-02-25 PROCEDURE — 83036 HEMOGLOBIN GLYCOSYLATED A1C: CPT

## 2021-02-25 PROCEDURE — 3008F PR BODY MASS INDEX (BMI) DOCUMENTED: ICD-10-PCS | Mod: CPTII,S$GLB,, | Performed by: NURSE PRACTITIONER

## 2021-02-26 LAB
ALBUMIN SERPL BCP-MCNC: 3.9 G/DL (ref 3.5–5.2)
ALP SERPL-CCNC: 93 U/L (ref 55–135)
ALT SERPL W/O P-5'-P-CCNC: 22 U/L (ref 10–44)
ANION GAP SERPL CALC-SCNC: 12 MMOL/L (ref 8–16)
AST SERPL-CCNC: 30 U/L (ref 10–40)
BILIRUB SERPL-MCNC: 0.6 MG/DL (ref 0.1–1)
BUN SERPL-MCNC: 15 MG/DL (ref 8–23)
CALCIUM SERPL-MCNC: 9.3 MG/DL (ref 8.7–10.5)
CHLORIDE SERPL-SCNC: 105 MMOL/L (ref 95–110)
CHOLEST SERPL-MCNC: 142 MG/DL (ref 120–199)
CHOLEST/HDLC SERPL: 4.1 {RATIO} (ref 2–5)
CO2 SERPL-SCNC: 25 MMOL/L (ref 23–29)
CREAT SERPL-MCNC: 0.9 MG/DL (ref 0.5–1.4)
EST. GFR  (AFRICAN AMERICAN): >60 ML/MIN/1.73 M^2
EST. GFR  (NON AFRICAN AMERICAN): >60 ML/MIN/1.73 M^2
FOLATE SERPL-MCNC: 14.3 NG/ML (ref 4–24)
GLUCOSE SERPL-MCNC: 91 MG/DL (ref 70–110)
HDLC SERPL-MCNC: 35 MG/DL (ref 40–75)
HDLC SERPL: 24.6 % (ref 20–50)
LDLC SERPL CALC-MCNC: 72.8 MG/DL (ref 63–159)
NONHDLC SERPL-MCNC: 107 MG/DL
POTASSIUM SERPL-SCNC: 4.3 MMOL/L (ref 3.5–5.1)
PROT SERPL-MCNC: 6.8 G/DL (ref 6–8.4)
SODIUM SERPL-SCNC: 142 MMOL/L (ref 136–145)
TRIGL SERPL-MCNC: 171 MG/DL (ref 30–150)
VIT B12 SERPL-MCNC: 710 PG/ML (ref 210–950)

## 2021-03-03 ENCOUNTER — TELEPHONE (OUTPATIENT)
Dept: FAMILY MEDICINE | Facility: CLINIC | Age: 71
End: 2021-03-03

## 2021-03-03 ENCOUNTER — PATIENT MESSAGE (OUTPATIENT)
Dept: FAMILY MEDICINE | Facility: CLINIC | Age: 71
End: 2021-03-03

## 2021-03-09 ENCOUNTER — PATIENT OUTREACH (OUTPATIENT)
Dept: ADMINISTRATIVE | Facility: HOSPITAL | Age: 71
End: 2021-03-09

## 2021-03-19 ENCOUNTER — PATIENT MESSAGE (OUTPATIENT)
Dept: ADMINISTRATIVE | Facility: HOSPITAL | Age: 71
End: 2021-03-19

## 2021-03-19 DIAGNOSIS — I10 ESSENTIAL HYPERTENSION: Primary | ICD-10-CM

## 2021-03-20 ENCOUNTER — PATIENT MESSAGE (OUTPATIENT)
Dept: FAMILY MEDICINE | Facility: CLINIC | Age: 71
End: 2021-03-20

## 2021-03-22 ENCOUNTER — PATIENT OUTREACH (OUTPATIENT)
Dept: ADMINISTRATIVE | Facility: HOSPITAL | Age: 71
End: 2021-03-22

## 2021-03-22 ENCOUNTER — TELEPHONE (OUTPATIENT)
Dept: ADMINISTRATIVE | Facility: HOSPITAL | Age: 71
End: 2021-03-22

## 2021-03-22 VITALS — DIASTOLIC BLOOD PRESSURE: 82 MMHG | SYSTOLIC BLOOD PRESSURE: 139 MMHG

## 2021-03-22 RX ORDER — LOSARTAN POTASSIUM 100 MG/1
TABLET ORAL
Qty: 30 TABLET | Refills: 0 | Status: SHIPPED | OUTPATIENT
Start: 2021-03-22 | End: 2021-04-19

## 2021-06-13 DIAGNOSIS — I10 ESSENTIAL HYPERTENSION: ICD-10-CM

## 2021-06-14 RX ORDER — ALLOPURINOL 300 MG/1
TABLET ORAL
Qty: 90 TABLET | Refills: 0 | Status: SHIPPED | OUTPATIENT
Start: 2021-06-14 | End: 2021-12-14

## 2021-06-14 RX ORDER — ATENOLOL 100 MG/1
TABLET ORAL
Qty: 90 TABLET | Refills: 0 | Status: SHIPPED | OUTPATIENT
Start: 2021-06-14 | End: 2021-09-08 | Stop reason: DRUGHIGH

## 2021-06-14 RX ORDER — LOSARTAN POTASSIUM 100 MG/1
TABLET ORAL
Qty: 30 TABLET | Refills: 0 | Status: SHIPPED | OUTPATIENT
Start: 2021-06-14 | End: 2021-08-02

## 2021-06-28 ENCOUNTER — OFFICE VISIT (OUTPATIENT)
Dept: FAMILY MEDICINE | Facility: CLINIC | Age: 71
End: 2021-06-28
Payer: COMMERCIAL

## 2021-06-28 VITALS
WEIGHT: 241 LBS | BODY MASS INDEX: 31.94 KG/M2 | TEMPERATURE: 98 F | HEART RATE: 79 BPM | HEIGHT: 73 IN | DIASTOLIC BLOOD PRESSURE: 85 MMHG | SYSTOLIC BLOOD PRESSURE: 142 MMHG

## 2021-06-28 DIAGNOSIS — I25.10 CORONARY ARTERY DISEASE INVOLVING NATIVE CORONARY ARTERY OF NATIVE HEART WITHOUT ANGINA PECTORIS: ICD-10-CM

## 2021-06-28 DIAGNOSIS — M10.9 GOUT, UNSPECIFIED CAUSE, UNSPECIFIED CHRONICITY, UNSPECIFIED SITE: ICD-10-CM

## 2021-06-28 DIAGNOSIS — K21.9 GASTROESOPHAGEAL REFLUX DISEASE, UNSPECIFIED WHETHER ESOPHAGITIS PRESENT: ICD-10-CM

## 2021-06-28 DIAGNOSIS — I10 ESSENTIAL HYPERTENSION: ICD-10-CM

## 2021-06-28 DIAGNOSIS — M14.671 CHARCOT'S JOINT OF RIGHT FOOT: ICD-10-CM

## 2021-06-28 DIAGNOSIS — E78.2 MIXED HYPERLIPIDEMIA: ICD-10-CM

## 2021-06-28 DIAGNOSIS — J45.40 MODERATE PERSISTENT ASTHMA WITHOUT COMPLICATION: Primary | ICD-10-CM

## 2021-06-28 DIAGNOSIS — I48.0 PAROXYSMAL ATRIAL FIBRILLATION: ICD-10-CM

## 2021-06-28 PROCEDURE — 1159F PR MEDICATION LIST DOCUMENTED IN MEDICAL RECORD: ICD-10-PCS | Mod: S$GLB,,, | Performed by: INTERNAL MEDICINE

## 2021-06-28 PROCEDURE — 3077F PR MOST RECENT SYSTOLIC BLOOD PRESSURE >= 140 MM HG: ICD-10-PCS | Mod: CPTII,S$GLB,, | Performed by: INTERNAL MEDICINE

## 2021-06-28 PROCEDURE — 3079F PR MOST RECENT DIASTOLIC BLOOD PRESSURE 80-89 MM HG: ICD-10-PCS | Mod: CPTII,S$GLB,, | Performed by: INTERNAL MEDICINE

## 2021-06-28 PROCEDURE — 3077F SYST BP >= 140 MM HG: CPT | Mod: CPTII,S$GLB,, | Performed by: INTERNAL MEDICINE

## 2021-06-28 PROCEDURE — 99214 OFFICE O/P EST MOD 30 MIN: CPT | Mod: S$GLB,,, | Performed by: INTERNAL MEDICINE

## 2021-06-28 PROCEDURE — 99999 PR PBB SHADOW E&M-EST. PATIENT-LVL IV: ICD-10-PCS | Mod: PBBFAC,,, | Performed by: INTERNAL MEDICINE

## 2021-06-28 PROCEDURE — 3079F DIAST BP 80-89 MM HG: CPT | Mod: CPTII,S$GLB,, | Performed by: INTERNAL MEDICINE

## 2021-06-28 PROCEDURE — 3288F FALL RISK ASSESSMENT DOCD: CPT | Mod: CPTII,S$GLB,, | Performed by: INTERNAL MEDICINE

## 2021-06-28 PROCEDURE — 1159F MED LIST DOCD IN RCRD: CPT | Mod: S$GLB,,, | Performed by: INTERNAL MEDICINE

## 2021-06-28 PROCEDURE — 1101F PT FALLS ASSESS-DOCD LE1/YR: CPT | Mod: CPTII,S$GLB,, | Performed by: INTERNAL MEDICINE

## 2021-06-28 PROCEDURE — 1126F AMNT PAIN NOTED NONE PRSNT: CPT | Mod: S$GLB,,, | Performed by: INTERNAL MEDICINE

## 2021-06-28 PROCEDURE — 99214 PR OFFICE/OUTPT VISIT, EST, LEVL IV, 30-39 MIN: ICD-10-PCS | Mod: S$GLB,,, | Performed by: INTERNAL MEDICINE

## 2021-06-28 PROCEDURE — 3288F PR FALLS RISK ASSESSMENT DOCUMENTED: ICD-10-PCS | Mod: CPTII,S$GLB,, | Performed by: INTERNAL MEDICINE

## 2021-06-28 PROCEDURE — 3008F PR BODY MASS INDEX (BMI) DOCUMENTED: ICD-10-PCS | Mod: CPTII,S$GLB,, | Performed by: INTERNAL MEDICINE

## 2021-06-28 PROCEDURE — 3008F BODY MASS INDEX DOCD: CPT | Mod: CPTII,S$GLB,, | Performed by: INTERNAL MEDICINE

## 2021-06-28 PROCEDURE — 99999 PR PBB SHADOW E&M-EST. PATIENT-LVL IV: CPT | Mod: PBBFAC,,, | Performed by: INTERNAL MEDICINE

## 2021-06-28 PROCEDURE — 1126F PR PAIN SEVERITY QUANTIFIED, NO PAIN PRESENT: ICD-10-PCS | Mod: S$GLB,,, | Performed by: INTERNAL MEDICINE

## 2021-06-28 PROCEDURE — 1101F PR PT FALLS ASSESS DOC 0-1 FALLS W/OUT INJ PAST YR: ICD-10-PCS | Mod: CPTII,S$GLB,, | Performed by: INTERNAL MEDICINE

## 2021-06-28 RX ORDER — ASPIRIN 81 MG/1
81 TABLET ORAL
COMMUNITY
End: 2023-06-22

## 2021-07-01 ENCOUNTER — PATIENT OUTREACH (OUTPATIENT)
Dept: ADMINISTRATIVE | Facility: HOSPITAL | Age: 71
End: 2021-07-01

## 2021-07-28 ENCOUNTER — TELEPHONE (OUTPATIENT)
Dept: CARDIOLOGY | Facility: CLINIC | Age: 71
End: 2021-07-28

## 2021-07-28 ENCOUNTER — TELEPHONE (OUTPATIENT)
Dept: FAMILY MEDICINE | Facility: CLINIC | Age: 71
End: 2021-07-28

## 2021-08-02 ENCOUNTER — OFFICE VISIT (OUTPATIENT)
Dept: FAMILY MEDICINE | Facility: CLINIC | Age: 71
End: 2021-08-02
Payer: COMMERCIAL

## 2021-08-02 ENCOUNTER — PATIENT MESSAGE (OUTPATIENT)
Dept: FAMILY MEDICINE | Facility: CLINIC | Age: 71
End: 2021-08-02

## 2021-08-02 VITALS
HEART RATE: 84 BPM | WEIGHT: 242 LBS | DIASTOLIC BLOOD PRESSURE: 83 MMHG | BODY MASS INDEX: 32.07 KG/M2 | SYSTOLIC BLOOD PRESSURE: 139 MMHG | TEMPERATURE: 98 F | HEIGHT: 73 IN

## 2021-08-02 DIAGNOSIS — I65.23 BILATERAL CAROTID ARTERY STENOSIS: ICD-10-CM

## 2021-08-02 DIAGNOSIS — I10 ESSENTIAL HYPERTENSION: ICD-10-CM

## 2021-08-02 DIAGNOSIS — I48.0 PAROXYSMAL ATRIAL FIBRILLATION: Primary | ICD-10-CM

## 2021-08-02 PROCEDURE — 3288F PR FALLS RISK ASSESSMENT DOCUMENTED: ICD-10-PCS | Mod: CPTII,S$GLB,, | Performed by: INTERNAL MEDICINE

## 2021-08-02 PROCEDURE — 1126F AMNT PAIN NOTED NONE PRSNT: CPT | Mod: CPTII,S$GLB,, | Performed by: INTERNAL MEDICINE

## 2021-08-02 PROCEDURE — 3075F SYST BP GE 130 - 139MM HG: CPT | Mod: CPTII,S$GLB,, | Performed by: INTERNAL MEDICINE

## 2021-08-02 PROCEDURE — 3079F DIAST BP 80-89 MM HG: CPT | Mod: CPTII,S$GLB,, | Performed by: INTERNAL MEDICINE

## 2021-08-02 PROCEDURE — 3079F PR MOST RECENT DIASTOLIC BLOOD PRESSURE 80-89 MM HG: ICD-10-PCS | Mod: CPTII,S$GLB,, | Performed by: INTERNAL MEDICINE

## 2021-08-02 PROCEDURE — 3008F BODY MASS INDEX DOCD: CPT | Mod: CPTII,S$GLB,, | Performed by: INTERNAL MEDICINE

## 2021-08-02 PROCEDURE — 3075F PR MOST RECENT SYSTOLIC BLOOD PRESS GE 130-139MM HG: ICD-10-PCS | Mod: CPTII,S$GLB,, | Performed by: INTERNAL MEDICINE

## 2021-08-02 PROCEDURE — 1101F PT FALLS ASSESS-DOCD LE1/YR: CPT | Mod: CPTII,S$GLB,, | Performed by: INTERNAL MEDICINE

## 2021-08-02 PROCEDURE — 3008F PR BODY MASS INDEX (BMI) DOCUMENTED: ICD-10-PCS | Mod: CPTII,S$GLB,, | Performed by: INTERNAL MEDICINE

## 2021-08-02 PROCEDURE — 1126F PR PAIN SEVERITY QUANTIFIED, NO PAIN PRESENT: ICD-10-PCS | Mod: CPTII,S$GLB,, | Performed by: INTERNAL MEDICINE

## 2021-08-02 PROCEDURE — 3288F FALL RISK ASSESSMENT DOCD: CPT | Mod: CPTII,S$GLB,, | Performed by: INTERNAL MEDICINE

## 2021-08-02 PROCEDURE — 1159F PR MEDICATION LIST DOCUMENTED IN MEDICAL RECORD: ICD-10-PCS | Mod: CPTII,S$GLB,, | Performed by: INTERNAL MEDICINE

## 2021-08-02 PROCEDURE — 99213 PR OFFICE/OUTPT VISIT, EST, LEVL III, 20-29 MIN: ICD-10-PCS | Mod: S$GLB,,, | Performed by: INTERNAL MEDICINE

## 2021-08-02 PROCEDURE — 99999 PR PBB SHADOW E&M-EST. PATIENT-LVL V: CPT | Mod: PBBFAC,,, | Performed by: INTERNAL MEDICINE

## 2021-08-02 PROCEDURE — 1101F PR PT FALLS ASSESS DOC 0-1 FALLS W/OUT INJ PAST YR: ICD-10-PCS | Mod: CPTII,S$GLB,, | Performed by: INTERNAL MEDICINE

## 2021-08-02 PROCEDURE — 99213 OFFICE O/P EST LOW 20 MIN: CPT | Mod: S$GLB,,, | Performed by: INTERNAL MEDICINE

## 2021-08-02 PROCEDURE — 1159F MED LIST DOCD IN RCRD: CPT | Mod: CPTII,S$GLB,, | Performed by: INTERNAL MEDICINE

## 2021-08-02 PROCEDURE — 99999 PR PBB SHADOW E&M-EST. PATIENT-LVL V: ICD-10-PCS | Mod: PBBFAC,,, | Performed by: INTERNAL MEDICINE

## 2021-08-02 PROCEDURE — 3044F PR MOST RECENT HEMOGLOBIN A1C LEVEL <7.0%: ICD-10-PCS | Mod: CPTII,S$GLB,, | Performed by: INTERNAL MEDICINE

## 2021-08-02 PROCEDURE — 3044F HG A1C LEVEL LT 7.0%: CPT | Mod: CPTII,S$GLB,, | Performed by: INTERNAL MEDICINE

## 2021-08-02 RX ORDER — TELMISARTAN 80 MG/1
TABLET ORAL DAILY
COMMUNITY
End: 2021-09-22 | Stop reason: SDUPTHER

## 2021-09-15 ENCOUNTER — PATIENT OUTREACH (OUTPATIENT)
Dept: ADMINISTRATIVE | Facility: HOSPITAL | Age: 71
End: 2021-09-15

## 2021-09-15 ENCOUNTER — TELEPHONE (OUTPATIENT)
Dept: FAMILY MEDICINE | Facility: CLINIC | Age: 71
End: 2021-09-15

## 2021-09-30 ENCOUNTER — IMMUNIZATION (OUTPATIENT)
Dept: FAMILY MEDICINE | Facility: CLINIC | Age: 71
End: 2021-09-30
Payer: COMMERCIAL

## 2021-09-30 DIAGNOSIS — Z23 NEED FOR VACCINATION: Primary | ICD-10-CM

## 2021-09-30 PROCEDURE — 91300 COVID-19, MRNA, LNP-S, PF, 30 MCG/0.3 ML DOSE VACCINE: CPT | Mod: PBBFAC | Performed by: FAMILY MEDICINE

## 2021-09-30 PROCEDURE — 0003A COVID-19, MRNA, LNP-S, PF, 30 MCG/0.3 ML DOSE VACCINE: CPT | Mod: PBBFAC | Performed by: FAMILY MEDICINE

## 2021-10-04 ENCOUNTER — PATIENT OUTREACH (OUTPATIENT)
Dept: ADMINISTRATIVE | Facility: HOSPITAL | Age: 71
End: 2021-10-04

## 2021-10-04 ENCOUNTER — PATIENT MESSAGE (OUTPATIENT)
Dept: ADMINISTRATIVE | Facility: HOSPITAL | Age: 71
End: 2021-10-04

## 2021-10-12 ENCOUNTER — TELEPHONE (OUTPATIENT)
Dept: FAMILY MEDICINE | Facility: CLINIC | Age: 71
End: 2021-10-12

## 2021-10-12 ENCOUNTER — PATIENT OUTREACH (OUTPATIENT)
Dept: ADMINISTRATIVE | Facility: HOSPITAL | Age: 71
End: 2021-10-12

## 2021-10-14 PROBLEM — I48.0 PAF (PAROXYSMAL ATRIAL FIBRILLATION): Status: ACTIVE | Noted: 2021-10-14

## 2021-11-12 ENCOUNTER — PATIENT OUTREACH (OUTPATIENT)
Dept: ADMINISTRATIVE | Facility: HOSPITAL | Age: 71
End: 2021-11-12
Payer: MEDICARE

## 2021-12-14 RX ORDER — ALLOPURINOL 300 MG/1
TABLET ORAL
Qty: 90 TABLET | Refills: 0 | Status: SHIPPED | OUTPATIENT
Start: 2021-12-14 | End: 2022-05-25

## 2022-01-03 ENCOUNTER — OFFICE VISIT (OUTPATIENT)
Dept: FAMILY MEDICINE | Facility: CLINIC | Age: 72
End: 2022-01-03
Payer: COMMERCIAL

## 2022-01-03 ENCOUNTER — HOSPITAL ENCOUNTER (OUTPATIENT)
Dept: RADIOLOGY | Facility: HOSPITAL | Age: 72
Discharge: HOME | End: 2022-01-03
Attending: NURSE PRACTITIONER
Payer: COMMERCIAL

## 2022-01-03 VITALS
TEMPERATURE: 98 F | WEIGHT: 250.19 LBS | HEIGHT: 73 IN | SYSTOLIC BLOOD PRESSURE: 177 MMHG | BODY MASS INDEX: 33.16 KG/M2 | DIASTOLIC BLOOD PRESSURE: 83 MMHG | HEART RATE: 66 BPM

## 2022-01-03 DIAGNOSIS — R07.81 RIB PAIN ON LEFT SIDE: ICD-10-CM
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
## 2022-01-03 PROCEDURE — 99999 PR PBB SHADOW E&M-EST. PATIENT-LVL V: ICD-10-PCS | Mod: PBBFAC,,, | Performed by: NURSE PRACTITIONER

## 2022-01-03 PROCEDURE — 1125F AMNT PAIN NOTED PAIN PRSNT: CPT | Mod: CPTII,S$GLB,, | Performed by: NURSE PRACTITIONER

## 2022-01-03 PROCEDURE — 1101F PT FALLS ASSESS-DOCD LE1/YR: CPT | Mod: CPTII,S$GLB,, | Performed by: NURSE PRACTITIONER

## 2022-01-03 PROCEDURE — 1125F PR PAIN SEVERITY QUANTIFIED, PAIN PRESENT: ICD-10-PCS | Mod: CPTII,S$GLB,, | Performed by: NURSE PRACTITIONER

## 2022-01-03 PROCEDURE — 1101F PR PT FALLS ASSESS DOC 0-1 FALLS W/OUT INJ PAST YR: ICD-10-PCS | Mod: CPTII,S$GLB,, | Performed by: NURSE PRACTITIONER

## 2022-01-03 PROCEDURE — 3008F PR BODY MASS INDEX (BMI) DOCUMENTED: ICD-10-PCS | Mod: CPTII,S$GLB,, | Performed by: NURSE PRACTITIONER

## 2022-01-03 PROCEDURE — 99213 OFFICE O/P EST LOW 20 MIN: CPT | Mod: S$GLB,,, | Performed by: NURSE PRACTITIONER

## 2022-01-03 PROCEDURE — 71100 XR RIBS 2 VIEW LEFT: ICD-10-PCS | Mod: 26,LT,, | Performed by: RADIOLOGY

## 2022-01-03 PROCEDURE — 3288F PR FALLS RISK ASSESSMENT DOCUMENTED: ICD-10-PCS | Mod: CPTII,S$GLB,, | Performed by: NURSE PRACTITIONER

## 2022-01-03 PROCEDURE — 1159F MED LIST DOCD IN RCRD: CPT | Mod: CPTII,S$GLB,, | Performed by: NURSE PRACTITIONER

## 2022-01-03 PROCEDURE — 1160F RVW MEDS BY RX/DR IN RCRD: CPT | Mod: CPTII,S$GLB,, | Performed by: NURSE PRACTITIONER

## 2022-01-03 PROCEDURE — 3079F PR MOST RECENT DIASTOLIC BLOOD PRESSURE 80-89 MM HG: ICD-10-PCS | Mod: CPTII,S$GLB,, | Performed by: NURSE PRACTITIONER

## 2022-01-03 PROCEDURE — 1159F PR MEDICATION LIST DOCUMENTED IN MEDICAL RECORD: ICD-10-PCS | Mod: CPTII,S$GLB,, | Performed by: NURSE PRACTITIONER

## 2022-01-03 PROCEDURE — 3288F FALL RISK ASSESSMENT DOCD: CPT | Mod: CPTII,S$GLB,, | Performed by: NURSE PRACTITIONER

## 2022-01-03 PROCEDURE — 71100 X-RAY EXAM RIBS UNI 2 VIEWS: CPT | Mod: 26,LT,, | Performed by: RADIOLOGY

## 2022-01-03 PROCEDURE — 3079F DIAST BP 80-89 MM HG: CPT | Mod: CPTII,S$GLB,, | Performed by: NURSE PRACTITIONER

## 2022-01-03 PROCEDURE — 99213 PR OFFICE/OUTPT VISIT, EST, LEVL III, 20-29 MIN: ICD-10-PCS | Mod: S$GLB,,, | Performed by: NURSE PRACTITIONER

## 2022-01-03 PROCEDURE — 3077F SYST BP >= 140 MM HG: CPT | Mod: CPTII,S$GLB,, | Performed by: NURSE PRACTITIONER

## 2022-01-03 PROCEDURE — 1160F PR REVIEW ALL MEDS BY PRESCRIBER/CLIN PHARMACIST DOCUMENTED: ICD-10-PCS | Mod: CPTII,S$GLB,, | Performed by: NURSE PRACTITIONER

## 2022-01-03 PROCEDURE — 71100 X-RAY EXAM RIBS UNI 2 VIEWS: CPT | Mod: TC,PO,LT

## 2022-01-03 PROCEDURE — 99999 PR PBB SHADOW E&M-EST. PATIENT-LVL V: CPT | Mod: PBBFAC,,, | Performed by: NURSE PRACTITIONER

## 2022-01-03 PROCEDURE — 3008F BODY MASS INDEX DOCD: CPT | Mod: CPTII,S$GLB,, | Performed by: NURSE PRACTITIONER

## 2022-01-03 PROCEDURE — 3077F PR MOST RECENT SYSTOLIC BLOOD PRESSURE >= 140 MM HG: ICD-10-PCS | Mod: CPTII,S$GLB,, | Performed by: NURSE PRACTITIONER

## 2022-01-03 NOTE — PROGRESS NOTES
Subjective:       Patient ID: Roshan Menard is a 71 y.o. male.    Chief Complaint: Fall (Fell on [place] day. Left side)    Fall  The accident occurred more than 1 week ago. The fall occurred while walking. He fell from a height of 1 to 2 ft. He landed on carpet. There was no blood loss. Point of impact: left ribs. Pain location: left ribs. The pain is mild (Improving per pt ). The symptoms are aggravated by movement. Pertinent negatives include no abdominal pain, bowel incontinence, fever, headaches, hearing loss, hematuria, loss of consciousness, nausea, numbness, tingling, visual change or vomiting. He has tried NSAID for the symptoms. The treatment provided moderate relief.     Past Medical History:   Diagnosis Date    Allergy     Anticoagulant long-term use     Arthritis     Asthma     Atrial fibrillation     Bronchitis     Cataract     Coronary artery disease     stent    General anesthetics causing adverse effect in therapeutic use     hard to awaken    GERD (gastroesophageal reflux disease)     Gout, chronic     Hypertension      Social History     Socioeconomic History    Marital status:    Occupational History     Employer: [employer]   Tobacco Use    Smoking status: Former Smoker     Packs/day: 1.50     Years: 22.00     Pack years: 33.00     Types: Cigarettes     Quit date: 1992     Years since quittin.6    Smokeless tobacco: Never Used   Substance and Sexual Activity    Alcohol use: No    Drug use: No    Sexual activity: Never     Past Surgical History:   Procedure Laterality Date    ANGIOGRAM, CORONARY, WITH LEFT HEART CATHETERIZATION  2021    Procedure: Angiogram, Coronary, with Left Heart Cath;  Surgeon: Vincent Gonzalez MD;  Location: [facility];  Service: Cardiology;;    BACK SURGERY      L3-5; ruptured disc; laminectomy x 2 surgery    CHOLECYSTECTOMY      CORONARY ANGIOPLASTY WITH STENT PLACEMENT      EYE SURGERY  2012    Cateract     JOINT REPLACEMENT  '08    RT  KNEE    SPINE SURGERY  1988/2004    Lamanectomy '88/[facility]apps '04    TONSILLECTOMY      TOTAL KNEE ARTHROPLASTY      VASECTOMY         Review of Systems   Constitutional: Negative.  Negative for fever.   HENT: Negative.    Eyes: Negative.    Respiratory: Negative.    Cardiovascular: Negative.    Gastrointestinal: Negative.  Negative for abdominal pain, bowel incontinence, nausea and vomiting.   Endocrine: Negative.    Genitourinary: Negative.  Negative for hematuria.   Musculoskeletal:        Left ribs   Integumentary:  Negative.   Allergic/Immunologic: Negative.    Neurological: Negative.  Negative for tingling, loss of consciousness, numbness and headaches.   Psychiatric/Behavioral: Negative.          Objective:      Physical Exam  Vitals and nursing note reviewed.   Constitutional:       Appearance: Normal appearance.   HENT:      Head: Normocephalic.      Right Ear: Tympanic membrane, ear canal and external ear normal.      Left Ear: Tympanic membrane, ear canal and external ear normal.      Nose: Nose normal.      Mouth/Throat:      Mouth: Mucous membranes are moist.      Pharynx: Oropharynx is clear.   Eyes:      Conjunctiva/sclera: Conjunctivae normal.      Pupils: Pupils are equal, round, and reactive to light.   Cardiovascular:      Rate and Rhythm: Normal rate and regular rhythm.      Pulses: Normal pulses.      Heart sounds: Normal heart sounds.   Pulmonary:      Effort: Pulmonary effort is normal.      Breath sounds: Normal breath sounds.   Chest:      Chest wall: Tenderness present. No mass, lacerations, deformity, swelling, crepitus or edema. There is no dullness to percussion.       Abdominal:      General: Bowel sounds are normal.      Palpations: Abdomen is soft.   Musculoskeletal:         General: Normal range of motion.      Cervical back: Normal range of motion and neck supple.   Skin:     General: Skin is warm and dry.      Capillary Refill: Capillary refill takes 2  to 3 seconds.   Neurological:      Mental Status: He is alert and oriented to person, place, and time.   Psychiatric:         Mood and Affect: Mood normal.         Behavior: Behavior normal.         Thought Content: Thought content normal.         Judgment: Judgment normal.         Assessment:       Problem List Items Addressed This Visit    None     Visit Diagnoses     Fall, subsequent encounter    -  Primary    Rib pain on left side        Relevant Orders    X-Ray Ribs 2 View Left          Plan:           Roshan was seen today for fall.    Diagnoses and all orders for this visit:    Fall, subsequent encounter  Rib pain on left side  -     X-Ray Ribs 2 View Left; Future  Deep breathing exercises daily and PRN  Report to ER immediately if symptoms worsen

## 2022-01-14 ENCOUNTER — TELEPHONE (OUTPATIENT)
Dept: FAMILY MEDICINE | Facility: CLINIC | Age: 72
End: 2022-01-14
Payer: MEDICARE

## 2022-01-14 NOTE — TELEPHONE ENCOUNTER
----- Message from Chelsie Stockton sent at 1/14/2022 11:09 AM CST -----  Contact: [phone]  Patient is returning a phone call.    Who left a message for the patient: The doctor's office    Does patient know what this is regarding:  Returning a phone call    Would you like a call back, or a response through your [facility] portal?: Callback    Comments:  Roshan is requesting a callback.

## 2022-01-14 NOTE — TELEPHONE ENCOUNTER
Spoke with pt regarding recommendations to go to the ER. Pt declined stating that bp was now 132/64. Pt stated that if bp were to go up again he would report to ER.

## 2022-01-14 NOTE — TELEPHONE ENCOUNTER
----- Message from Shannan Luna sent at 1/14/2022  1:22 PM CST -----  .Type:  Patient Returning Call    Who Called:NABIL THOMPSON [[id number]]  Who Left Message for Patient: Yevgeniy  Does the patient know what this is regarding?:  Would the patient rather a call back or a response via [facility]? call  Best Call Back Number:[phone] (home)   Additional Information:

## 2022-01-14 NOTE — TELEPHONE ENCOUNTER
----- Message from Lexi Armas sent at 1/14/2022 10:44 AM CST -----  Contact: Roshan ross [phone]  Patient is returning a phone call.  Who left a message for the patient: Yevgeniy  Does patient know what this is regarding:    Would you like a call back, or a response through your [facility] portal?call back  Comments: Pt was returning the nurses call

## 2022-01-18 ENCOUNTER — TELEPHONE (OUTPATIENT)
Dept: FAMILY MEDICINE | Facility: CLINIC | Age: 72
End: 2022-01-18
Payer: MEDICARE

## 2022-01-18 ENCOUNTER — LAB VISIT (OUTPATIENT)
Dept: LAB | Facility: HOSPITAL | Age: 72
End: 2022-01-18
Attending: INTERNAL MEDICINE
Payer: COMMERCIAL

## 2022-01-18 ENCOUNTER — CLINICAL SUPPORT (OUTPATIENT)
Dept: FAMILY MEDICINE | Facility: CLINIC | Age: 72
End: 2022-01-18
Payer: MEDICARE

## 2022-01-18 DIAGNOSIS — Z51.81 ENCOUNTER FOR THERAPEUTIC DRUG MONITORING: Primary | ICD-10-CM

## 2022-01-18 DIAGNOSIS — U07.1 COVID: ICD-10-CM

## 2022-01-18 DIAGNOSIS — U07.1 COVID: Primary | ICD-10-CM

## 2022-01-18 LAB
ANION GAP SERPL CALC-SCNC: 10 MMOL/L (ref 8–16)
BASOPHILS # BLD AUTO: 0.02 K/UL (ref 0–0.2)
BASOPHILS NFR BLD: 0.2 % (ref 0–1.9)
BUN SERPL-MCNC: 24 MG/DL (ref 8–23)
CALCIUM SERPL-MCNC: 9.1 MG/DL (ref 8.7–10.5)
CHLORIDE SERPL-SCNC: 103 MMOL/L (ref 95–110)
CO2 SERPL-SCNC: 24 MMOL/L (ref 23–29)
CREAT SERPL-MCNC: 1.2 MG/DL (ref 0.5–1.4)
DIFFERENTIAL METHOD: ABNORMAL
EOSINOPHIL # BLD AUTO: 0 K/UL (ref 0–0.5)
EOSINOPHIL NFR BLD: 0 % (ref 0–8)
ERYTHROCYTE [DISTWIDTH] IN BLOOD BY AUTOMATED COUNT: 14.2 % (ref 11.5–14.5)
EST. GFR  (AFRICAN AMERICAN): >60 ML/MIN/1.73 M^2
EST. GFR  (NON AFRICAN AMERICAN): >60 ML/MIN/1.73 M^2
GLUCOSE SERPL-MCNC: 120 MG/DL (ref 70–110)
HCT VFR BLD AUTO: 44.2 % (ref 40–54)
HGB BLD-MCNC: 14.2 G/DL (ref 14–18)
IMM GRANULOCYTES # BLD AUTO: 0.06 K/UL (ref 0–0.04)
IMM GRANULOCYTES NFR BLD AUTO: 0.6 % (ref 0–0.5)
LYMPHOCYTES # BLD AUTO: 0.6 K/UL (ref 1–4.8)
LYMPHOCYTES NFR BLD: 5.8 % (ref 18–48)
MCH RBC QN AUTO: 28.7 PG (ref 27–31)
MCHC RBC AUTO-ENTMCNC: 32.1 G/DL (ref 32–36)
MCV RBC AUTO: 89 FL (ref 82–98)
MONOCYTES # BLD AUTO: 0.2 K/UL (ref 0.3–1)
MONOCYTES NFR BLD: 1.8 % (ref 4–15)
NEUTROPHILS # BLD AUTO: 8.9 K/UL (ref 1.8–7.7)
NEUTROPHILS NFR BLD: 92.2 % (ref 38–73)
NRBC BLD-RTO: 0 /100 WBC
PLATELET # BLD AUTO: 282 K/UL (ref 150–450)
PMV BLD AUTO: 10 FL (ref 9.2–12.9)
POTASSIUM SERPL-SCNC: 4.4 MMOL/L (ref 3.5–5.1)
RBC # BLD AUTO: 4.95 M/UL (ref 4.6–6.2)
SODIUM SERPL-SCNC: 137 MMOL/L (ref 136–145)
WBC # BLD AUTO: 9.68 K/UL (ref 3.9–12.7)

## 2022-01-18 PROCEDURE — 85610 PROTHROMBIN TIME: CPT | Performed by: INTERNAL MEDICINE

## 2022-01-18 PROCEDURE — U0003 INFECTIOUS AGENT DETECTION BY NUCLEIC ACID (DNA OR RNA); SEVERE ACUTE RESPIRATORY SYNDROME CORONAVIRUS 2 (SARS-COV-2) (CORONAVIRUS DISEASE [COVID-19]), AMPLIFIED PROBE TECHNIQUE, MAKING USE OF HIGH THROUGHPUT TECHNOLOGIES AS DESCRIBED BY CMS-2020-01-R: HCPCS | Performed by: INTERNAL MEDICINE

## 2022-01-18 PROCEDURE — 36415 COLL VENOUS BLD VENIPUNCTURE: CPT | Mod: PO | Performed by: INTERNAL MEDICINE

## 2022-01-18 PROCEDURE — 85025 COMPLETE CBC W/AUTO DIFF WBC: CPT | Mod: PO | Performed by: INTERNAL MEDICINE

## 2022-01-18 PROCEDURE — U0005 INFEC AGEN DETEC AMPLI PROBE: HCPCS | Performed by: INTERNAL MEDICINE

## 2022-01-18 PROCEDURE — 85730 THROMBOPLASTIN TIME PARTIAL: CPT | Performed by: INTERNAL MEDICINE

## 2022-01-18 PROCEDURE — 80048 BASIC METABOLIC PNL TOTAL CA: CPT | Performed by: INTERNAL MEDICINE

## 2022-01-18 NOTE — TELEPHONE ENCOUNTER
----- Message from Clarisse Cole sent at 1/18/2022  9:55 AM CST -----  Contact: Roshan  Patient would like a call back at [phone] in regard to getting the covid test. He states that it is for a procedure that he has Friday morning.      Thanks

## 2022-01-18 NOTE — TELEPHONE ENCOUNTER
----- Message from Clarisse Cole sent at 1/18/2022  9:55 AM CST -----  Contact: Roshan  Patient would like a call back at [phone] in regard to getting the covid test. He states that it is for a procedure that he has Friday morning.      Thanks

## 2022-01-19 LAB
APTT BLDCRRT: 29.4 SEC (ref 21–32)
INR PPP: 1.1 (ref 0.8–1.2)
PROTHROMBIN TIME: 11.7 SEC (ref 9–12.5)
SARS-COV-2 RNA RESP QL NAA+PROBE: NOT DETECTED
SARS-COV-2- CYCLE NUMBER: NORMAL

## 2022-02-15 ENCOUNTER — OFFICE VISIT (OUTPATIENT)
Dept: FAMILY MEDICINE | Facility: CLINIC | Age: 72
End: 2022-02-15
Payer: MEDICARE

## 2022-02-15 VITALS
HEIGHT: 73 IN | RESPIRATION RATE: 18 BRPM | SYSTOLIC BLOOD PRESSURE: 129 MMHG | TEMPERATURE: 98 F | BODY MASS INDEX: 32.5 KG/M2 | WEIGHT: 245.19 LBS | DIASTOLIC BLOOD PRESSURE: 72 MMHG | HEART RATE: 63 BPM

## 2022-02-15 DIAGNOSIS — I10 ESSENTIAL HYPERTENSION: Primary | ICD-10-CM

## 2022-02-15 PROCEDURE — 99999 PR PBB SHADOW E&M-EST. PATIENT-LVL V: ICD-10-PCS | Mod: PBBFAC,,, | Performed by: STUDENT IN AN ORGANIZED HEALTH CARE EDUCATION/TRAINING PROGRAM

## 2022-02-15 PROCEDURE — 99999 PR PBB SHADOW E&M-EST. PATIENT-LVL V: CPT | Mod: PBBFAC,,, | Performed by: STUDENT IN AN ORGANIZED HEALTH CARE EDUCATION/TRAINING PROGRAM

## 2022-02-15 PROCEDURE — 99215 OFFICE O/P EST HI 40 MIN: CPT | Mod: PBBFAC,PO | Performed by: STUDENT IN AN ORGANIZED HEALTH CARE EDUCATION/TRAINING PROGRAM

## 2022-02-15 PROCEDURE — 99213 OFFICE O/P EST LOW 20 MIN: CPT | Mod: S$PBB,,, | Performed by: STUDENT IN AN ORGANIZED HEALTH CARE EDUCATION/TRAINING PROGRAM

## 2022-02-15 PROCEDURE — 99213 PR OFFICE/OUTPT VISIT, EST, LEVL III, 20-29 MIN: ICD-10-PCS | Mod: S$PBB,,, | Performed by: STUDENT IN AN ORGANIZED HEALTH CARE EDUCATION/TRAINING PROGRAM

## 2022-02-15 RX ORDER — PREDNISONE 20 MG/1
10 TABLET ORAL DAILY
COMMUNITY
End: 2022-04-29

## 2022-02-15 NOTE — PROGRESS NOTES
Problem List Items Addressed This Visit        Cardiac/Vascular    Essential hypertension - Primary    Overview     -at goal today in clinic at home -180s/50-70s (however takes BP after exertion)   - Current Hypertension Medications:   Hypertension Medications             carvediloL (COREG) 25 MG tablet Take 1 tablet (25 mg total) by mouth 2 (two) times daily with meals.    cloNIDine (CATAPRES) 0.1 MG tablet Take 1 tablet (0.1 mg total) by mouth every 6 (six) hours as needed (As needed for systolic BP >165).    furosemide (LASIX) 20 MG tablet Take 0.5 tablets (10 mg total) by mouth once daily.    telmisartan (MICARDIS) 80 MG Tab Take 1 tablet (80 mg total) by mouth once daily.        -continue lifestyle modification with low sodium diet and exercise   -discussed hypertension disease course and importance of treating high blood pressure  -patient understood and advised of risk of untreated blood pressure.  ER precautions were given   for symptoms of hypertensive urgency and emergency.                     Follow up in about 6 weeks (around 3/29/2022).    Roxanna John MD  _________________________________________________________________________      Patient ID: Roshan Menard is a 72 y.o. male.    Chief Complaint:  BP follow up  Reports BP has been mildly elevated at home. Follows with cardiology. Hx of a fib.     Has ?papilledema L eye - follows with Dr. Berger; currently on prednisone taper. Has follow up on Friday.    Denies fevers, chills, chest pain, SOB, fatigue, abdominal pain, nausea, vomiting, dysuria, hematuria, hematochezia, or melena.     Past medical histories reviewed, including past medical, surgical, family and social histories.      Current Outpatient Medications on File Prior to Visit   Medication Sig Dispense Refill    albuterol (PROVENTIL/VENTOLIN HFA) 90 mcg/actuation inhaler Inhale 2 puffs into the lungs every 6 (six) hours as needed for Wheezing or Shortness of Breath. Rescue 18 g  11    albuterol-ipratropium (DUO-NEB) 2.5 mg-0.5 mg/3 mL nebulizer solution Take 3 mLs by nebulization every 6 (six) hours as needed for Wheezing or Shortness of Breath. Rescue 120 vial 5    allopurinoL (ZYLOPRIM) 300 MG tablet TAKE 1 TABLET BY MOUTH EVERY DAY 90 tablet 0    apixaban (ELIQUIS) 5 mg Tab Take 1 tablet (5 mg total) by mouth 2 (two) times daily. 180 tablet 3    aspirin (ECOTRIN) 81 MG EC tablet Take 81 mg by mouth every Mon, Wed, Fri.      atorvastatin (LIPITOR) 10 MG tablet TK 1 T PO QD 90 tablet 3    carvediloL (COREG) 25 MG tablet Take 1 tablet (25 mg total) by mouth 2 (two) times daily with meals. 60 tablet 11    cloNIDine (CATAPRES) 0.1 MG tablet Take 1 tablet (0.1 mg total) by mouth every 6 (six) hours as needed (As needed for systolic BP >165). 84 tablet 0    coenzyme Q10 200 mg capsule Take 200 mg by mouth once daily.      fluticasone furoate-vilanteroL (BREO ELLIPTA) 200-25 mcg/dose DsDv diskus inhaler INHALE 1 PUFF BY MOUTH AT THE SAME TIME EVERY DAY 1 each 5    furosemide (LASIX) 20 MG tablet Take 0.5 tablets (10 mg total) by mouth once daily. 90 tablet 3    lactobacillus comb no.10 (PROBIOTIC) 20 billion cell Cap Take 1 capsule by mouth once daily. Or as directed on bottle      montelukast (SINGULAIR) 10 mg tablet Take 1 tablet (10 mg total) by mouth once daily. 30 tablet 11    multivitamin (THERAGRAN) per tablet Take 1 tablet by mouth once daily.      naproxen (NAPROSYN) 500 MG tablet TAKE 1 TABLET(500 MG) BY MOUTH TWICE DAILY (Patient taking differently: Take 500 mg by mouth 2 (two) times daily as needed.) 60 tablet 12    omeprazole (PRILOSEC) 10 MG capsule Take 10 mg by mouth once daily.      potassium chloride (KLOR-CON) 10 MEQ TbSR TK 1 T PO ONCE A DAY. 90 tablet 3    predniSONE (DELTASONE) 20 MG tablet Take 20 mg by mouth once daily.      SPIRIVA RESPIMAT 2.5 mcg/actuation inhaler USE 2 INHALATIONS BY MOUTH ONCE DAILY INTO THE LUNGS. CONTROLLER 4 g 11    telmisartan  (MICARDIS) 80 MG Tab Take 1 tablet (80 mg total) by mouth once daily. (Patient taking differently: Take 80 mg by mouth once daily. 80 mg am and 40 mg hs) 90 tablet 3    urea (CARMOL) 40 % Crea 2 (two) times daily as needed.  0    [DISCONTINUED] amiodarone (PACERONE) 400 MG tablet Take 1 tablet (400 mg total) by mouth once daily. (Patient not taking: Reported on 2/15/2022) 30 tablet 11    [DISCONTINUED] amLODIPine (NORVASC) 5 MG tablet Take 1 tablet (5 mg total) by mouth every evening. for 21 days 21 tablet 0    [DISCONTINUED] fish oil-omega-3 fatty acids 300-1,000 mg capsule Take by mouth once daily.       [DISCONTINUED] FLONASE ALLERGY RELIEF 50 mcg/actuation nasal spray SHAKE LIQUID AND USE 1 SPRAY IN EACH NOSTRIL EVERY DAY (Patient not taking: Reported on 2/15/2022) 15.8 mL 12     No current facility-administered medications on file prior to visit.       Review of Systems   12 point review of systems negative except for listed in HPI.     Objective:    Nursing note and vitals reviewed.  Vitals:    02/15/22 1047   BP: 129/72   Pulse: 63   Resp: 18   Temp: 98.2 °F (36.8 °C)     Body mass index is 32.35 kg/m².     Physical Exam   Constitutional: oriented to person, place, and time. well-developed and well-nourished. No distress.   HENT: WNL  Head: Normocephalic and atraumatic.   Eyes: EOM are normal.   Neck: Normal range of motion. Neck supple.   Cardiovascular: Normal rate  Pulmonary/Chest: Effort normal. No respiratory distress.   Musculoskeletal: Normal range of motion. no edema.   Neurological: CN II-XII intact  Skin: warm and dry.   Psychiatric: normal mood and affect. behavior is normal.           We Offer Telehealth & Same Day Appointments!   Book your Telehealth appointment with me through my nurse or   Clinic appointments on [employer]!  [phone]     To Schedule appointments online, go to [employer]: [email or web address]

## 2022-03-16 ENCOUNTER — TELEPHONE (OUTPATIENT)
Dept: FAMILY MEDICINE | Facility: CLINIC | Age: 72
End: 2022-03-16
Payer: MEDICARE

## 2022-03-16 ENCOUNTER — OFFICE VISIT (OUTPATIENT)
Dept: FAMILY MEDICINE | Facility: CLINIC | Age: 72
End: 2022-03-16
Payer: MEDICARE

## 2022-03-16 VITALS
DIASTOLIC BLOOD PRESSURE: 75 MMHG | HEIGHT: 73 IN | TEMPERATURE: 98 F | WEIGHT: 254 LBS | BODY MASS INDEX: 33.66 KG/M2 | HEART RATE: 60 BPM | SYSTOLIC BLOOD PRESSURE: 149 MMHG

## 2022-03-16 DIAGNOSIS — Z13.220 ENCOUNTER FOR LIPID SCREENING FOR CARDIOVASCULAR DISEASE: ICD-10-CM

## 2022-03-16 DIAGNOSIS — I48.11 LONGSTANDING PERSISTENT ATRIAL FIBRILLATION: ICD-10-CM

## 2022-03-16 DIAGNOSIS — I65.23 BILATERAL CAROTID ARTERY STENOSIS: ICD-10-CM

## 2022-03-16 DIAGNOSIS — Z13.6 ENCOUNTER FOR LIPID SCREENING FOR CARDIOVASCULAR DISEASE: ICD-10-CM

## 2022-03-16 DIAGNOSIS — I10 ESSENTIAL HYPERTENSION: Primary | ICD-10-CM

## 2022-03-16 DIAGNOSIS — I25.10 CORONARY ARTERY DISEASE INVOLVING NATIVE CORONARY ARTERY OF NATIVE HEART WITHOUT ANGINA PECTORIS: ICD-10-CM

## 2022-03-16 DIAGNOSIS — I10 ESSENTIAL HYPERTENSION: ICD-10-CM

## 2022-03-16 DIAGNOSIS — E78.2 MIXED HYPERLIPIDEMIA: ICD-10-CM

## 2022-03-16 DIAGNOSIS — M31.6 TEMPORAL ARTERITIS: ICD-10-CM

## 2022-03-16 PROCEDURE — 99215 OFFICE O/P EST HI 40 MIN: CPT | Mod: PBBFAC,PO | Performed by: INTERNAL MEDICINE

## 2022-03-16 PROCEDURE — 99214 PR OFFICE/OUTPT VISIT, EST, LEVL IV, 30-39 MIN: ICD-10-PCS | Mod: S$PBB,,, | Performed by: INTERNAL MEDICINE

## 2022-03-16 PROCEDURE — 99214 OFFICE O/P EST MOD 30 MIN: CPT | Mod: S$PBB,,, | Performed by: INTERNAL MEDICINE

## 2022-03-16 PROCEDURE — 99999 PR PBB SHADOW E&M-EST. PATIENT-LVL V: ICD-10-PCS | Mod: PBBFAC,,, | Performed by: INTERNAL MEDICINE

## 2022-03-16 PROCEDURE — 99999 PR PBB SHADOW E&M-EST. PATIENT-LVL V: CPT | Mod: PBBFAC,,, | Performed by: INTERNAL MEDICINE

## 2022-03-16 RX ORDER — SPIRONOLACTONE 25 MG/1
25 TABLET ORAL DAILY
Qty: 30 TABLET | Refills: 1 | Status: SHIPPED | OUTPATIENT
Start: 2022-03-16 | End: 2022-04-29 | Stop reason: ALTCHOICE

## 2022-03-16 NOTE — PATIENT INSTRUCTIONS
-continue carvedilol as prescribed. Decrease telmisartan to 80 mg at night. Add spironolactone 25 mg once daily in the morning.

## 2022-03-16 NOTE — PROGRESS NOTES
Assessment/Plan:    Problem List Items Addressed This Visit        Cardiac/Vascular    Coronary artery disease involving native coronary artery of native heart without angina pectoris    Overview     -followed by external cardiology, Dr. Martinez  -requesting to switch to a cardiologist in [place]  -hx of PCI with LUIS  -remains on ASA and eliquis (hx of a fib)  -also on statin  -denies recent symptoms of angina or dyspnea           Relevant Orders    Ambulatory referral/consult to Cardiology    Lipid Panel    Atrial fibrillation    Overview     -followed by cardiology  -currently on atenolol and eliquis for anticoagulation  -s/p recent cardioversion, failed  -asymptomatic  -wanting to switch to [facility] cardiology           Relevant Orders    Ambulatory referral/consult to Cardiology    Essential hypertension - Primary    Overview     -above goal today and has been elevated recently  -currently on coreg 25 mg BID, telmisartan 80/40 mg BID, clonidine PRN  -intolerant to amlodipine  -plan to continue coreg, telmisartan 80 QD, and start aldactone 25 mg QD  -follow up with BP check in 1 week; BMP in 2 weeks  -continue lifestyle modification with low sodium diet and exercise   -discussed hypertension disease course and importance of treating high blood pressure  -patient understood and advised of risk of untreated blood pressure.  ER precautions were given   for symptoms of hypertensive urgency and emergency.             Relevant Medications    spironolactone (ALDACTONE) 25 MG tablet    Other Relevant Orders    Basic Metabolic Panel    Ambulatory referral/consult to Cardiology    Mixed hyperlipidemia    Overview     -chronic condition. Currently stable.    -reports compliance with hyperlipidemia treatment as prescribed  -denies any known adverse effects of medications  -recent labs listed below:  Lab Results   Component Value Date    CHOL 142 02/25/2021     Lab Results   Component Value Date    HDL 35 (L) 02/25/2021     Lab  Results   Component Value Date    LDLCALC 72.8 02/25/2021     Lab Results   Component Value Date    TRIG 171 (H) 02/25/2021     Lab Results   Component Value Date    ALT 37 01/14/2022    AST 45 01/14/2022    ALKPHOS 147 (H) 01/14/2022    BILITOT 0.6 01/14/2022              Bilateral carotid artery stenosis    Overview     -carotid USJuly 2021- mild atherosclerotic plaquing, <50% plaquing, no hemodynamically significant stenosis              Immunology/Multi System    Temporal arteritis    Overview     -recent vision changes, referred to retinal specialist  -s/p temporal biopsy normal, however improving with prednisone  -has follow up with retinal specialist soon  -requesting records             Other Visit Diagnoses     Encounter for lipid screening for cardiovascular disease        Relevant Orders    Lipid Panel          Follow up in about 1 week (around 3/23/2022) for blood pressure check.    Catrina Yanez MD  _____________________________________________________________________________________________________________________________________________________    CC: follow up of chronic medical conditions     HPI:    Patient is in clinic today as an established patient here for follow up of chronic medical conditions.    HTN: The patient has a diagnosis of hypertension and the blood pressure has been high on recent checks.  The patient has been compliant on the medicine listed below and has not had problems with side effects.  The patient has had no headache, chest pain, shortness of breath, dizziness, palpitations. He has had some recent vision changes and was referred by his eye doctor to a retinal specialist. There was concern for GCA. S/p temporal biopsy and started on pred. Biopsy was normal but having improvement with pred. Reports a headache in temporal region when this all started but this has resolved.     No other complaints today. Remaining chronic conditions have been reviewed and remain stable.  Further detail as stated above.       Past Medical History:  Past Medical History:   Diagnosis Date    Allergy     Anticoagulant long-term use     Arthritis     Asthma     Atrial fibrillation     Bronchitis     Cataract     Coronary artery disease     stent    General anesthetics causing adverse effect in therapeutic use     hard to awaken    GERD (gastroesophageal reflux disease)     Gout, chronic     Hypertension      Past Surgical History:   Procedure Laterality Date    ANGIOGRAM, CORONARY, WITH LEFT HEART CATHETERIZATION  2021    Procedure: Angiogram, Coronary, with Left Heart Cath;  Surgeon: Vincent Gonzalez MD;  Location: [facility];  Service: Cardiology;;    BACK SURGERY      L3-5; ruptured disc; laminectomy x 2 surgery    CHOLECYSTECTOMY      CORONARY ANGIOPLASTY WITH STENT PLACEMENT      EYE SURGERY      Cateract    JOINT REPLACEMENT      RT  KNEE    SPINE SURGERY      Lamanectomy /    TONSILLECTOMY      TOTAL KNEE ARTHROPLASTY      VASECTOMY       Review of patient's allergies indicates:   Allergen Reactions    Sulfa (sulfonamide antibiotics)      Hives    Azithromycin      Diarrhea      Cefaclor Other (See Comments)     Other reaction(s): Hives    Iodine and iodide containing products      Other reaction(s): Unknown    Ivp dye  [iodinated contrast media] Other (See Comments)    Doxycycline Rash     Social History     Tobacco Use    Smoking status: Former Smoker     Packs/day: 1.50     Years: 22.00     Pack years: 33.00     Types: Cigarettes     Quit date: 1992     Years since quittin.8    Smokeless tobacco: Never Used   Substance Use Topics    Alcohol use: No    Drug use: No     Family History   Problem Relation Age of Onset    Cancer Mother     Early death Mother     Arthritis Father     Diabetes Father     Heart disease Father     Hyperlipidemia Father     Hypertension Father      Current Outpatient Medications on File Prior  to Visit   Medication Sig Dispense Refill    albuterol (PROVENTIL/VENTOLIN HFA) 90 mcg/actuation inhaler Inhale 2 puffs into the lungs every 6 (six) hours as needed for Wheezing or Shortness of Breath. Rescue 18 g 11    albuterol-ipratropium (DUO-NEB) 2.5 mg-0.5 mg/3 mL nebulizer solution Take 3 mLs by nebulization every 6 (six) hours as needed for Wheezing or Shortness of Breath. Rescue 120 vial 5    allopurinoL (ZYLOPRIM) 300 MG tablet TAKE 1 TABLET BY MOUTH EVERY DAY 90 tablet 0    apixaban (ELIQUIS) 5 mg Tab Take 1 tablet (5 mg total) by mouth 2 (two) times daily. 180 tablet 3    aspirin (ECOTRIN) 81 MG EC tablet Take 81 mg by mouth every Mon, Wed, Fri.      atorvastatin (LIPITOR) 10 MG tablet TK 1 T PO QD 90 tablet 3    carvediloL (COREG) 25 MG tablet Take 1 tablet (25 mg total) by mouth 2 (two) times daily with meals. 60 tablet 11    cloNIDine (CATAPRES) 0.1 MG tablet Take 1 tablet (0.1 mg total) by mouth every 6 (six) hours as needed (As needed for systolic BP >165). 84 tablet 0    coenzyme Q10 200 mg capsule Take 200 mg by mouth once daily.      fluticasone furoate-vilanteroL (BREO ELLIPTA) 200-25 mcg/dose DsDv diskus inhaler INHALE 1 PUFF BY MOUTH AT THE SAME TIME EVERY DAY 1 each 5    furosemide (LASIX) 20 MG tablet Take 0.5 tablets (10 mg total) by mouth once daily. 90 tablet 3    lactobacillus comb no.10 (PROBIOTIC) 20 billion cell Cap Take 1 capsule by mouth once daily. Or as directed on bottle      montelukast (SINGULAIR) 10 mg tablet Take 1 tablet (10 mg total) by mouth once daily. 30 tablet 11    multivitamin (THERAGRAN) per tablet Take 1 tablet by mouth once daily.      naproxen (NAPROSYN) 500 MG tablet TAKE 1 TABLET(500 MG) BY MOUTH TWICE DAILY (Patient taking differently: Take 500 mg by mouth 2 (two) times daily as needed.) 60 tablet 12    omeprazole (PRILOSEC) 10 MG capsule Take 10 mg by mouth once daily.      potassium chloride (KLOR-CON) 10 MEQ TbSR TK 1 T PO ONCE A DAY. 90  "tablet 3    predniSONE (DELTASONE) 20 MG tablet Take 20 mg by mouth once daily.      SPIRIVA RESPIMAT 2.5 mcg/actuation inhaler USE 2 INHALATIONS BY MOUTH ONCE DAILY INTO THE LUNGS. CONTROLLER 4 g 11    telmisartan (MICARDIS) 80 MG Tab Take 1 tablet (80 mg total) by mouth once daily. (Patient taking differently: Take 80 mg by mouth once daily. 80 mg am and 40 mg hs) 90 tablet 3    urea (CARMOL) 40 % Crea 2 (two) times daily as needed.  0     No current facility-administered medications on file prior to visit.       Review of Systems   Constitutional: Negative for chills, diaphoresis, fatigue and fever.   HENT: Negative for congestion, ear pain, postnasal drip, sinus pain and sore throat.    Eyes: Negative for pain and redness.   Respiratory: Negative for cough, chest tightness and shortness of breath.    Cardiovascular: Negative for chest pain and leg swelling.   Gastrointestinal: Negative for abdominal pain, constipation, diarrhea, nausea and vomiting.   Genitourinary: Negative for dysuria and hematuria.   Musculoskeletal: Negative for arthralgias and joint swelling.   Skin: Negative for rash.   Neurological: Negative for dizziness, syncope and headaches.       Vitals:    03/16/22 0950   BP: (!) 149/75   Pulse: 60   Temp: 98.1 °F (36.7 °C)   Weight: 115.2 kg (254 lb)   Height: 6' 1" (1.854 m)       Wt Readings from Last 3 Encounters:   03/16/22 115.2 kg (254 lb)   02/15/22 111.2 kg (245 lb 3.2 oz)   01/14/22 110 kg (242 lb 8.1 oz)       Physical Exam  Constitutional:       General: He is not in acute distress.     Appearance: Normal appearance. He is well-developed.   HENT:      Head: Normocephalic and atraumatic.   Eyes:      Conjunctiva/sclera: Conjunctivae normal.   Cardiovascular:      Rate and Rhythm: Normal rate and regular rhythm.      Pulses: Normal pulses.      Heart sounds: Murmur heard.   Pulmonary:      Effort: Pulmonary effort is normal. No respiratory distress.      Breath sounds: Normal breath " sounds.   Abdominal:      General: Bowel sounds are normal. There is no distension.      Palpations: Abdomen is soft.      Tenderness: There is no abdominal tenderness.   Musculoskeletal:         General: Normal range of motion.      Cervical back: Normal range of motion and neck supple.   Skin:     General: Skin is warm and dry.      Findings: No rash.   Neurological:      General: No focal deficit present.      Mental Status: He is alert and oriented to person, place, and time.   Psychiatric:         Mood and Affect: Mood normal.         Behavior: Behavior normal.         Health Maintenance   Topic Date Due    Lipid Panel  02/25/2022    High Dose Statin  03/16/2023    TETANUS VACCINE  10/09/2031    Hepatitis C Screening  Completed    Abdominal Aortic Aneurysm Screening  Completed

## 2022-03-16 NOTE — TELEPHONE ENCOUNTER
Care Due:                  Date            Visit Type   Department     Provider  --------------------------------------------------------------------------------                                EP -                              PRIMARY      [facility]  Last Visit: 03-      CARE ([facility])   MEDICINE       Catrina Yanez                              [facility]                              PRIMARY      [facility]  Next Visit: 04-      CARE ([facility])   MEDICINE       Catrina  Yanez                                                            Last  Test          Frequency    Reason                     Performed    Due Date  --------------------------------------------------------------------------------    Uric Acid...  12 months..  allopurinoL..............  Not Found    Overdue    Powered by [employer] by [employer]. Reference number: [id number].   3/16/2022 10:54:30 AM CDT

## 2022-03-17 RX ORDER — SPIRONOLACTONE 25 MG/1
TABLET ORAL
Qty: 90 TABLET | OUTPATIENT
Start: 2022-03-17

## 2022-03-20 PROBLEM — M31.6 TEMPORAL ARTERITIS: Status: ACTIVE | Noted: 2022-03-20

## 2022-03-21 ENCOUNTER — OFFICE VISIT (OUTPATIENT)
Dept: CARDIOLOGY | Facility: CLINIC | Age: 72
End: 2022-03-21
Payer: MEDICARE

## 2022-03-21 VITALS
HEIGHT: 73 IN | BODY MASS INDEX: 33.28 KG/M2 | HEART RATE: 81 BPM | SYSTOLIC BLOOD PRESSURE: 166 MMHG | RESPIRATION RATE: 16 BRPM | OXYGEN SATURATION: 96 % | WEIGHT: 251.13 LBS | DIASTOLIC BLOOD PRESSURE: 86 MMHG

## 2022-03-21 DIAGNOSIS — I48.11 LONGSTANDING PERSISTENT ATRIAL FIBRILLATION: ICD-10-CM

## 2022-03-21 DIAGNOSIS — R94.39 ABNORMAL CARDIOVASCULAR STRESS TEST: ICD-10-CM

## 2022-03-21 DIAGNOSIS — Z87.891 FORMER SMOKER: ICD-10-CM

## 2022-03-21 DIAGNOSIS — I35.0 NONRHEUMATIC AORTIC VALVE STENOSIS: ICD-10-CM

## 2022-03-21 DIAGNOSIS — E78.2 MIXED HYPERLIPIDEMIA: ICD-10-CM

## 2022-03-21 DIAGNOSIS — I10 ESSENTIAL HYPERTENSION: Primary | ICD-10-CM

## 2022-03-21 DIAGNOSIS — I25.10 CORONARY ARTERY DISEASE INVOLVING NATIVE CORONARY ARTERY OF NATIVE HEART WITHOUT ANGINA PECTORIS: ICD-10-CM

## 2022-03-21 DIAGNOSIS — I48.0 PAF (PAROXYSMAL ATRIAL FIBRILLATION): ICD-10-CM

## 2022-03-21 DIAGNOSIS — G47.33 OBSTRUCTIVE SLEEP APNEA SYNDROME: ICD-10-CM

## 2022-03-21 DIAGNOSIS — J44.9 CHRONIC OBSTRUCTIVE PULMONARY DISEASE, UNSPECIFIED COPD TYPE: ICD-10-CM

## 2022-03-21 DIAGNOSIS — I65.23 BILATERAL CAROTID ARTERY STENOSIS: ICD-10-CM

## 2022-03-21 PROCEDURE — 99204 OFFICE O/P NEW MOD 45 MIN: CPT | Mod: S$PBB,,, | Performed by: INTERNAL MEDICINE

## 2022-03-21 PROCEDURE — 99999 PR PBB SHADOW E&M-EST. PATIENT-LVL V: ICD-10-PCS | Mod: PBBFAC,,, | Performed by: INTERNAL MEDICINE

## 2022-03-21 PROCEDURE — 99204 PR OFFICE/OUTPT VISIT, NEW, LEVL IV, 45-59 MIN: ICD-10-PCS | Mod: S$PBB,,, | Performed by: INTERNAL MEDICINE

## 2022-03-21 PROCEDURE — 99215 OFFICE O/P EST HI 40 MIN: CPT | Mod: PBBFAC | Performed by: INTERNAL MEDICINE

## 2022-03-21 PROCEDURE — 99999 PR PBB SHADOW E&M-EST. PATIENT-LVL V: CPT | Mod: PBBFAC,,, | Performed by: INTERNAL MEDICINE

## 2022-03-21 RX ORDER — HYDRALAZINE HYDROCHLORIDE 50 MG/1
50 TABLET, FILM COATED ORAL EVERY 12 HOURS
Qty: 60 TABLET | Refills: 11 | Status: SHIPPED | OUTPATIENT
Start: 2022-03-21 | End: 2022-03-21

## 2022-03-21 RX ORDER — HYDRALAZINE HYDROCHLORIDE 25 MG/1
25 TABLET, FILM COATED ORAL EVERY 12 HOURS
Qty: 60 TABLET | Refills: 11 | Status: SHIPPED | OUTPATIENT
Start: 2022-03-21 | End: 2022-04-29 | Stop reason: ALTCHOICE

## 2022-03-21 NOTE — PROGRESS NOTES
Subjective:   Patient ID:  Roshan Menard is a 72 y.o. male who presents for follow-up of No chief complaint on file.  This pleasant patient here in the office for evaluation.  History of temporal arteritis, paroxysmal atrial fibrillation and cardioversion the past with YRN.  History of essential hypertension coronary disease without angina and evidence of aortic stenosis of a mild-to-moderate degree.  This was seen on prior YRN.  Patient is a former smoker no history of drug or alcohol abuse no syncope or near syncopal episodes.  Patient has a history of hypertension on multiple medications and is on full anticoagulation due to PAF.  This visit finds patient feeling well.  His medical problems include cardiac disease with stenting of circumflex coronary artery 2 years ago repeat angiogram last year showed patent stent.  The patient has history of obesity lost over 100 lb.  Patient has cardiac disease as well including significant calcification of the aorta calcification the aortic valve and repeat echo be done.  Prior echo done last year showed evidence of at least moderate aortic stenosis.  Today's physical exam shows the patient has brief murmur but also brisk carotid upstroke which would argue against severe aortic stenosis.  Overall LV function was preserved therefore I would disagree that he has low flow state.      Review of Systems   Constitutional: Negative for chills, diaphoresis, night sweats, weight gain and weight loss.   HENT: Negative for congestion, hoarse voice, sore throat and stridor.    Eyes: Negative for double vision and pain.   Cardiovascular: Negative for chest pain, claudication, cyanosis, dyspnea on exertion, irregular heartbeat, leg swelling, near-syncope, orthopnea, palpitations, paroxysmal nocturnal dyspnea and syncope.   Respiratory: Negative for cough, hemoptysis, shortness of breath, sleep disturbances due to breathing, snoring, sputum production and wheezing.    Endocrine: Negative  for cold intolerance, heat intolerance and polydipsia.   Hematologic/Lymphatic: Negative for bleeding problem. Does not bruise/bleed easily.   Skin: Negative for color change, dry skin and rash.   Musculoskeletal: Negative for joint swelling and muscle cramps.   Gastrointestinal: Negative for bloating, abdominal pain, constipation, diarrhea, dysphagia, melena, nausea and vomiting.   Genitourinary: Negative for flank pain and urgency.   Neurological: Negative for dizziness, focal weakness, headaches, light-headedness, loss of balance, seizures and weakness.   Psychiatric/Behavioral: Negative for altered mental status and memory loss. The patient is not nervous/anxious.      Family History   Problem Relation Age of Onset    Cancer Mother     Early death Mother     Arthritis Father     Diabetes Father     Heart disease Father     Hyperlipidemia Father     Hypertension Father      Past Medical History:   Diagnosis Date    Allergy     Anticoagulant long-term use     Arthritis     Asthma     Atrial fibrillation     Bronchitis     Cataract     Coronary artery disease     stent    General anesthetics causing adverse effect in therapeutic use     hard to awaken    GERD (gastroesophageal reflux disease)     Gout, chronic     Hypertension      Social History     Socioeconomic History    Marital status:    Occupational History     Employer: [employer]   Tobacco Use    Smoking status: Former Smoker     Packs/day: 1.50     Years: 22.00     Pack years: 33.00     Types: Cigarettes     Quit date: 1992     Years since quittin.8    Smokeless tobacco: Never Used   Substance and Sexual Activity    Alcohol use: No    Drug use: No    Sexual activity: Never     Current Outpatient Medications on File Prior to Visit   Medication Sig Dispense Refill    albuterol (PROVENTIL/VENTOLIN HFA) 90 mcg/actuation inhaler Inhale 2 puffs into the lungs every 6 (six) hours as needed for Wheezing or  Shortness of Breath. Rescue 18 g 11    albuterol-ipratropium (DUO-NEB) 2.5 mg-0.5 mg/3 mL nebulizer solution Take 3 mLs by nebulization every 6 (six) hours as needed for Wheezing or Shortness of Breath. Rescue 120 vial 5    allopurinoL (ZYLOPRIM) 300 MG tablet TAKE 1 TABLET BY MOUTH EVERY DAY 90 tablet 0    apixaban (ELIQUIS) 5 mg Tab Take 1 tablet (5 mg total) by mouth 2 (two) times daily. 180 tablet 3    aspirin (ECOTRIN) 81 MG EC tablet Take 81 mg by mouth every Mon, Wed, Fri.      atorvastatin (LIPITOR) 10 MG tablet TK 1 T PO QD 90 tablet 3    carvediloL (COREG) 25 MG tablet Take 1 tablet (25 mg total) by mouth 2 (two) times daily with meals. 60 tablet 11    cloNIDine (CATAPRES) 0.1 MG tablet Take 1 tablet (0.1 mg total) by mouth every 6 (six) hours as needed (As needed for systolic BP >165). 84 tablet 0    coenzyme Q10 200 mg capsule Take 200 mg by mouth once daily.      fluticasone furoate-vilanteroL (BREO ELLIPTA) 200-25 mcg/dose DsDv diskus inhaler INHALE 1 PUFF BY MOUTH AT THE SAME TIME EVERY DAY 1 each 5    furosemide (LASIX) 20 MG tablet Take 0.5 tablets (10 mg total) by mouth once daily. 90 tablet 3    lactobacillus comb no.10 (PROBIOTIC) 20 billion cell Cap Take 1 capsule by mouth once daily. Or as directed on bottle      montelukast (SINGULAIR) 10 mg tablet Take 1 tablet (10 mg total) by mouth once daily. 30 tablet 11    multivitamin (THERAGRAN) per tablet Take 1 tablet by mouth once daily.      naproxen (NAPROSYN) 500 MG tablet TAKE 1 TABLET(500 MG) BY MOUTH TWICE DAILY (Patient taking differently: Take 500 mg by mouth 2 (two) times daily as needed.) 60 tablet 12    omeprazole (PRILOSEC) 10 MG capsule Take 10 mg by mouth once daily.      potassium chloride (KLOR-CON) 10 MEQ TbSR TK 1 T PO ONCE A DAY. 90 tablet 3    predniSONE (DELTASONE) 20 MG tablet Take 20 mg by mouth once daily.      SPIRIVA RESPIMAT 2.5 mcg/actuation inhaler USE 2 INHALATIONS BY MOUTH ONCE DAILY INTO THE LUNGS.  CONTROLLER 4 g 11    spironolactone (ALDACTONE) 25 MG tablet Take 1 tablet (25 mg total) by mouth once daily. 30 tablet 1    telmisartan (MICARDIS) 80 MG Tab Take 1 tablet (80 mg total) by mouth once daily. (Patient taking differently: Take 80 mg by mouth once daily. 80 mg am and 40 mg hs) 90 tablet 3    urea (CARMOL) 40 % Crea 2 (two) times daily as needed.  0     No current facility-administered medications on file prior to visit.     Review of patient's allergies indicates:   Allergen Reactions    Sulfa (sulfonamide antibiotics)      Hives    Azithromycin      Diarrhea      Cefaclor Other (See Comments)     Other reaction(s): Hives    Iodine and iodide containing products      Other reaction(s): Unknown    Ivp dye  [iodinated contrast media] Other (See Comments)    Doxycycline Rash       Objective:     Physical Exam  Eyes:      Pupils: Pupils are equal, round, and reactive to light.   Neck:      Trachea: No tracheal deviation.   Cardiovascular:      Rate and Rhythm: Normal rate and regular rhythm.      Pulses: Intact distal pulses.           Carotid pulses are 2+ on the right side and 2+ on the left side.       Radial pulses are 2+ on the right side and 2+ on the left side.        Femoral pulses are 2+ on the right side and 2+ on the left side.       Popliteal pulses are 2+ on the right side and 2+ on the left side.        Dorsalis pedis pulses are 2+ on the right side and 2+ on the left side.        Posterior tibial pulses are 2+ on the right side and 2+ on the left side.      Heart sounds: Normal heart sounds. No murmur heard.    No friction rub. No gallop.   Pulmonary:      Effort: Pulmonary effort is normal. No respiratory distress.      Breath sounds: Normal breath sounds. No stridor. No wheezing or rales.   Chest:      Chest wall: No tenderness.   Abdominal:      General: There is no distension.      Tenderness: There is no abdominal tenderness. There is no rebound.   Musculoskeletal:          General: No tenderness.      Cervical back: Normal range of motion.   Skin:     General: Skin is warm and dry.   Neurological:      Mental Status: He is alert and oriented to person, place, and time.     Summary YRN from 10/28/2021    · The estimated ejection fraction is 60%.  · The left ventricle is normal in size with normal systolic function.  · Normal right ventricular size with normal right ventricular systolic function.  · Normal appearing left atrial appendage. No thrombus is present in the appendage.  · Aortic valve is trileaflet with moderate calcification and some restrictive motion  · Mild mitral regurgitation.  · A 150 J synchronized cardioversion was successfully performed with restoration of normal sinus rhythm.      Ref Range & Units 1 yr ago   (2/25/21) 3 yr ago   (1/26/19) 9 yr ago   (1/28/13) 10 yr ago   (1/27/12) 11 yr ago   (3/22/11) 11 yr ago   (6/7/10) 13 yr ago   (8/28/08)   Cholesterol 120 - 199 mg/dL 142   174 CM  167 CM  170 CM  175 CM  164 CM    Comment: The National Cholesterol Education Program (NCEP) has set the   following guidelines (reference ranges) for Cholesterol:   Optimal.....................<200 mg/dL   Borderline High.............200-239 mg/dL   High........................> or = 240 mg/dL    Triglycerides 30 - 150 mg/dL 171 High   170 R  229 High  CM  413 High  CM  307 High  CM  291 High  CM  294 High  CM    Comment: The National Cholesterol Education Program (NCEP) has set the   following guidelines (reference values) for triglycerides:   Normal......................<150 mg/dL   Borderline High.............150-199 mg/dL   High........................200-499 mg/dL    HDL 40 - 75 mg/dL 35 Low   36 R  29 Low  CM  27 Low  CM  31 Low  CM  29 Low  CM  32 Low  CM    Comment: The National Cholesterol Education Program (NCEP) has set the   following guidelines (reference values) for HDL Cholesterol:   Low...............<40 mg/dL   Optimal...........>60 mg/dL    LDL Cholesterol 63.0 -  159.0 mg/dL 72.8  77 R  99.0 R, CM  57.0 Low  R, CM  77.6           Assessment:     1. PAF (paroxysmal atrial fibrillation)    2. Obstructive sleep apnea syndrome    3. Coronary artery disease involving native coronary artery of native heart without angina pectoris    4. Chronic obstructive pulmonary disease, unspecified COPD type    5. Essential hypertension    6. Abnormal cardiovascular stress test    7. Mixed hyperlipidemia    8. Longstanding persistent atrial fibrillation    9. Former smoker        Plan:     PAF (paroxysmal atrial fibrillation)    Obstructive sleep apnea syndrome    Coronary artery disease involving native coronary artery of native heart without angina pectoris    Chronic obstructive pulmonary disease, unspecified COPD type    Essential hypertension    Abnormal cardiovascular stress test    Mixed hyperlipidemia    Longstanding persistent atrial fibrillation    Former smoker      Impression 1. Paroxysmal atrial fibrillation I would continue long-term anticoagulation with Eliquis and not stop that medication   2. Hyperlipidemia stable will repeat lipid profile to art decide if he needs more Lipitor higher dose  3. Former smoker stable off cigarettes  4. Coronary disease stable last angiogram showed stable coronary anatomy.  The patient has had no evidence of angina.  5. Dyspnea secondary to significant lung disease and is on Breo and Spiriva  6. Aortic stenosis probably moderate to severe at this point and will recheck cardiac echo  7. Hypertension:  I will add hydralazine 25 mg b.i.d. to current regimen to see if it all overall improved his blood pressure he continues with telmisartan 80 mg daily, carvedilol 25 b.i.d. prolactin 25 mg daily he had difficulty with amlodipine with peripheral swelling.    All considerations the patient is doing fairly well and will review for evidence of aortic stenosis hyperlipidemia and coronary disease.  I will see the patient back after testing is performed with  cardiac echo.  Also the patient has had intermittent palpitations and Holter monitor will be done.

## 2022-03-23 ENCOUNTER — TELEPHONE (OUTPATIENT)
Dept: FAMILY MEDICINE | Facility: CLINIC | Age: 72
End: 2022-03-23
Payer: MEDICARE

## 2022-03-23 ENCOUNTER — PATIENT OUTREACH (OUTPATIENT)
Dept: ADMINISTRATIVE | Facility: HOSPITAL | Age: 72
End: 2022-03-23
Payer: MEDICARE

## 2022-03-23 VITALS — SYSTOLIC BLOOD PRESSURE: 138 MMHG | DIASTOLIC BLOOD PRESSURE: 72 MMHG

## 2022-03-30 ENCOUNTER — TELEPHONE (OUTPATIENT)
Dept: CARDIOLOGY | Facility: CLINIC | Age: 72
End: 2022-03-30
Payer: MEDICARE

## 2022-03-30 ENCOUNTER — HOSPITAL ENCOUNTER (OUTPATIENT)
Dept: CARDIOLOGY | Facility: HOSPITAL | Age: 72
Discharge: HOME | End: 2022-03-30
Attending: INTERNAL MEDICINE
Payer: MEDICARE

## 2022-03-30 VITALS
HEART RATE: 70 BPM | WEIGHT: 251 LBS | HEIGHT: 73 IN | BODY MASS INDEX: 33.27 KG/M2 | DIASTOLIC BLOOD PRESSURE: 72 MMHG | SYSTOLIC BLOOD PRESSURE: 138 MMHG

## 2022-03-30 DIAGNOSIS — I48.0 PAF (PAROXYSMAL ATRIAL FIBRILLATION): ICD-10-CM

## 2022-03-30 DIAGNOSIS — G47.33 OBSTRUCTIVE SLEEP APNEA SYNDROME: ICD-10-CM

## 2022-03-30 DIAGNOSIS — E78.2 MIXED HYPERLIPIDEMIA: ICD-10-CM

## 2022-03-30 DIAGNOSIS — I25.10 CORONARY ARTERY DISEASE INVOLVING NATIVE CORONARY ARTERY OF NATIVE HEART WITHOUT ANGINA PECTORIS: ICD-10-CM

## 2022-03-30 DIAGNOSIS — I48.11 LONGSTANDING PERSISTENT ATRIAL FIBRILLATION: ICD-10-CM

## 2022-03-30 DIAGNOSIS — R94.39 ABNORMAL CARDIOVASCULAR STRESS TEST: ICD-10-CM

## 2022-03-30 DIAGNOSIS — I10 ESSENTIAL HYPERTENSION: ICD-10-CM

## 2022-03-30 DIAGNOSIS — J44.9 CHRONIC OBSTRUCTIVE PULMONARY DISEASE, UNSPECIFIED COPD TYPE: ICD-10-CM

## 2022-03-30 DIAGNOSIS — I65.23 BILATERAL CAROTID ARTERY STENOSIS: ICD-10-CM

## 2022-03-30 DIAGNOSIS — I35.0 NONRHEUMATIC AORTIC VALVE STENOSIS: ICD-10-CM

## 2022-03-30 DIAGNOSIS — Z87.891 FORMER SMOKER: ICD-10-CM

## 2022-03-30 LAB
AORTIC ROOT ANNULUS: 3.09 CM
AV INDEX (PROSTH): 0.32
AV MEAN GRADIENT: 29 MMHG
AV PEAK GRADIENT: 49 MMHG
AV VALVE AREA: 1.3 CM2
AV VELOCITY RATIO: 0.32
BSA FOR ECHO PROCEDURE: 2.42 M2
CV ECHO LV RWT: 0.44 CM
DOP CALC AO PEAK VEL: 3.49 M/S
DOP CALC AO VTI: 87.3 CM
DOP CALC LVOT AREA: 4 CM2
DOP CALC LVOT DIAMETER: 2.26 CM
DOP CALC LVOT PEAK VEL: 1.1 M/S
DOP CALC LVOT STROKE VOLUME: 113.07 CM3
DOP CALC RVOT PEAK VEL: 0.82 M/S
DOP CALC RVOT VTI: 17.1 CM
DOP CALCLVOT PEAK VEL VTI: 28.2 CM
E WAVE DECELERATION TIME: 319.89 MSEC
E/A RATIO: 0.63
E/E' RATIO: 10.4 M/S
ECHO EF ESTIMATED: 66 %
ECHO LV POSTERIOR WALL: 1.1 CM (ref 0.6–1.1)
EJECTION FRACTION: 65 %
FRACTIONAL SHORTENING: 37 % (ref 28–44)
INTERVENTRICULAR SEPTUM: 1.11 CM (ref 0.6–1.1)
IVRT: 105.61 MSEC
LA MAJOR: 5.89 CM
LA MINOR: 5.81 CM
LA WIDTH: 4.28 CM
LEFT ATRIUM SIZE: 4.6 CM
LEFT ATRIUM VOLUME INDEX MOD: 22.3 ML/M2
LEFT ATRIUM VOLUME INDEX: 41.3 ML/M2
LEFT ATRIUM VOLUME MOD: 52.82 CM3
LEFT ATRIUM VOLUME: 97.89 CM3
LEFT INTERNAL DIMENSION IN SYSTOLE: 3.16 CM (ref 2.1–4)
LEFT VENTRICLE DIASTOLIC VOLUME INDEX: 49.87 ML/M2
LEFT VENTRICLE DIASTOLIC VOLUME: 118.19 ML
LEFT VENTRICLE MASS INDEX: 88 G/M2
LEFT VENTRICLE SYSTOLIC VOLUME INDEX: 16.7 ML/M2
LEFT VENTRICLE SYSTOLIC VOLUME: 39.64 ML
LEFT VENTRICULAR INTERNAL DIMENSION IN DIASTOLE: 5 CM (ref 3.5–6)
LEFT VENTRICULAR MASS: 208.44 G
LV LATERAL E/E' RATIO: 9.75 M/S
LV SEPTAL E/E' RATIO: 11.14 M/S
LVOT MG: 2.84 MMHG
LVOT MV: 0.8 CM/S
MV PEAK A VEL: 1.23 M/S
MV PEAK E VEL: 0.78 M/S
MV STENOSIS PRESSURE HALF TIME: 92.77 MS
MV VALVE AREA P 1/2 METHOD: 2.37 CM2
PISA TR MAX VEL: 2.71 M/S
PULM VEIN S/D RATIO: 1.33
PV MEAN GRADIENT: 1.67 MMHG
PV PEAK D VEL: 0.16 M/S
PV PEAK S VEL: 0.21 M/S
PV PEAK VELOCITY: 0.98 CM/S
RA MAJOR: 5.73 CM
RA PRESSURE: 3 MMHG
RA WIDTH: 3.47 CM
RIGHT VENTRICULAR END-DIASTOLIC DIMENSION: 2.71 CM
SINUS: 2.91 CM
STJ: 3.05 CM
TDI LATERAL: 0.08 M/S
TDI SEPTAL: 0.07 M/S
TDI: 0.08 M/S
TR MAX PG: 29 MMHG
TRICUSPID ANNULAR PLANE SYSTOLIC EXCURSION: 2.27 CM
TV REST PULMONARY ARTERY PRESSURE: 32 MMHG

## 2022-03-30 PROCEDURE — 93226 XTRNL ECG REC<48 HR SCAN A/R: CPT | Mod: PO

## 2022-03-30 PROCEDURE — 93306 TTE W/DOPPLER COMPLETE: CPT | Mod: PO

## 2022-03-30 PROCEDURE — 93227 XTRNL ECG REC<48 HR R&I: CPT | Mod: ,,, | Performed by: INTERNAL MEDICINE

## 2022-03-30 PROCEDURE — 93227 HOLTER MONITOR - 48 HOUR (CUPID ONLY): ICD-10-PCS | Mod: ,,, | Performed by: INTERNAL MEDICINE

## 2022-03-30 PROCEDURE — 93306 ECHO (CUPID ONLY): ICD-10-PCS | Mod: 26,,, | Performed by: INTERNAL MEDICINE

## 2022-03-30 PROCEDURE — 93306 TTE W/DOPPLER COMPLETE: CPT | Mod: 26,,, | Performed by: INTERNAL MEDICINE

## 2022-03-30 NOTE — TELEPHONE ENCOUNTER
----- Message from Perry Osborne MD sent at 3/30/2022  2:28 PM CDT -----  Normal heart function and evidence of moderate aortic stenosis and will follow-up with cardiac echo yearly.

## 2022-04-01 LAB
OHS CV EVENT MONITOR DAY: 2
OHS CV HOLTER LENGTH DECIMAL HOURS: 96
OHS CV HOLTER LENGTH HOURS: 48
OHS CV HOLTER LENGTH MINUTES: 0
OHS CV HOLTER SINUS AVERAGE HR: 71
OHS CV HOLTER SINUS MAX HR: 106
OHS CV HOLTER SINUS MIN HR: 49

## 2022-04-04 ENCOUNTER — PATIENT OUTREACH (OUTPATIENT)
Dept: ADMINISTRATIVE | Facility: HOSPITAL | Age: 72
End: 2022-04-04
Payer: MEDICARE

## 2022-04-05 ENCOUNTER — TELEPHONE (OUTPATIENT)
Dept: CARDIOLOGY | Facility: CLINIC | Age: 72
End: 2022-04-05
Payer: MEDICARE

## 2022-04-05 NOTE — TELEPHONE ENCOUNTER
----- Message from Perry Osborne MD sent at 4/4/2022  8:18 AM CDT -----  Mild numbers of extra heartbeats no significant arrhythmias will discuss further next visit

## 2022-04-06 ENCOUNTER — PATIENT MESSAGE (OUTPATIENT)
Dept: FAMILY MEDICINE | Facility: CLINIC | Age: 72
End: 2022-04-06
Payer: MEDICARE

## 2022-04-07 NOTE — TELEPHONE ENCOUNTER
Patient recently established with [facility].  Please ask him to reach out to their staff to see if his cardiologist would like for him to try something other than Eliquis.

## 2022-04-11 ENCOUNTER — PATIENT MESSAGE (OUTPATIENT)
Dept: CARDIOLOGY | Facility: CLINIC | Age: 72
End: 2022-04-11
Payer: MEDICARE

## 2022-04-18 ENCOUNTER — OFFICE VISIT (OUTPATIENT)
Dept: FAMILY MEDICINE | Facility: CLINIC | Age: 72
End: 2022-04-18
Payer: MEDICARE

## 2022-04-18 VITALS
BODY MASS INDEX: 33.4 KG/M2 | SYSTOLIC BLOOD PRESSURE: 139 MMHG | WEIGHT: 252 LBS | DIASTOLIC BLOOD PRESSURE: 79 MMHG | HEART RATE: 72 BPM | HEIGHT: 73 IN | TEMPERATURE: 98 F

## 2022-04-18 DIAGNOSIS — I48.11 LONGSTANDING PERSISTENT ATRIAL FIBRILLATION: ICD-10-CM

## 2022-04-18 DIAGNOSIS — M31.6 TEMPORAL ARTERITIS: ICD-10-CM

## 2022-04-18 DIAGNOSIS — I10 ESSENTIAL HYPERTENSION: Primary | ICD-10-CM

## 2022-04-18 PROCEDURE — 99999 PR PBB SHADOW E&M-EST. PATIENT-LVL IV: ICD-10-PCS | Mod: PBBFAC,,, | Performed by: INTERNAL MEDICINE

## 2022-04-18 PROCEDURE — 99214 OFFICE O/P EST MOD 30 MIN: CPT | Mod: S$PBB,,, | Performed by: INTERNAL MEDICINE

## 2022-04-18 PROCEDURE — 99214 OFFICE O/P EST MOD 30 MIN: CPT | Mod: PBBFAC,PO | Performed by: INTERNAL MEDICINE

## 2022-04-18 PROCEDURE — 99214 PR OFFICE/OUTPT VISIT, EST, LEVL IV, 30-39 MIN: ICD-10-PCS | Mod: S$PBB,,, | Performed by: INTERNAL MEDICINE

## 2022-04-18 PROCEDURE — 99999 PR PBB SHADOW E&M-EST. PATIENT-LVL IV: CPT | Mod: PBBFAC,,, | Performed by: INTERNAL MEDICINE

## 2022-04-25 ENCOUNTER — PATIENT MESSAGE (OUTPATIENT)
Dept: FAMILY MEDICINE | Facility: CLINIC | Age: 72
End: 2022-04-25
Payer: MEDICARE

## 2022-04-26 ENCOUNTER — PATIENT MESSAGE (OUTPATIENT)
Dept: CARDIOLOGY | Facility: CLINIC | Age: 72
End: 2022-04-26
Payer: MEDICARE

## 2022-04-26 DIAGNOSIS — I48.0 PAF (PAROXYSMAL ATRIAL FIBRILLATION): Primary | ICD-10-CM

## 2022-04-26 NOTE — PROGRESS NOTES
Subjective:   Patient ID:  Roshan Menard is a 72 y.o. male who presents for follow-up of No chief complaint on file.  This pleasant patient here in the office for evaluation.  History of temporal arteritis, paroxysmal atrial fibrillation and cardioversion the past with YRN.  History of essential hypertension coronary disease without angina and evidence of aortic stenosis of a mild-to-moderate degree.  This was seen on prior YRN.  Patient is a former smoker no history of drug or alcohol abuse no syncope or near syncopal episodes.  Patient has a history of hypertension on multiple medications and is on full anticoagulation due to PAF.  This visit finds patient feeling well.  His medical problems include cardiac disease with stenting of circumflex coronary artery 2 years ago repeat angiogram last year showed patent stent.  The patient has history of obesity lost over 100 lb.  Patient has cardiac disease as well including significant calcification of the aorta calcification the aortic valve and repeat echo be done.  Prior echo done last year showed evidence of at least moderate aortic stenosis.  Today's physical exam shows the patient has brief murmur but also brisk carotid upstroke which would argue against severe aortic stenosis.  Overall LV function was preserved therefore I would disagree that he has low flow state.       Today patient feels generally well.  Blood pressure is a little bit elevated and he is not taking carvedilol more than once a day.  Will discontinue the medications that he feels like he has a brain fog when he takes the medicines.  Will increase hydralazine to 25 mg q.8 hours and otherwise stable continue with Micardis.      Review of Systems   Constitutional: Negative for chills, diaphoresis, night sweats, weight gain and weight loss.   HENT: Negative for congestion, hoarse voice, sore throat and stridor.    Eyes: Negative for double vision and pain.   Cardiovascular: Negative for chest pain,  claudication, cyanosis, dyspnea on exertion, irregular heartbeat, leg swelling, near-syncope, orthopnea, palpitations, paroxysmal nocturnal dyspnea and syncope.   Respiratory: Negative for cough, hemoptysis, shortness of breath, sleep disturbances due to breathing, snoring, sputum production and wheezing.    Endocrine: Negative for cold intolerance, heat intolerance and polydipsia.   Hematologic/Lymphatic: Negative for bleeding problem. Does not bruise/bleed easily.   Skin: Negative for color change, dry skin and rash.   Musculoskeletal: Negative for joint swelling and muscle cramps.   Gastrointestinal: Negative for bloating, abdominal pain, constipation, diarrhea, dysphagia, melena, nausea and vomiting.   Genitourinary: Negative for flank pain and urgency.   Neurological: Negative for dizziness, focal weakness, headaches, light-headedness, loss of balance, seizures and weakness.   Psychiatric/Behavioral: Negative for altered mental status and memory loss. The patient is not nervous/anxious.      Family History   Problem Relation Age of Onset    Cancer Mother     Early death Mother     Arthritis Father     Diabetes Father     Heart disease Father     Hyperlipidemia Father     Hypertension Father      Past Medical History:   Diagnosis Date    Allergy     Anticoagulant long-term use     Arthritis     Asthma     Atrial fibrillation     Bronchitis     Cataract     Coronary artery disease     stent    General anesthetics causing adverse effect in therapeutic use     hard to awaken    GERD (gastroesophageal reflux disease)     Gout, chronic     Hypertension      Social History     Socioeconomic History    Marital status:    Occupational History     Employer: [employer]   Tobacco Use    Smoking status: Former Smoker     Packs/day: 1.50     Years: 22.00     Pack years: 33.00     Types: Cigarettes     Quit date: 1992     Years since quittin.9    Smokeless tobacco: Never  Used   Substance and Sexual Activity    Alcohol use: No    Drug use: No    Sexual activity: Never     Current Outpatient Medications on File Prior to Visit   Medication Sig Dispense Refill    albuterol (PROVENTIL/VENTOLIN HFA) 90 mcg/actuation inhaler Inhale 2 puffs into the lungs every 6 (six) hours as needed for Wheezing or Shortness of Breath. Rescue 18 g 11    albuterol-ipratropium (DUO-NEB) 2.5 mg-0.5 mg/3 mL nebulizer solution Take 3 mLs by nebulization every 6 (six) hours as needed for Wheezing or Shortness of Breath. Rescue 120 vial 5    allopurinoL (ZYLOPRIM) 300 MG tablet TAKE 1 TABLET BY MOUTH EVERY DAY 90 tablet 0    apixaban (ELIQUIS) 5 mg Tab Take 1 tablet (5 mg total) by mouth 2 (two) times daily. 180 tablet 3    aspirin (ECOTRIN) 81 MG EC tablet Take 81 mg by mouth every Mon, Wed, Fri.      atorvastatin (LIPITOR) 10 MG tablet TK 1 T PO QD 90 tablet 3    carvediloL (COREG) 25 MG tablet Take 1 tablet (25 mg total) by mouth 2 (two) times daily with meals. 60 tablet 11    cloNIDine (CATAPRES) 0.1 MG tablet Take 1 tablet (0.1 mg total) by mouth every 6 (six) hours as needed (As needed for systolic BP >165). 84 tablet 0    coenzyme Q10 200 mg capsule Take 200 mg by mouth once daily.      fluticasone furoate-vilanteroL (BREO ELLIPTA) 200-25 mcg/dose DsDv diskus inhaler INHALE 1 PUFF BY MOUTH AT THE SAME TIME EVERY DAY 1 each 5    furosemide (LASIX) 20 MG tablet Take 0.5 tablets (10 mg total) by mouth once daily. 90 tablet 3    hydrALAZINE (APRESOLINE) 25 MG tablet Take 1 tablet (25 mg total) by mouth every 12 (twelve) hours. (Patient taking differently: Take 25 mg by mouth nightly.) 60 tablet 11    lactobacillus comb no.10 (PROBIOTIC) 20 billion cell Cap Take 1 capsule by mouth once daily. Or as directed on bottle      montelukast (SINGULAIR) 10 mg tablet Take 1 tablet (10 mg total) by mouth once daily. 30 tablet 11    multivitamin (THERAGRAN) per tablet Take 1 tablet by mouth once daily.       naproxen (NAPROSYN) 500 MG tablet TAKE 1 TABLET(500 MG) BY MOUTH TWICE DAILY (Patient taking differently: Take 500 mg by mouth 2 (two) times daily as needed.) 60 tablet 12    omeprazole (PRILOSEC) 10 MG capsule Take 10 mg by mouth once daily.      potassium chloride (KLOR-CON) 10 MEQ TbSR TK 1 T PO ONCE A DAY. 90 tablet 3    predniSONE (DELTASONE) 20 MG tablet Take 10 mg by mouth once daily.      SPIRIVA RESPIMAT 2.5 mcg/actuation inhaler USE 2 INHALATIONS BY MOUTH ONCE DAILY INTO THE LUNGS. CONTROLLER 4 g 11    spironolactone (ALDACTONE) 25 MG tablet Take 1 tablet (25 mg total) by mouth once daily. (Patient not taking: Reported on 4/18/2022) 30 tablet 1    telmisartan (MICARDIS) 80 MG Tab Take 1 tablet (80 mg total) by mouth once daily. (Patient taking differently: Take 80 mg by mouth once daily. 80 mg PM) 90 tablet 3    urea (CARMOL) 40 % Crea 2 (two) times daily as needed.  0     No current facility-administered medications on file prior to visit.     Review of patient's allergies indicates:   Allergen Reactions    Sulfa (sulfonamide antibiotics)      Hives    Azithromycin      Diarrhea      Cefaclor Other (See Comments)     Other reaction(s): Hives    Iodine and iodide containing products      Other reaction(s): Unknown    Ivp dye  [iodinated contrast media] Other (See Comments)    Doxycycline Rash       Objective:     Physical Exam  Eyes:      Pupils: Pupils are equal, round, and reactive to light.   Neck:      Trachea: No tracheal deviation.   Cardiovascular:      Rate and Rhythm: Normal rate and regular rhythm.      Pulses: Intact distal pulses.           Carotid pulses are 2+ on the right side and 2+ on the left side.       Radial pulses are 2+ on the right side and 2+ on the left side.        Femoral pulses are 2+ on the right side and 2+ on the left side.       Popliteal pulses are 2+ on the right side and 2+ on the left side.        Dorsalis pedis pulses are 2+ on the right side and 2+  on the left side.        Posterior tibial pulses are 2+ on the right side and 2+ on the left side.      Heart sounds: Normal heart sounds. No murmur heard.    No friction rub. No gallop.   Pulmonary:      Effort: Pulmonary effort is normal. No respiratory distress.      Breath sounds: Normal breath sounds. No stridor. No wheezing or rales.   Chest:      Chest wall: No tenderness.   Abdominal:      General: There is no distension.      Tenderness: There is no abdominal tenderness. There is no rebound.   Musculoskeletal:         General: No tenderness.      Cervical back: Normal range of motion.   Skin:     General: Skin is warm and dry.   Neurological:      Mental Status: He is alert and oriented to person, place, and time.       Cardiac echo 03/30/2022:  · left ventricle is normal in size with normal systolic function.  · The estimated ejection fraction is 65%.  · Grade I left ventricular diastolic dysfunction.  · Normal right ventricular size with normal right ventricular systolic function.  · Mild left atrial enlargement.  · There is moderate aortic valve stenosis.  · Aortic valve area is 1.30 cm2; peak velocity is 3.49 m/s; mean gradient is 29 mmHg.  · The estimated PA systolic pressure is 32 mmHg.   120 - 199 mg/dL 147  142 CM   174 CM  167 CM  170 CM  175 CM    Comment: The National Cholesterol Education Program (NCEP) has set the   following guidelines (reference ranges) for Cholesterol:   Optimal.....................<200 mg/dL   Borderline High.............200-239 mg/dL   High........................> or = 240 mg/dL    Triglycerides 30 - 150 mg/dL 179 High   171 High  CM  170 R  229 High  CM  413 High  CM  307 High  CM  291 High  CM    Comment: The National Cholesterol Education Program (NCEP) has set the   following guidelines (reference values) for triglycerides:   Normal......................<150 mg/dL   Borderline High.............150-199 mg/dL   High........................200-499 mg/dL    HDL 40 - 75  mg/dL 35 Low   35 Low  CM  36 R  29 Low  CM  27 Low  CM  31 Low  CM  29 Low  CM    Comment: The National Cholesterol Education Program (NCEP) has set the   following guidelines (reference values) for HDL Cholesterol:   Low...............<40 mg/dL   Optimal...........>60 mg/dL    LDL Cholesterol 63.0 - 159.0 mg/dL 76.2  72.8 CM  77 R  99.0 R, CM  57.0 Low  R, CM  77.6 R, CM  87.8 R, CM    Comment: The National Cholesterol Education Program (NCEP) has set the   following guidelines (reference values) for LDL Cholesterol:   Optimal.......................<130 mg/dL   Borderline High...............130-159 mg/dL   High..........................160-189 mg/dL   Very High.....................>190 mg/dL    HDL/Cholesterol Ratio 20.0 - 50.0 % 23.8  24.6   16.7 Low  R  16.2 Low  R  18.2 R  16.6 Low  R    Total Cholesterol/HDL Ratio 2.0 - 5.0 4.2  4.1   6.0 High  R  6.2 High  R  5.5 R  6.0 High     Non-HDL Cholesterol mg/dL 112  107 CM  104 R        Comment: Risk category and Non-HDL cholesterol goals:          Assessment:     1. Coronary artery disease involving native coronary artery of native heart without angina pectoris    2. Longstanding persistent atrial fibrillation    3. Essential hypertension    4. Mixed hyperlipidemia    5. Bilateral carotid artery stenosis    6. Abnormal cardiovascular stress test    7. PAF (paroxysmal atrial fibrillation)        Plan:     Coronary artery disease involving native coronary artery of native heart without angina pectoris    Longstanding persistent atrial fibrillation    Essential hypertension    Mixed hyperlipidemia    Bilateral carotid artery stenosis    Abnormal cardiovascular stress test    PAF (paroxysmal atrial fibrillation)    Impression 1. Essential hypertension will increase hydralazine continue Micardis follow-up evaluation blood pressure in 2 weeks  2. PAF stable in sinus rhythm today and doing well and continues on Eliquis.  Otherwise stable doing well no acute changes no  significant symptoms follow-up evaluation in 2 weeks.  Symptoms.  Patient is stable and all questions answered today.  Wife was present today and her questions were answered as well.

## 2022-04-29 ENCOUNTER — OFFICE VISIT (OUTPATIENT)
Dept: CARDIOLOGY | Facility: CLINIC | Age: 72
End: 2022-04-29
Payer: MEDICARE

## 2022-04-29 ENCOUNTER — HOSPITAL ENCOUNTER (OUTPATIENT)
Dept: CARDIOLOGY | Facility: HOSPITAL | Age: 72
Discharge: HOME | End: 2022-04-29
Attending: INTERNAL MEDICINE
Payer: MEDICARE

## 2022-04-29 VITALS
HEIGHT: 73 IN | HEART RATE: 76 BPM | DIASTOLIC BLOOD PRESSURE: 86 MMHG | OXYGEN SATURATION: 97 % | SYSTOLIC BLOOD PRESSURE: 158 MMHG | WEIGHT: 252.38 LBS | BODY MASS INDEX: 33.45 KG/M2

## 2022-04-29 DIAGNOSIS — I65.23 BILATERAL CAROTID ARTERY STENOSIS: ICD-10-CM

## 2022-04-29 DIAGNOSIS — I10 ESSENTIAL HYPERTENSION: Primary | ICD-10-CM

## 2022-04-29 DIAGNOSIS — I48.0 PAF (PAROXYSMAL ATRIAL FIBRILLATION): ICD-10-CM

## 2022-04-29 DIAGNOSIS — R94.39 ABNORMAL CARDIOVASCULAR STRESS TEST: ICD-10-CM

## 2022-04-29 DIAGNOSIS — E78.2 MIXED HYPERLIPIDEMIA: ICD-10-CM

## 2022-04-29 DIAGNOSIS — J44.9 CHRONIC OBSTRUCTIVE PULMONARY DISEASE, UNSPECIFIED COPD TYPE: ICD-10-CM

## 2022-04-29 DIAGNOSIS — I35.0 NONRHEUMATIC AORTIC VALVE STENOSIS: ICD-10-CM

## 2022-04-29 DIAGNOSIS — G47.33 OBSTRUCTIVE SLEEP APNEA SYNDROME: ICD-10-CM

## 2022-04-29 DIAGNOSIS — I48.11 LONGSTANDING PERSISTENT ATRIAL FIBRILLATION: ICD-10-CM

## 2022-04-29 DIAGNOSIS — Z87.891 FORMER SMOKER: ICD-10-CM

## 2022-04-29 DIAGNOSIS — I25.10 CORONARY ARTERY DISEASE INVOLVING NATIVE CORONARY ARTERY OF NATIVE HEART WITHOUT ANGINA PECTORIS: ICD-10-CM

## 2022-04-29 PROCEDURE — 93246 EXT ECG>7D<15D RECORDING: CPT | Mod: PO

## 2022-04-29 PROCEDURE — 99999 PR PBB SHADOW E&M-EST. PATIENT-LVL IV: CPT | Mod: PBBFAC,,, | Performed by: INTERNAL MEDICINE

## 2022-04-29 PROCEDURE — 99214 OFFICE O/P EST MOD 30 MIN: CPT | Mod: PBBFAC,PO | Performed by: INTERNAL MEDICINE

## 2022-04-29 PROCEDURE — 99214 PR OFFICE/OUTPT VISIT, EST, LEVL IV, 30-39 MIN: ICD-10-PCS | Mod: S$PBB,,, | Performed by: INTERNAL MEDICINE

## 2022-04-29 PROCEDURE — 93248 CV CARDIAC MONITOR - 3-15 DAY ADULT (CUPID ONLY): ICD-10-PCS | Mod: ,,, | Performed by: STUDENT IN AN ORGANIZED HEALTH CARE EDUCATION/TRAINING PROGRAM

## 2022-04-29 PROCEDURE — 99999 PR PBB SHADOW E&M-EST. PATIENT-LVL IV: ICD-10-PCS | Mod: PBBFAC,,, | Performed by: INTERNAL MEDICINE

## 2022-04-29 PROCEDURE — 99214 OFFICE O/P EST MOD 30 MIN: CPT | Mod: S$PBB,,, | Performed by: INTERNAL MEDICINE

## 2022-04-29 PROCEDURE — 93248 EXT ECG>7D<15D REV&INTERPJ: CPT | Mod: ,,, | Performed by: STUDENT IN AN ORGANIZED HEALTH CARE EDUCATION/TRAINING PROGRAM

## 2022-04-29 RX ORDER — PREDNISONE 2.5 MG/1
5 TABLET ORAL DAILY
COMMUNITY
Start: 2022-04-23

## 2022-04-29 RX ORDER — HYDRALAZINE HYDROCHLORIDE 25 MG/1
25 TABLET, FILM COATED ORAL 3 TIMES DAILY
Qty: 90 TABLET | Refills: 11 | Status: SHIPPED | OUTPATIENT
Start: 2022-04-29 | End: 2022-05-20

## 2022-05-19 NOTE — PROGRESS NOTES
Subjective:   Patient ID:  Roshan Menard is a 72 y.o. male who presents for follow-up of No chief complaint on file.  This pleasant patient here in the office for evaluation.  History of temporal arteritis, paroxysmal atrial fibrillation and cardioversion the past with YRN.  History of essential hypertension coronary disease without angina and evidence of aortic stenosis of a mild-to-moderate degree.  This was seen on prior YRN.  Patient is a former smoker no history of drug or alcohol abuse no syncope or near syncopal episodes.  Patient has a history of hypertension on multiple medications and is on full anticoagulation due to PAF.  This visit finds patient feeling well.  His medical problems include cardiac disease with stenting of circumflex coronary artery 2 years ago repeat angiogram last year showed patent stent.  The patient has history of obesity lost over 100 lb.  Patient has cardiac disease as well including significant calcification of the aorta calcification the aortic valve and repeat echo be done.  Prior echo done last year showed evidence of at least moderate aortic stenosis.  Today's physical exam shows the patient has brief murmur but also brisk carotid upstroke which would argue against severe aortic stenosis.  Overall LV function was preserved therefore I would disagree that he has low flow state.        Today patient feels generally well.  Blood pressure is a little bit elevated and he is not taking carvedilol more than once a day.  Will discontinue the medications that he feels like he has a brain fog when he takes the medicines.  Will increase hydralazine to 25 mg q.8 hours and otherwise stable continue with Micardis.    The patient presents the office blood pressures been under higher values.  This reason will add hydralazine and now 50 mg q.8 hours he continues on Micardis 80 mg daily he was intolerant of carvedilol.  Monitor did not show significant arrhythmias.  Intermittent mild short  episodes of sinus tachycardia noted.      Review of Systems   Constitutional: Negative for chills, diaphoresis, night sweats, weight gain and weight loss.   HENT: Negative for congestion, hoarse voice, sore throat and stridor.    Eyes: Negative for double vision and pain.   Cardiovascular: Negative for chest pain, claudication, cyanosis, dyspnea on exertion, irregular heartbeat, leg swelling, near-syncope, orthopnea, palpitations, paroxysmal nocturnal dyspnea and syncope.   Respiratory: Negative for cough, hemoptysis, shortness of breath, sleep disturbances due to breathing, snoring, sputum production and wheezing.    Endocrine: Negative for cold intolerance, heat intolerance and polydipsia.   Hematologic/Lymphatic: Negative for bleeding problem. Does not bruise/bleed easily.   Skin: Negative for color change, dry skin and rash.   Musculoskeletal: Negative for joint swelling and muscle cramps.   Gastrointestinal: Negative for bloating, abdominal pain, constipation, diarrhea, dysphagia, melena, nausea and vomiting.   Genitourinary: Negative for flank pain and urgency.   Neurological: Negative for dizziness, focal weakness, headaches, light-headedness, loss of balance, seizures and weakness.   Psychiatric/Behavioral: Negative for altered mental status and memory loss. The patient is not nervous/anxious.      Family History   Problem Relation Age of Onset    Cancer Mother     Early death Mother     Arthritis Father     Diabetes Father     Heart disease Father     Hyperlipidemia Father     Hypertension Father      Past Medical History:   Diagnosis Date    Allergy     Anticoagulant long-term use     Arthritis     Asthma     Atrial fibrillation     Bronchitis     Cataract     Coronary artery disease     stent    General anesthetics causing adverse effect in therapeutic use     hard to awaken    GERD (gastroesophageal reflux disease)     Gout, chronic     Hypertension      Social History      Socioeconomic History    Marital status:    Occupational History     Employer: [employer]   Tobacco Use    Smoking status: Former Smoker     Packs/day: 1.50     Years: 22.00     Pack years: 33.00     Types: Cigarettes     Quit date: 1992     Years since quittin.0    Smokeless tobacco: Never Used   Substance and Sexual Activity    Alcohol use: No    Drug use: No    Sexual activity: Never     Current Outpatient Medications on File Prior to Visit   Medication Sig Dispense Refill    albuterol (PROVENTIL/VENTOLIN HFA) 90 mcg/actuation inhaler Inhale 2 puffs into the lungs every 6 (six) hours as needed for Wheezing or Shortness of Breath. Rescue (Patient not taking: Reported on 2022) 18 g 11    albuterol-ipratropium (DUO-NEB) 2.5 mg-0.5 mg/3 mL nebulizer solution Take 3 mLs by nebulization every 6 (six) hours as needed for Wheezing or Shortness of Breath. Rescue (Patient not taking: Reported on 2022) 120 vial 5    allopurinoL (ZYLOPRIM) 300 MG tablet TAKE 1 TABLET BY MOUTH EVERY DAY 90 tablet 0    apixaban (ELIQUIS) 5 mg Tab Take 1 tablet (5 mg total) by mouth 2 (two) times daily. 180 tablet 3    aspirin (ECOTRIN) 81 MG EC tablet Take 81 mg by mouth every Mon, Wed, Fri.      atorvastatin (LIPITOR) 10 MG tablet TK 1 T PO QD 90 tablet 3    cloNIDine (CATAPRES) 0.1 MG tablet Take 1 tablet (0.1 mg total) by mouth every 6 (six) hours as needed (As needed for systolic BP >165). (Patient not taking: Reported on 2022) 84 tablet 0    coenzyme Q10 200 mg capsule Take 200 mg by mouth once daily.      fluticasone furoate-vilanteroL (BREO ELLIPTA) 200-25 mcg/dose DsDv diskus inhaler INHALE 1 PUFF BY MOUTH AT THE SAME TIME EVERY DAY 1 each 5    furosemide (LASIX) 20 MG tablet Take 0.5 tablets (10 mg total) by mouth once daily. 90 tablet 3    hydrALAZINE (APRESOLINE) 25 MG tablet Take 1 tablet (25 mg total) by mouth 3 (three) times daily. 90 tablet 11    lactobacillus  comb no.10 (PROBIOTIC) 20 billion cell Cap Take 1 capsule by mouth once daily. Or as directed on bottle      montelukast (SINGULAIR) 10 mg tablet Take 1 tablet (10 mg total) by mouth once daily. 30 tablet 11    multivitamin (THERAGRAN) per tablet Take 1 tablet by mouth once daily.      omeprazole (PRILOSEC) 10 MG capsule Take 10 mg by mouth once daily.      potassium chloride (KLOR-CON) 10 MEQ TbSR TK 1 T PO ONCE A DAY. 90 tablet 3    predniSONE (DELTASONE) 5 MG tablet Take 5 mg by mouth once daily.      SPIRIVA RESPIMAT 2.5 mcg/actuation inhaler USE 2 INHALATIONS BY MOUTH ONCE DAILY INTO THE LUNGS. CONTROLLER 4 g 11    telmisartan (MICARDIS) 80 MG Tab Take 1 tablet (80 mg total) by mouth once daily. (Patient taking differently: Take 80 mg by mouth once daily. 80 mg PM) 90 tablet 3    urea (CARMOL) 40 % Crea 2 (two) times daily as needed.  0     No current facility-administered medications on file prior to visit.     Review of patient's allergies indicates:   Allergen Reactions    Sulfa (sulfonamide antibiotics)      Hives    Azithromycin      Diarrhea      Cefaclor Other (See Comments)     Other reaction(s): Hives    Iodine and iodide containing products      Other reaction(s): Unknown    Ivp dye  [iodinated contrast media] Other (See Comments)    Doxycycline Rash       Objective:     Physical Exam  Eyes:      Pupils: Pupils are equal, round, and reactive to light.   Neck:      Trachea: No tracheal deviation.   Cardiovascular:      Rate and Rhythm: Normal rate and regular rhythm.      Pulses: Intact distal pulses.           Carotid pulses are 2+ on the right side and 2+ on the left side.       Radial pulses are 2+ on the right side and 2+ on the left side.        Femoral pulses are 2+ on the right side and 2+ on the left side.       Popliteal pulses are 2+ on the right side and 2+ on the left side.        Dorsalis pedis pulses are 2+ on the right side and 2+ on the left side.        Posterior tibial  pulses are 2+ on the right side and 2+ on the left side.      Heart sounds: Normal heart sounds. No murmur heard.    No friction rub. No gallop.   Pulmonary:      Effort: Pulmonary effort is normal. No respiratory distress.      Breath sounds: Normal breath sounds. No stridor. No wheezing or rales.   Chest:      Chest wall: No tenderness.   Abdominal:      General: There is no distension.      Tenderness: There is no abdominal tenderness. There is no rebound.   Musculoskeletal:         General: No tenderness.      Cervical back: Normal range of motion.   Skin:     General: Skin is warm and dry.   Neurological:      Mental Status: He is alert and oriented to person, place, and time.        120 - 199 mg/dL 147  142 CM   174 CM  167 CM  170 CM  175 CM    Comment: The National Cholesterol Education Program (NCEP) has set the   following guidelines (reference ranges) for Cholesterol:   Optimal.....................<200 mg/dL   Borderline High.............200-239 mg/dL   High........................> or = 240 mg/dL    Triglycerides 30 - 150 mg/dL 179 High   171 High  CM  170 R  229 High  CM  413 High  CM  307 High  CM  291 High  CM    Comment: The National Cholesterol Education Program (NCEP) has set the   following guidelines (reference values) for triglycerides:   Normal......................<150 mg/dL   Borderline High.............150-199 mg/dL   High........................200-499 mg/dL    HDL 40 - 75 mg/dL 35 Low   35 Low  CM  36 R  29 Low  CM  27 Low  CM  31 Low  CM  29 Low  CM    Comment: The National Cholesterol Education Program (NCEP) has set the   following guidelines (reference values) for HDL Cholesterol:   Low...............<40 mg/dL   Optimal...........>60 mg/dL    LDL Cholesterol 63.0 - 159.0 mg/dL 76.2  72.8 CM  77 R  99.0 R, CM  57.0 Low  R, CM  77.6 R, CM  87.8 R, CM    Comment: The National Cholesterol Education Program (NCEP) has set the   following guidelines (reference values) for LDL Cholesterol:         Assessment:     1. Coronary artery disease involving native coronary artery of native heart without angina pectoris    2. Longstanding persistent atrial fibrillation    3. Essential hypertension    4. Mixed hyperlipidemia    5. Bilateral carotid artery stenosis    6. Abnormal cardiovascular stress test    7. PAF (paroxysmal atrial fibrillation)        Plan:     Coronary artery disease involving native coronary artery of native heart without angina pectoris    Longstanding persistent atrial fibrillation    Essential hypertension    Mixed hyperlipidemia    Bilateral carotid artery stenosis    Abnormal cardiovascular stress test    PAF (paroxysmal atrial fibrillation)    Impression 1 hypertension will increase hydralazine and continue current medicines cardiac follow-up evaluation within to 1 month.  2. Hyperlipidemia stable    3 arrhythmias stable no significant arrhythmias seen on 15 day monitor.  No evidence of atrial fibrillation.

## 2022-05-20 ENCOUNTER — OFFICE VISIT (OUTPATIENT)
Dept: CARDIOLOGY | Facility: CLINIC | Age: 72
End: 2022-05-20
Payer: MEDICARE

## 2022-05-20 VITALS
OXYGEN SATURATION: 96 % | WEIGHT: 252.69 LBS | HEIGHT: 73 IN | HEART RATE: 102 BPM | RESPIRATION RATE: 16 BRPM | BODY MASS INDEX: 33.49 KG/M2 | SYSTOLIC BLOOD PRESSURE: 160 MMHG | DIASTOLIC BLOOD PRESSURE: 94 MMHG

## 2022-05-20 DIAGNOSIS — I35.0 NONRHEUMATIC AORTIC VALVE STENOSIS: ICD-10-CM

## 2022-05-20 DIAGNOSIS — I65.23 BILATERAL CAROTID ARTERY STENOSIS: ICD-10-CM

## 2022-05-20 DIAGNOSIS — R94.39 ABNORMAL CARDIOVASCULAR STRESS TEST: ICD-10-CM

## 2022-05-20 DIAGNOSIS — G47.33 OBSTRUCTIVE SLEEP APNEA SYNDROME: ICD-10-CM

## 2022-05-20 DIAGNOSIS — Z87.891 FORMER SMOKER: ICD-10-CM

## 2022-05-20 DIAGNOSIS — E78.2 MIXED HYPERLIPIDEMIA: ICD-10-CM

## 2022-05-20 DIAGNOSIS — I48.11 LONGSTANDING PERSISTENT ATRIAL FIBRILLATION: ICD-10-CM

## 2022-05-20 DIAGNOSIS — J44.9 CHRONIC OBSTRUCTIVE PULMONARY DISEASE, UNSPECIFIED COPD TYPE: ICD-10-CM

## 2022-05-20 DIAGNOSIS — I10 ESSENTIAL HYPERTENSION: ICD-10-CM

## 2022-05-20 DIAGNOSIS — I48.0 PAF (PAROXYSMAL ATRIAL FIBRILLATION): ICD-10-CM

## 2022-05-20 DIAGNOSIS — I25.10 CORONARY ARTERY DISEASE INVOLVING NATIVE CORONARY ARTERY OF NATIVE HEART WITHOUT ANGINA PECTORIS: Primary | ICD-10-CM

## 2022-05-20 PROCEDURE — 99214 PR OFFICE/OUTPT VISIT, EST, LEVL IV, 30-39 MIN: ICD-10-PCS | Mod: S$PBB,,, | Performed by: INTERNAL MEDICINE

## 2022-05-20 PROCEDURE — 99999 PR PBB SHADOW E&M-EST. PATIENT-LVL V: CPT | Mod: PBBFAC,,, | Performed by: INTERNAL MEDICINE

## 2022-05-20 PROCEDURE — 99215 OFFICE O/P EST HI 40 MIN: CPT | Mod: PBBFAC,PO | Performed by: INTERNAL MEDICINE

## 2022-05-20 PROCEDURE — 99999 PR PBB SHADOW E&M-EST. PATIENT-LVL V: ICD-10-PCS | Mod: PBBFAC,,, | Performed by: INTERNAL MEDICINE

## 2022-05-20 PROCEDURE — 99214 OFFICE O/P EST MOD 30 MIN: CPT | Mod: S$PBB,,, | Performed by: INTERNAL MEDICINE

## 2022-05-20 RX ORDER — HYDRALAZINE HYDROCHLORIDE 50 MG/1
50 TABLET, FILM COATED ORAL 3 TIMES DAILY
Qty: 90 TABLET | Refills: 11 | Status: SHIPPED | OUTPATIENT
Start: 2022-05-20 | End: 2022-06-17

## 2022-05-25 ENCOUNTER — TELEPHONE (OUTPATIENT)
Dept: CARDIOLOGY | Facility: CLINIC | Age: 72
End: 2022-05-25
Payer: MEDICARE

## 2022-05-25 LAB
OHS CV HOLTER SINUS AVERAGE HR: 84
OHS CV HOLTER SINUS MAX HR: 138
OHS CV HOLTER SINUS MIN HR: 54

## 2022-05-25 RX ORDER — ALLOPURINOL 300 MG/1
TABLET ORAL
Qty: 90 TABLET | Refills: 1 | Status: SHIPPED | OUTPATIENT
Start: 2022-05-25 | End: 2023-04-25

## 2022-05-25 NOTE — TELEPHONE ENCOUNTER
----- Message from Perry Osborne MD sent at 5/25/2022  8:02 AM CDT -----  Moderate PVCs no high-grade ectopy no VT no significant arrhythmias follow-up at next visit for possible change of medications

## 2022-05-25 NOTE — TELEPHONE ENCOUNTER
Refill Routing Note   Medication(s) are not appropriate for processing by [facility] for the following reason(s):      - Required laboratory values are outdated  - Medication not previously prescribed by PCP    [facility] action(s):  Defer          Medication reconciliation completed: No     Appointments  past 12m or future 3m with PCP    Date Provider   Last Visit   4/7/2018 Steven Burgess MD   Next Visit   Visit date not found Steven Burgess MD   ED visits in past 90 days: 0        Note composed:1:30 PM 05/25/2022

## 2022-05-25 NOTE — TELEPHONE ENCOUNTER
Care Due:                  Date            Visit Type   Department     Provider  --------------------------------------------------------------------------------                                EP -                              PRIMARY      [facility] FAMILY  Last Visit: 04-      CARE ([facility])   MEDICINE       Catrina Yanez  Next Visit: None Scheduled  None         None Found                                                            Last  Test          Frequency    Reason                     Performed    Due Date  --------------------------------------------------------------------------------    Uric Acid...  12 months..  allopurinoL..............  Not Found    Overdue    [employer] Embedded Care Gaps. Reference number: [id number]. 5/25/2022   12:25:45 PM CDT

## 2022-05-26 ENCOUNTER — TELEPHONE (OUTPATIENT)
Dept: CARDIOLOGY | Facility: CLINIC | Age: 72
End: 2022-05-26
Payer: MEDICARE

## 2022-05-26 NOTE — TELEPHONE ENCOUNTER
----- Message from Bill Clemens sent at 5/26/2022  9:01 AM CDT -----  Contact: [phone]  PT is calling in, he is returning call, please call back, thanks

## 2022-05-27 ENCOUNTER — PATIENT MESSAGE (OUTPATIENT)
Dept: FAMILY MEDICINE | Facility: CLINIC | Age: 72
End: 2022-05-27
Payer: MEDICARE

## 2022-06-16 NOTE — PROGRESS NOTES
Subjective:   Patient ID:  Roshan Menard is a 72 y.o. male who presents for follow-up of No chief complaint on file.  This pleasant patient here in the office for evaluation.  History of temporal arteritis, paroxysmal atrial fibrillation and cardioversion the past with YRN.  History of essential hypertension coronary disease without angina and evidence of aortic stenosis of a mild-to-moderate degree.  This was seen on prior YRN.  Patient is a former smoker no history of drug or alcohol abuse no syncope or near syncopal episodes.  Patient has a history of hypertension on multiple medications and is on full anticoagulation due to PAF.  This visit finds patient feeling well.  His medical problems include cardiac disease with stenting of circumflex coronary artery 2 years ago repeat angiogram last year showed patent stent.  The patient has history of obesity lost over 100 lb.  Patient has cardiac disease as well including significant calcification of the aorta calcification the aortic valve and repeat echo be done.  Prior echo done last year showed evidence of at least moderate aortic stenosis.  Today's physical exam shows the patient has brief murmur but also brisk carotid upstroke which would argue against severe aortic stenosis.  Overall LV function was preserved therefore I would disagree that he has low flow state.        Today patient feels generally well.  Blood pressure is a little bit elevated and he is not taking carvedilol more than once a day.  Will discontinue the medications that he feels like he has a brain fog when he takes the medicines.  Will increase hydralazine to 25 mg q.8 hours and otherwise stable continue with Micardis.     The patient presents the office blood pressures been under higher values.  This reason will add hydralazine and now 50 mg q.8 hours he continues on Micardis 80 mg daily he was intolerant of carvedilol.  Monitor did not show significant arrhythmias.  Intermittent mild short  episodes of sinus tachycardia noted.       Patient presents the office feeling generally well.  Blood pressure still elevated will increase hydralazine 100 mg b.i.d.      Review of Systems   Constitutional: Negative for chills, diaphoresis, night sweats, weight gain and weight loss.   HENT: Negative for congestion, hoarse voice, sore throat and stridor.    Eyes: Negative for double vision and pain.   Cardiovascular: Negative for chest pain, claudication, cyanosis, dyspnea on exertion, irregular heartbeat, leg swelling, near-syncope, orthopnea, palpitations, paroxysmal nocturnal dyspnea and syncope.   Respiratory: Negative for cough, hemoptysis, shortness of breath, sleep disturbances due to breathing, snoring, sputum production and wheezing.    Endocrine: Negative for cold intolerance, heat intolerance and polydipsia.   Hematologic/Lymphatic: Negative for bleeding problem. Does not bruise/bleed easily.   Skin: Negative for color change, dry skin and rash.   Musculoskeletal: Negative for joint swelling and muscle cramps.   Gastrointestinal: Negative for bloating, abdominal pain, constipation, diarrhea, dysphagia, melena, nausea and vomiting.   Genitourinary: Negative for flank pain and urgency.   Neurological: Negative for dizziness, focal weakness, headaches, light-headedness, loss of balance, seizures and weakness.   Psychiatric/Behavioral: Negative for altered mental status and memory loss. The patient is not nervous/anxious.      Family History   Problem Relation Age of Onset    Cancer Mother     Early death Mother     Arthritis Father     Diabetes Father     Heart disease Father     Hyperlipidemia Father     Hypertension Father      Past Medical History:   Diagnosis Date    Allergy     Anticoagulant long-term use     Arthritis     Asthma     Atrial fibrillation     Bronchitis     Cataract     Coronary artery disease     stent    General anesthetics causing adverse effect in therapeutic use      hard to awaken    GERD (gastroesophageal reflux disease)     Gout, chronic     Hypertension      Social History     Socioeconomic History    Marital status:    Occupational History     Employer: [employer]   Tobacco Use    Smoking status: Former Smoker     Packs/day: 1.50     Years: 22.00     Pack years: 33.00     Types: Cigarettes     Quit date: 1992     Years since quittin.1    Smokeless tobacco: Never Used   Substance and Sexual Activity    Alcohol use: No    Drug use: No    Sexual activity: Never     Current Outpatient Medications on File Prior to Visit   Medication Sig Dispense Refill    albuterol (PROVENTIL/VENTOLIN HFA) 90 mcg/actuation inhaler Inhale 2 puffs into the lungs every 6 (six) hours as needed for Wheezing or Shortness of Breath. Rescue 18 g 11    albuterol-ipratropium (DUO-NEB) 2.5 mg-0.5 mg/3 mL nebulizer solution Take 3 mLs by nebulization every 6 (six) hours as needed for Wheezing or Shortness of Breath. Rescue 120 vial 5    allopurinoL (ZYLOPRIM) 300 MG tablet TAKE 1 TABLET BY MOUTH EVERY DAY 90 tablet 1    apixaban (ELIQUIS) 5 mg Tab Take 1 tablet (5 mg total) by mouth 2 (two) times daily. 180 tablet 3    aspirin (ECOTRIN) 81 MG EC tablet Take 81 mg by mouth every Mon, Wed, Fri.      atorvastatin (LIPITOR) 10 MG tablet TK 1 T PO QD 90 tablet 3    cloNIDine (CATAPRES) 0.1 MG tablet Take 1 tablet (0.1 mg total) by mouth every 6 (six) hours as needed (As needed for systolic BP >165). 84 tablet 0    coenzyme Q10 200 mg capsule Take 200 mg by mouth once daily.      fluticasone furoate-vilanteroL (BREO ELLIPTA) 200-25 mcg/dose DsDv diskus inhaler INHALE 1 PUFF BY MOUTH AT THE SAME TIME EVERY DAY 60 each 0    furosemide (LASIX) 20 MG tablet Take 0.5 tablets (10 mg total) by mouth once daily. 90 tablet 3    hydrALAZINE (APRESOLINE) 50 MG tablet Take 1 tablet (50 mg total) by mouth 3 (three) times daily. 90 tablet 11    lactobacillus comb no.10  (PROBIOTIC) 20 billion cell Cap Take 1 capsule by mouth once daily. Or as directed on bottle      montelukast (SINGULAIR) 10 mg tablet Take 1 tablet (10 mg total) by mouth once daily. 30 tablet 11    multivitamin (THERAGRAN) per tablet Take 1 tablet by mouth once daily.      omeprazole (PRILOSEC) 10 MG capsule Take 10 mg by mouth once daily.      potassium chloride (KLOR-CON) 10 MEQ TbSR TK 1 T PO ONCE A DAY. 90 tablet 3    predniSONE (DELTASONE) 5 MG tablet Take 5 mg by mouth once daily.      SPIRIVA RESPIMAT 2.5 mcg/actuation inhaler USE 2 INHALATIONS BY MOUTH ONCE DAILY INTO THE LUNGS. CONTROLLER 4 g 11    telmisartan (MICARDIS) 80 MG Tab Take 1 tablet (80 mg total) by mouth once daily. (Patient taking differently: Take 80 mg by mouth once daily. 80 mg PM) 90 tablet 3    urea (CARMOL) 40 % Crea 2 (two) times daily as needed.  0     No current facility-administered medications on file prior to visit.     Review of patient's allergies indicates:   Allergen Reactions    Sulfa (sulfonamide antibiotics)      Hives    Azithromycin      Diarrhea      Cefaclor Other (See Comments)     Other reaction(s): Hives    Iodine and iodide containing products      Other reaction(s): Unknown    Ivp dye  [iodinated contrast media] Other (See Comments)    Doxycycline Rash       Objective:     Physical Exam  Eyes:      Pupils: Pupils are equal, round, and reactive to light.   Neck:      Trachea: No tracheal deviation.   Cardiovascular:      Rate and Rhythm: Normal rate and regular rhythm.      Pulses: Intact distal pulses.           Carotid pulses are 2+ on the right side and 2+ on the left side.       Radial pulses are 2+ on the right side and 2+ on the left side.        Femoral pulses are 2+ on the right side and 2+ on the left side.       Popliteal pulses are 2+ on the right side and 2+ on the left side.        Dorsalis pedis pulses are 2+ on the right side and 2+ on the left side.        Posterior tibial pulses are  2+ on the right side and 2+ on the left side.      Heart sounds: Normal heart sounds. No murmur heard.    No friction rub. No gallop.   Pulmonary:      Effort: Pulmonary effort is normal. No respiratory distress.      Breath sounds: Normal breath sounds. No stridor. No wheezing or rales.   Chest:      Chest wall: No tenderness.   Abdominal:      General: There is no distension.      Tenderness: There is no abdominal tenderness. There is no rebound.   Musculoskeletal:         General: No tenderness.      Cervical back: Normal range of motion.   Skin:     General: Skin is warm and dry.   Neurological:      Mental Status: He is alert and oriented to person, place, and time.         Assessment:     1. Coronary artery disease involving native coronary artery of native heart without angina pectoris    2. Bilateral carotid artery stenosis    3. Chronic obstructive pulmonary disease, unspecified COPD type    4. Abnormal cardiovascular stress test    5. Former smoker    6. PAF (paroxysmal atrial fibrillation)    7. Obstructive sleep apnea syndrome    8. Mixed hyperlipidemia        Plan:     Coronary artery disease involving native coronary artery of native heart without angina pectoris    Bilateral carotid artery stenosis    Chronic obstructive pulmonary disease, unspecified COPD type    Abnormal cardiovascular stress test    Former smoker    PAF (paroxysmal atrial fibrillation)    Obstructive sleep apnea syndrome    Mixed hyperlipidemia      Impression hypertension will increase hydralazine as above.  2. Paroxysmal AFib stable on apixaban.  Follow-up evaluation in 4-6 weeks.

## 2022-06-17 ENCOUNTER — OFFICE VISIT (OUTPATIENT)
Dept: CARDIOLOGY | Facility: CLINIC | Age: 72
End: 2022-06-17
Payer: MEDICARE

## 2022-06-17 VITALS
OXYGEN SATURATION: 96 % | BODY MASS INDEX: 33.8 KG/M2 | SYSTOLIC BLOOD PRESSURE: 160 MMHG | HEIGHT: 73 IN | WEIGHT: 255 LBS | DIASTOLIC BLOOD PRESSURE: 90 MMHG | HEART RATE: 92 BPM

## 2022-06-17 DIAGNOSIS — I65.23 BILATERAL CAROTID ARTERY STENOSIS: ICD-10-CM

## 2022-06-17 DIAGNOSIS — E78.2 MIXED HYPERLIPIDEMIA: ICD-10-CM

## 2022-06-17 DIAGNOSIS — I48.11 LONGSTANDING PERSISTENT ATRIAL FIBRILLATION: ICD-10-CM

## 2022-06-17 DIAGNOSIS — I35.0 NONRHEUMATIC AORTIC VALVE STENOSIS: ICD-10-CM

## 2022-06-17 DIAGNOSIS — Z87.891 FORMER SMOKER: ICD-10-CM

## 2022-06-17 DIAGNOSIS — I10 ESSENTIAL HYPERTENSION: ICD-10-CM

## 2022-06-17 DIAGNOSIS — I48.0 PAF (PAROXYSMAL ATRIAL FIBRILLATION): ICD-10-CM

## 2022-06-17 DIAGNOSIS — R94.39 ABNORMAL CARDIOVASCULAR STRESS TEST: ICD-10-CM

## 2022-06-17 DIAGNOSIS — I25.10 CORONARY ARTERY DISEASE INVOLVING NATIVE CORONARY ARTERY OF NATIVE HEART WITHOUT ANGINA PECTORIS: Primary | ICD-10-CM

## 2022-06-17 DIAGNOSIS — G47.33 OBSTRUCTIVE SLEEP APNEA SYNDROME: ICD-10-CM

## 2022-06-17 DIAGNOSIS — J44.9 CHRONIC OBSTRUCTIVE PULMONARY DISEASE, UNSPECIFIED COPD TYPE: ICD-10-CM

## 2022-06-17 PROCEDURE — 99999 PR PBB SHADOW E&M-EST. PATIENT-LVL V: ICD-10-PCS | Mod: PBBFAC,,, | Performed by: INTERNAL MEDICINE

## 2022-06-17 PROCEDURE — 99999 PR PBB SHADOW E&M-EST. PATIENT-LVL V: CPT | Mod: PBBFAC,,, | Performed by: INTERNAL MEDICINE

## 2022-06-17 PROCEDURE — 99214 OFFICE O/P EST MOD 30 MIN: CPT | Mod: S$PBB,,, | Performed by: INTERNAL MEDICINE

## 2022-06-17 PROCEDURE — 99214 PR OFFICE/OUTPT VISIT, EST, LEVL IV, 30-39 MIN: ICD-10-PCS | Mod: S$PBB,,, | Performed by: INTERNAL MEDICINE

## 2022-06-17 PROCEDURE — 99215 OFFICE O/P EST HI 40 MIN: CPT | Mod: PBBFAC,PO | Performed by: INTERNAL MEDICINE

## 2022-06-17 RX ORDER — HYDRALAZINE HYDROCHLORIDE 100 MG/1
100 TABLET, FILM COATED ORAL EVERY 12 HOURS
Qty: 60 TABLET | Refills: 11 | Status: SHIPPED | OUTPATIENT
Start: 2022-06-17 | End: 2022-08-19

## 2022-07-05 ENCOUNTER — OFFICE VISIT (OUTPATIENT)
Dept: PRIMARY CARE CLINIC | Facility: CLINIC | Age: 72
End: 2022-07-05
Payer: MEDICARE

## 2022-07-05 VITALS
HEART RATE: 83 BPM | SYSTOLIC BLOOD PRESSURE: 155 MMHG | WEIGHT: 258.06 LBS | HEIGHT: 73 IN | TEMPERATURE: 98 F | BODY MASS INDEX: 34.2 KG/M2 | DIASTOLIC BLOOD PRESSURE: 75 MMHG | OXYGEN SATURATION: 99 %

## 2022-07-05 DIAGNOSIS — M31.6 TEMPORAL ARTERITIS: ICD-10-CM

## 2022-07-05 DIAGNOSIS — J45.40 MODERATE PERSISTENT ASTHMA WITHOUT COMPLICATION: ICD-10-CM

## 2022-07-05 DIAGNOSIS — I10 ESSENTIAL HYPERTENSION: ICD-10-CM

## 2022-07-05 DIAGNOSIS — I35.0 NONRHEUMATIC AORTIC VALVE STENOSIS: ICD-10-CM

## 2022-07-05 DIAGNOSIS — E78.2 MIXED HYPERLIPIDEMIA: Primary | ICD-10-CM

## 2022-07-05 DIAGNOSIS — I48.0 PAF (PAROXYSMAL ATRIAL FIBRILLATION): ICD-10-CM

## 2022-07-05 DIAGNOSIS — I25.10 CORONARY ARTERY DISEASE INVOLVING NATIVE CORONARY ARTERY OF NATIVE HEART WITHOUT ANGINA PECTORIS: ICD-10-CM

## 2022-07-05 PROCEDURE — 99214 OFFICE O/P EST MOD 30 MIN: CPT | Mod: S$PBB,,, | Performed by: INTERNAL MEDICINE

## 2022-07-05 PROCEDURE — 99999 PR PBB SHADOW E&M-EST. PATIENT-LVL IV: ICD-10-PCS | Mod: PBBFAC,,, | Performed by: INTERNAL MEDICINE

## 2022-07-05 PROCEDURE — 99999 PR PBB SHADOW E&M-EST. PATIENT-LVL IV: CPT | Mod: PBBFAC,,, | Performed by: INTERNAL MEDICINE

## 2022-07-05 PROCEDURE — 99214 OFFICE O/P EST MOD 30 MIN: CPT | Mod: PBBFAC,PN | Performed by: INTERNAL MEDICINE

## 2022-07-05 PROCEDURE — 99214 PR OFFICE/OUTPT VISIT, EST, LEVL IV, 30-39 MIN: ICD-10-PCS | Mod: S$PBB,,, | Performed by: INTERNAL MEDICINE

## 2022-07-05 NOTE — PROGRESS NOTES
Assessment/Plan:    Problem List Items Addressed This Visit        Pulmonary    Moderate persistent asthma without complication    Overview     -managed by external pulmonology, Dr. Wright  -remains on breo and spiriva  -respiratory symptoms stable              Cardiac/Vascular    Coronary artery disease involving native coronary artery of native heart without angina pectoris    Overview     -followed by cardiology  -hx of PCI with LUIS  -remains on ASA and eliquis (hx of a fib)  -also on statin  -denies recent symptoms of angina or dyspnea           Essential hypertension    Overview     Hypertension Medications             furosemide (LASIX) 20 MG tablet Take 0.5 tablets (10 mg total) by mouth once daily.    hydrALAZINE (APRESOLINE) 100 MG tablet Take 1 tablet (100 mg total) by mouth every 12 (twelve) hours.    telmisartan (MICARDIS) 80 MG Tab Take 1 tablet (80 mg total) by mouth once daily.      -above goal today  -intolerant to amlodipine and beta blocker before  -add afternoon dose of Hydralazine 50 mg   -follow up with cardiology in several weeks  -continue lifestyle modification with low sodium diet and exercise   -discussed hypertension disease course and importance of treating high blood pressure  -patient understood and advised of risk of untreated blood pressure.  ER precautions were given   for symptoms of hypertensive urgency and emergency.           Mixed hyperlipidemia - Primary    Overview     Hyperlipidemia Medications             atorvastatin (LIPITOR) 10 MG tablet TK 1 T PO QD      -chronic condition. Currently stable.    -reports compliance with hyperlipidemia treatment as prescribed  -denies any known adverse effects of medications  -most recent labs listed below:  Lab Results   Component Value Date    CHOL 147 03/30/2022     Lab Results   Component Value Date    HDL 35 (L) 03/30/2022     Lab Results   Component Value Date    LDLCALC 76.2 03/30/2022     Lab Results   Component Value Date     TRIG 179 (H) 03/30/2022     Lab Results   Component Value Date    ALT 37 01/14/2022    AST 45 01/14/2022    ALKPHOS 147 (H) 01/14/2022    BILITOT 0.6 01/14/2022              Nonrheumatic aortic valve stenosis    Overview     -moderate AS on TTE March 2022  -followed by cardiology           PAF (paroxysmal atrial fibrillation)    Overview     -followed by cardiology  -intolerant of beta blocker   -eliquis for anticoagulation  -s/p recent cardioversion, failed  -asymptomatic              Immunology/Multi System    Temporal arteritis    Overview     -recent vision changes, referred to retinal specialist  -s/p temporal biopsy normal, however improved with prednisone  -follow up with eye specialist next month                 Follow up in about 6 months (around 1/5/2023).    Catrina Yanez MD  _____________________________________________________________________________________________________________________________________________________    CC: follow up of chronic medical conditions     HPI:    Patient is in clinic today as an established patient here for follow up of chronic medical conditions.    HTN: The patient has a diagnosis of hypertension and the blood pressure has been high on recent checks.  The patient has been compliant on the medicine listed below and has not had problems with side effects.  The patient has had no headache, vision changes, chest pain, shortness of breath, dizziness, palpitations.  Denies any identifiable exacerbating or relieving factors.    CAD/Afib: followed by cardiology. Doing well. No recent complaints of angina or SOB. Compliant with medications.    No other new complaints today.  Remaining chronic conditions have been reviewed and remain stable. Further detail as stated above.      Past Medical History:  Past Medical History:   Diagnosis Date    Allergy     Anticoagulant long-term use     Arthritis     Asthma     Atrial fibrillation     Bronchitis     Cataract     Coronary  artery disease     stent    General anesthetics causing adverse effect in therapeutic use     hard to awaken    GERD (gastroesophageal reflux disease)     Gout, chronic     Hypertension      Past Surgical History:   Procedure Laterality Date    ANGIOGRAM, CORONARY, WITH LEFT HEART CATHETERIZATION  2021    Procedure: Angiogram, Coronary, with Left Heart Cath;  Surgeon: Vincent Gonzalez MD;  Location: [facility] C[facility];  Service: Cardiology;;    BACK SURGERY      L3-5; ruptured disc; laminectomy x 2 surgery    CHOLECYSTECTOMY      CORONARY ANGIOPLASTY WITH STENT PLACEMENT      EYE SURGERY      Cateract    JOINT REPLACEMENT      RT  KNEE    SPINE SURGERY      Lamanectomy /Capps     TONSILLECTOMY      TOTAL KNEE ARTHROPLASTY      VASECTOMY       Review of patient's allergies indicates:   Allergen Reactions    Sulfa (sulfonamide antibiotics)      Hives    Azithromycin      Diarrhea      Cefaclor Other (See Comments)     Other reaction(s): Hives    Iodine and iodide containing products      Other reaction(s): Unknown    Ivp dye  [iodinated contrast media] Other (See Comments)    Doxycycline Rash     Social History     Tobacco Use    Smoking status: Former Smoker     Packs/day: 1.50     Years: 22.00     Pack years: 33.00     Types: Cigarettes     Quit date: 1992     Years since quittin.1    Smokeless tobacco: Never Used   Substance Use Topics    Alcohol use: No    Drug use: No     Family History   Problem Relation Age of Onset    Cancer Mother     Early death Mother     Arthritis Father     Diabetes Father     Heart disease Father     Hyperlipidemia Father     Hypertension Father      Current Outpatient Medications on File Prior to Visit   Medication Sig Dispense Refill    albuterol (PROVENTIL/VENTOLIN HFA) 90 mcg/actuation inhaler Inhale 2 puffs into the lungs every 6 (six) hours as needed for Wheezing or Shortness of Breath. Rescue 18 g 11     albuterol-ipratropium (DUO-NEB) 2.5 mg-0.5 mg/3 mL nebulizer solution Take 3 mLs by nebulization every 6 (six) hours as needed for Wheezing or Shortness of Breath. Rescue 120 vial 5    allopurinoL (ZYLOPRIM) 300 MG tablet TAKE 1 TABLET BY MOUTH EVERY DAY 90 tablet 1    apixaban (ELIQUIS) 5 mg Tab Take 1 tablet (5 mg total) by mouth 2 (two) times daily. 180 tablet 3    aspirin (ECOTRIN) 81 MG EC tablet Take 81 mg by mouth every Mon, Wed, Fri.      atorvastatin (LIPITOR) 10 MG tablet TK 1 T PO QD 90 tablet 3    coenzyme Q10 200 mg capsule Take 200 mg by mouth once daily.      fluticasone furoate-vilanteroL (BREO ELLIPTA) 200-25 mcg/dose DsDv diskus inhaler INHALE 1 PUFF BY MOUTH AT THE SAME TIME EVERY DAY 60 each 0    furosemide (LASIX) 20 MG tablet Take 0.5 tablets (10 mg total) by mouth once daily. 90 tablet 3    hydrALAZINE (APRESOLINE) 100 MG tablet Take 1 tablet (100 mg total) by mouth every 12 (twelve) hours. (Patient taking differently: Take 100 mg by mouth 2 (two) times a day.) 60 tablet 11    lactobacillus comb no.10 (PROBIOTIC) 20 billion cell Cap Take 1 capsule by mouth once daily. Or as directed on bottle      montelukast (SINGULAIR) 10 mg tablet Take 1 tablet (10 mg total) by mouth once daily. 30 tablet 1    multivitamin (THERAGRAN) per tablet Take 1 tablet by mouth once daily.      omeprazole (PRILOSEC) 10 MG capsule Take 10 mg by mouth once daily.      potassium chloride (KLOR-CON) 10 MEQ TbSR TK 1 T PO ONCE A DAY. 90 tablet 3    predniSONE (DELTASONE) 5 MG tablet Take 5 mg by mouth once daily.      SPIRIVA RESPIMAT 2.5 mcg/actuation inhaler USE 2 INHALATIONS BY MOUTH ONCE DAILY INTO THE LUNGS. CONTROLLER 4 g 11    telmisartan (MICARDIS) 80 MG Tab Take 1 tablet (80 mg total) by mouth once daily. (Patient taking differently: Take 80 mg by mouth once daily. 80 mg PM) 90 tablet 3    urea (CARMOL) 40 % Crea 2 (two) times daily as needed.  0     No current facility-administered medications  "on file prior to visit.       Review of Systems   Constitutional: Negative for chills, diaphoresis, fatigue and fever.   HENT: Negative for congestion, ear pain, postnasal drip, sinus pain and sore throat.    Eyes: Negative for pain and redness.   Respiratory: Negative for cough, chest tightness and shortness of breath.    Cardiovascular: Negative for chest pain and leg swelling.   Gastrointestinal: Negative for abdominal pain, constipation, diarrhea, nausea and vomiting.   Genitourinary: Negative for dysuria and hematuria.   Musculoskeletal: Negative for arthralgias and joint swelling.   Skin: Negative for rash.   Neurological: Negative for dizziness, syncope and headaches.       Vitals:    07/05/22 0725   BP: (!) 158/80   Pulse: 83   Temp: 97.9 °F (36.6 °C)   SpO2: 99%   Weight: 117 kg (258 lb 0.8 oz)   Height: 6' 1" (1.854 m)       Wt Readings from Last 3 Encounters:   07/05/22 117 kg (258 lb 0.8 oz)   06/17/22 115.7 kg (255 lb)   05/20/22 114.6 kg (252 lb 11.2 oz)       Physical Exam  Constitutional:       General: He is not in acute distress.     Appearance: Normal appearance. He is well-developed.   HENT:      Head: Normocephalic and atraumatic.   Eyes:      Conjunctiva/sclera: Conjunctivae normal.   Cardiovascular:      Rate and Rhythm: Normal rate and regular rhythm.      Pulses: Normal pulses.      Heart sounds: Murmur heard.   Pulmonary:      Effort: Pulmonary effort is normal. No respiratory distress.      Breath sounds: Normal breath sounds.   Abdominal:      General: Bowel sounds are normal. There is no distension.      Palpations: Abdomen is soft.      Tenderness: There is no abdominal tenderness.   Musculoskeletal:         General: Normal range of motion.      Cervical back: Normal range of motion and neck supple.   Skin:     General: Skin is warm and dry.      Findings: No rash.   Neurological:      General: No focal deficit present.      Mental Status: He is alert and oriented to person, place, and " time.   Psychiatric:         Mood and Affect: Mood normal.         Behavior: Behavior normal.         Health Maintenance   Topic Date Due    Lipid Panel  03/30/2023    High Dose Statin  07/05/2023    TETANUS VACCINE  10/09/2031    Hepatitis C Screening  Completed    Abdominal Aortic Aneurysm Screening  Completed

## 2022-08-15 ENCOUNTER — LAB VISIT (OUTPATIENT)
Dept: LAB | Facility: HOSPITAL | Age: 72
End: 2022-08-15
Attending: OPHTHALMOLOGY
Payer: MEDICARE

## 2022-08-15 DIAGNOSIS — M31.6 GIANT CELL ARTERITIS: Primary | ICD-10-CM

## 2022-08-15 LAB
CRP SERPL-MCNC: 1.1 MG/L (ref 0–8.2)
ERYTHROCYTE [SEDIMENTATION RATE] IN BLOOD BY WESTERGREN METHOD: 11 MM/HR (ref 0–10)

## 2022-08-15 PROCEDURE — 86140 C-REACTIVE PROTEIN: CPT | Performed by: OPHTHALMOLOGY

## 2022-08-15 PROCEDURE — 85651 RBC SED RATE NONAUTOMATED: CPT | Mod: PO | Performed by: OPHTHALMOLOGY

## 2022-08-15 PROCEDURE — 36415 COLL VENOUS BLD VENIPUNCTURE: CPT | Mod: PO | Performed by: OPHTHALMOLOGY

## 2022-08-19 ENCOUNTER — OFFICE VISIT (OUTPATIENT)
Dept: CARDIOLOGY | Facility: CLINIC | Age: 72
End: 2022-08-19
Payer: MEDICARE

## 2022-08-19 VITALS
SYSTOLIC BLOOD PRESSURE: 154 MMHG | OXYGEN SATURATION: 96 % | WEIGHT: 257 LBS | HEART RATE: 99 BPM | BODY MASS INDEX: 34.06 KG/M2 | HEIGHT: 73 IN | DIASTOLIC BLOOD PRESSURE: 88 MMHG

## 2022-08-19 DIAGNOSIS — Z01.818 PRE-OP TESTING: ICD-10-CM

## 2022-08-19 DIAGNOSIS — I25.10 CORONARY ARTERY DISEASE INVOLVING NATIVE CORONARY ARTERY OF NATIVE HEART WITHOUT ANGINA PECTORIS: Primary | ICD-10-CM

## 2022-08-19 DIAGNOSIS — I48.11 LONGSTANDING PERSISTENT ATRIAL FIBRILLATION: ICD-10-CM

## 2022-08-19 DIAGNOSIS — E78.2 MIXED HYPERLIPIDEMIA: ICD-10-CM

## 2022-08-19 DIAGNOSIS — I35.0 NONRHEUMATIC AORTIC VALVE STENOSIS: ICD-10-CM

## 2022-08-19 DIAGNOSIS — Z20.822 ENCOUNTER FOR LABORATORY TESTING FOR COVID-19 VIRUS: ICD-10-CM

## 2022-08-19 DIAGNOSIS — Z87.891 FORMER SMOKER: ICD-10-CM

## 2022-08-19 DIAGNOSIS — Z01.810 PREOP CARDIOVASCULAR EXAM: ICD-10-CM

## 2022-08-19 DIAGNOSIS — J44.9 CHRONIC OBSTRUCTIVE PULMONARY DISEASE, UNSPECIFIED COPD TYPE: ICD-10-CM

## 2022-08-19 DIAGNOSIS — I48.0 PAF (PAROXYSMAL ATRIAL FIBRILLATION): ICD-10-CM

## 2022-08-19 DIAGNOSIS — I10 ESSENTIAL HYPERTENSION: ICD-10-CM

## 2022-08-19 DIAGNOSIS — G47.33 OBSTRUCTIVE SLEEP APNEA SYNDROME: ICD-10-CM

## 2022-08-19 DIAGNOSIS — R94.39 ABNORMAL CARDIOVASCULAR STRESS TEST: ICD-10-CM

## 2022-08-19 DIAGNOSIS — I65.23 BILATERAL CAROTID ARTERY STENOSIS: ICD-10-CM

## 2022-08-19 DIAGNOSIS — I35.0 NONRHEUMATIC AORTIC (VALVE) STENOSIS: ICD-10-CM

## 2022-08-19 PROCEDURE — 99999 PR PBB SHADOW E&M-EST. PATIENT-LVL V: CPT | Mod: PBBFAC,,, | Performed by: INTERNAL MEDICINE

## 2022-08-19 PROCEDURE — 99215 OFFICE O/P EST HI 40 MIN: CPT | Mod: PBBFAC,PO | Performed by: INTERNAL MEDICINE

## 2022-08-19 PROCEDURE — 99999 PR PBB SHADOW E&M-EST. PATIENT-LVL V: ICD-10-PCS | Mod: PBBFAC,,, | Performed by: INTERNAL MEDICINE

## 2022-08-19 PROCEDURE — 99214 OFFICE O/P EST MOD 30 MIN: CPT | Mod: S$PBB,,, | Performed by: INTERNAL MEDICINE

## 2022-08-19 PROCEDURE — 99214 PR OFFICE/OUTPT VISIT, EST, LEVL IV, 30-39 MIN: ICD-10-PCS | Mod: S$PBB,,, | Performed by: INTERNAL MEDICINE

## 2022-08-19 RX ORDER — HYDRALAZINE HYDROCHLORIDE 100 MG/1
100 TABLET, FILM COATED ORAL EVERY 8 HOURS
Qty: 90 TABLET | Refills: 11 | Status: SHIPPED | OUTPATIENT
Start: 2022-08-19 | End: 2023-03-02

## 2022-08-19 NOTE — PROGRESS NOTES
Subjective:   Patient ID:  Roshan Menard is a 72 y.o. male who presents for follow-up of No chief complaint on file.  This pleasant patient here in the office for evaluation.  History of temporal arteritis, paroxysmal atrial fibrillation and cardioversion the past with YRN.  History of essential hypertension coronary disease without angina and evidence of aortic stenosis of a mild-to-moderate degree.  This was seen on prior YRN.  Patient is a former smoker no history of drug or alcohol abuse no syncope or near syncopal episodes.  Patient has a history of hypertension on multiple medications and is on full anticoagulation due to PAF.  This visit finds patient feeling well.  His medical problems include cardiac disease with stenting of circumflex coronary artery 2 years ago repeat angiogram last year showed patent stent.  The patient has history of obesity lost over 100 lb.  Patient has cardiac disease as well including significant calcification of the aorta calcification the aortic valve and repeat echo be done.  Prior echo done last year showed evidence of at least moderate aortic stenosis.  Today's physical exam shows the patient has brief murmur but also brisk carotid upstroke which would argue against severe aortic stenosis.  Overall LV function was preserved therefore I would disagree that he has low flow state.        Today patient feels generally well.  Blood pressure is a little bit elevated and he is not taking carvedilol more than once a day.  Will discontinue the medications that he feels like he has a brain fog when he takes the medicines.  Will increase hydralazine to 25 mg q.8 hours and otherwise stable continue with Micardis.     The patient presents the office blood pressures been under higher values.  This reason will add hydralazine and now 50 mg q.8 hours he continues on Micardis 80 mg daily he was intolerant of carvedilol.  Monitor did not show significant arrhythmias.  Intermittent mild short  episodes of sinus tachycardia noted.        Patient presents the office feeling generally well.  Blood pressure still elevated will increase hydralazine 100 mg b.i.d.       Patient still is elevated blood pressure he had a trial of amlodipine from another physician and he was stop it because it did make him feel well.  I will increase hydralazine to mg 3 times a day follow-up evaluation blood pressure and less than 1 month.  Otherwise stable no acute symptoms chest pain shortness.      Review of Systems   Constitutional: Negative for chills, diaphoresis, night sweats, weight gain and weight loss.   HENT: Negative for congestion, hoarse voice, sore throat and stridor.    Eyes: Negative for double vision and pain.   Cardiovascular: Negative for chest pain, claudication, cyanosis, dyspnea on exertion, irregular heartbeat, leg swelling, near-syncope, orthopnea, palpitations, paroxysmal nocturnal dyspnea and syncope.   Respiratory: Negative for cough, hemoptysis, shortness of breath, sleep disturbances due to breathing, snoring, sputum production and wheezing.    Endocrine: Negative for cold intolerance, heat intolerance and polydipsia.   Hematologic/Lymphatic: Negative for bleeding problem. Does not bruise/bleed easily.   Skin: Negative for color change, dry skin and rash.   Musculoskeletal: Negative for joint swelling and muscle cramps.   Gastrointestinal: Negative for bloating, abdominal pain, constipation, diarrhea, dysphagia, melena, nausea and vomiting.   Genitourinary: Negative for flank pain and urgency.   Neurological: Negative for dizziness, focal weakness, headaches, light-headedness, loss of balance, seizures and weakness.   Psychiatric/Behavioral: Negative for altered mental status and memory loss. The patient is not nervous/anxious.      Family History   Problem Relation Age of Onset    Cancer Mother     Early death Mother     Arthritis Father     Diabetes Father     Heart disease Father      Hyperlipidemia Father     Hypertension Father      Past Medical History:   Diagnosis Date    Allergy     Anticoagulant long-term use     Arthritis     Asthma     Atrial fibrillation     Bronchitis     Cataract     Coronary artery disease     stent    General anesthetics causing adverse effect in therapeutic use     hard to awaken    GERD (gastroesophageal reflux disease)     Gout, chronic     Hypertension      Social History     Socioeconomic History    Marital status:    Occupational History     Employer: [employer]   Tobacco Use    Smoking status: Former Smoker     Packs/day: 1.50     Years: 22.00     Pack years: 33.00     Types: Cigarettes     Quit date: 1992     Years since quittin.3    Smokeless tobacco: Never Used   Substance and Sexual Activity    Alcohol use: No    Drug use: No    Sexual activity: Never     Current Outpatient Medications on File Prior to Visit   Medication Sig Dispense Refill    albuterol (PROVENTIL/VENTOLIN HFA) 90 mcg/actuation inhaler Inhale 2 puffs into the lungs every 6 (six) hours as needed for Wheezing or Shortness of Breath. Rescue 18 g 11    albuterol-ipratropium (DUO-NEB) 2.5 mg-0.5 mg/3 mL nebulizer solution Take 3 mLs by nebulization every 6 (six) hours as needed for Wheezing or Shortness of Breath. Rescue 120 vial 5    allopurinoL (ZYLOPRIM) 300 MG tablet TAKE 1 TABLET BY MOUTH EVERY DAY 90 tablet 1    amLODIPine (NORVASC) 2.5 MG tablet Take 1 tablet (2.5 mg total) by mouth once daily. 30 tablet 11    apixaban (ELIQUIS) 5 mg Tab Take 1 tablet (5 mg total) by mouth 2 (two) times daily. 180 tablet 3    aspirin (ECOTRIN) 81 MG EC tablet Take 81 mg by mouth every Mon, Wed, Fri.      atorvastatin (LIPITOR) 10 MG tablet TK 1 T PO QD 90 tablet 3    coenzyme Q10 200 mg capsule Take 200 mg by mouth once daily.      fluticasone furoate-vilanteroL (BREO ELLIPTA) 200-25 mcg/dose DsDv diskus inhaler INHALE 1 PUFF BY MOUTH AT THE SAME  TIME EVERY DAY 60 each 5    furosemide (LASIX) 20 MG tablet Take 1 tablet (20 mg total) by mouth once daily. 90 tablet 3    hydrALAZINE (APRESOLINE) 100 MG tablet Take 1 tablet (100 mg total) by mouth every 12 (twelve) hours. (Patient taking differently: Take 100 mg by mouth 2 (two) times a day.) 60 tablet 11    lactobacillus comb no.10 (PROBIOTIC) 20 billion cell Cap Take 1 capsule by mouth once daily. Or as directed on bottle      montelukast (SINGULAIR) 10 mg tablet Take 1 tablet (10 mg total) by mouth once daily. 30 tablet 1    multivitamin (THERAGRAN) per tablet Take 1 tablet by mouth once daily.      omeprazole (PRILOSEC) 10 MG capsule Take 10 mg by mouth once daily.      potassium chloride (KLOR-CON) 10 MEQ TbSR TK 1 T PO ONCE A DAY. 90 tablet 3    predniSONE (DELTASONE) 5 MG tablet Take 5 mg by mouth once daily.      telmisartan (MICARDIS) 80 MG Tab Take 1 tablet (80 mg total) by mouth once daily. (Patient taking differently: Take 80 mg by mouth once daily. 80 mg PM) 90 tablet 3    tiotropium bromide (SPIRIVA RESPIMAT) 2.5 mcg/actuation inhaler Inhale 2 puffs into the lungs Daily. Controller 4 g 11    urea (CARMOL) 40 % Crea 2 (two) times daily as needed.  0     No current facility-administered medications on file prior to visit.     Review of patient's allergies indicates:   Allergen Reactions    Sulfa (sulfonamide antibiotics)      Hives    Azithromycin      Diarrhea      Cefaclor Other (See Comments)     Other reaction(s): Hives    Iodine and iodide containing products      Other reaction(s): Unknown    Ivp dye  [iodinated contrast media] Other (See Comments)    Doxycycline Rash       Objective:     Physical Exam  Eyes:      Pupils: Pupils are equal, round, and reactive to light.   Neck:      Trachea: No tracheal deviation.   Cardiovascular:      Rate and Rhythm: Normal rate and regular rhythm.      Pulses: Intact distal pulses.           Carotid pulses are 2+ on the right side and 2+ on  the left side.       Radial pulses are 2+ on the right side and 2+ on the left side.        Femoral pulses are 2+ on the right side and 2+ on the left side.       Popliteal pulses are 2+ on the right side and 2+ on the left side.        Dorsalis pedis pulses are 2+ on the right side and 2+ on the left side.        Posterior tibial pulses are 2+ on the right side and 2+ on the left side.      Heart sounds: Normal heart sounds. No murmur heard.    No friction rub. No gallop.   Pulmonary:      Effort: Pulmonary effort is normal. No respiratory distress.      Breath sounds: Normal breath sounds. No stridor. No wheezing or rales.   Chest:      Chest wall: No tenderness.   Abdominal:      General: There is no distension.      Tenderness: There is no abdominal tenderness. There is no rebound.   Musculoskeletal:         General: No tenderness.      Cervical back: Normal range of motion.   Skin:     General: Skin is warm and dry.   Neurological:      Mental Status: He is alert and oriented to person, place, and time.       - 199 mg/dL 147  142 CM   174 CM  167 CM  170 CM  175 CM     Comment: The National Cholesterol Education Program (NCEP) has set the   following guidelines (reference ranges) for Cholesterol:   Optimal.....................<200 mg/dL   Borderline High.............200-239 mg/dL   High........................> or = 240 mg/dL    Triglycerides 30 - 150 mg/dL 179 High   171 High  CM  170 R  229 High  CM  413 High  CM  307 High  CM  291 High  CM    Comment: The National Cholesterol Education Program (NCEP) has set the   following guidelines (reference values) for triglycerides:   Normal......................<150 mg/dL   Borderline High.............150-199 mg/dL   High........................200-499 mg/dL    HDL 40 - 75 mg/dL 35 Low   35 Low  CM  36 R  29 Low  CM  27 Low  CM  31 Low  CM  29 Low  CM    Comment: The National Cholesterol Education Program (NCEP) has set the   following guidelines (reference values)  for HDL Cholesterol:   Low...............<40 mg/dL   Optimal...........>60 mg/dL    LDL Cholesterol 63.0 - 159.0 mg/dL 76.2  72.8 CM  77 R  99.0 R, CM  57.0 Low  R, CM  77.6 R, CM  87.8 R, CM        Assessment:     1. Coronary artery disease involving native coronary artery of native heart without angina pectoris    2. Longstanding persistent atrial fibrillation    3. Former smoker    4. Nonrheumatic aortic (valve) stenosis    5. Bilateral carotid artery stenosis    6. PAF (paroxysmal atrial fibrillation)    7. Essential hypertension    8. Chronic obstructive pulmonary disease, unspecified COPD type    9. Obstructive sleep apnea syndrome    10. Nonrheumatic aortic valve stenosis    11. Preop cardiovascular exam    12. Abnormal cardiovascular stress test    13. Pre-op testing    14. Mixed hyperlipidemia    15. Encounter for laboratory testing for COVID-19 virus        Plan:     Coronary artery disease involving native coronary artery of native heart without angina pectoris    Longstanding persistent atrial fibrillation    Former smoker    Nonrheumatic aortic (valve) stenosis    Bilateral carotid artery stenosis    PAF (paroxysmal atrial fibrillation)    Essential hypertension    Chronic obstructive pulmonary disease, unspecified COPD type    Obstructive sleep apnea syndrome    Nonrheumatic aortic valve stenosis    Preop cardiovascular exam    Abnormal cardiovascular stress test    Pre-op testing    Mixed hyperlipidemia    Encounter for laboratory testing for COVID-19 virus    Impression 1. CAD stable no recurrence symptoms on aspirin and beta-blockers.    2. Atrial fibrillation paroxysmal will go ahead and continue with Eliquis  3. Hypertension increasing hydralazine follow-up evaluation within the next month.  Patient continues on Micardis.  No acute change no at additional symptoms noted.

## 2022-09-09 ENCOUNTER — OFFICE VISIT (OUTPATIENT)
Dept: CARDIOLOGY | Facility: CLINIC | Age: 72
End: 2022-09-09
Payer: MEDICARE

## 2022-09-09 ENCOUNTER — HOSPITAL ENCOUNTER (OUTPATIENT)
Dept: CARDIOLOGY | Facility: HOSPITAL | Age: 72
Discharge: HOME | End: 2022-09-09
Attending: INTERNAL MEDICINE
Payer: MEDICARE

## 2022-09-09 VITALS
BODY MASS INDEX: 33.4 KG/M2 | DIASTOLIC BLOOD PRESSURE: 90 MMHG | WEIGHT: 252 LBS | OXYGEN SATURATION: 97 % | HEIGHT: 73 IN | HEART RATE: 83 BPM | SYSTOLIC BLOOD PRESSURE: 140 MMHG

## 2022-09-09 DIAGNOSIS — I48.0 PAF (PAROXYSMAL ATRIAL FIBRILLATION): ICD-10-CM

## 2022-09-09 DIAGNOSIS — R94.39 ABNORMAL CARDIOVASCULAR STRESS TEST: Primary | ICD-10-CM

## 2022-09-09 DIAGNOSIS — I25.10 CORONARY ARTERY DISEASE INVOLVING NATIVE CORONARY ARTERY OF NATIVE HEART WITHOUT ANGINA PECTORIS: ICD-10-CM

## 2022-09-09 DIAGNOSIS — E78.2 MIXED HYPERLIPIDEMIA: ICD-10-CM

## 2022-09-09 DIAGNOSIS — I35.0 NONRHEUMATIC AORTIC VALVE STENOSIS: ICD-10-CM

## 2022-09-09 DIAGNOSIS — I65.23 BILATERAL CAROTID ARTERY STENOSIS: ICD-10-CM

## 2022-09-09 DIAGNOSIS — I10 ESSENTIAL HYPERTENSION: ICD-10-CM

## 2022-09-09 PROCEDURE — 93010 ELECTROCARDIOGRAM REPORT: CPT | Mod: ,,, | Performed by: INTERNAL MEDICINE

## 2022-09-09 PROCEDURE — 99999 PR PBB SHADOW E&M-EST. PATIENT-LVL IV: ICD-10-PCS | Mod: PBBFAC,,, | Performed by: INTERNAL MEDICINE

## 2022-09-09 PROCEDURE — 99214 OFFICE O/P EST MOD 30 MIN: CPT | Mod: PBBFAC,PO | Performed by: INTERNAL MEDICINE

## 2022-09-09 PROCEDURE — 99999 PR PBB SHADOW E&M-EST. PATIENT-LVL IV: CPT | Mod: PBBFAC,,, | Performed by: INTERNAL MEDICINE

## 2022-09-09 PROCEDURE — 93010 EKG 12-LEAD: ICD-10-PCS | Mod: ,,, | Performed by: INTERNAL MEDICINE

## 2022-09-09 PROCEDURE — 99214 PR OFFICE/OUTPT VISIT, EST, LEVL IV, 30-39 MIN: ICD-10-PCS | Mod: S$PBB,,, | Performed by: INTERNAL MEDICINE

## 2022-09-09 PROCEDURE — 99214 OFFICE O/P EST MOD 30 MIN: CPT | Mod: S$PBB,,, | Performed by: INTERNAL MEDICINE

## 2022-09-09 PROCEDURE — 93005 ELECTROCARDIOGRAM TRACING: CPT | Mod: PO

## 2022-09-09 RX ORDER — CIPROFLOXACIN 500 MG/1
500 TABLET ORAL 2 TIMES DAILY
COMMUNITY
Start: 2022-08-31 | End: 2022-12-05

## 2022-09-09 NOTE — PROGRESS NOTES
Subjective:   Patient ID:  Roshan Menard is a 72 y.o. male who presents for follow-up of No chief complaint on file.  This pleasant patient here in the office for evaluation.  History of temporal arteritis, paroxysmal atrial fibrillation and cardioversion the past with YRN.  History of essential hypertension coronary disease without angina and evidence of aortic stenosis of a mild-to-moderate degree.  This was seen on prior YRN.  Patient is a former smoker no history of drug or alcohol abuse no syncope or near syncopal episodes.  Patient has a history of hypertension on multiple medications and is on full anticoagulation due to PAF.  This visit finds patient feeling well.  His medical problems include cardiac disease with stenting of circumflex coronary artery 2 years ago repeat angiogram last year showed patent stent.  The patient has history of obesity lost over 100 lb.  Patient has cardiac disease as well including significant calcification of the aorta calcification the aortic valve and repeat echo be done.  Prior echo done last year showed evidence of at least moderate aortic stenosis.  Today's physical exam shows the patient has brief murmur but also brisk carotid upstroke which would argue against severe aortic stenosis.  Overall LV function was preserved therefore I would disagree that he has low flow state.        Today patient feels generally well.  Blood pressure is a little bit elevated and he is not taking carvedilol more than once a day.  Will discontinue the medications that he feels like he has a brain fog when he takes the medicines.  Will increase hydralazine to 25 mg q.8 hours and otherwise stable continue with Micardis.     The patient presents the office blood pressures been under higher values.  This reason will add hydralazine and now 50 mg q.8 hours he continues on Micardis 80 mg daily he was intolerant of carvedilol.  Monitor did not show significant arrhythmias.  Intermittent mild short  episodes of sinus tachycardia noted.        Patient presents the office feeling generally well.  Blood pressure still elevated will increase hydralazine 100 mg b.i.d.     Patient still is elevated blood pressure he had a trial of amlodipine from another physician and he was stop it because it did make him feel well.  I will increase hydralazine to mg 3 times a day follow-up evaluation blood pressure and less than 1 month.  Otherwise stable no acute symptoms chest pain shortness.       Is the patient is here for review of blood pressure.  Slightly elevated today however he has some mild swelling has been off Lasix and also restart that today at with double doses for the next 2 days.  Also has of toe infection on antibiotics and also has some pain in that foot.  Follow-up evaluation again within next several weeks after complete re-evaluation on all medications.  He does continue on hydralazine 3 times daily and he has good compliance with this medication.      Review of Systems   Constitutional: Negative for chills, diaphoresis, night sweats, weight gain and weight loss.   HENT:  Negative for congestion, hoarse voice, sore throat and stridor.    Eyes:  Negative for double vision and pain.   Cardiovascular:  Negative for chest pain, claudication, cyanosis, dyspnea on exertion, irregular heartbeat, leg swelling, near-syncope, orthopnea, palpitations, paroxysmal nocturnal dyspnea and syncope.   Respiratory:  Negative for cough, hemoptysis, shortness of breath, sleep disturbances due to breathing, snoring, sputum production and wheezing.    Endocrine: Negative for cold intolerance, heat intolerance and polydipsia.   Hematologic/Lymphatic: Negative for bleeding problem. Does not bruise/bleed easily.   Skin:  Negative for color change, dry skin and rash.   Musculoskeletal:  Negative for joint swelling and muscle cramps.   Gastrointestinal:  Negative for bloating, abdominal pain, constipation, diarrhea, dysphagia, melena,  nausea and vomiting.   Genitourinary:  Negative for flank pain and urgency.   Neurological:  Negative for dizziness, focal weakness, headaches, light-headedness, loss of balance, seizures and weakness.   Psychiatric/Behavioral:  Negative for altered mental status and memory loss. The patient is not nervous/anxious.    Family History   Problem Relation Age of Onset    Cancer Mother     Early death Mother     Arthritis Father     Diabetes Father     Heart disease Father     Hyperlipidemia Father     Hypertension Father      Past Medical History:   Diagnosis Date    Allergy     Anticoagulant long-term use     Arthritis     Asthma     Atrial fibrillation     Bronchitis     Cataract     Coronary artery disease     stent    General anesthetics causing adverse effect in therapeutic use     hard to awaken    GERD (gastroesophageal reflux disease)     Gout, chronic     Hypertension      Social History     Socioeconomic History    Marital status:    Occupational History     Employer: [employer]   Tobacco Use    Smoking status: Former     Packs/day: 1.50     Years: 22.00     Pack years: 33.00     Types: Cigarettes     Quit date: 1992     Years since quittin.3    Smokeless tobacco: Never   Substance and Sexual Activity    Alcohol use: No    Drug use: No    Sexual activity: Never     Current Outpatient Medications on File Prior to Visit   Medication Sig Dispense Refill    albuterol (PROVENTIL/VENTOLIN HFA) 90 mcg/actuation inhaler Inhale 2 puffs into the lungs every 6 (six) hours as needed for Wheezing or Shortness of Breath. Rescue 18 g 11    albuterol-ipratropium (DUO-NEB) 2.5 mg-0.5 mg/3 mL nebulizer solution Take 3 mLs by nebulization every 6 (six) hours as needed for Wheezing or Shortness of Breath. Rescue 120 vial 5    allopurinoL (ZYLOPRIM) 300 MG tablet TAKE 1 TABLET BY MOUTH EVERY DAY 90 tablet 1    apixaban (ELIQUIS) 5 mg Tab Take 1 tablet (5 mg total) by mouth 2 (two) times daily. 180  tablet 3    aspirin (ECOTRIN) 81 MG EC tablet Take 81 mg by mouth every Mon, Wed, Fri.      atorvastatin (LIPITOR) 10 MG tablet TK 1 T PO QD 90 tablet 3    coenzyme Q10 200 mg capsule Take 200 mg by mouth once daily.      fluticasone furoate-vilanteroL (BREO ELLIPTA) 200-25 mcg/dose DsDv diskus inhaler INHALE 1 PUFF BY MOUTH AT THE SAME TIME EVERY DAY 60 each 5    furosemide (LASIX) 20 MG tablet Take 1 tablet (20 mg total) by mouth once daily. 90 tablet 3    hydrALAZINE (APRESOLINE) 100 MG tablet Take 1 tablet (100 mg total) by mouth every 8 (eight) hours. 90 tablet 11    lactobacillus comb no.10 (PROBIOTIC) 20 billion cell Cap Take 1 capsule by mouth once daily. Or as directed on bottle      montelukast (SINGULAIR) 10 mg tablet Take 1 tablet (10 mg total) by mouth once daily. 30 tablet 1    multivitamin (THERAGRAN) per tablet Take 1 tablet by mouth once daily.      omeprazole (PRILOSEC) 10 MG capsule Take 10 mg by mouth once daily.      potassium chloride (KLOR-CON) 10 MEQ TbSR TK 1 T PO ONCE A DAY. 90 tablet 3    predniSONE (DELTASONE) 5 MG tablet Take 5 mg by mouth once daily.      telmisartan (MICARDIS) 80 MG Tab Take 1 tablet (80 mg total) by mouth once daily. (Patient taking differently: Take 80 mg by mouth once daily. 80 mg PM) 90 tablet 3    tiotropium bromide (SPIRIVA RESPIMAT) 2.5 mcg/actuation inhaler Inhale 2 puffs into the lungs Daily. Controller 4 g 11    urea (CARMOL) 40 % Crea 2 (two) times daily as needed.  0     No current facility-administered medications on file prior to visit.     Review of patient's allergies indicates:   Allergen Reactions    Sulfa (sulfonamide antibiotics)      Hives    Azithromycin      Diarrhea      Cefaclor Other (See Comments)     Other reaction(s): Hives    Iodine and iodide containing products      Other reaction(s): Unknown    Ivp dye  [iodinated contrast media] Other (See Comments)    Doxycycline Rash       Objective:     Physical Exam  Eyes:      Pupils: Pupils are  equal, round, and reactive to light.   Neck:      Trachea: No tracheal deviation.   Cardiovascular:      Rate and Rhythm: Normal rate and regular rhythm.      Pulses: Intact distal pulses.           Carotid pulses are 2+ on the right side and 2+ on the left side.       Radial pulses are 2+ on the right side and 2+ on the left side.        Femoral pulses are 2+ on the right side and 2+ on the left side.       Popliteal pulses are 2+ on the right side and 2+ on the left side.        Dorsalis pedis pulses are 2+ on the right side and 2+ on the left side.        Posterior tibial pulses are 2+ on the right side and 2+ on the left side.      Heart sounds: Normal heart sounds. No murmur heard.    No friction rub. No gallop.   Pulmonary:      Effort: Pulmonary effort is normal. No respiratory distress.      Breath sounds: Normal breath sounds. No stridor. No wheezing or rales.   Chest:      Chest wall: No tenderness.   Abdominal:      General: There is no distension.      Tenderness: There is no abdominal tenderness. There is no rebound.   Musculoskeletal:         General: No tenderness.      Cervical back: Normal range of motion.      Right lower leg: Edema present.      Left lower leg: Edema present.   Skin:     General: Skin is warm and dry.   Neurological:      Mental Status: He is alert and oriented to person, place, and time.       EKG shows NSR with RBBB, no changes  This EKG was personally reviewed by myself, I agree with the interpretation.    Assessment:     1. Abnormal cardiovascular stress test    2. Bilateral carotid artery stenosis    3. Coronary artery disease involving native coronary artery of native heart without angina pectoris    4. Essential hypertension    5. Mixed hyperlipidemia    6. Nonrheumatic aortic valve stenosis    7. PAF (paroxysmal atrial fibrillation)        Plan:     Abnormal cardiovascular stress test    Bilateral carotid artery stenosis    Coronary artery disease involving native coronary  artery of native heart without angina pectoris    Essential hypertension    Mixed hyperlipidemia    Nonrheumatic aortic valve stenosis    PAF (paroxysmal atrial fibrillation)      Impression 1. Hypertension stable slightly elevated and most likely due to the fact that he has had not complete compliance of medication.  He will continue with Lasix as prescribed and will continue with current doses hydralazine 3 times daily.  Follow-up evaluation within next 3 weeks.    2. Mixed hyperlipidemia stable   3 valvular heart disease stable   4.  PAF stable on Eliquis.    All medications reviewed renewed as necessary follow-up evaluation for hypertension next 3 weeks.

## 2022-09-12 DIAGNOSIS — I48.0 PAF (PAROXYSMAL ATRIAL FIBRILLATION): Primary | ICD-10-CM

## 2022-11-04 ENCOUNTER — OFFICE VISIT (OUTPATIENT)
Dept: CARDIOLOGY | Facility: CLINIC | Age: 72
End: 2022-11-04
Payer: MEDICARE

## 2022-11-04 VITALS
BODY MASS INDEX: 33.4 KG/M2 | HEIGHT: 73 IN | SYSTOLIC BLOOD PRESSURE: 130 MMHG | WEIGHT: 252 LBS | DIASTOLIC BLOOD PRESSURE: 78 MMHG

## 2022-11-04 DIAGNOSIS — G47.33 OBSTRUCTIVE SLEEP APNEA SYNDROME: ICD-10-CM

## 2022-11-04 DIAGNOSIS — I35.0 NONRHEUMATIC AORTIC VALVE STENOSIS: ICD-10-CM

## 2022-11-04 DIAGNOSIS — Z01.810 PREOP CARDIOVASCULAR EXAM: ICD-10-CM

## 2022-11-04 DIAGNOSIS — I65.23 BILATERAL CAROTID ARTERY STENOSIS: ICD-10-CM

## 2022-11-04 DIAGNOSIS — I48.11 LONGSTANDING PERSISTENT ATRIAL FIBRILLATION: ICD-10-CM

## 2022-11-04 DIAGNOSIS — J43.8 OTHER EMPHYSEMA: ICD-10-CM

## 2022-11-04 DIAGNOSIS — R94.39 ABNORMAL CARDIOVASCULAR STRESS TEST: ICD-10-CM

## 2022-11-04 DIAGNOSIS — E78.2 MIXED HYPERLIPIDEMIA: ICD-10-CM

## 2022-11-04 DIAGNOSIS — Z20.822 ENCOUNTER FOR LABORATORY TESTING FOR COVID-19 VIRUS: ICD-10-CM

## 2022-11-04 DIAGNOSIS — I25.10 CORONARY ARTERY DISEASE INVOLVING NATIVE CORONARY ARTERY OF NATIVE HEART WITHOUT ANGINA PECTORIS: ICD-10-CM

## 2022-11-04 DIAGNOSIS — Z87.891 FORMER SMOKER: ICD-10-CM

## 2022-11-04 DIAGNOSIS — J44.9 CHRONIC OBSTRUCTIVE PULMONARY DISEASE, UNSPECIFIED COPD TYPE: ICD-10-CM

## 2022-11-04 DIAGNOSIS — I48.0 PAF (PAROXYSMAL ATRIAL FIBRILLATION): ICD-10-CM

## 2022-11-04 DIAGNOSIS — I10 ESSENTIAL HYPERTENSION: Primary | ICD-10-CM

## 2022-11-04 DIAGNOSIS — I35.0 NONRHEUMATIC AORTIC (VALVE) STENOSIS: ICD-10-CM

## 2022-11-04 PROCEDURE — 99214 PR OFFICE/OUTPT VISIT, EST, LEVL IV, 30-39 MIN: ICD-10-PCS | Mod: S$PBB,,, | Performed by: INTERNAL MEDICINE

## 2022-11-04 PROCEDURE — 99212 OFFICE O/P EST SF 10 MIN: CPT | Mod: PBBFAC,PO | Performed by: INTERNAL MEDICINE

## 2022-11-04 PROCEDURE — 99999 PR PBB SHADOW E&M-EST. PATIENT-LVL II: CPT | Mod: PBBFAC,,, | Performed by: INTERNAL MEDICINE

## 2022-11-04 PROCEDURE — 99214 OFFICE O/P EST MOD 30 MIN: CPT | Mod: S$PBB,,, | Performed by: INTERNAL MEDICINE

## 2022-11-04 PROCEDURE — 99999 PR PBB SHADOW E&M-EST. PATIENT-LVL II: ICD-10-PCS | Mod: PBBFAC,,, | Performed by: INTERNAL MEDICINE

## 2022-11-04 RX ORDER — TELMISARTAN 80 MG/1
80 TABLET ORAL DAILY
Qty: 90 TABLET | Refills: 3 | Status: SHIPPED | OUTPATIENT
Start: 2022-11-04 | End: 2023-12-11

## 2022-11-04 NOTE — PROGRESS NOTES
Subjective:   Patient ID:  Roshan Menard is a 72 y.o. male who presents for follow-up of No chief complaint on file.    This pleasant patient here in the office for evaluation.  History of temporal arteritis, paroxysmal atrial fibrillation and cardioversion the past with YRN.  History of essential hypertension coronary disease without angina and evidence of aortic stenosis of a mild-to-moderate degree.  This was seen on prior YRN.  Patient is a former smoker no history of drug or alcohol abuse no syncope or near syncopal episodes.  Patient has a history of hypertension on multiple medications and is on full anticoagulation due to PAF.  This visit finds patient feeling well.  His medical problems include cardiac disease with stenting of circumflex coronary artery 2 years ago repeat angiogram last year showed patent stent.  The patient has history of obesity lost over 100 lb.  Patient has cardiac disease as well including significant calcification of the aorta calcification the aortic valve and repeat echo be done.  Prior echo done last year showed evidence of at least moderate aortic stenosis.  Today's physical exam shows the patient has brief murmur but also brisk carotid upstroke which would argue against severe aortic stenosis.  Overall LV function was preserved therefore I would disagree that he has low flow state.        Today patient feels generally well.  Blood pressure is a little bit elevated and he is not taking carvedilol more than once a day.  Will discontinue the medications that he feels like he has a brain fog when he takes the medicines.  Will increase hydralazine to 25 mg q.8 hours and otherwise stable continue with Micardis.     The patient presents the office blood pressures been under higher values.  This reason will add hydralazine and now 50 mg q.8 hours he continues on Micardis 80 mg daily he was intolerant of carvedilol.  Monitor did not show significant arrhythmias.  Intermittent mild short  episodes of sinus tachycardia noted.        Patient presents the office feeling generally well.  Blood pressure still elevated will increase hydralazine 100 mg b.i.d.     Patient still is elevated blood pressure he had a trial of amlodipine from another physician and he was stop it because it did make him feel well.  I will increase hydralazine to mg 3 times a day follow-up evaluation blood pressure and less than 1 month.  Otherwise stable no acute symptoms chest pain shortness.        Is the patient is here for review of blood pressure.  Slightly elevated today however he has some mild swelling has been off Lasix and also restart that today at with double doses for the next 2 days.  Also has of toe infection on antibiotics and also has some pain in that foot.  Follow-up evaluation again within next several weeks after complete re-evaluation on all medications.  He does continue on hydralazine 3 times daily and he has good compliance with this medication.  Today the patient presents in the office and blood pressure is stable on hydralazine feels well.  Evaluation again in 3 months.  All medications reviewed and renewed as necessary.    NO FOCAL CNS SYMPTOMS OR SIGNS TO SUGGEST TIA OR STROKE  NO ANGINA OR EQUIVALENT  NO UNUSUAL GLASER. NO ORTHOPNEA OR PND  NO PALPITATIONS  NO NEAR SYNCOPE OR SYNCOPE  NO EDEMA. NO CALVE TENDERNESS    Review of Systems   Constitutional: Negative for chills, diaphoresis, night sweats, weight gain and weight loss.   HENT:  Negative for congestion, hoarse voice, sore throat and stridor.    Eyes:  Negative for double vision and pain.   Cardiovascular:  Negative for chest pain, claudication, cyanosis, dyspnea on exertion, irregular heartbeat, leg swelling, near-syncope, orthopnea, palpitations, paroxysmal nocturnal dyspnea and syncope.   Respiratory:  Negative for cough, hemoptysis, shortness of breath, sleep disturbances due to breathing, snoring, sputum production and wheezing.    Endocrine:  Negative for cold intolerance, heat intolerance and polydipsia.   Hematologic/Lymphatic: Negative for bleeding problem. Does not bruise/bleed easily.   Skin:  Negative for color change, dry skin and rash.   Musculoskeletal:  Negative for joint swelling and muscle cramps.   Gastrointestinal:  Negative for bloating, abdominal pain, constipation, diarrhea, dysphagia, melena, nausea and vomiting.   Genitourinary:  Negative for flank pain and urgency.   Neurological:  Negative for dizziness, focal weakness, headaches, light-headedness, loss of balance, seizures and weakness.   Psychiatric/Behavioral:  Negative for altered mental status and memory loss. The patient is not nervous/anxious.    Family History   Problem Relation Age of Onset    Cancer Mother     Early death Mother     Arthritis Father     Diabetes Father     Heart disease Father     Hyperlipidemia Father     Hypertension Father      Past Medical History:   Diagnosis Date    Allergy     Anticoagulant long-term use     Arthritis     Asthma     Atrial fibrillation     Bronchitis     Cataract     Coronary artery disease     stent    General anesthetics causing adverse effect in therapeutic use     hard to awaken    GERD (gastroesophageal reflux disease)     Gout, chronic     Hypertension      Social History     Socioeconomic History    Marital status:    Occupational History     Employer: [employer]   Tobacco Use    Smoking status: Former     Packs/day: 1.50     Years: 22.00     Pack years: 33.00     Types: Cigarettes     Quit date: 1992     Years since quittin.5    Smokeless tobacco: Never   Substance and Sexual Activity    Alcohol use: No    Drug use: No    Sexual activity: Never     Current Outpatient Medications on File Prior to Visit   Medication Sig Dispense Refill    albuterol (PROVENTIL/VENTOLIN HFA) 90 mcg/actuation inhaler Inhale 2 puffs into the lungs every 6 (six) hours as needed for Wheezing or Shortness of Breath.  Rescue 18 g 11    albuterol-ipratropium (DUO-NEB) 2.5 mg-0.5 mg/3 mL nebulizer solution Take 3 mLs by nebulization every 6 (six) hours as needed for Wheezing or Shortness of Breath. Rescue 120 vial 5    allopurinoL (ZYLOPRIM) 300 MG tablet TAKE 1 TABLET BY MOUTH EVERY DAY 90 tablet 1    apixaban (ELIQUIS) 5 mg Tab Take 1 tablet (5 mg total) by mouth 2 (two) times daily. 180 tablet 3    aspirin (ECOTRIN) 81 MG EC tablet Take 81 mg by mouth every Mon, Wed, Fri.      atorvastatin (LIPITOR) 10 MG tablet TK 1 T PO QD 90 tablet 3    ciprofloxacin HCl (CIPRO) 500 MG tablet Take 500 mg by mouth 2 (two) times daily.      coenzyme Q10 200 mg capsule Take 200 mg by mouth once daily.      fluticasone furoate-vilanteroL (BREO ELLIPTA) 200-25 mcg/dose DsDv diskus inhaler INHALE 1 PUFF BY MOUTH AT THE SAME TIME EVERY DAY 60 each 5    furosemide (LASIX) 20 MG tablet Take 1 tablet (20 mg total) by mouth once daily. 90 tablet 3    hydrALAZINE (APRESOLINE) 100 MG tablet Take 1 tablet (100 mg total) by mouth every 8 (eight) hours. 90 tablet 11    lactobacillus comb no.10 (PROBIOTIC) 20 billion cell Cap Take 1 capsule by mouth once daily. Or as directed on bottle      montelukast (SINGULAIR) 10 mg tablet Take 1 tablet (10 mg total) by mouth once daily. 30 tablet 1    multivitamin (THERAGRAN) per tablet Take 1 tablet by mouth once daily.      omeprazole (PRILOSEC) 10 MG capsule Take 10 mg by mouth once daily.      potassium chloride (KLOR-CON) 10 MEQ TbSR TK 1 T PO ONCE A DAY. 90 tablet 3    predniSONE (DELTASONE) 5 MG tablet Take 5 mg by mouth once daily.      telmisartan (MICARDIS) 80 MG Tab Take 1 tablet (80 mg total) by mouth once daily. (Patient taking differently: Take 80 mg by mouth once daily. 80 mg PM) 90 tablet 3    tiotropium bromide (SPIRIVA RESPIMAT) 2.5 mcg/actuation inhaler Inhale 2 puffs into the lungs Daily. Controller 4 g 11    urea (CARMOL) 40 % Crea 2 (two) times daily as needed.  0     No current  facility-administered medications on file prior to visit.     Review of patient's allergies indicates:   Allergen Reactions    Sulfa (sulfonamide antibiotics)      Hives    Azithromycin      Diarrhea      Cefaclor Other (See Comments)     Other reaction(s): Hives    Iodine and iodide containing products      Other reaction(s): Unknown    Ivp dye  [iodinated contrast media] Other (See Comments)    Doxycycline Rash       Objective:     Physical Exam  Eyes:      Pupils: Pupils are equal, round, and reactive to light.   Neck:      Trachea: No tracheal deviation.   Cardiovascular:      Rate and Rhythm: Normal rate and regular rhythm.      Pulses: Intact distal pulses.           Carotid pulses are 2+ on the right side and 2+ on the left side.       Radial pulses are 2+ on the right side and 2+ on the left side.        Femoral pulses are 2+ on the right side and 2+ on the left side.       Popliteal pulses are 2+ on the right side and 2+ on the left side.        Dorsalis pedis pulses are 2+ on the right side and 2+ on the left side.        Posterior tibial pulses are 2+ on the right side and 2+ on the left side.      Heart sounds: Normal heart sounds. No murmur heard.    No friction rub. No gallop.   Pulmonary:      Effort: Pulmonary effort is normal. No respiratory distress.      Breath sounds: Normal breath sounds. No stridor. No wheezing or rales.   Chest:      Chest wall: No tenderness.   Abdominal:      General: There is no distension.      Tenderness: There is no abdominal tenderness. There is no rebound.   Musculoskeletal:         General: No tenderness.      Cervical back: Normal range of motion.   Skin:     General: Skin is warm and dry.   Neurological:      Mental Status: He is alert and oriented to person, place, and time.     Assessment:     1. PAF (paroxysmal atrial fibrillation)    2. Essential hypertension    3. Former smoker    4. Preop cardiovascular exam    5. Abnormal cardiovascular stress test    6.  Mixed hyperlipidemia    7. Nonrheumatic aortic (valve) stenosis    8. Bilateral carotid artery stenosis    9. Nonrheumatic aortic valve stenosis    10. Chronic obstructive pulmonary disease, unspecified COPD type    11. Encounter for laboratory testing for COVID-19 virus    12. Coronary artery disease involving native coronary artery of native heart without angina pectoris    13. Longstanding persistent atrial fibrillation    14. Obstructive sleep apnea syndrome    15. Other emphysema        Plan:     PAF (paroxysmal atrial fibrillation)    Essential hypertension    Former smoker    Preop cardiovascular exam    Abnormal cardiovascular stress test    Mixed hyperlipidemia    Nonrheumatic aortic (valve) stenosis    Bilateral carotid artery stenosis    Nonrheumatic aortic valve stenosis    Chronic obstructive pulmonary disease, unspecified COPD type    Encounter for laboratory testing for COVID-19 virus    Coronary artery disease involving native coronary artery of native heart without angina pectoris    Longstanding persistent atrial fibrillation    Obstructive sleep apnea syndrome    Other emphysema        Impression 1. Hypertension stable on Micardis and hydralazine.    2. CAD stable no recurrence symptoms  3 hyperlipidemia stable on statin medications   4 PAF continues apixiban  All questions answered follow-up evaluation again in 3 months sooner if acute recurrence symptoms.

## 2022-11-28 ENCOUNTER — PATIENT MESSAGE (OUTPATIENT)
Dept: FAMILY MEDICINE | Facility: CLINIC | Age: 72
End: 2022-11-28
Payer: MEDICARE

## 2022-11-28 DIAGNOSIS — I48.11 LONGSTANDING PERSISTENT ATRIAL FIBRILLATION: Primary | ICD-10-CM

## 2022-12-05 ENCOUNTER — OFFICE VISIT (OUTPATIENT)
Dept: FAMILY MEDICINE | Facility: CLINIC | Age: 72
End: 2022-12-05
Payer: MEDICARE

## 2022-12-05 VITALS
TEMPERATURE: 98 F | SYSTOLIC BLOOD PRESSURE: 160 MMHG | BODY MASS INDEX: 33.57 KG/M2 | WEIGHT: 253.31 LBS | DIASTOLIC BLOOD PRESSURE: 79 MMHG | HEART RATE: 87 BPM | HEIGHT: 73 IN

## 2022-12-05 DIAGNOSIS — I48.0 PAF (PAROXYSMAL ATRIAL FIBRILLATION): ICD-10-CM

## 2022-12-05 DIAGNOSIS — J45.40 MODERATE PERSISTENT ASTHMA WITHOUT COMPLICATION: ICD-10-CM

## 2022-12-05 DIAGNOSIS — M1A.09X0 IDIOPATHIC CHRONIC GOUT OF MULTIPLE SITES WITHOUT TOPHUS: ICD-10-CM

## 2022-12-05 DIAGNOSIS — E78.2 MIXED HYPERLIPIDEMIA: ICD-10-CM

## 2022-12-05 DIAGNOSIS — I10 ESSENTIAL HYPERTENSION: Primary | ICD-10-CM

## 2022-12-05 DIAGNOSIS — Z13.1 SCREENING FOR DIABETES MELLITUS: ICD-10-CM

## 2022-12-05 DIAGNOSIS — I25.10 CORONARY ARTERY DISEASE INVOLVING NATIVE CORONARY ARTERY OF NATIVE HEART WITHOUT ANGINA PECTORIS: ICD-10-CM

## 2022-12-05 DIAGNOSIS — M31.6 TEMPORAL ARTERITIS: ICD-10-CM

## 2022-12-05 DIAGNOSIS — I65.23 BILATERAL CAROTID ARTERY STENOSIS: ICD-10-CM

## 2022-12-05 DIAGNOSIS — R73.9 HYPERGLYCEMIA: ICD-10-CM

## 2022-12-05 PROCEDURE — 99214 OFFICE O/P EST MOD 30 MIN: CPT | Mod: PBBFAC,PO | Performed by: INTERNAL MEDICINE

## 2022-12-05 PROCEDURE — 99999 PR PBB SHADOW E&M-EST. PATIENT-LVL IV: ICD-10-PCS | Mod: PBBFAC,,, | Performed by: INTERNAL MEDICINE

## 2022-12-05 PROCEDURE — 99999 PR PBB SHADOW E&M-EST. PATIENT-LVL IV: CPT | Mod: PBBFAC,,, | Performed by: INTERNAL MEDICINE

## 2022-12-05 PROCEDURE — 99214 PR OFFICE/OUTPT VISIT, EST, LEVL IV, 30-39 MIN: ICD-10-PCS | Mod: S$PBB,,, | Performed by: INTERNAL MEDICINE

## 2022-12-05 PROCEDURE — 99214 OFFICE O/P EST MOD 30 MIN: CPT | Mod: S$PBB,,, | Performed by: INTERNAL MEDICINE

## 2022-12-05 RX ORDER — FLUOCINONIDE GEL 0.5 MG/G
GEL TOPICAL
COMMUNITY
Start: 2022-09-22

## 2022-12-05 NOTE — PROGRESS NOTES
Assessment/Plan:    Problem List Items Addressed This Visit          Pulmonary    Moderate persistent asthma without complication    Overview     -managed by external pulmonology, Dr. Wright  -remains on breo and spiriva  -respiratory symptoms stable            Cardiac/Vascular    Bilateral carotid artery stenosis    Overview     -carotid USJuly 2021- mild atherosclerotic plaquing, <50% plaquing, no hemodynamically significant stenosis         Coronary artery disease involving native coronary artery of native heart without angina pectoris    Overview     -followed by cardiology  -hx of PCI with [facility]IS  -remains on ASA and eliquis (hx of a fib)  -also on statin  -denies recent symptoms of angina or dyspnea         Essential hypertension - Primary    Overview       Hypertension Medications               furosemide (LASIX) 20 MG tablet Take 1 tablet (20 mg total) by mouth once daily.    hydrALAZINE (APRESOLINE) 100 MG tablet Take 1 tablet (100 mg total) by mouth every 8 (eight) hours.    telmisartan (MICARDIS) 80 MG Tab Take 1 tablet (80 mg total) by mouth once daily.   -above goal today but reports normal at home  -will follow up with home BP reading  -intolerant to amlodipine and beta blocker before  -continues to follow with cardiology  -continue lifestyle modification with low sodium diet and exercise   -discussed hypertension disease course and importance of treating high blood pressure  -patient understood and advised of risk of untreated blood pressure.  ER precautions were given   for symptoms of hypertensive urgency and emergency.         Relevant Orders    Comprehensive Metabolic Panel    Mixed hyperlipidemia    Overview     Hyperlipidemia Medications               atorvastatin (LIPITOR) 10 MG tablet TK 1 T PO QD   -chronic condition. Currently stable.    -reports compliance with hyperlipidemia treatment as prescribed  -denies any known adverse effects of medications  -most recent labs listed below:  Lab  Results   Component Value Date    CHOL 147 03/30/2022     Lab Results   Component Value Date    HDL 35 (L) 03/30/2022     Lab Results   Component Value Date    LDLCALC 76.2 03/30/2022     Lab Results   Component Value Date    TRIG 179 (H) 03/30/2022     Lab Results   Component Value Date    ALT 37 01/14/2022    AST 45 01/14/2022    ALKPHOS 147 (H) 01/14/2022    BILITOT 0.6 01/14/2022          PAF (paroxysmal atrial fibrillation)    Overview     -followed by cardiology  -intolerant of beta blocker   -eliquis for anticoagulation  -s/p cardioversion in the past, failed  -asymptomatic         Relevant Orders    CBC Without Differential    Comprehensive Metabolic Panel       Immunology/Multi System    Temporal arteritis    Overview     -followed now by retinal specialist  -s/p temporal biopsy normal, however improved with prednisone  -remains on pred 5 mg QD         Relevant Orders    C-REACTIVE PROTEIN    Sedimentation rate       Orthopedic    Gout    Overview     -last uric acid several years ago; repeat uric acid ordered  -remains on allopurinol 150 mg QD  -denies recent flares         Relevant Orders    URIC ACID     Other Visit Diagnoses       Screening for diabetes mellitus        Relevant Orders    Hemoglobin A1C    Hyperglycemia        Relevant Orders    Hemoglobin A1C            Follow up in about 6 months (around 6/5/2023) for F/U with me; labs mid Jan: cbc,cmp,lipid,uric acid,crp,sed rate,a1c.    Catrina Yanez MD  _____________________________________________________________________________________________________________________________________________________    CC: follow up of chronic medical conditions     HPI:    Patient is in clinic today as an established patient.    HTN: The patient is currently being treated for essential hypertension. This condition is chronic and stable. Elevated today but has a hx of white coat HTN. He reports that BP has been within goal at home. The patient is tolerating their  medication well with good compliance.  Denies any adverse effects of medications.  Counseling was offered regarding low sodium diet.  The patient has a reduced salt intake. Routine exercise recommended. The patient denies headache, vision changes, chest pain, palpitations, shortness of breath, or lower extremity edema.    A fib/CAD: continues to be followed by cardiology. No changes in medications. Denies symptoms of CP, SOB or palpitations.    No other new complaints today.  Remaining chronic conditions have been reviewed and remain stable. Further detail as stated above.     HM reviewed and UTD.    No recent changes to medical/surgical history.    Current Outpatient Medications on File Prior to Visit   Medication Sig Dispense Refill    albuterol (PROVENTIL/VENTOLIN HFA) 90 mcg/actuation inhaler Inhale 2 puffs into the lungs every 6 (six) hours as needed for Wheezing or Shortness of Breath. Rescue 18 g 11    albuterol-ipratropium (DUO-NEB) 2.5 mg-0.5 mg/3 mL nebulizer solution Take 3 mLs by nebulization every 6 (six) hours as needed for Wheezing or Shortness of Breath. Rescue 120 vial 5    allopurinoL (ZYLOPRIM) 300 MG tablet TAKE 1 TABLET BY MOUTH EVERY DAY 90 tablet 1    apixaban (ELIQUIS) 5 mg Tab Take 1 tablet (5 mg total) by mouth 2 (two) times daily. 180 tablet 3    aspirin (ECOTRIN) 81 MG EC tablet Take 81 mg by mouth every Mon, Wed, Fri.      atorvastatin (LIPITOR) 10 MG tablet TK 1 T PO QD 90 tablet 3    fluocinonide (LIDEX) 0.05 % gel SMARTSI-2 Gram(s) Topical Twice Daily      fluticasone furoate-vilanteroL (BREO ELLIPTA) 200-25 mcg/dose DsDv diskus inhaler INHALE 1 PUFF BY MOUTH AT THE SAME TIME EVERY DAY 60 each 5    furosemide (LASIX) 20 MG tablet Take 1 tablet (20 mg total) by mouth once daily. 90 tablet 3    hydrALAZINE (APRESOLINE) 100 MG tablet Take 1 tablet (100 mg total) by mouth every 8 (eight) hours. 90 tablet 11    lactobacillus comb no.10 (PROBIOTIC) 20 billion cell Cap Take 1 capsule by  "mouth once daily. Or as directed on bottle      montelukast (SINGULAIR) 10 mg tablet Take 1 tablet (10 mg total) by mouth once daily. 30 tablet 1    multivitamin (THERAGRAN) per tablet Take 1 tablet by mouth once daily.      omeprazole (PRILOSEC) 10 MG capsule Take 10 mg by mouth once daily.      potassium chloride (KLOR-CON) 10 MEQ TbSR TK 1 T PO ONCE A DAY. 90 tablet 3    predniSONE (DELTASONE) 5 MG tablet Take 5 mg by mouth once daily.      telmisartan (MICARDIS) 80 MG Tab Take 1 tablet (80 mg total) by mouth once daily. 90 tablet 3    tiotropium bromide (SPIRIVA RESPIMAT) 2.5 mcg/actuation inhaler Inhale 2 puffs into the lungs Daily. Controller 4 g 11    urea (CARMOL) 40 % Crea 2 (two) times daily as needed.  0     No current facility-administered medications on file prior to visit.       Review of Systems   Constitutional:  Negative for chills, diaphoresis, fatigue and fever.   HENT:  Negative for congestion, ear pain, postnasal drip, sinus pain and sore throat.    Eyes:  Negative for pain and redness.   Respiratory:  Negative for cough, chest tightness and shortness of breath.    Cardiovascular:  Negative for chest pain and leg swelling.   Gastrointestinal:  Negative for abdominal pain, constipation, diarrhea, nausea and vomiting.   Genitourinary:  Negative for dysuria and hematuria.   Musculoskeletal:  Negative for arthralgias and joint swelling.   Skin:  Negative for rash.   Neurological:  Negative for dizziness, syncope and headaches.   Psychiatric/Behavioral:  Negative for dysphoric mood. The patient is not nervous/anxious.      Vitals:    12/05/22 0932   BP: (!) 160/79   Pulse: 87   Temp: 97.9 °F (36.6 °C)   Weight: 114.9 kg (253 lb 4.9 oz)   Height: 6' 1" (1.854 m)       Wt Readings from Last 3 Encounters:   12/05/22 114.9 kg (253 lb 4.9 oz)   11/04/22 114.3 kg (252 lb)   09/09/22 114.3 kg (252 lb)       Physical Exam  Constitutional:       General: He is not in acute distress.     Appearance: Normal " appearance. He is well-developed.   HENT:      Head: Normocephalic and atraumatic.   Eyes:      Conjunctiva/sclera: Conjunctivae normal.   Cardiovascular:      Rate and Rhythm: Normal rate. Rhythm irregular.      Pulses: Normal pulses.      Heart sounds: Murmur heard.   Pulmonary:      Effort: Pulmonary effort is normal. No respiratory distress.      Breath sounds: Normal breath sounds.   Abdominal:      General: Bowel sounds are normal. There is no distension.      Palpations: Abdomen is soft.      Tenderness: There is no abdominal tenderness.   Musculoskeletal:         General: Normal range of motion.      Cervical back: Normal range of motion and neck supple.   Skin:     General: Skin is warm and dry.      Findings: No rash.   Neurological:      General: No focal deficit present.      Mental Status: He is alert and oriented to person, place, and time.   Psychiatric:         Mood and Affect: Mood normal.         Behavior: Behavior normal.       Health Maintenance   Topic Date Due    Lipid Panel  03/30/2023    High Dose Statin  12/05/2023    TETANUS VACCINE  10/09/2031    Hepatitis C Screening  Completed    Abdominal Aortic Aneurysm Screening  Completed

## 2022-12-21 ENCOUNTER — TELEPHONE (OUTPATIENT)
Dept: FAMILY MEDICINE | Facility: CLINIC | Age: 72
End: 2022-12-21
Payer: MEDICARE

## 2022-12-21 VITALS — DIASTOLIC BLOOD PRESSURE: 81 MMHG | SYSTOLIC BLOOD PRESSURE: 143 MMHG

## 2022-12-21 NOTE — TELEPHONE ENCOUNTER
----- Message from Catrina Yanez MD sent at 12/21/2022  8:12 AM CST -----  Please follow up with patient for home BP reading  ----- Message -----  From: Catrina Yanez MD  Sent: 12/21/2022   8:00 AM CST  To: Catrina Yanez MD    Check home BP

## 2022-12-21 NOTE — TELEPHONE ENCOUNTER
I spoke with the patient about this. Pt reports his reading 143/81. Remote blood pressure reading entered

## 2023-01-20 ENCOUNTER — LAB VISIT (OUTPATIENT)
Dept: LAB | Facility: HOSPITAL | Age: 73
End: 2023-01-20
Attending: INTERNAL MEDICINE
Payer: MEDICARE

## 2023-01-20 DIAGNOSIS — I10 ESSENTIAL HYPERTENSION: ICD-10-CM

## 2023-01-20 DIAGNOSIS — Z13.220 ENCOUNTER FOR LIPID SCREENING FOR CARDIOVASCULAR DISEASE: ICD-10-CM

## 2023-01-20 DIAGNOSIS — I25.10 CORONARY ARTERY DISEASE INVOLVING NATIVE CORONARY ARTERY OF NATIVE HEART WITHOUT ANGINA PECTORIS: ICD-10-CM

## 2023-01-20 DIAGNOSIS — M1A.09X0 IDIOPATHIC CHRONIC GOUT OF MULTIPLE SITES WITHOUT TOPHUS: ICD-10-CM

## 2023-01-20 DIAGNOSIS — R73.9 HYPERGLYCEMIA: ICD-10-CM

## 2023-01-20 DIAGNOSIS — Z13.6 ENCOUNTER FOR LIPID SCREENING FOR CARDIOVASCULAR DISEASE: ICD-10-CM

## 2023-01-20 DIAGNOSIS — M31.6 TEMPORAL ARTERITIS: ICD-10-CM

## 2023-01-20 DIAGNOSIS — I48.0 PAF (PAROXYSMAL ATRIAL FIBRILLATION): ICD-10-CM

## 2023-01-20 DIAGNOSIS — Z13.1 SCREENING FOR DIABETES MELLITUS: ICD-10-CM

## 2023-01-20 LAB
ALBUMIN SERPL BCP-MCNC: 4.1 G/DL (ref 3.5–5.2)
ALP SERPL-CCNC: 82 U/L (ref 55–135)
ALT SERPL W/O P-5'-P-CCNC: 33 U/L (ref 10–44)
ANION GAP SERPL CALC-SCNC: 9 MMOL/L (ref 8–16)
AST SERPL-CCNC: 24 U/L (ref 10–40)
BILIRUB SERPL-MCNC: 0.6 MG/DL (ref 0.1–1)
BUN SERPL-MCNC: 16 MG/DL (ref 8–23)
CALCIUM SERPL-MCNC: 9.7 MG/DL (ref 8.7–10.5)
CHLORIDE SERPL-SCNC: 103 MMOL/L (ref 95–110)
CHOLEST SERPL-MCNC: 187 MG/DL (ref 120–199)
CHOLEST/HDLC SERPL: 4.7 {RATIO} (ref 2–5)
CO2 SERPL-SCNC: 30 MMOL/L (ref 23–29)
CREAT SERPL-MCNC: 1.1 MG/DL (ref 0.5–1.4)
CRP SERPL-MCNC: 1.4 MG/L (ref 0–8.2)
ERYTHROCYTE [DISTWIDTH] IN BLOOD BY AUTOMATED COUNT: 14.5 % (ref 11.5–14.5)
ERYTHROCYTE [SEDIMENTATION RATE] IN BLOOD BY WESTERGREN METHOD: 15 MM/HR (ref 0–10)
EST. GFR  (NO RACE VARIABLE): >60 ML/MIN/1.73 M^2
ESTIMATED AVG GLUCOSE: 108 MG/DL (ref 68–131)
GLUCOSE SERPL-MCNC: 93 MG/DL (ref 70–110)
HBA1C MFR BLD: 5.4 % (ref 4–5.6)
HCT VFR BLD AUTO: 45.1 % (ref 40–54)
HDLC SERPL-MCNC: 40 MG/DL (ref 40–75)
HDLC SERPL: 21.4 % (ref 20–50)
HGB BLD-MCNC: 13.9 G/DL (ref 14–18)
LDLC SERPL CALC-MCNC: 101.4 MG/DL (ref 63–159)
MCH RBC QN AUTO: 28.7 PG (ref 27–31)
MCHC RBC AUTO-ENTMCNC: 30.8 G/DL (ref 32–36)
MCV RBC AUTO: 93 FL (ref 82–98)
NONHDLC SERPL-MCNC: 147 MG/DL
PLATELET # BLD AUTO: 256 K/UL (ref 150–450)
PMV BLD AUTO: 11.3 FL (ref 9.2–12.9)
POTASSIUM SERPL-SCNC: 4.6 MMOL/L (ref 3.5–5.1)
PROT SERPL-MCNC: 6.6 G/DL (ref 6–8.4)
RBC # BLD AUTO: 4.84 M/UL (ref 4.6–6.2)
SODIUM SERPL-SCNC: 142 MMOL/L (ref 136–145)
TRIGL SERPL-MCNC: 228 MG/DL (ref 30–150)
URATE SERPL-MCNC: 8.7 MG/DL (ref 3.4–7)
WBC # BLD AUTO: 7.25 K/UL (ref 3.9–12.7)

## 2023-01-20 PROCEDURE — 85651 RBC SED RATE NONAUTOMATED: CPT | Mod: PO | Performed by: INTERNAL MEDICINE

## 2023-01-20 PROCEDURE — 85027 COMPLETE CBC AUTOMATED: CPT | Performed by: INTERNAL MEDICINE

## 2023-01-20 PROCEDURE — 36415 COLL VENOUS BLD VENIPUNCTURE: CPT | Mod: PO | Performed by: INTERNAL MEDICINE

## 2023-01-20 PROCEDURE — 84550 ASSAY OF BLOOD/URIC ACID: CPT | Performed by: INTERNAL MEDICINE

## 2023-01-20 PROCEDURE — 80053 COMPREHEN METABOLIC PANEL: CPT | Performed by: INTERNAL MEDICINE

## 2023-01-20 PROCEDURE — 86140 C-REACTIVE PROTEIN: CPT | Performed by: INTERNAL MEDICINE

## 2023-01-20 PROCEDURE — 83036 HEMOGLOBIN GLYCOSYLATED A1C: CPT | Performed by: INTERNAL MEDICINE

## 2023-01-20 PROCEDURE — 80061 LIPID PANEL: CPT | Performed by: INTERNAL MEDICINE

## 2023-02-02 ENCOUNTER — OFFICE VISIT (OUTPATIENT)
Dept: CARDIOLOGY | Facility: CLINIC | Age: 73
End: 2023-02-02
Payer: MEDICARE

## 2023-02-02 ENCOUNTER — HOSPITAL ENCOUNTER (OUTPATIENT)
Dept: CARDIOLOGY | Facility: HOSPITAL | Age: 73
Discharge: HOME | End: 2023-02-02
Attending: INTERNAL MEDICINE
Payer: MEDICARE

## 2023-02-02 VITALS
WEIGHT: 263.5 LBS | HEART RATE: 82 BPM | BODY MASS INDEX: 34.92 KG/M2 | RESPIRATION RATE: 18 BRPM | OXYGEN SATURATION: 99 % | DIASTOLIC BLOOD PRESSURE: 92 MMHG | HEIGHT: 73 IN | SYSTOLIC BLOOD PRESSURE: 190 MMHG

## 2023-02-02 DIAGNOSIS — Z20.822 ENCOUNTER FOR LABORATORY TESTING FOR COVID-19 VIRUS: ICD-10-CM

## 2023-02-02 DIAGNOSIS — I35.0 NONRHEUMATIC AORTIC (VALVE) STENOSIS: ICD-10-CM

## 2023-02-02 DIAGNOSIS — G47.33 OBSTRUCTIVE SLEEP APNEA SYNDROME: ICD-10-CM

## 2023-02-02 DIAGNOSIS — I35.0 NONRHEUMATIC AORTIC VALVE STENOSIS: ICD-10-CM

## 2023-02-02 DIAGNOSIS — I48.11 LONGSTANDING PERSISTENT ATRIAL FIBRILLATION: ICD-10-CM

## 2023-02-02 DIAGNOSIS — R94.39 ABNORMAL CARDIOVASCULAR STRESS TEST: ICD-10-CM

## 2023-02-02 DIAGNOSIS — I10 ESSENTIAL HYPERTENSION: ICD-10-CM

## 2023-02-02 DIAGNOSIS — E78.2 MIXED HYPERLIPIDEMIA: ICD-10-CM

## 2023-02-02 DIAGNOSIS — Z87.891 FORMER SMOKER: ICD-10-CM

## 2023-02-02 DIAGNOSIS — J44.9 CHRONIC OBSTRUCTIVE PULMONARY DISEASE, UNSPECIFIED COPD TYPE: ICD-10-CM

## 2023-02-02 DIAGNOSIS — J43.8 OTHER EMPHYSEMA: ICD-10-CM

## 2023-02-02 DIAGNOSIS — I48.0 PAF (PAROXYSMAL ATRIAL FIBRILLATION): Primary | ICD-10-CM

## 2023-02-02 PROCEDURE — 99999 PR PBB SHADOW E&M-EST. PATIENT-LVL IV: CPT | Mod: PBBFAC,,, | Performed by: INTERNAL MEDICINE

## 2023-02-02 PROCEDURE — 99214 OFFICE O/P EST MOD 30 MIN: CPT | Mod: PBBFAC,PO | Performed by: INTERNAL MEDICINE

## 2023-02-02 PROCEDURE — 93005 ELECTROCARDIOGRAM TRACING: CPT | Mod: PO

## 2023-02-02 PROCEDURE — 99214 PR OFFICE/OUTPT VISIT, EST, LEVL IV, 30-39 MIN: ICD-10-PCS | Mod: S$PBB,25,, | Performed by: INTERNAL MEDICINE

## 2023-02-02 PROCEDURE — 99214 OFFICE O/P EST MOD 30 MIN: CPT | Mod: S$PBB,25,, | Performed by: INTERNAL MEDICINE

## 2023-02-02 PROCEDURE — 93010 ELECTROCARDIOGRAM REPORT: CPT | Mod: ,,, | Performed by: INTERNAL MEDICINE

## 2023-02-02 PROCEDURE — 99999 PR PBB SHADOW E&M-EST. PATIENT-LVL IV: ICD-10-PCS | Mod: PBBFAC,,, | Performed by: INTERNAL MEDICINE

## 2023-02-02 PROCEDURE — 93010 EKG 12-LEAD: ICD-10-PCS | Mod: ,,, | Performed by: INTERNAL MEDICINE

## 2023-02-02 RX ORDER — KETOCONAZOLE 20 MG/G
CREAM TOPICAL
COMMUNITY
Start: 2023-01-23

## 2023-02-02 NOTE — PROGRESS NOTES
Subjective:   Patient ID:  Roshan Menard is a 73 y.o. male who presents for follow-up of No chief complaint on file.    This pleasant patient here in the office for evaluation.  History of temporal arteritis, paroxysmal atrial fibrillation and cardioversion the past with YRN.  History of essential hypertension coronary disease without angina and evidence of aortic stenosis of a mild-to-moderate degree.  This was seen on prior YRN.  Patient is a former smoker no history of drug or alcohol abuse no syncope or near syncopal episodes.  Patient has a history of hypertension on multiple medications and is on full anticoagulation due to PAF.  This visit finds patient feeling well.  His medical problems include cardiac disease with stenting of circumflex coronary artery 2 years ago repeat angiogram last year showed patent stent.  The patient has history of obesity lost over 100 lb.  Patient has cardiac disease as well including significant calcification of the aorta calcification the aortic valve and repeat echo be done.  Prior echo done last year showed evidence of at least moderate aortic stenosis.  Today's physical exam shows the patient has brief murmur but also brisk carotid upstroke which would argue against severe aortic stenosis.  Overall LV function was preserved therefore I would disagree that he has low flow state.        Today patient feels generally well.  Blood pressure is a little bit elevated and he is not taking carvedilol more than once a day.  Will discontinue the medications that he feels like he has a brain fog when he takes the medicines.  Will increase hydralazine to 25 mg q.8 hours and otherwise stable continue with Micardis.     The patient presents the office blood pressures been under higher values.  This reason will add hydralazine and now 50 mg q.8 hours he continues on Micardis 80 mg daily he was intolerant of carvedilol.  Monitor did not show significant arrhythmias.  Intermittent mild short  episodes of sinus tachycardia noted.        Patient presents the office feeling generally well.  Blood pressure still elevated will increase hydralazine 100 mg b.i.d.     Patient still is elevated blood pressure he had a trial of amlodipine from another physician and he was stop it because it did make him feel well.  I will increase hydralazine to mg 3 times a day follow-up evaluation blood pressure and less than 1 month.  Otherwise stable no acute symptoms chest pain shortness.        Is the patient is here for review of blood pressure.  Slightly elevated today however he has some mild swelling has been off Lasix and also restart that today at with double doses for the next 2 days.  Also has of toe infection on antibiotics and also has some pain in that foot.  Follow-up evaluation again within next several weeks after complete re-evaluation on all medications.  He does continue on hydralazine 3 times daily and he has good compliance with this medication.  Today the patient presents in the office and blood pressure is stable on hydralazine feels well.  Evaluation again in 3 months.  All medications reviewed and renewed as necessary.     NO FOCAL CNS SYMPTOMS OR SIGNS TO SUGGEST TIA OR STROKE  NO ANGINA OR EQUIVALENT  NO UNUSUAL GLASER. NO ORTHOPNEA OR PND  NO PALPITATIONS  NO NEAR SYNCOPE OR SYNCOPE  NO EDEMA. NO CALVE TENDERNESS     02/02/2023: Patient still has elevated blood pressure and did not take his hydralazine this morning but usually runs blood pressure 150/90 will increase the hydralazine back to 100 mg q.8 hours.  Will consider back to amlodipine in the pain as he is tried this in the past.  Patient states he sometimes has irregular heart rhythms today is heart rate is stable with sinus arrhythmia.  Will follow-up evaluation again in the next month.  Patient will have another cardiac echo to reassess heart function.      Review of Systems   Constitutional: Negative for chills, diaphoresis, night sweats,  weight gain and weight loss.   HENT:  Negative for congestion, hoarse voice, sore throat and stridor.    Eyes:  Negative for double vision and pain.   Cardiovascular:  Negative for chest pain, claudication, cyanosis, dyspnea on exertion, irregular heartbeat, leg swelling, near-syncope, orthopnea, palpitations, paroxysmal nocturnal dyspnea and syncope.   Respiratory:  Negative for cough, hemoptysis, shortness of breath, sleep disturbances due to breathing, snoring, sputum production and wheezing.    Endocrine: Negative for cold intolerance, heat intolerance and polydipsia.   Hematologic/Lymphatic: Negative for bleeding problem. Does not bruise/bleed easily.   Skin:  Negative for color change, dry skin and rash.   Musculoskeletal:  Negative for joint swelling and muscle cramps.   Gastrointestinal:  Negative for bloating, abdominal pain, constipation, diarrhea, dysphagia, melena, nausea and vomiting.   Genitourinary:  Negative for flank pain and urgency.   Neurological:  Negative for dizziness, focal weakness, headaches, light-headedness, loss of balance, seizures and weakness.   Psychiatric/Behavioral:  Negative for altered mental status and memory loss. The patient is not nervous/anxious.    Family History   Problem Relation Age of Onset    Cancer Mother     Early death Mother     Arthritis Father     Diabetes Father     Heart disease Father     Hyperlipidemia Father     Hypertension Father      Past Medical History:   Diagnosis Date    Allergy     Anticoagulant long-term use     Arthritis     Asthma     Atrial fibrillation     Bronchitis     Cataract     Coronary artery disease     stent    General anesthetics causing adverse effect in therapeutic use     hard to awaken    GERD (gastroesophageal reflux disease)     Gout, chronic     Hypertension      Social History     Socioeconomic History    Marital status:    Occupational History     Employer: [employer]   Tobacco Use    Smoking status:  Former     Packs/day: 1.50     Years: 22.00     Pack years: 33.00     Types: Cigarettes     Quit date: 1992     Years since quittin.7    Smokeless tobacco: Never   Substance and Sexual Activity    Alcohol use: No    Drug use: No    Sexual activity: Never     Current Outpatient Medications on File Prior to Visit   Medication Sig Dispense Refill    albuterol (PROVENTIL/VENTOLIN HFA) 90 mcg/actuation inhaler Inhale 2 puffs into the lungs every 6 (six) hours as needed for Wheezing or Shortness of Breath. Rescue 18 g 11    albuterol-ipratropium (DUO-NEB) 2.5 mg-0.5 mg/3 mL nebulizer solution Take 3 mLs by nebulization every 6 (six) hours as needed for Wheezing or Shortness of Breath. Rescue 120 vial 5    allopurinoL (ZYLOPRIM) 300 MG tablet TAKE 1 TABLET BY MOUTH EVERY DAY 90 tablet 1    apixaban (ELIQUIS) 5 mg Tab Take 1 tablet (5 mg total) by mouth 2 (two) times daily. 180 tablet 3    aspirin (ECOTRIN) 81 MG EC tablet Take 81 mg by mouth every Mon, Wed, Fri.      atorvastatin (LIPITOR) 10 MG tablet TK 1 T PO QD 90 tablet 3    fluocinonide (LIDEX) 0.05 % gel SMARTSI-2 Gram(s) Topical Twice Daily      fluticasone furoate-vilanteroL (BREO ELLIPTA) 200-25 mcg/dose DsDv diskus inhaler INHALE 1 PUFF BY MOUTH AT THE SAME TIME EVERY DAY 60 each 5    furosemide (LASIX) 20 MG tablet Take 1 tablet (20 mg total) by mouth once daily. 90 tablet 3    hydrALAZINE (APRESOLINE) 100 MG tablet Take 1 tablet (100 mg total) by mouth every 8 (eight) hours. 90 tablet 11    lactobacillus comb no.10 (PROBIOTIC) 20 billion cell Cap Take 1 capsule by mouth once daily. Or as directed on bottle      montelukast (SINGULAIR) 10 mg tablet Take 1 tablet (10 mg total) by mouth once daily. 30 tablet 1    multivitamin (THERAGRAN) per tablet Take 1 tablet by mouth once daily.      omeprazole (PRILOSEC) 10 MG capsule Take 10 mg by mouth once daily.      potassium chloride (KLOR-CON) 10 MEQ TbSR TK 1 T PO ONCE A DAY. 90 tablet 3    predniSONE  (DELTASONE) 5 MG tablet Take 5 mg by mouth once daily.      telmisartan (MICARDIS) 80 MG Tab Take 1 tablet (80 mg total) by mouth once daily. 90 tablet 3    tiotropium bromide (SPIRIVA RESPIMAT) 2.5 mcg/actuation inhaler Inhale 2 puffs into the lungs Daily. Controller 4 g 11    urea (CARMOL) 40 % Crea 2 (two) times daily as needed.  0     No current facility-administered medications on file prior to visit.     Review of patient's allergies indicates:   Allergen Reactions    Sulfa (sulfonamide antibiotics)      Hives    Azithromycin      Diarrhea      Cefaclor Other (See Comments)     Other reaction(s): Hives    Iodine and iodide containing products      Other reaction(s): Unknown    Ivp dye  [iodinated contrast media] Other (See Comments)    Doxycycline Rash       Objective:     Physical Exam  Eyes:      Pupils: Pupils are equal, round, and reactive to light.   Neck:      Trachea: No tracheal deviation.   Cardiovascular:      Rate and Rhythm: Normal rate and regular rhythm.      Pulses: Intact distal pulses.           Carotid pulses are 2+ on the right side and 2+ on the left side.       Radial pulses are 2+ on the right side and 2+ on the left side.        Femoral pulses are 2+ on the right side and 2+ on the left side.       Popliteal pulses are 2+ on the right side and 2+ on the left side.        Dorsalis pedis pulses are 2+ on the right side and 2+ on the left side.        Posterior tibial pulses are 2+ on the right side and 2+ on the left side.      Heart sounds: Normal heart sounds. No murmur heard.    No friction rub. No gallop.   Pulmonary:      Effort: Pulmonary effort is normal. No respiratory distress.      Breath sounds: Normal breath sounds. No stridor. No wheezing or rales.   Chest:      Chest wall: No tenderness.   Abdominal:      General: There is no distension.      Tenderness: There is no abdominal tenderness. There is no rebound.   Musculoskeletal:         General: No tenderness.      Cervical  back: Normal range of motion.   Skin:     General: Skin is warm and dry.   Neurological:      Mental Status: He is alert and oriented to person, place, and time.      Ref Range & Units 13 d ago   (1/20/23) 10 mo ago   (3/30/22) 1 yr ago   (2/25/21) 4 yr ago   (1/26/19) 10 yr ago   (1/28/13) 11 yr ago   (1/27/12) 11 yr ago   (3/22/11)   Cholesterol 120 - 199 mg/dL 187  147 CM  142 CM   174 CM  167 CM  170 CM    Comment: The National Cholesterol Education Program (NCEP) has set the   following guidelines (reference ranges) for Cholesterol:   Optimal.....................<200 mg/dL   Borderline High.............200-239 mg/dL   High........................> or = 240 mg/dL    Triglycerides 30 - 150 mg/dL 228 High   179 High  CM  171 High  CM  170 R  229 High  CM  413 High  CM  307 High  CM    Comment: The National Cholesterol Education Program (NCEP) has set the   following guidelines (reference values) for triglycerides:   Normal......................<150 mg/dL   Borderline High.............150-199 mg/dL   High........................200-499 mg/dL    HDL 40 - 75 mg/dL 40  35 Low  CM  35 Low  CM  36 R  29 Low  CM  27 Low  CM  31 Low  CM    Comment: The National Cholesterol Education Program (NCEP) has set the   following guidelines (reference values) for HDL Cholesterol:   Low...............<40 mg/dL   Optimal...........>60 mg/dL    LDL Cholesterol 63.0 - 159.0 mg/dL 101.4  76.2 CM  72.8 CM  77 R  99.0 R, CM  57.0 Low  R, CM  77.6 R, CM    Comment: The National Cholesterol Education Program (NCEP) has set the    EKG shows sinus rhythm with sinus arrhythmia right bundle-branch block no other changes.  Assessment:     1. PAF (paroxysmal atrial fibrillation)    2. Nonrheumatic aortic valve stenosis    3. Abnormal cardiovascular stress test    4. Longstanding persistent atrial fibrillation    5. Essential hypertension    6. Mixed hyperlipidemia    7. Chronic obstructive pulmonary disease, unspecified COPD type    8.  Obstructive sleep apnea syndrome    9. Former smoker    10. Nonrheumatic aortic (valve) stenosis    11. Encounter for laboratory testing for COVID-19 virus    12. Other emphysema        Plan:     PAF (paroxysmal atrial fibrillation)    Nonrheumatic aortic valve stenosis    Abnormal cardiovascular stress test    Longstanding persistent atrial fibrillation    Essential hypertension    Mixed hyperlipidemia    Chronic obstructive pulmonary disease, unspecified COPD type    Obstructive sleep apnea syndrome    Former smoker    Nonrheumatic aortic (valve) stenosis    Encounter for laboratory testing for COVID-19 virus    Other emphysema      Impression 1 hypertension will increase hydralazine and re-evaluate the patient.  Consider adding back amlodipine.  Continues on diuresis.    Two aortic valve disease will repeat cardiac echo follow-up evaluation again within the next 1-2 months.

## 2023-02-12 NOTE — PROGRESS NOTES
Results have been released via [facility]. Please verify that these have been viewed by patient. If not, please call patient with results.    I have reviewed your recent results.    A1C NORMAL-The A1c is normal. No evidence of diabetes.    CBC NORMAL-The CBC appears to be stable at this time with no sign of major anemia, abnormal white count or platelet abnormality.    CMP/BMP NORMAL-The electrolytes all appear stable at this time.  This includes kidney functions along with routine electrolytes like sugar, potassium and sodium.  The liver enzymes were noted to be stable also.    LIPID NORMAL ON MEDS-The cholesterol panel screening showed levels that are considered at target with the current medications. Continue meds & check annually.    Uric acid level was increased. If you have any gout flares, we may need to increase your allopurinol dose.     Please let me know if you have any additional questions or concerns about above.

## 2023-02-13 ENCOUNTER — PATIENT MESSAGE (OUTPATIENT)
Dept: CARDIOLOGY | Facility: CLINIC | Age: 73
End: 2023-02-13
Payer: MEDICARE

## 2023-02-14 ENCOUNTER — TELEPHONE (OUTPATIENT)
Dept: CARDIOLOGY | Facility: CLINIC | Age: 73
End: 2023-02-14
Payer: MEDICARE

## 2023-02-14 NOTE — TELEPHONE ENCOUNTER
Spoke with patient to let him know that there was no early appointment for an Echo to be done in [place]. Also asked patient if he had any chest pains and patient stated  No chest pains.

## 2023-02-28 ENCOUNTER — TELEPHONE (OUTPATIENT)
Dept: CARDIOLOGY | Facility: CLINIC | Age: 73
End: 2023-02-28
Payer: MEDICARE

## 2023-02-28 ENCOUNTER — HOSPITAL ENCOUNTER (OUTPATIENT)
Dept: CARDIOLOGY | Facility: HOSPITAL | Age: 73
Discharge: HOME | End: 2023-02-28
Attending: INTERNAL MEDICINE
Payer: MEDICARE

## 2023-02-28 VITALS — HEIGHT: 73 IN | BODY MASS INDEX: 34.85 KG/M2 | WEIGHT: 263 LBS

## 2023-02-28 DIAGNOSIS — J44.9 CHRONIC OBSTRUCTIVE PULMONARY DISEASE, UNSPECIFIED COPD TYPE: ICD-10-CM

## 2023-02-28 DIAGNOSIS — J43.8 OTHER EMPHYSEMA: ICD-10-CM

## 2023-02-28 DIAGNOSIS — Z20.822 ENCOUNTER FOR LABORATORY TESTING FOR COVID-19 VIRUS: ICD-10-CM

## 2023-02-28 DIAGNOSIS — I48.11 LONGSTANDING PERSISTENT ATRIAL FIBRILLATION: ICD-10-CM

## 2023-02-28 DIAGNOSIS — I48.0 PAF (PAROXYSMAL ATRIAL FIBRILLATION): ICD-10-CM

## 2023-02-28 DIAGNOSIS — Z87.891 FORMER SMOKER: ICD-10-CM

## 2023-02-28 DIAGNOSIS — I35.0 NONRHEUMATIC AORTIC (VALVE) STENOSIS: ICD-10-CM

## 2023-02-28 DIAGNOSIS — G47.33 OBSTRUCTIVE SLEEP APNEA SYNDROME: ICD-10-CM

## 2023-02-28 DIAGNOSIS — E78.2 MIXED HYPERLIPIDEMIA: ICD-10-CM

## 2023-02-28 DIAGNOSIS — I35.0 NONRHEUMATIC AORTIC VALVE STENOSIS: ICD-10-CM

## 2023-02-28 DIAGNOSIS — I10 ESSENTIAL HYPERTENSION: ICD-10-CM

## 2023-02-28 DIAGNOSIS — R94.39 ABNORMAL CARDIOVASCULAR STRESS TEST: ICD-10-CM

## 2023-02-28 LAB
AV INDEX (PROSTH): 0.3
AV MEAN GRADIENT: 36 MMHG
AV PEAK GRADIENT: 58 MMHG
AV VALVE AREA: 1.35 CM2
AV VELOCITY RATIO: 0.28
BSA FOR ECHO PROCEDURE: 2.48 M2
CV ECHO LV RWT: 0.7 CM
DOP CALC AO PEAK VEL: 3.81 M/S
DOP CALC AO VTI: 89.7 CM
DOP CALC LVOT AREA: 4.5 CM2
DOP CALC LVOT DIAMETER: 2.39 CM
DOP CALC LVOT PEAK VEL: 1.06 M/S
DOP CALC LVOT STROKE VOLUME: 121.07 CM3
DOP CALC MV VTI: 37.6 CM
DOP CALCLVOT PEAK VEL VTI: 27 CM
E WAVE DECELERATION TIME: 280.79 MSEC
E/A RATIO: 0.93
E/E' RATIO: 16.12 M/S
ECHO LV POSTERIOR WALL: 1.67 CM (ref 0.6–1.1)
EJECTION FRACTION: 65 %
FRACTIONAL SHORTENING: 39 % (ref 28–44)
INTERVENTRICULAR SEPTUM: 1.57 CM (ref 0.6–1.1)
IVC DIAMETER: 18 CM
LA MAJOR: 6.24 CM
LA MINOR: 5.4 CM
LA WIDTH: 4.5 CM
LEFT ATRIUM SIZE: 5.01 CM
LEFT ATRIUM VOLUME INDEX MOD: 31.1 ML/M2
LEFT ATRIUM VOLUME INDEX: 45.8 ML/M2
LEFT ATRIUM VOLUME MOD: 75.32 CM3
LEFT ATRIUM VOLUME: 110.95 CM3
LEFT INTERNAL DIMENSION IN SYSTOLE: 2.94 CM (ref 2.1–4)
LEFT VENTRICLE DIASTOLIC VOLUME INDEX: 44.38 ML/M2
LEFT VENTRICLE DIASTOLIC VOLUME: 107.4 ML
LEFT VENTRICLE MASS INDEX: 141 G/M2
LEFT VENTRICLE SYSTOLIC VOLUME INDEX: 13.8 ML/M2
LEFT VENTRICLE SYSTOLIC VOLUME: 33.37 ML
LEFT VENTRICULAR INTERNAL DIMENSION IN DIASTOLE: 4.8 CM (ref 3.5–6)
LEFT VENTRICULAR MASS: 340.99 G
LV LATERAL E/E' RATIO: 19.57 M/S
LV SEPTAL E/E' RATIO: 13.7 M/S
LVOT MG: 2.52 MMHG
LVOT MV: 0.76 CM/S
MV MEAN GRADIENT: 7 MMHG
MV PEAK A VEL: 1.47 M/S
MV PEAK E VEL: 1.37 M/S
MV PEAK GRADIENT: 12 MMHG
MV STENOSIS PRESSURE HALF TIME: 81.43 MS
MV VALVE AREA BY CONTINUITY EQUATION: 3.22 CM2
MV VALVE AREA P 1/2 METHOD: 2.7 CM2
PISA TR MAX VEL: 2.6 M/S
PULM VEIN S/D RATIO: 1.25
PV MV: 0.98 M/S
PV PEAK D VEL: 0.57 M/S
PV PEAK S VEL: 0.71 M/S
PV PEAK VELOCITY: 1.42 CM/S
RA MAJOR: 4.17 CM
RA PRESSURE: 3 MMHG
RIGHT VENTRICULAR END-DIASTOLIC DIMENSION: 2.15 CM
RV TISSUE DOPPLER FREE WALL SYSTOLIC VELOCITY 1 (APICAL 4 CHAMBER VIEW): 0.02 CM/S
TDI LATERAL: 0.07 M/S
TDI SEPTAL: 0.1 M/S
TDI: 0.09 M/S
TR MAX PG: 27 MMHG
TRICUSPID ANNULAR PLANE SYSTOLIC EXCURSION: 3 CM
TV REST PULMONARY ARTERY PRESSURE: 30 MMHG

## 2023-02-28 PROCEDURE — 93306 ECHO (CUPID ONLY): ICD-10-PCS | Mod: 26,,, | Performed by: INTERNAL MEDICINE

## 2023-02-28 PROCEDURE — 93306 TTE W/DOPPLER COMPLETE: CPT | Mod: 26,,, | Performed by: INTERNAL MEDICINE

## 2023-02-28 PROCEDURE — 93306 TTE W/DOPPLER COMPLETE: CPT | Mod: PO

## 2023-02-28 NOTE — TELEPHONE ENCOUNTER
The patient has been notified of this information and all questions answered.    ----- Message from Perry Osborne MD sent at 2/28/2023  1:33 PM CST -----  Normal heart function moderate AS and will follow  ----- Message -----  From: Zackery Jones MD  Sent: 2/28/2023   1:03 PM CST  To: Perry Osborne MD

## 2023-03-02 ENCOUNTER — TELEPHONE (OUTPATIENT)
Dept: CARDIOLOGY | Facility: HOSPITAL | Age: 73
End: 2023-03-02
Payer: MEDICARE

## 2023-03-02 ENCOUNTER — OFFICE VISIT (OUTPATIENT)
Dept: CARDIOLOGY | Facility: CLINIC | Age: 73
End: 2023-03-02
Payer: MEDICARE

## 2023-03-02 VITALS
HEIGHT: 73 IN | HEART RATE: 101 BPM | BODY MASS INDEX: 33.8 KG/M2 | SYSTOLIC BLOOD PRESSURE: 162 MMHG | WEIGHT: 255.06 LBS | DIASTOLIC BLOOD PRESSURE: 90 MMHG | OXYGEN SATURATION: 97 %

## 2023-03-02 DIAGNOSIS — I48.0 PAF (PAROXYSMAL ATRIAL FIBRILLATION): ICD-10-CM

## 2023-03-02 DIAGNOSIS — R94.39 ABNORMAL CARDIOVASCULAR STRESS TEST: ICD-10-CM

## 2023-03-02 DIAGNOSIS — E78.2 MIXED HYPERLIPIDEMIA: Primary | ICD-10-CM

## 2023-03-02 DIAGNOSIS — I65.23 BILATERAL CAROTID ARTERY STENOSIS: ICD-10-CM

## 2023-03-02 DIAGNOSIS — I35.0 NONRHEUMATIC AORTIC VALVE STENOSIS: ICD-10-CM

## 2023-03-02 DIAGNOSIS — I48.11 LONGSTANDING PERSISTENT ATRIAL FIBRILLATION: ICD-10-CM

## 2023-03-02 DIAGNOSIS — Z87.891 FORMER SMOKER: ICD-10-CM

## 2023-03-02 DIAGNOSIS — I35.0 NONRHEUMATIC AORTIC (VALVE) STENOSIS: ICD-10-CM

## 2023-03-02 DIAGNOSIS — I25.110 ATHEROSCLEROSIS OF NATIVE CORONARY ARTERY OF NATIVE HEART WITH UNSTABLE ANGINA PECTORIS: ICD-10-CM

## 2023-03-02 DIAGNOSIS — I25.10 CORONARY ARTERY DISEASE INVOLVING NATIVE CORONARY ARTERY OF NATIVE HEART WITHOUT ANGINA PECTORIS: ICD-10-CM

## 2023-03-02 DIAGNOSIS — J43.8 OTHER EMPHYSEMA: ICD-10-CM

## 2023-03-02 DIAGNOSIS — I10 ESSENTIAL HYPERTENSION: ICD-10-CM

## 2023-03-02 DIAGNOSIS — Z01.810 PREOP CARDIOVASCULAR EXAM: ICD-10-CM

## 2023-03-02 DIAGNOSIS — I20.89 STABLE ANGINA PECTORIS: ICD-10-CM

## 2023-03-02 DIAGNOSIS — Z20.822 ENCOUNTER FOR LABORATORY TESTING FOR COVID-19 VIRUS: ICD-10-CM

## 2023-03-02 DIAGNOSIS — G47.33 OBSTRUCTIVE SLEEP APNEA SYNDROME: ICD-10-CM

## 2023-03-02 DIAGNOSIS — J44.9 CHRONIC OBSTRUCTIVE PULMONARY DISEASE, UNSPECIFIED COPD TYPE: ICD-10-CM

## 2023-03-02 PROCEDURE — 99214 PR OFFICE/OUTPT VISIT, EST, LEVL IV, 30-39 MIN: ICD-10-PCS | Mod: S$PBB,,, | Performed by: INTERNAL MEDICINE

## 2023-03-02 PROCEDURE — 99999 PR PBB SHADOW E&M-EST. PATIENT-LVL IV: CPT | Mod: PBBFAC,,, | Performed by: INTERNAL MEDICINE

## 2023-03-02 PROCEDURE — 99214 OFFICE O/P EST MOD 30 MIN: CPT | Mod: S$PBB,,, | Performed by: INTERNAL MEDICINE

## 2023-03-02 PROCEDURE — 99214 OFFICE O/P EST MOD 30 MIN: CPT | Mod: PBBFAC,PO | Performed by: INTERNAL MEDICINE

## 2023-03-02 PROCEDURE — 99999 PR PBB SHADOW E&M-EST. PATIENT-LVL IV: ICD-10-PCS | Mod: PBBFAC,,, | Performed by: INTERNAL MEDICINE

## 2023-03-02 RX ORDER — ATORVASTATIN CALCIUM 40 MG/1
TABLET, FILM COATED ORAL
Qty: 90 TABLET | Refills: 3 | Status: SHIPPED | OUTPATIENT
Start: 2023-03-02

## 2023-03-02 RX ORDER — HYDRALAZINE HYDROCHLORIDE 100 MG/1
100 TABLET, FILM COATED ORAL EVERY 8 HOURS
Qty: 90 TABLET | Refills: 11 | Status: SHIPPED | OUTPATIENT
Start: 2023-03-02 | End: 2023-06-22 | Stop reason: ALTCHOICE

## 2023-03-02 RX ORDER — METOPROLOL SUCCINATE 25 MG/1
25 TABLET, EXTENDED RELEASE ORAL DAILY
Qty: 30 TABLET | Refills: 11 | Status: SHIPPED | OUTPATIENT
Start: 2023-03-02 | End: 2023-04-20

## 2023-03-02 NOTE — PROGRESS NOTES
Subjective:   Patient ID:  Roshan Menard is a 73 y.o. male who presents for follow-up of No chief complaint on file.  This pleasant patient here in the office for evaluation.  History of temporal arteritis, paroxysmal atrial fibrillation and cardioversion the past with YRN.  History of essential hypertension coronary disease without angina and evidence of aortic stenosis of a mild-to-moderate degree.  This was seen on prior YRN.  Patient is a former smoker no history of drug or alcohol abuse no syncope or near syncopal episodes.  Patient has a history of hypertension on multiple medications and is on full anticoagulation due to PAF.  This visit finds patient feeling well.  His medical problems include cardiac disease with stenting of circumflex coronary artery 2 years ago repeat angiogram last year showed patent stent.  The patient has history of obesity lost over 100 lb.  Patient has cardiac disease as well including significant calcification of the aorta calcification the aortic valve and repeat echo be done.  Prior echo done last year showed evidence of at least moderate aortic stenosis.  Today's physical exam shows the patient has brief murmur but also brisk carotid upstroke which would argue against severe aortic stenosis.  Overall LV function was preserved therefore I would disagree that he has low flow state.        Today patient feels generally well.  Blood pressure is a little bit elevated and he is not taking carvedilol more than once a day.  Will discontinue the medications that he feels like he has a brain fog when he takes the medicines.  Will increase hydralazine to 25 mg q.8 hours and otherwise stable continue with Micardis.     The patient presents the office blood pressures been under higher values.  This reason will add hydralazine and now 50 mg q.8 hours he continues on Micardis 80 mg daily he was intolerant of carvedilol.  Monitor did not show significant arrhythmias.  Intermittent mild short  episodes of sinus tachycardia noted.        Patient presents the office feeling generally well.  Blood pressure still elevated will increase hydralazine 100 mg b.i.d.     Patient still is elevated blood pressure he had a trial of amlodipine from another physician and he was stop it because it did make him feel well.  I will increase hydralazine to mg 3 times a day follow-up evaluation blood pressure and less than 1 month.  Otherwise stable no acute symptoms chest pain shortness.        Is the patient is here for review of blood pressure.  Slightly elevated today however he has some mild swelling has been off Lasix and also restart that today at with double doses for the next 2 days.  Also has of toe infection on antibiotics and also has some pain in that foot.  Follow-up evaluation again within next several weeks after complete re-evaluation on all medications.  He does continue on hydralazine 3 times daily and he has good compliance with this medication.  Today the patient presents in the office and blood pressure is stable on hydralazine feels well.  Evaluation again in 3 months.  All medications reviewed and renewed as necessary.     NO FOCAL CNS SYMPTOMS OR SIGNS TO SUGGEST TIA OR STROKE  NO ANGINA OR EQUIVALENT  NO UNUSUAL GLASER. NO ORTHOPNEA OR PND  NO PALPITATIONS  NO NEAR SYNCOPE OR SYNCOPE  NO EDEMA. NO CALVE TENDERNESS     02/02/2023: Patient still has elevated blood pressure and did not take his hydralazine this morning but usually runs blood pressure 150/90 will increase the hydralazine back to 100 mg q.8 hours.  Will consider back to amlodipine in the pain as he is tried this in the past.  Patient states he sometimes has irregular heart rhythms today is heart rate is stable with sinus arrhythmia.  Will follow-up evaluation again in the next month.  Patient will have another cardiac echo to reassess heart function.       03/02/2023:   The patient has multiple issues today he had 1 episode of chest  discomfort will go ahead with a nuclear stress test SVT stent in the circumflex in the past moderate disease in the RCA and LAD 2021.    The patient has moderate to moderately severe aortic stenosis and repeat echoes now be done every 3 months as gradients are changing.    The patient also has fairly low dose Lipitor I will increase it to 40 mg daily repeat lipid profile is he needs more advanced cholesterol reduction.    Patient also has had some tachycardia and no evidence of atrial fibrillation by Holter monitor last year.  The patient will start on metoprolol XL 25 mg daily this will help with blood pressure control I will also increase the losartan now back to 100 mg 3 times daily.  All questions answered today.  The wife was present today and the daughter may call she is a nurse in [place].    The patient has to major issues 1 is coronary disease to make sure he is not having significant symptoms and nuclear stress test will be done also aortic stenosis will be rechecked as he is looking into the future to have a TAVR.    Also patient has PAF for distant past he is on anticoagulation and we need to make sure he is not having AFib in the future.      Review of Systems   Constitutional: Negative for chills, diaphoresis, night sweats, weight gain and weight loss.   HENT:  Negative for congestion, hoarse voice, sore throat and stridor.    Eyes:  Negative for double vision and pain.   Cardiovascular:  Negative for chest pain, claudication, cyanosis, dyspnea on exertion, irregular heartbeat, leg swelling, near-syncope, orthopnea, palpitations, paroxysmal nocturnal dyspnea and syncope.   Respiratory:  Negative for cough, hemoptysis, shortness of breath, sleep disturbances due to breathing, snoring, sputum production and wheezing.    Endocrine: Negative for cold intolerance, heat intolerance and polydipsia.   Hematologic/Lymphatic: Negative for bleeding problem. Does not bruise/bleed easily.   Skin:  Negative for color  change, dry skin and rash.   Musculoskeletal:  Negative for joint swelling and muscle cramps.   Gastrointestinal:  Negative for bloating, abdominal pain, constipation, diarrhea, dysphagia, melena, nausea and vomiting.   Genitourinary:  Negative for flank pain and urgency.   Neurological:  Negative for dizziness, focal weakness, headaches, light-headedness, loss of balance, seizures and weakness.   Psychiatric/Behavioral:  Negative for altered mental status and memory loss. The patient is not nervous/anxious.    Family History   Problem Relation Age of Onset    Cancer Mother     Early death Mother     Arthritis Father     Diabetes Father     Heart disease Father     Hyperlipidemia Father     Hypertension Father      Past Medical History:   Diagnosis Date    Allergy     Anticoagulant long-term use     Arthritis     Asthma     Atrial fibrillation     Bronchitis     Cataract     Coronary artery disease     stent    General anesthetics causing adverse effect in therapeutic use     hard to awaken    GERD (gastroesophageal reflux disease)     Gout, chronic     Hypertension      Social History     Socioeconomic History    Marital status:    Occupational History     Employer: [employer]   Tobacco Use    Smoking status: Former     Packs/day: 1.50     Years: 22.00     Pack years: 33.00     Types: Cigarettes     Quit date: 1992     Years since quittin.8    Smokeless tobacco: Never   Substance and Sexual Activity    Alcohol use: No    Drug use: No    Sexual activity: Never     Current Outpatient Medications on File Prior to Visit   Medication Sig Dispense Refill    albuterol (PROVENTIL/VENTOLIN HFA) 90 mcg/actuation inhaler Inhale 2 puffs into the lungs every 6 (six) hours as needed for Wheezing or Shortness of Breath. Rescue 18 g 11    albuterol-ipratropium (DUO-NEB) 2.5 mg-0.5 mg/3 mL nebulizer solution Take 3 mLs by nebulization every 6 (six) hours as needed for Wheezing or Shortness of Breath.  Rescue 120 vial 5    allopurinoL (ZYLOPRIM) 300 MG tablet TAKE 1 TABLET BY MOUTH EVERY DAY 90 tablet 1    apixaban (ELIQUIS) 5 mg Tab Take 1 tablet (5 mg total) by mouth 2 (two) times daily. 180 tablet 3    aspirin (ECOTRIN) 81 MG EC tablet Take 81 mg by mouth every Mon, Wed, Fri.      atorvastatin (LIPITOR) 10 MG tablet TK 1 T PO QD 90 tablet 3    fluocinonide (LIDEX) 0.05 % gel SMARTSI-2 Gram(s) Topical Twice Daily      fluticasone furoate-vilanteroL (BREO ELLIPTA) 200-25 mcg/dose DsDv diskus inhaler INHALE 1 PUFF BY MOUTH AT THE SAME TIME EVERY DAY 60 each 5    furosemide (LASIX) 20 MG tablet Take 1 tablet (20 mg total) by mouth once daily. 90 tablet 3    hydrALAZINE (APRESOLINE) 100 MG tablet Take 1 tablet (100 mg total) by mouth every 8 (eight) hours. 90 tablet 11    ketoconazole (NIZORAL) 2 % cream Apply topically.      lactobacillus comb no.10 (PROBIOTIC) 20 billion cell Cap Take 1 capsule by mouth once daily. Or as directed on bottle      montelukast (SINGULAIR) 10 mg tablet Take 1 tablet (10 mg total) by mouth once daily. 30 tablet 1    multivitamin (THERAGRAN) per tablet Take 1 tablet by mouth once daily.      omeprazole (PRILOSEC) 10 MG capsule Take 10 mg by mouth once daily.      potassium chloride (KLOR-CON) 10 MEQ TbSR TK 1 T PO ONCE A DAY. 90 tablet 3    predniSONE (DELTASONE) 5 MG tablet Take 5 mg by mouth once daily.      telmisartan (MICARDIS) 80 MG Tab Take 1 tablet (80 mg total) by mouth once daily. 90 tablet 3    tiotropium bromide (SPIRIVA RESPIMAT) 2.5 mcg/actuation inhaler Inhale 2 puffs into the lungs Daily. Controller 4 g 11    urea (CARMOL) 40 % Crea 2 (two) times daily as needed.  0     No current facility-administered medications on file prior to visit.     Review of patient's allergies indicates:   Allergen Reactions    Sulfa (sulfonamide antibiotics)      Hives    Azithromycin      Diarrhea      Cefaclor Other (See Comments)     Other reaction(s): Hives    Iodine and iodide  containing products      Other reaction(s): Unknown    Ivp dye  [iodinated contrast media] Other (See Comments)    Doxycycline Rash       Objective:     Physical Exam  Eyes:      Pupils: Pupils are equal, round, and reactive to light.   Neck:      Trachea: No tracheal deviation.   Cardiovascular:      Rate and Rhythm: Normal rate and regular rhythm.      Pulses: Intact distal pulses.           Carotid pulses are 2+ on the right side and 2+ on the left side.       Radial pulses are 2+ on the right side and 2+ on the left side.        Femoral pulses are 2+ on the right side and 2+ on the left side.       Popliteal pulses are 2+ on the right side and 2+ on the left side.        Dorsalis pedis pulses are 2+ on the right side and 2+ on the left side.        Posterior tibial pulses are 2+ on the right side and 2+ on the left side.      Heart sounds: Normal heart sounds. No murmur heard.    No friction rub. No gallop.   Pulmonary:      Effort: Pulmonary effort is normal. No respiratory distress.      Breath sounds: Normal breath sounds. No stridor. No wheezing or rales.   Chest:      Chest wall: No tenderness.   Abdominal:      General: There is no distension.      Tenderness: There is no abdominal tenderness. There is no rebound.   Musculoskeletal:         General: No tenderness.      Cervical back: Normal range of motion.   Skin:     General: Skin is warm and dry.   Neurological:      Mental Status: He is alert and oriented to person, place, and time.       Cardiac echo 02/28/2023:   The left ventricle is normal in size with concentric hypertrophy and normal systolic function.  Moderate left atrial enlargement.  Right atrial enlargement.  The estimated ejection fraction is 65%.  Grade II left ventricular diastolic dysfunction.  Normal right ventricular size with normal right ventricular systolic function.  There is moderate aortic valve stenosis.  Aortic valve area is 1.35 cm2; peak velocity is 3.81 m/s; mean gradient is  36 mmHg.  Normal central venous pressure (3 mmHg).  The estimated PA systolic pressure is 30 mmHg.     Assessment:     1. PAF (paroxysmal atrial fibrillation)    2. Former smoker    3. Essential hypertension    4. Preop cardiovascular exam    5. Nonrheumatic aortic valve stenosis    6. Mixed hyperlipidemia    7. Nonrheumatic aortic (valve) stenosis    8. Bilateral carotid artery stenosis    9. Chronic obstructive pulmonary disease, unspecified COPD type    10. Encounter for laboratory testing for COVID-19 virus    11. Coronary artery disease involving native coronary artery of native heart without angina pectoris    12. Longstanding persistent atrial fibrillation    13. Other emphysema        Plan:     PAF (paroxysmal atrial fibrillation)    Former smoker    Essential hypertension    Preop cardiovascular exam    Nonrheumatic aortic valve stenosis    Mixed hyperlipidemia    Nonrheumatic aortic (valve) stenosis    Bilateral carotid artery stenosis    Chronic obstructive pulmonary disease, unspecified COPD type    Encounter for laboratory testing for COVID-19 virus    Coronary artery disease involving native coronary artery of native heart without angina pectoris    Longstanding persistent atrial fibrillation    Other emphysema    Impression 1 PAF stable I will add metoprolol XL he continues on Eliquis  2. Chest discomfort he will have nuclear stress test to rule out advanced coronary disease  3. Aortic stenosis moderate to moderately severe will recheck echoes every 3 months.    4. CAD will increase Lipitor 40 mg daily to give him more advanced anti cholesterol regimen.  Repeat lipid profile in about a month.    All questions answered follow-up evaluation again in the next month.

## 2023-03-08 ENCOUNTER — TELEPHONE (OUTPATIENT)
Dept: CARDIOLOGY | Facility: CLINIC | Age: 73
End: 2023-03-08
Payer: MEDICARE

## 2023-03-08 ENCOUNTER — PATIENT MESSAGE (OUTPATIENT)
Dept: CARDIOLOGY | Facility: CLINIC | Age: 73
End: 2023-03-08
Payer: MEDICARE

## 2023-03-08 NOTE — TELEPHONE ENCOUNTER
Current ekg and a surgery clearance for dr. Harper for eye surgery pt is on eliquis his surgery will be scheduled once release is done. Please advise they are faxing it again pb        ---- Message from Joseph oFntaine sent at 3/8/2023 10:33 AM CST -----  Contact: Roshan Esteban is calling in regards to verifying if fax for surgery release was received, please give patient a call back at [phone]

## 2023-03-10 ENCOUNTER — TELEPHONE (OUTPATIENT)
Dept: FAMILY MEDICINE | Facility: CLINIC | Age: 73
End: 2023-03-10
Payer: MEDICARE

## 2023-03-10 NOTE — TELEPHONE ENCOUNTER
----- Message from Amber Jesus sent at 3/10/2023  9:00 AM CST -----  Regarding: Surgery Clearance  Contact: Roshan Islas is requesting a  callback from the nurse Jessica Watson in regards to needing a surgery clearance.    They need an EKG and a doctor's release faxed to [phone] Dr. Douglas Islas can be reached at [phone] (home)      Thanks     
----- Message from Destiney Hill sent at 3/10/2023 11:14 AM CST -----  Contact: self [phone]  Patient called back after missing a call from your office. Please call back [phone]. Thanks fay    
Pt states he has been seeing Dr. Osborne, Cardiologist and he gave him surgical clearance.  Pt had a recent EKG ordered by Cardiology.  Pt asking if he could have something in writing stating his Primary Care will clear him since his Cardiologist has cleared him. Offered to schedule him an appt and he wants a message sent to provider first.   
Spoke with pt, pre op scheduled. Pt stated that he would bring form to appt.  
impaired postural control/impaired balance/decreased endurance/decreased strength

## 2023-03-14 ENCOUNTER — OFFICE VISIT (OUTPATIENT)
Dept: FAMILY MEDICINE | Facility: CLINIC | Age: 73
End: 2023-03-14
Payer: MEDICARE

## 2023-03-14 ENCOUNTER — LAB VISIT (OUTPATIENT)
Dept: LAB | Facility: HOSPITAL | Age: 73
End: 2023-03-14
Attending: PHYSICIAN ASSISTANT
Payer: MEDICARE

## 2023-03-14 VITALS
BODY MASS INDEX: 34.14 KG/M2 | HEIGHT: 73 IN | WEIGHT: 257.63 LBS | SYSTOLIC BLOOD PRESSURE: 154 MMHG | HEART RATE: 72 BPM | DIASTOLIC BLOOD PRESSURE: 71 MMHG

## 2023-03-14 DIAGNOSIS — Z01.818 PREOPERATIVE CLEARANCE: Primary | ICD-10-CM

## 2023-03-14 DIAGNOSIS — Z01.818 PREOPERATIVE CLEARANCE: ICD-10-CM

## 2023-03-14 PROCEDURE — 99999 PR PBB SHADOW E&M-EST. PATIENT-LVL V: ICD-10-PCS | Mod: PBBFAC,,, | Performed by: PHYSICIAN ASSISTANT

## 2023-03-14 PROCEDURE — 99214 PR OFFICE/OUTPT VISIT, EST, LEVL IV, 30-39 MIN: ICD-10-PCS | Mod: S$PBB,,, | Performed by: PHYSICIAN ASSISTANT

## 2023-03-14 PROCEDURE — 99215 OFFICE O/P EST HI 40 MIN: CPT | Mod: PBBFAC,PO | Performed by: PHYSICIAN ASSISTANT

## 2023-03-14 PROCEDURE — 99999 PR PBB SHADOW E&M-EST. PATIENT-LVL V: CPT | Mod: PBBFAC,,, | Performed by: PHYSICIAN ASSISTANT

## 2023-03-14 PROCEDURE — 80048 BASIC METABOLIC PNL TOTAL CA: CPT | Performed by: PHYSICIAN ASSISTANT

## 2023-03-14 PROCEDURE — 36415 COLL VENOUS BLD VENIPUNCTURE: CPT | Mod: PO | Performed by: PHYSICIAN ASSISTANT

## 2023-03-14 PROCEDURE — 99214 OFFICE O/P EST MOD 30 MIN: CPT | Mod: S$PBB,,, | Performed by: PHYSICIAN ASSISTANT

## 2023-03-14 PROCEDURE — 85025 COMPLETE CBC W/AUTO DIFF WBC: CPT | Performed by: PHYSICIAN ASSISTANT

## 2023-03-14 RX ORDER — TOBRAMYCIN/DEXAMETHASONE 0.3 %-0.1%
OINTMENT (GRAM) OPHTHALMIC (EYE)
COMMUNITY
Start: 2023-02-13

## 2023-03-14 NOTE — PROGRESS NOTES
Assessment/Plan:    Problem List Items Addressed This Visit    None  Visit Diagnoses       Preoperative clearance    -  Primary    Relevant Orders    CBC Auto Differential    Basic Metabolic Panel           Plan:   1. Preoperative workup as follows ECG, hemoglobin, hematocrit, electrolytes, creatinine, glucose.  2. Change in medication regimen before surgery:  discontinue Eliquis 2 days before surgery per cardiology .  3. Prophylaxis for cardiac events with perioperative beta-blockers: should be considered, specific regimen per anesthesia.  4. Invasive hemodynamic monitoring perioperatively: at the discretion of anesthesiologist.  5. Deep vein thrombosis prophylaxis postoperatively:regimen to be chosen by surgical team.  6. Surveillance for postoperative MI with ECG immediately postoperatively and on postoperative days 1 and 2 AND troponin levels 24 hours postoperatively and on day 4 or hospital discharge (whichever comes first): at the discretion of anesthesiologist.  7. Other measures: none, will provide clearance once reviewing results.   3/16/23: Most recent EKG and labs (CBC, BMP) stable. Patient cleared for surgery from primary care perspective.     Follow up for with PCP.    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 The physician that is performing the surgery is Dr. Rendon. The surgery is being planned for TBD. The patient states that there has not been previous problems with sedation. He reports having general anesthesia about >5 years ago in which he had difficulty awakening after surgery otherwise he denies any other significant adverse effects from anesthesia. He is currently on Eliquis for history of afib. He was instructed to  discontinue Eliquis 2 days before procedure per cardiology recommendations. He has no history of blood clots or bleeding disorders. Patient is a former smoker. He has JOHNATHAN and reports compliance with CPAP machine. He also has a history of temporal arteritis, CHF, CAD s/p stent placement, and COPD. He has recently had an EKG in 2/2023 per cardiology which showed sinus rhythm with marked sinus arrythmia and a right bundle branch block. He also had an echo at that time which showed an EF of 65% with grade II left ventricular diastolic dysfunction. He has a nuclear stress test scheduled this week per cardiology. No other complaints today.     Lab Results   Component Value Date     03/14/2023    K 4.3 03/14/2023     03/14/2023    CO2 28 03/14/2023    BUN 19 03/14/2023    CREATININE 1.2 03/14/2023    CALCIUM 9.6 03/14/2023    ANIONGAP 13 03/14/2023    EGFRNORACEVR >60.0 03/14/2023     Lab Results   Component Value Date    WBC 8.81 03/14/2023    HGB 14.4 03/14/2023    HCT 46.4 03/14/2023    MCV 91 03/14/2023     03/14/2023     Past Medical History:   Diagnosis Date    Allergy     Anticoagulant long-term use     Arthritis     Asthma     Atrial fibrillation     Bronchitis     Cataract     Coronary artery disease     stent    General anesthetics causing adverse effect in therapeutic use     hard to awaken    GERD (gastroesophageal reflux disease)     Gout, chronic     Hypertension      Past Surgical History:   Procedure Laterality Date    ANGIOGRAM, CORONARY, WITH LEFT HEART CATHETERIZATION  09/27/2021    Procedure: Angiogram, Coronary, with Left Heart Cath;  Surgeon: Vincent Gonzalez MD;  Location: [facility];  Service: Cardiology;;    BACK SURGERY      L3-5; ruptured disc; laminectomy x 2 surgery    CHOLECYSTECTOMY      CORONARY ANGIOPLASTY WITH STENT PLACEMENT      EYE SURGERY  2012    Cateract    JOINT REPLACEMENT  '08    RT  KNEE    SPINE SURGERY  1988/2004    Lamanectomy '88/Lam '04    TONSILLECTOMY       TOTAL KNEE ARTHROPLASTY      VASECTOMY       Review of patient's allergies indicates:   Allergen Reactions    Sulfa (sulfonamide antibiotics)      Hives    Azithromycin      Diarrhea      Cefaclor Other (See Comments)     Other reaction(s): Hives    Iodine and iodide containing products      Other reaction(s): Unknown    Ivp dye  [iodinated contrast media] Other (See Comments)    Doxycycline Rash     Social History     Tobacco Use    Smoking status: Former     Packs/day: 1.50     Years: 22.00     Pack years: 33.00     Types: Cigarettes     Quit date: 1992     Years since quittin.8    Smokeless tobacco: Never   Substance Use Topics    Alcohol use: No    Drug use: No     Family History   Problem Relation Age of Onset    Cancer Mother     Early death Mother     Arthritis Father     Diabetes Father     Heart disease Father     Hyperlipidemia Father     Hypertension Father      Current Outpatient Medications on File Prior to Visit   Medication Sig Dispense Refill    albuterol (PROVENTIL/VENTOLIN HFA) 90 mcg/actuation inhaler Inhale 2 puffs into the lungs every 6 (six) hours as needed for Wheezing or Shortness of Breath. Rescue 18 g 11    albuterol-ipratropium (DUO-NEB) 2.5 mg-0.5 mg/3 mL nebulizer solution Take 3 mLs by nebulization every 6 (six) hours as needed for Wheezing or Shortness of Breath. Rescue 120 vial 5    allopurinoL (ZYLOPRIM) 300 MG tablet TAKE 1 TABLET BY MOUTH EVERY DAY 90 tablet 1    apixaban (ELIQUIS) 5 mg Tab Take 1 tablet (5 mg total) by mouth 2 (two) times daily. 180 tablet 3    aspirin (ECOTRIN) 81 MG EC tablet Take 81 mg by mouth every Mon, Wed, Fri.      atorvastatin (LIPITOR) 40 MG tablet TK 1 T PO QD 90 tablet 3    fluocinonide (LIDEX) 0.05 % gel SMARTSI-2 Gram(s) Topical Twice Daily      fluticasone furoate-vilanteroL (BREO ELLIPTA) 200-25 mcg/dose DsDv diskus inhaler INHALE 1 PUFF BY MOUTH AT THE SAME TIME EVERY DAY 60 each 5    furosemide (LASIX) 20 MG tablet Take 1 tablet  (20 mg total) by mouth once daily. 90 tablet 3    hydrALAZINE (APRESOLINE) 100 MG tablet Take 1 tablet (100 mg total) by mouth every 8 (eight) hours. 90 tablet 11    ketoconazole (NIZORAL) 2 % cream Apply topically.      lactobacillus comb no.10 (PROBIOTIC) 20 billion cell Cap Take 1 capsule by mouth once daily. Or as directed on bottle      metoprolol succinate (TOPROL-XL) 25 MG 24 hr tablet Take 1 tablet (25 mg total) by mouth once daily. 30 tablet 11    montelukast (SINGULAIR) 10 mg tablet Take 1 tablet (10 mg total) by mouth once daily. 30 tablet 1    multivitamin (THERAGRAN) per tablet Take 1 tablet by mouth once daily.      omeprazole (PRILOSEC) 10 MG capsule Take 10 mg by mouth once daily.      potassium chloride (KLOR-CON) 10 MEQ TbSR TK 1 T PO ONCE A DAY. 90 tablet 3    predniSONE (DELTASONE) 5 MG tablet Take 5 mg by mouth once daily.      telmisartan (MICARDIS) 80 MG Tab Take 1 tablet (80 mg total) by mouth once daily. 90 tablet 3    tiotropium bromide (SPIRIVA RESPIMAT) 2.5 mcg/actuation inhaler Inhale 2 puffs into the lungs Daily. Controller 4 g 11    TOBRADEX 0.3-0.1 % Oint       urea (CARMOL) 40 % Crea 2 (two) times daily as needed.  0     No current facility-administered medications on file prior to visit.       Review of Systems   Constitutional:  Negative for chills, diaphoresis, fatigue and fever.   HENT:  Negative for congestion, ear pain, postnasal drip, sinus pain and sore throat.    Eyes:  Negative for pain and redness.   Respiratory:  Negative for cough, chest tightness and shortness of breath.    Cardiovascular:  Negative for chest pain and leg swelling.   Gastrointestinal:  Negative for abdominal pain, constipation, diarrhea, nausea and vomiting.   Genitourinary:  Negative for dysuria and hematuria.   Musculoskeletal:  Negative for arthralgias and joint swelling.   Skin:  Negative for rash.   Neurological:  Negative for dizziness, syncope and headaches.   Psychiatric/Behavioral:  Negative  "for dysphoric mood. The patient is not nervous/anxious.      Vitals:    03/14/23 1434 03/14/23 1506   BP: (!) 156/73 (!) 154/71   Pulse: 79 72   Weight: 116.8 kg (257 lb 9.6 oz)    Height: 6' 1" (1.854 m)        Wt Readings from Last 3 Encounters:   03/14/23 116.8 kg (257 lb 9.6 oz)   03/02/23 115.7 kg (255 lb 1.2 oz)   02/28/23 119.3 kg (263 lb)       Physical Exam  Constitutional:       General: He is not in acute distress.     Appearance: Normal appearance. He is well-developed.   HENT:      Head: Normocephalic and atraumatic.   Eyes:      Conjunctiva/sclera: Conjunctivae normal.   Cardiovascular:      Rate and Rhythm: Normal rate and regular rhythm.      Pulses: Normal pulses.      Heart sounds: Normal heart sounds. No murmur heard.  Pulmonary:      Effort: Pulmonary effort is normal. No respiratory distress.      Breath sounds: Normal breath sounds.   Abdominal:      General: Bowel sounds are normal. There is no distension.      Palpations: Abdomen is soft.      Tenderness: There is no abdominal tenderness.   Musculoskeletal:         General: Normal range of motion.      Cervical back: Normal range of motion and neck supple.   Skin:     General: Skin is warm and dry.      Findings: No rash.   Neurological:      General: No focal deficit present.      Mental Status: He is alert and oriented to person, place, and time.   Psychiatric:         Mood and Affect: Mood normal.         Behavior: Behavior normal.       Health Maintenance   Topic Date Due    Lipid Panel  01/20/2024    High Dose Statin  03/14/2024    TETANUS VACCINE  10/09/2031    Hepatitis C Screening  Completed    Abdominal Aortic Aneurysm Screening  Completed     "

## 2023-03-15 LAB
ANION GAP SERPL CALC-SCNC: 13 MMOL/L (ref 8–16)
BASOPHILS # BLD AUTO: 0.07 K/UL (ref 0–0.2)
BASOPHILS NFR BLD: 0.8 % (ref 0–1.9)
BUN SERPL-MCNC: 19 MG/DL (ref 8–23)
CALCIUM SERPL-MCNC: 9.6 MG/DL (ref 8.7–10.5)
CHLORIDE SERPL-SCNC: 102 MMOL/L (ref 95–110)
CO2 SERPL-SCNC: 28 MMOL/L (ref 23–29)
CREAT SERPL-MCNC: 1.2 MG/DL (ref 0.5–1.4)
DIFFERENTIAL METHOD: ABNORMAL
EOSINOPHIL # BLD AUTO: 0.1 K/UL (ref 0–0.5)
EOSINOPHIL NFR BLD: 0.8 % (ref 0–8)
ERYTHROCYTE [DISTWIDTH] IN BLOOD BY AUTOMATED COUNT: 14.6 % (ref 11.5–14.5)
EST. GFR  (NO RACE VARIABLE): >60 ML/MIN/1.73 M^2
GLUCOSE SERPL-MCNC: 121 MG/DL (ref 70–110)
HCT VFR BLD AUTO: 46.4 % (ref 40–54)
HGB BLD-MCNC: 14.4 G/DL (ref 14–18)
IMM GRANULOCYTES # BLD AUTO: 0.04 K/UL (ref 0–0.04)
IMM GRANULOCYTES NFR BLD AUTO: 0.5 % (ref 0–0.5)
LYMPHOCYTES # BLD AUTO: 0.9 K/UL (ref 1–4.8)
LYMPHOCYTES NFR BLD: 10 % (ref 18–48)
MCH RBC QN AUTO: 28.1 PG (ref 27–31)
MCHC RBC AUTO-ENTMCNC: 31 G/DL (ref 32–36)
MCV RBC AUTO: 91 FL (ref 82–98)
MONOCYTES # BLD AUTO: 0.6 K/UL (ref 0.3–1)
MONOCYTES NFR BLD: 6.8 % (ref 4–15)
NEUTROPHILS # BLD AUTO: 7.2 K/UL (ref 1.8–7.7)
NEUTROPHILS NFR BLD: 81.1 % (ref 38–73)
NRBC BLD-RTO: 0 /100 WBC
PLATELET # BLD AUTO: 238 K/UL (ref 150–450)
PMV BLD AUTO: 11.2 FL (ref 9.2–12.9)
POTASSIUM SERPL-SCNC: 4.3 MMOL/L (ref 3.5–5.1)
RBC # BLD AUTO: 5.12 M/UL (ref 4.6–6.2)
SODIUM SERPL-SCNC: 143 MMOL/L (ref 136–145)
WBC # BLD AUTO: 8.81 K/UL (ref 3.9–12.7)

## 2023-03-16 NOTE — PROGRESS NOTES
Results have been released via [employer]. Please verify that these have been viewed by patient. If not, please call patient with results.     I have sent a message to them with the following interpretation (see below).    I have reviewed your recent blood work.     CBC NORMAL-The CBC appears to be stable at this time with no sign of major anemia, abnormal white count or platelet abnormality.  CMP/BMP NORMAL-The electrolytes all appear stable at this time. This includes kidney functions along with routine electrolytes like sugar, potassium and sodium.    Please do not hesitate to call or message with any additional questions or concerns.    Aide Stephens PA-C

## 2023-03-17 ENCOUNTER — HOSPITAL ENCOUNTER (OUTPATIENT)
Dept: RADIOLOGY | Facility: HOSPITAL | Age: 73
Discharge: HOME | End: 2023-03-17
Attending: INTERNAL MEDICINE
Payer: MEDICARE

## 2023-03-17 ENCOUNTER — CLINICAL SUPPORT (OUTPATIENT)
Dept: CARDIOLOGY | Facility: HOSPITAL | Age: 73
End: 2023-03-17
Attending: INTERNAL MEDICINE
Payer: MEDICARE

## 2023-03-17 VITALS — WEIGHT: 257 LBS | HEIGHT: 73 IN | BODY MASS INDEX: 34.06 KG/M2

## 2023-03-17 DIAGNOSIS — I48.0 PAF (PAROXYSMAL ATRIAL FIBRILLATION): ICD-10-CM

## 2023-03-17 DIAGNOSIS — I25.110 ATHEROSCLEROSIS OF NATIVE CORONARY ARTERY OF NATIVE HEART WITH UNSTABLE ANGINA PECTORIS: ICD-10-CM

## 2023-03-17 DIAGNOSIS — J43.8 OTHER EMPHYSEMA: ICD-10-CM

## 2023-03-17 DIAGNOSIS — I35.0 NONRHEUMATIC AORTIC (VALVE) STENOSIS: ICD-10-CM

## 2023-03-17 DIAGNOSIS — I65.23 BILATERAL CAROTID ARTERY STENOSIS: ICD-10-CM

## 2023-03-17 DIAGNOSIS — R94.39 ABNORMAL CARDIOVASCULAR STRESS TEST: ICD-10-CM

## 2023-03-17 DIAGNOSIS — Z87.891 FORMER SMOKER: ICD-10-CM

## 2023-03-17 DIAGNOSIS — E78.2 MIXED HYPERLIPIDEMIA: ICD-10-CM

## 2023-03-17 DIAGNOSIS — J44.9 CHRONIC OBSTRUCTIVE PULMONARY DISEASE, UNSPECIFIED COPD TYPE: ICD-10-CM

## 2023-03-17 DIAGNOSIS — I20.89 STABLE ANGINA PECTORIS: ICD-10-CM

## 2023-03-17 DIAGNOSIS — I35.0 NONRHEUMATIC AORTIC VALVE STENOSIS: ICD-10-CM

## 2023-03-17 DIAGNOSIS — I48.11 LONGSTANDING PERSISTENT ATRIAL FIBRILLATION: ICD-10-CM

## 2023-03-17 DIAGNOSIS — Z01.810 PREOP CARDIOVASCULAR EXAM: ICD-10-CM

## 2023-03-17 DIAGNOSIS — G47.33 OBSTRUCTIVE SLEEP APNEA SYNDROME: ICD-10-CM

## 2023-03-17 DIAGNOSIS — I10 ESSENTIAL HYPERTENSION: ICD-10-CM

## 2023-03-17 DIAGNOSIS — I25.10 CORONARY ARTERY DISEASE INVOLVING NATIVE CORONARY ARTERY OF NATIVE HEART WITHOUT ANGINA PECTORIS: ICD-10-CM

## 2023-03-17 DIAGNOSIS — Z20.822 ENCOUNTER FOR LABORATORY TESTING FOR COVID-19 VIRUS: ICD-10-CM

## 2023-03-17 PROCEDURE — 93018 CV STRESS TEST I&R ONLY: CPT | Mod: ,,, | Performed by: INTERNAL MEDICINE

## 2023-03-17 PROCEDURE — 93016 NUCLEAR STRESS - CARDIOLOGY INTERPRETED (CUPID ONLY): ICD-10-PCS | Mod: ,,, | Performed by: INTERNAL MEDICINE

## 2023-03-17 PROCEDURE — 93018 PR CARDIAC STRESS TST,INTERP/REPT ONLY: ICD-10-PCS | Mod: ,,, | Performed by: INTERNAL MEDICINE

## 2023-03-17 PROCEDURE — 78452 NUCLEAR STRESS - CARDIOLOGY INTERPRETED (CUPID ONLY): ICD-10-PCS | Mod: 26,,, | Performed by: INTERNAL MEDICINE

## 2023-03-17 PROCEDURE — 78452 HT MUSCLE IMAGE SPECT MULT: CPT | Mod: PO

## 2023-03-17 PROCEDURE — 93017 CV STRESS TEST TRACING ONLY: CPT | Mod: PO

## 2023-03-17 PROCEDURE — 93016 CV STRESS TEST SUPVJ ONLY: CPT | Mod: ,,, | Performed by: INTERNAL MEDICINE

## 2023-03-17 PROCEDURE — 78452 HT MUSCLE IMAGE SPECT MULT: CPT | Mod: 26,,, | Performed by: INTERNAL MEDICINE

## 2023-03-17 PROCEDURE — A9502 TC99M TETROFOSMIN: HCPCS | Mod: PO

## 2023-03-17 PROCEDURE — 63600175 PHARM REV CODE 636 W HCPCS: Mod: PO | Performed by: INTERNAL MEDICINE

## 2023-03-17 RX ORDER — REGADENOSON 0.08 MG/ML
0.4 INJECTION, SOLUTION INTRAVENOUS
Status: COMPLETED | OUTPATIENT
Start: 2023-03-17 | End: 2023-03-17

## 2023-03-17 RX ADMIN — REGADENOSON 0.4 MG: 0.08 INJECTION, SOLUTION INTRAVENOUS at 01:03

## 2023-03-21 LAB
CV PHARM DOSE: 0.4 MG
CV STRESS BASE HR: 87 BPM
DIASTOLIC BLOOD PRESSURE: 114 MMHG
NUC STRESS EJECTION FRACTION: 72 %
OHS CV CPX 1 MINUTE RECOVERY HEART RATE: 108 BPM
OHS CV CPX 85 PERCENT MAX PREDICTED HEART RATE MALE: 126
OHS CV CPX MAX PREDICTED HEART RATE: 148
OHS CV CPX PATIENT IS FEMALE: 0
OHS CV CPX PATIENT IS MALE: 1
OHS CV CPX PEAK DIASTOLIC BLOOD PRESSURE: 114 MMHG
OHS CV CPX PEAK HEAR RATE: 108 BPM
OHS CV CPX PEAK RATE PRESSURE PRODUCT: NORMAL
OHS CV CPX PEAK SYSTOLIC BLOOD PRESSURE: 180 MMHG
OHS CV CPX PERCENT MAX PREDICTED HEART RATE ACHIEVED: 73
OHS CV CPX RATE PRESSURE PRODUCT PRESENTING: NORMAL
OHS CV PHARM TIME: 1333 MIN
SYSTOLIC BLOOD PRESSURE: 180 MMHG

## 2023-03-23 ENCOUNTER — OFFICE VISIT (OUTPATIENT)
Dept: CARDIOLOGY | Facility: CLINIC | Age: 73
End: 2023-03-23
Payer: MEDICARE

## 2023-03-23 ENCOUNTER — HOSPITAL ENCOUNTER (OUTPATIENT)
Dept: CARDIOLOGY | Facility: HOSPITAL | Age: 73
Discharge: HOME | End: 2023-03-23
Attending: INTERNAL MEDICINE
Payer: MEDICARE

## 2023-03-23 VITALS
HEIGHT: 73 IN | OXYGEN SATURATION: 95 % | HEART RATE: 102 BPM | SYSTOLIC BLOOD PRESSURE: 160 MMHG | RESPIRATION RATE: 18 BRPM | WEIGHT: 254.38 LBS | BODY MASS INDEX: 33.71 KG/M2 | DIASTOLIC BLOOD PRESSURE: 78 MMHG

## 2023-03-23 DIAGNOSIS — I20.89 STABLE ANGINA PECTORIS: ICD-10-CM

## 2023-03-23 DIAGNOSIS — Z01.810 PREOP CARDIOVASCULAR EXAM: ICD-10-CM

## 2023-03-23 DIAGNOSIS — J43.8 OTHER EMPHYSEMA: ICD-10-CM

## 2023-03-23 DIAGNOSIS — J44.9 CHRONIC OBSTRUCTIVE PULMONARY DISEASE, UNSPECIFIED COPD TYPE: ICD-10-CM

## 2023-03-23 DIAGNOSIS — I65.23 BILATERAL CAROTID ARTERY STENOSIS: ICD-10-CM

## 2023-03-23 DIAGNOSIS — G47.33 OBSTRUCTIVE SLEEP APNEA SYNDROME: ICD-10-CM

## 2023-03-23 DIAGNOSIS — Z01.818 PRE-OP TESTING: Primary | ICD-10-CM

## 2023-03-23 DIAGNOSIS — I35.0 NONRHEUMATIC AORTIC (VALVE) STENOSIS: ICD-10-CM

## 2023-03-23 DIAGNOSIS — I10 ESSENTIAL HYPERTENSION: ICD-10-CM

## 2023-03-23 DIAGNOSIS — I48.0 PAF (PAROXYSMAL ATRIAL FIBRILLATION): ICD-10-CM

## 2023-03-23 DIAGNOSIS — I35.0 NONRHEUMATIC AORTIC VALVE STENOSIS: ICD-10-CM

## 2023-03-23 DIAGNOSIS — Z20.822 ENCOUNTER FOR LABORATORY TESTING FOR COVID-19 VIRUS: ICD-10-CM

## 2023-03-23 DIAGNOSIS — E78.2 MIXED HYPERLIPIDEMIA: ICD-10-CM

## 2023-03-23 DIAGNOSIS — R94.39 ABNORMAL CARDIOVASCULAR STRESS TEST: ICD-10-CM

## 2023-03-23 PROCEDURE — 93010 EKG 12-LEAD: ICD-10-PCS | Mod: ,,, | Performed by: INTERNAL MEDICINE

## 2023-03-23 PROCEDURE — 99215 OFFICE O/P EST HI 40 MIN: CPT | Mod: S$PBB,,, | Performed by: INTERNAL MEDICINE

## 2023-03-23 PROCEDURE — 93010 ELECTROCARDIOGRAM REPORT: CPT | Mod: ,,, | Performed by: INTERNAL MEDICINE

## 2023-03-23 PROCEDURE — 99214 OFFICE O/P EST MOD 30 MIN: CPT | Mod: PBBFAC,PO | Performed by: INTERNAL MEDICINE

## 2023-03-23 PROCEDURE — 99215 PR OFFICE/OUTPT VISIT, EST, LEVL V, 40-54 MIN: ICD-10-PCS | Mod: S$PBB,,, | Performed by: INTERNAL MEDICINE

## 2023-03-23 PROCEDURE — 99999 PR PBB SHADOW E&M-EST. PATIENT-LVL IV: CPT | Mod: PBBFAC,,, | Performed by: INTERNAL MEDICINE

## 2023-03-23 PROCEDURE — 99999 PR PBB SHADOW E&M-EST. PATIENT-LVL IV: ICD-10-PCS | Mod: PBBFAC,,, | Performed by: INTERNAL MEDICINE

## 2023-03-23 PROCEDURE — 93005 ELECTROCARDIOGRAM TRACING: CPT | Mod: PO

## 2023-03-23 RX ORDER — PREDNISONE 50 MG/1
50 TABLET ORAL 2 TIMES DAILY
Qty: 3 TABLET | Refills: 1 | Status: SHIPPED | OUTPATIENT
Start: 2023-04-03 | End: 2023-05-03

## 2023-03-23 RX ORDER — DIPHENHYDRAMINE HCL 50 MG
50 CAPSULE ORAL EVERY 6 HOURS PRN
Qty: 3 CAPSULE | Refills: 1 | Status: SHIPPED | OUTPATIENT
Start: 2023-04-03 | End: 2023-05-03

## 2023-03-23 RX ORDER — FAMOTIDINE 40 MG/1
40 TABLET, FILM COATED ORAL DAILY
Qty: 10 TABLET | Refills: 1 | Status: SHIPPED | OUTPATIENT
Start: 2023-04-02 | End: 2023-05-03

## 2023-03-23 NOTE — PROGRESS NOTES
Subjective:   Patient ID:  Roshan Menard is a 73 y.o. male who presents for follow-up of No chief complaint on file.  This pleasant patient here in the office for evaluation.  History of temporal arteritis, paroxysmal atrial fibrillation and cardioversion the past with YRN.  History of essential hypertension coronary disease without angina and evidence of aortic stenosis of a mild-to-moderate degree.  This was seen on prior YRN.  Patient is a former smoker no history of drug or alcohol abuse no syncope or near syncopal episodes.  Patient has a history of hypertension on multiple medications and is on full anticoagulation due to PAF.  This visit finds patient feeling well.  His medical problems include cardiac disease with stenting of circumflex coronary artery 2 years ago repeat angiogram last year showed patent stent.  The patient has history of obesity lost over 100 lb.  Patient has cardiac disease as well including significant calcification of the aorta calcification the aortic valve and repeat echo be done.  Prior echo done last year showed evidence of at least moderate aortic stenosis.  Today's physical exam shows the patient has brief murmur but also brisk carotid upstroke which would argue against severe aortic stenosis.  Overall LV function was preserved therefore I would disagree that he has low flow state.        Today patient feels generally well.  Blood pressure is a little bit elevated and he is not taking carvedilol more than once a day.  Will discontinue the medications that he feels like he has a brain fog when he takes the medicines.  Will increase hydralazine to 25 mg q.8 hours and otherwise stable continue with Micardis.     The patient presents the office blood pressures been under higher values.  This reason will add hydralazine and now 50 mg q.8 hours he continues on Micardis 80 mg daily he was intolerant of carvedilol.  Monitor did not show significant arrhythmias.  Intermittent mild short  episodes of sinus tachycardia noted.        Patient presents the office feeling generally well.  Blood pressure still elevated will increase hydralazine 100 mg b.i.d.     Patient still is elevated blood pressure he had a trial of amlodipine from another physician and he was stop it because it did make him feel well.  I will increase hydralazine to mg 3 times a day follow-up evaluation blood pressure and less than 1 month.  Otherwise stable no acute symptoms chest pain shortness.        Is the patient is here for review of blood pressure.  Slightly elevated today however he has some mild swelling has been off Lasix and also restart that today at with double doses for the next 2 days.  Also has of toe infection on antibiotics and also has some pain in that foot.  Follow-up evaluation again within next several weeks after complete re-evaluation on all medications.  He does continue on hydralazine 3 times daily and he has good compliance with this medication.  Today the patient presents in the office and blood pressure is stable on hydralazine feels well.  Evaluation again in 3 months.  All medications reviewed and renewed as necessary.     NO FOCAL CNS SYMPTOMS OR SIGNS TO SUGGEST TIA OR STROKE  NO ANGINA OR EQUIVALENT  NO UNUSUAL GLASER. NO ORTHOPNEA OR PND  NO PALPITATIONS  NO NEAR SYNCOPE OR SYNCOPE  NO EDEMA. NO CALVE TENDERNESS     02/02/2023: Patient still has elevated blood pressure and did not take his hydralazine this morning but usually runs blood pressure 150/90 will increase the hydralazine back to 100 mg q.8 hours.  Will consider back to amlodipine in the pain as he is tried this in the past.  Patient states he sometimes has irregular heart rhythms today is heart rate is stable with sinus arrhythmia.  Will follow-up evaluation again in the next month.  Patient will have another cardiac echo to reassess heart function.        03/02/2023:   The patient has multiple issues today he had 1 episode of chest  discomfort will go ahead with a nuclear stress test SVT stent in the circumflex in the past moderate disease in the RCA and LAD 2021.    The patient has moderate to moderately severe aortic stenosis and repeat echoes now be done every 3 months as gradients are changing.    The patient also has fairly low dose Lipitor I will increase it to 40 mg daily repeat lipid profile is he needs more advanced cholesterol reduction.    Patient also has had some tachycardia and no evidence of atrial fibrillation by Holter monitor last year.  The patient will start on metoprolol XL 25 mg daily this will help with blood pressure control I will also increase the losartan now back to 100 mg 3 times daily.  All questions answered today.  The wife was present today and the daughter may call she is a nurse in [place].    The patient has to major issues 1 is coronary disease to make sure he is not having significant symptoms and nuclear stress test will be done also aortic stenosis will be rechecked as he is looking into the future to have a TAVR.    Also patient has PAF for distant past he is on anticoagulation and we need to make sure he is not having AFib in the future.          03/23/2023:   Overall patient is doing well today intermittent chest discomfort positive nuclear stress test shows inferior lateral ischemia prior.  Prior stenting of the circumflex coronary in the past paroxysmal AFib cardioversion past on Eliquis.  Clinically stable history hypertension.  Plan on heart catheterization in the near future.  Patient has contrast allergy also need to be premedicated.  Eliquis will be started several days before he is in normal sinus rhythm as this visit.  EKG today shows normal sinus rhythm.  Right bundle-branch block is chronic.    Patient continues on aspirin beta-blockers and hydralazine.  He continues on Micardis and diuretics.      Review of Systems   Constitutional: Negative for chills, diaphoresis, night sweats, weight gain  and weight loss.   HENT:  Negative for congestion, hoarse voice, sore throat and stridor.    Eyes:  Negative for double vision and pain.   Cardiovascular:  Negative for chest pain, claudication, cyanosis, dyspnea on exertion, irregular heartbeat, leg swelling, near-syncope, orthopnea, palpitations, paroxysmal nocturnal dyspnea and syncope.   Respiratory:  Negative for cough, hemoptysis, shortness of breath, sleep disturbances due to breathing, snoring, sputum production and wheezing.    Endocrine: Negative for cold intolerance, heat intolerance and polydipsia.   Hematologic/Lymphatic: Negative for bleeding problem. Does not bruise/bleed easily.   Skin:  Negative for color change, dry skin and rash.   Musculoskeletal:  Negative for joint swelling and muscle cramps.   Gastrointestinal:  Negative for bloating, abdominal pain, constipation, diarrhea, dysphagia, melena, nausea and vomiting.   Genitourinary:  Negative for flank pain and urgency.   Neurological:  Negative for dizziness, focal weakness, headaches, light-headedness, loss of balance, seizures and weakness.   Psychiatric/Behavioral:  Negative for altered mental status and memory loss. The patient is not nervous/anxious.    Family History   Problem Relation Age of Onset    Cancer Mother     Early death Mother     Arthritis Father     Diabetes Father     Heart disease Father     Hyperlipidemia Father     Hypertension Father      Past Medical History:   Diagnosis Date    Allergy     Anticoagulant long-term use     Arthritis     Asthma     Atrial fibrillation     Bronchitis     Cataract     Coronary artery disease     stent    General anesthetics causing adverse effect in therapeutic use     hard to awaken    GERD (gastroesophageal reflux disease)     Gout, chronic     Hypertension      Social History     Socioeconomic History    Marital status:    Occupational History     Employer: [employer]   Tobacco Use    Smoking status: Former      Packs/day: 1.50     Years: 22.00     Pack years: 33.00     Types: Cigarettes     Quit date: 1992     Years since quittin.8    Smokeless tobacco: Never   Substance and Sexual Activity    Alcohol use: No    Drug use: No    Sexual activity: Never     Current Outpatient Medications on File Prior to Visit   Medication Sig Dispense Refill    albuterol (PROVENTIL/VENTOLIN HFA) 90 mcg/actuation inhaler Inhale 2 puffs into the lungs every 6 (six) hours as needed for Wheezing or Shortness of Breath. Rescue 18 g 11    albuterol-ipratropium (DUO-NEB) 2.5 mg-0.5 mg/3 mL nebulizer solution Take 3 mLs by nebulization every 6 (six) hours as needed for Wheezing or Shortness of Breath. Rescue 120 vial 5    allopurinoL (ZYLOPRIM) 300 MG tablet TAKE 1 TABLET BY MOUTH EVERY DAY 90 tablet 1    apixaban (ELIQUIS) 5 mg Tab Take 1 tablet (5 mg total) by mouth 2 (two) times daily. 180 tablet 3    aspirin (ECOTRIN) 81 MG EC tablet Take 81 mg by mouth every Mon, Wed, Fri.      atorvastatin (LIPITOR) 40 MG tablet TK 1 T PO QD 90 tablet 3    fluocinonide (LIDEX) 0.05 % gel SMARTSI-2 Gram(s) Topical Twice Daily      fluticasone furoate-vilanteroL (BREO ELLIPTA) 200-25 mcg/dose DsDv diskus inhaler INHALE 1 PUFF BY MOUTH AT THE SAME TIME EVERY DAY 60 each 5    furosemide (LASIX) 20 MG tablet Take 1 tablet (20 mg total) by mouth once daily. 90 tablet 3    hydrALAZINE (APRESOLINE) 100 MG tablet Take 1 tablet (100 mg total) by mouth every 8 (eight) hours. 90 tablet 11    ketoconazole (NIZORAL) 2 % cream Apply topically.      lactobacillus comb no.10 (PROBIOTIC) 20 billion cell Cap Take 1 capsule by mouth once daily. Or as directed on bottle      metoprolol succinate (TOPROL-XL) 25 MG 24 hr tablet Take 1 tablet (25 mg total) by mouth once daily. 30 tablet 11    montelukast (SINGULAIR) 10 mg tablet Take 1 tablet (10 mg total) by mouth once daily. 30 tablet 1    multivitamin (THERAGRAN) per tablet Take 1 tablet by mouth once daily.       omeprazole (PRILOSEC) 10 MG capsule Take 10 mg by mouth once daily.      potassium chloride (KLOR-CON) 10 MEQ TbSR TK 1 T PO ONCE A DAY. 90 tablet 3    predniSONE (DELTASONE) 5 MG tablet Take 5 mg by mouth once daily.      telmisartan (MICARDIS) 80 MG Tab Take 1 tablet (80 mg total) by mouth once daily. 90 tablet 3    tiotropium bromide (SPIRIVA RESPIMAT) 2.5 mcg/actuation inhaler Inhale 2 puffs into the lungs Daily. Controller 4 g 11    TOBRADEX 0.3-0.1 % Oint       urea (CARMOL) 40 % Crea 2 (two) times daily as needed.  0     No current facility-administered medications on file prior to visit.     Review of patient's allergies indicates:   Allergen Reactions    Sulfa (sulfonamide antibiotics)      Hives    Azithromycin      Diarrhea      Cefaclor Other (See Comments)     Other reaction(s): Hives    Iodine and iodide containing products      Other reaction(s): Unknown    Ivp dye  [iodinated contrast media] Other (See Comments)    Doxycycline Rash       Objective:     Physical Exam  Eyes:      Pupils: Pupils are equal, round, and reactive to light.   Neck:      Trachea: No tracheal deviation.   Cardiovascular:      Rate and Rhythm: Normal rate and regular rhythm.      Pulses: Intact distal pulses.           Carotid pulses are 2+ on the right side and 2+ on the left side.       Radial pulses are 2+ on the right side and 2+ on the left side.        Femoral pulses are 2+ on the right side and 2+ on the left side.       Popliteal pulses are 2+ on the right side and 2+ on the left side.        Dorsalis pedis pulses are 2+ on the right side and 2+ on the left side.        Posterior tibial pulses are 2+ on the right side and 2+ on the left side.      Heart sounds: Normal heart sounds. No murmur heard.    No friction rub. No gallop.   Pulmonary:      Effort: Pulmonary effort is normal. No respiratory distress.      Breath sounds: Normal breath sounds. No stridor. No wheezing or rales.   Chest:      Chest wall: No  tenderness.   Abdominal:      General: There is no distension.      Tenderness: There is no abdominal tenderness. There is no rebound.   Musculoskeletal:         General: No tenderness.      Cervical back: Normal range of motion.   Skin:     General: Skin is warm and dry.   Neurological:      Mental Status: He is alert and oriented to person, place, and time.         Cardiac echo 02/28/2023:   The left ventricle is normal in size with concentric hypertrophy and normal systolic function.  Moderate left atrial enlargement.  Right atrial enlargement.  The estimated ejection fraction is 65%.  Grade II left ventricular diastolic dysfunction.  Normal right ventricular size with normal right ventricular systolic function.  There is moderate aortic valve stenosis.  Aortic valve area is 1.35 cm2; peak velocity is 3.81 m/s; mean gradient is 36 mmHg.  Normal central venous pressure (3 mmHg).  The estimated PA systolic pressure is 30 mmHg.    Nuclear stress test 03/17/2023:     Abnormal myocardial perfusion scan.    There is a moderate to severe intensity, small to moderate sized, reversible perfusion abnormality that is consistent with ischemia in the basal to mid inferolateral wall(s).    There are no other significant perfusion abnormalities.    The gated perfusion images showed an ejection fraction of 72% post stress.    There is normal wall motion post stress.    LV cavity size is normal at rest and normal at stress.    The ECG portion of the study is negative for ischemia.    The patient reported no chest pain during the stress test.  EKG today shows  Assessment:     1. PAF (paroxysmal atrial fibrillation)    2. Nonrheumatic aortic valve stenosis    3. Chronic obstructive pulmonary disease, unspecified COPD type    4. Other emphysema    5. Stable angina pectoris    6. Mixed hyperlipidemia    7. Encounter for laboratory testing for COVID-19 virus    8. Obstructive sleep apnea syndrome    9. Pre-op testing    10. Essential  hypertension    11. Nonrheumatic aortic (valve) stenosis    12. Abnormal cardiovascular stress test    13. Preop cardiovascular exam    14. Bilateral carotid artery stenosis        Plan:     PAF (paroxysmal atrial fibrillation)    Nonrheumatic aortic valve stenosis    Chronic obstructive pulmonary disease, unspecified COPD type    Other emphysema    Stable angina pectoris    Mixed hyperlipidemia    Encounter for laboratory testing for COVID-19 virus    Obstructive sleep apnea syndrome    Pre-op testing    Essential hypertension    Nonrheumatic aortic (valve) stenosis    Abnormal cardiovascular stress test    Preop cardiovascular exam    Bilateral carotid artery stenosis       Impression 1. Hypertension stable current medications including hydralazine beta-blocker and Micardis   2. PAF stable on Eliquis and beta-blocker , stable normal sinus rhythm right bundle-branch block is chronic.  3. Recurrent angina history of stent in the circumflex and positive nuclear stress test found heart catheterization.  Eliquis will be stopped 2 days before procedure premedication due to contrast allergy  4. Moderate aortic stenosis stable and observation

## 2023-03-27 ENCOUNTER — LAB VISIT (OUTPATIENT)
Dept: LAB | Facility: HOSPITAL | Age: 73
End: 2023-03-27
Attending: INTERNAL MEDICINE
Payer: MEDICARE

## 2023-03-27 DIAGNOSIS — I48.0 PAF (PAROXYSMAL ATRIAL FIBRILLATION): ICD-10-CM

## 2023-03-27 DIAGNOSIS — R94.39 ABNORMAL CARDIOVASCULAR STRESS TEST: ICD-10-CM

## 2023-03-27 DIAGNOSIS — I35.0 NONRHEUMATIC AORTIC VALVE STENOSIS: ICD-10-CM

## 2023-03-27 DIAGNOSIS — I35.0 NONRHEUMATIC AORTIC (VALVE) STENOSIS: ICD-10-CM

## 2023-03-27 DIAGNOSIS — I65.23 BILATERAL CAROTID ARTERY STENOSIS: ICD-10-CM

## 2023-03-27 DIAGNOSIS — Z01.810 PREOP CARDIOVASCULAR EXAM: ICD-10-CM

## 2023-03-27 DIAGNOSIS — E78.2 MIXED HYPERLIPIDEMIA: ICD-10-CM

## 2023-03-27 DIAGNOSIS — I10 ESSENTIAL HYPERTENSION: ICD-10-CM

## 2023-03-27 DIAGNOSIS — J43.8 OTHER EMPHYSEMA: ICD-10-CM

## 2023-03-27 DIAGNOSIS — Z20.822 ENCOUNTER FOR LABORATORY TESTING FOR COVID-19 VIRUS: ICD-10-CM

## 2023-03-27 DIAGNOSIS — Z01.818 PRE-OP TESTING: ICD-10-CM

## 2023-03-27 DIAGNOSIS — I20.89 STABLE ANGINA PECTORIS: ICD-10-CM

## 2023-03-27 DIAGNOSIS — J44.9 CHRONIC OBSTRUCTIVE PULMONARY DISEASE, UNSPECIFIED COPD TYPE: ICD-10-CM

## 2023-03-27 DIAGNOSIS — G47.33 OBSTRUCTIVE SLEEP APNEA SYNDROME: ICD-10-CM

## 2023-03-27 LAB
ALBUMIN SERPL BCP-MCNC: 3.8 G/DL (ref 3.5–5.2)
ALP SERPL-CCNC: 79 U/L (ref 55–135)
ALT SERPL W/O P-5'-P-CCNC: 23 U/L (ref 10–44)
ANION GAP SERPL CALC-SCNC: 8 MMOL/L (ref 8–16)
AST SERPL-CCNC: 20 U/L (ref 10–40)
BASOPHILS # BLD AUTO: 0.06 K/UL (ref 0–0.2)
BASOPHILS NFR BLD: 0.7 % (ref 0–1.9)
BILIRUB SERPL-MCNC: 0.4 MG/DL (ref 0.1–1)
BUN SERPL-MCNC: 18 MG/DL (ref 8–23)
CALCIUM SERPL-MCNC: 9.5 MG/DL (ref 8.7–10.5)
CHLORIDE SERPL-SCNC: 108 MMOL/L (ref 95–110)
CO2 SERPL-SCNC: 26 MMOL/L (ref 23–29)
CREAT SERPL-MCNC: 0.9 MG/DL (ref 0.5–1.4)
DIFFERENTIAL METHOD: ABNORMAL
EOSINOPHIL # BLD AUTO: 0.3 K/UL (ref 0–0.5)
EOSINOPHIL NFR BLD: 3.4 % (ref 0–8)
ERYTHROCYTE [DISTWIDTH] IN BLOOD BY AUTOMATED COUNT: 14.6 % (ref 11.5–14.5)
EST. GFR  (NO RACE VARIABLE): >60 ML/MIN/1.73 M^2
GLUCOSE SERPL-MCNC: 84 MG/DL (ref 70–110)
HCT VFR BLD AUTO: 44 % (ref 40–54)
HGB BLD-MCNC: 13.4 G/DL (ref 14–18)
IMM GRANULOCYTES # BLD AUTO: 0.03 K/UL (ref 0–0.04)
IMM GRANULOCYTES NFR BLD AUTO: 0.4 % (ref 0–0.5)
INR PPP: 1.1 (ref 0.8–1.2)
LYMPHOCYTES # BLD AUTO: 1.3 K/UL (ref 1–4.8)
LYMPHOCYTES NFR BLD: 15.5 % (ref 18–48)
MCH RBC QN AUTO: 27.9 PG (ref 27–31)
MCHC RBC AUTO-ENTMCNC: 30.5 G/DL (ref 32–36)
MCV RBC AUTO: 92 FL (ref 82–98)
MONOCYTES # BLD AUTO: 0.8 K/UL (ref 0.3–1)
MONOCYTES NFR BLD: 10.4 % (ref 4–15)
NEUTROPHILS # BLD AUTO: 5.6 K/UL (ref 1.8–7.7)
NEUTROPHILS NFR BLD: 69.6 % (ref 38–73)
NRBC BLD-RTO: 0 /100 WBC
PLATELET # BLD AUTO: 213 K/UL (ref 150–450)
PMV BLD AUTO: 11.5 FL (ref 9.2–12.9)
POTASSIUM SERPL-SCNC: 4.4 MMOL/L (ref 3.5–5.1)
PROT SERPL-MCNC: 6.4 G/DL (ref 6–8.4)
PROTHROMBIN TIME: 11 SEC (ref 9–12.5)
RBC # BLD AUTO: 4.8 M/UL (ref 4.6–6.2)
SODIUM SERPL-SCNC: 142 MMOL/L (ref 136–145)
WBC # BLD AUTO: 8.04 K/UL (ref 3.9–12.7)

## 2023-03-27 PROCEDURE — 36415 COLL VENOUS BLD VENIPUNCTURE: CPT | Mod: PO | Performed by: INTERNAL MEDICINE

## 2023-03-27 PROCEDURE — 85025 COMPLETE CBC W/AUTO DIFF WBC: CPT | Performed by: INTERNAL MEDICINE

## 2023-03-27 PROCEDURE — 85610 PROTHROMBIN TIME: CPT | Performed by: INTERNAL MEDICINE

## 2023-03-27 PROCEDURE — 80053 COMPREHEN METABOLIC PANEL: CPT | Performed by: INTERNAL MEDICINE

## 2023-03-28 ENCOUNTER — TELEPHONE (OUTPATIENT)
Dept: CARDIOLOGY | Facility: CLINIC | Age: 73
End: 2023-03-28
Payer: MEDICARE

## 2023-03-28 NOTE — TELEPHONE ENCOUNTER
The patient has been notified of this information and all questions answered.      ----- Message from Perry Osborne MD sent at 3/27/2023  8:51 PM CDT -----  Stable hct  ----- Message -----  From: [facility]  Sent: 3/27/2023   4:47 PM CDT  To: Perry Osborne MD

## 2023-04-03 ENCOUNTER — LAB VISIT (OUTPATIENT)
Dept: LAB | Facility: HOSPITAL | Age: 73
End: 2023-04-03
Attending: INTERNAL MEDICINE
Payer: MEDICARE

## 2023-04-03 DIAGNOSIS — I35.0 NONRHEUMATIC AORTIC (VALVE) STENOSIS: ICD-10-CM

## 2023-04-03 DIAGNOSIS — G47.33 OBSTRUCTIVE SLEEP APNEA SYNDROME: ICD-10-CM

## 2023-04-03 DIAGNOSIS — I25.110 ATHEROSCLEROSIS OF NATIVE CORONARY ARTERY OF NATIVE HEART WITH UNSTABLE ANGINA PECTORIS: ICD-10-CM

## 2023-04-03 DIAGNOSIS — I35.0 NONRHEUMATIC AORTIC VALVE STENOSIS: ICD-10-CM

## 2023-04-03 DIAGNOSIS — E78.2 MIXED HYPERLIPIDEMIA: ICD-10-CM

## 2023-04-03 DIAGNOSIS — Z01.810 PREOP CARDIOVASCULAR EXAM: ICD-10-CM

## 2023-04-03 DIAGNOSIS — J44.9 CHRONIC OBSTRUCTIVE PULMONARY DISEASE, UNSPECIFIED COPD TYPE: ICD-10-CM

## 2023-04-03 DIAGNOSIS — I65.23 BILATERAL CAROTID ARTERY STENOSIS: ICD-10-CM

## 2023-04-03 DIAGNOSIS — Z20.822 ENCOUNTER FOR LABORATORY TESTING FOR COVID-19 VIRUS: ICD-10-CM

## 2023-04-03 DIAGNOSIS — Z87.891 FORMER SMOKER: ICD-10-CM

## 2023-04-03 DIAGNOSIS — I10 ESSENTIAL HYPERTENSION: ICD-10-CM

## 2023-04-03 DIAGNOSIS — I48.0 PAF (PAROXYSMAL ATRIAL FIBRILLATION): ICD-10-CM

## 2023-04-03 DIAGNOSIS — I25.10 CORONARY ARTERY DISEASE INVOLVING NATIVE CORONARY ARTERY OF NATIVE HEART WITHOUT ANGINA PECTORIS: ICD-10-CM

## 2023-04-03 DIAGNOSIS — I20.89 STABLE ANGINA PECTORIS: ICD-10-CM

## 2023-04-03 DIAGNOSIS — R94.39 ABNORMAL CARDIOVASCULAR STRESS TEST: ICD-10-CM

## 2023-04-03 DIAGNOSIS — J43.8 OTHER EMPHYSEMA: ICD-10-CM

## 2023-04-03 DIAGNOSIS — I48.11 LONGSTANDING PERSISTENT ATRIAL FIBRILLATION: ICD-10-CM

## 2023-04-03 LAB
CHOLEST SERPL-MCNC: 156 MG/DL (ref 120–199)
CHOLEST/HDLC SERPL: 4 {RATIO} (ref 2–5)
HDLC SERPL-MCNC: 39 MG/DL (ref 40–75)
HDLC SERPL: 25 % (ref 20–50)
LDLC SERPL CALC-MCNC: 89.6 MG/DL (ref 63–159)
NONHDLC SERPL-MCNC: 117 MG/DL
TRIGL SERPL-MCNC: 137 MG/DL (ref 30–150)

## 2023-04-03 PROCEDURE — 80061 LIPID PANEL: CPT | Performed by: INTERNAL MEDICINE

## 2023-04-03 PROCEDURE — 36415 COLL VENOUS BLD VENIPUNCTURE: CPT | Mod: PO | Performed by: INTERNAL MEDICINE

## 2023-04-04 ENCOUNTER — TELEPHONE (OUTPATIENT)
Dept: CARDIOLOGY | Facility: CLINIC | Age: 73
End: 2023-04-04
Payer: MEDICARE

## 2023-04-04 ENCOUNTER — HOSPITAL ENCOUNTER (OUTPATIENT)
Facility: HOSPITAL | Age: 73
Discharge: HOME | End: 2023-04-04
Attending: INTERNAL MEDICINE | Admitting: INTERNAL MEDICINE
Payer: MEDICARE

## 2023-04-04 DIAGNOSIS — R06.02 SOB (SHORTNESS OF BREATH): ICD-10-CM

## 2023-04-04 DIAGNOSIS — I25.10 CAD, MULTIPLE VESSEL: ICD-10-CM

## 2023-04-04 DIAGNOSIS — I48.0 PAF (PAROXYSMAL ATRIAL FIBRILLATION): ICD-10-CM

## 2023-04-04 DIAGNOSIS — R94.39 ABNORMAL NUCLEAR STRESS TEST: ICD-10-CM

## 2023-04-04 DIAGNOSIS — I35.0 NONRHEUMATIC AORTIC VALVE STENOSIS: ICD-10-CM

## 2023-04-04 DIAGNOSIS — I48.11 LONGSTANDING PERSISTENT ATRIAL FIBRILLATION: ICD-10-CM

## 2023-04-04 DIAGNOSIS — R94.39 ABNORMAL CARDIOVASCULAR STRESS TEST: Primary | ICD-10-CM

## 2023-04-04 LAB — CATH EF QUANTITATIVE: 60 %

## 2023-04-04 PROCEDURE — C1894 INTRO/SHEATH, NON-LASER: HCPCS | Performed by: INTERNAL MEDICINE

## 2023-04-04 PROCEDURE — 99152 MOD SED SAME PHYS/QHP 5/>YRS: CPT | Mod: ,,, | Performed by: INTERNAL MEDICINE

## 2023-04-04 PROCEDURE — 99152 PR MOD CONSCIOUS SEDATION, SAME PHYS, 5+ YRS, FIRST 15 MIN: ICD-10-PCS | Mod: ,,, | Performed by: INTERNAL MEDICINE

## 2023-04-04 PROCEDURE — C1769 GUIDE WIRE: HCPCS | Performed by: INTERNAL MEDICINE

## 2023-04-04 PROCEDURE — 93458 L HRT ARTERY/VENTRICLE ANGIO: CPT | Mod: 26,,, | Performed by: INTERNAL MEDICINE

## 2023-04-04 PROCEDURE — 93458 L HRT ARTERY/VENTRICLE ANGIO: CPT | Performed by: INTERNAL MEDICINE

## 2023-04-04 PROCEDURE — 25000003 PHARM REV CODE 250: Performed by: INTERNAL MEDICINE

## 2023-04-04 PROCEDURE — 93458 PR CATH PLACE/CORON ANGIO, IMG SUPER/INTERP,W LEFT HEART VENTRICULOGRAPHY: ICD-10-PCS | Mod: 26,,, | Performed by: INTERNAL MEDICINE

## 2023-04-04 PROCEDURE — 27201423 OPTIME MED/SURG SUP & DEVICES STERILE SUPPLY: Performed by: INTERNAL MEDICINE

## 2023-04-04 PROCEDURE — 63600175 PHARM REV CODE 636 W HCPCS: Performed by: INTERNAL MEDICINE

## 2023-04-04 PROCEDURE — 99152 MOD SED SAME PHYS/QHP 5/>YRS: CPT | Performed by: INTERNAL MEDICINE

## 2023-04-04 RX ORDER — SODIUM CHLORIDE 9 MG/ML
INJECTION, SOLUTION INTRAVENOUS CONTINUOUS
Status: DISCONTINUED | OUTPATIENT
Start: 2023-04-04 | End: 2023-04-04 | Stop reason: HOSPADM

## 2023-04-04 RX ORDER — NITROGLYCERIN 5 MG/ML
INJECTION, SOLUTION INTRAVENOUS
Status: DISCONTINUED | OUTPATIENT
Start: 2023-04-04 | End: 2023-04-04 | Stop reason: HOSPADM

## 2023-04-04 RX ORDER — ACETAMINOPHEN 325 MG/1
650 TABLET ORAL EVERY 4 HOURS PRN
Status: DISCONTINUED | OUTPATIENT
Start: 2023-04-04 | End: 2023-04-04 | Stop reason: HOSPADM

## 2023-04-04 RX ORDER — NAPROXEN SODIUM 220 MG/1
81 TABLET, FILM COATED ORAL ONCE
Status: COMPLETED | OUTPATIENT
Start: 2023-04-04 | End: 2023-04-04

## 2023-04-04 RX ORDER — VERAPAMIL HYDROCHLORIDE 2.5 MG/ML
INJECTION, SOLUTION INTRAVENOUS
Status: DISCONTINUED | OUTPATIENT
Start: 2023-04-04 | End: 2023-04-04 | Stop reason: HOSPADM

## 2023-04-04 RX ORDER — HEPARIN SODIUM 1000 [USP'U]/ML
INJECTION INTRAVENOUS; SUBCUTANEOUS
Status: DISCONTINUED | OUTPATIENT
Start: 2023-04-04 | End: 2023-04-04 | Stop reason: HOSPADM

## 2023-04-04 RX ORDER — DIPHENHYDRAMINE HCL 50 MG
50 CAPSULE ORAL ONCE
Status: COMPLETED | OUTPATIENT
Start: 2023-04-04 | End: 2023-04-04

## 2023-04-04 RX ORDER — SODIUM CHLORIDE 0.9 % (FLUSH) 0.9 %
10 SYRINGE (ML) INJECTION
Status: DISCONTINUED | OUTPATIENT
Start: 2023-04-04 | End: 2023-04-04 | Stop reason: HOSPADM

## 2023-04-04 RX ORDER — ONDANSETRON 8 MG/1
8 TABLET, ORALLY DISINTEGRATING ORAL EVERY 8 HOURS PRN
Status: DISCONTINUED | OUTPATIENT
Start: 2023-04-04 | End: 2023-04-04 | Stop reason: HOSPADM

## 2023-04-04 RX ORDER — MIDAZOLAM HYDROCHLORIDE 1 MG/ML
INJECTION, SOLUTION INTRAMUSCULAR; INTRAVENOUS
Status: DISCONTINUED | OUTPATIENT
Start: 2023-04-04 | End: 2023-04-04 | Stop reason: HOSPADM

## 2023-04-04 RX ORDER — LIDOCAINE HYDROCHLORIDE 20 MG/ML
INJECTION, SOLUTION EPIDURAL; INFILTRATION; INTRACAUDAL; PERINEURAL
Status: DISCONTINUED | OUTPATIENT
Start: 2023-04-04 | End: 2023-04-04 | Stop reason: HOSPADM

## 2023-04-04 RX ORDER — SODIUM CHLORIDE 9 MG/ML
INJECTION, SOLUTION INTRAVENOUS CONTINUOUS
Status: ACTIVE | OUTPATIENT
Start: 2023-04-04 | End: 2023-04-04

## 2023-04-04 RX ORDER — DIAZEPAM 5 MG/1
5 TABLET ORAL
Status: DISCONTINUED | OUTPATIENT
Start: 2023-04-04 | End: 2023-04-04 | Stop reason: HOSPADM

## 2023-04-04 RX ORDER — FENTANYL CITRATE 50 UG/ML
INJECTION, SOLUTION INTRAMUSCULAR; INTRAVENOUS
Status: DISCONTINUED | OUTPATIENT
Start: 2023-04-04 | End: 2023-04-04 | Stop reason: HOSPADM

## 2023-04-04 RX ORDER — METOPROLOL TARTRATE 1 MG/ML
INJECTION, SOLUTION INTRAVENOUS
Status: DISCONTINUED | OUTPATIENT
Start: 2023-04-04 | End: 2023-04-04 | Stop reason: HOSPADM

## 2023-04-04 RX ADMIN — DIAZEPAM 5 MG: 5 TABLET ORAL at 07:04

## 2023-04-04 RX ADMIN — SODIUM CHLORIDE: 9 INJECTION, SOLUTION INTRAVENOUS at 07:04

## 2023-04-04 RX ADMIN — DIPHENHYDRAMINE HYDROCHLORIDE 50 MG: 50 CAPSULE ORAL at 07:04

## 2023-04-04 RX ADMIN — ASPIRIN 81 MG CHEWABLE TABLET 81 MG: 81 TABLET CHEWABLE at 07:04

## 2023-04-04 NOTE — TELEPHONE ENCOUNTER
Left a voicemail for patient to give him his lab results     ----- Message from Sandra Vang sent at 4/4/2023  2:46 PM CDT -----  Type:  Patient Returning Call    Who Called:pt  Who Left Message for Patient:nurse  Does the patient know what this is regarding?:yes  Would the patient rather a call back or a response via [facility]? call  Best Call Back Number:[phone]  Additional Information:

## 2023-04-04 NOTE — DISCHARGE INSTRUCTIONS
"Post-op Heart Catheterization    1. DIET: It is advisable for you to follow a diet that limits the intake of salt, sugar, saturated fats and cholesterol.     2. DRIVING: Due to sedation you received during your procedure, DO NOT drive or operate machinery for 24 hours. Avoid making critical decisions or signing legal documents until tomorrow.    3. ACTIVITY: AVOID activities that require bending of the affected arm/wrist for 3 days and submerging the site in water for 3 days. REMOVE the dressing the day after  the procedure and shower and apply a bandaid to the site. You may RESUME your normal activities or prescribed exercise program as instructed by your physician after 3 days.                                                                                                                 4. WOUND CARE: It is not unusual to have a small amount of bruising to appear at or near the puncture site. It is also common to have a tender "knot" develop beneath the skin at the puncture site of the wrist/arm. This is usually scar tissue and is not a cause for concern or alarm. This tender knot may take several weeks to fully resolve. The bruise will usually spread over several days. However, if the lump gets bigger, call your doctor immediately.    5. DISCOMFORT: For general discomfort at the puncture site, you may take 1 or 2 Acetaminophen (Tylenol) tablets every 4 - 6 hours as needed. (Do not take more than 4000 mg a day)    6. SIGNS AND SYMPTOMS TO REPORT:  Call your physician immediately if any of the following occur:                                            1. Loss of feeling, warmth or color to the affected arm/wrist                                                                                                          2. Mild beeding from the site                 3. Pain that is sudden, sharp or persistent in the affected arm/wrist                 4. Swelling or a change in "lump" size, increased redness or drainage " at the puncture site                                                                               5. High fever (101 degrees or higher)    7. GO TO  THE EMERGENCY ROOM OR CALL 911 IF YOU HAVE: Chest pains or discomforts not relieved with 3 nitroglycerin doses (sublingual tablets or spray), numbness or severe pain of limb, if your limb becomes cold or discolored or if you develop uncontrolled bleeding from the puncture site (quickly apply firm, direct pressure above the site).

## 2023-04-04 NOTE — Clinical Note
The left radial was prepped. The site was prepped with ChloraPrep. The site was clipped. The patient was draped. The patient was positioned supine. The patient was secured using safety straps and to an armboard.

## 2023-04-04 NOTE — OP NOTE
INPATIENT Operative Note         SUMMARY     Surgery Date: 4/4/2023     Surgeon(s) and Role:     * Aleksey Krishnan MD - Primary    ASSISTANT:    Pre-op Diagnosis:  Abnormal nuclear stress test [R94.39]  CAD, multiple vessel [I25.10]  Nonrheumatic aortic valve stenosis [I35.0]  PAF (paroxysmal atrial fibrillation) [I48.0]  SOB (shortness of breath) [R06.02]      Post-op Diagnosis:  Abnormal nuclear stress test [R94.39]  CAD, multiple vessel [I25.10]  Nonrheumatic aortic valve stenosis [I35.0]  PAF (paroxysmal atrial fibrillation) [I48.0]  SOB (shortness of breath) [R06.02]    Procedure(s) (LRB):  Left heart cath (Left)    COMPLICATION:none    Anesthesia: RN IV Sedation    Findings/Key Components:  Diffuse coronary calcification.  50% rca prox  Lvedp 25-28  Aov gradient 55 mmhg    Estimated Blood Loss: < 50 ML.         SPECIMEN: NONE    Devices/Prostetics: None    PLAN:   Repeat surface echo    Maximize medical therapy.

## 2023-04-04 NOTE — TELEPHONE ENCOUNTER
I have attempted without success to contact this patient by phone to discuss lab results and I left a message on answering machine.      ----- Message from Perry Osborne MD sent at 4/3/2023  6:57 PM CDT -----  Continue medications, improved lipids  ----- Message -----  From: [facility]  Sent: 4/3/2023   6:04 PM CDT  To: Perry Osborne MD

## 2023-04-04 NOTE — NURSING
Pt states has some balance issues and ambulating in room with minimal assistance. To bathroom in wheelchair to urinate without problems.

## 2023-04-04 NOTE — DISCHARGE SUMMARY
[facility] (Hospital)  Discharge Note  Short Stay    Procedure(s) (LRB):  Left heart cath (Left)      OUTCOME: Patient tolerated treatment/procedure well without complication and is now ready for discharge.    DISPOSITION: Home or Self Care    FINAL DIAGNOSIS:  Abnormal cardiovascular stress test    FOLLOWUP: In clinic    DISCHARGE INSTRUCTIONS:    Discharge Procedure Orders   Diet general     Call MD for:  temperature >100.4     Call MD for:  persistent nausea and vomiting     Call MD for:  severe uncontrolled pain     Call MD for:  difficulty breathing, headache or visual disturbances     Call MD for:  redness, tenderness, or signs of infection (pain, swelling, redness, odor or green/yellow discharge around incision site)     Call MD for:  hives     Call MD for:  persistent dizziness or light-headedness     Call MD for:  extreme fatigue        TIME SPENT ON DISCHARGE: 15  minutes

## 2023-04-04 NOTE — Clinical Note
The radial band was applied to the left radial artery. 8 cc's of air were inserted into the closure device.

## 2023-04-04 NOTE — INTERVAL H&P NOTE
The patient has been examined and the H&P has been reviewed:    I concur with the findings and no changes have occurred since H&P was written.  He is having exertional angina new onset will evaluate coronaries  Procedure risks, benefits and alternative options discussed and understood by patient/family.  I have explained the risks, benefits , and alternatives of the procedure in detail.the patient voices understanding and all questions have been answered.the patient agrees to proceed as planned.         Active Hospital Problems    Diagnosis  POA    *Abnormal cardiovascular stress test [R94.39]  Yes    Nonrheumatic aortic valve stenosis [I35.0]  Yes     -moderate AS on TTE March 2022  -followed by cardiology        Temporal arteritis [M31.6]  Yes     -followed now by retinal specialist  -s/p temporal biopsy normal, however improved with prednisone  -remains on pred 5 mg QD        PAF (paroxysmal atrial fibrillation) [I48.0]  Yes     -followed by cardiology  -intolerant of beta blocker   -eliquis for anticoagulation  -s/p cardioversion in the past, failed  -asymptomatic        Bilateral carotid artery stenosis [I65.23]  Yes     -carotid USJuly 2021- mild atherosclerotic plaquing, <50% plaquing, no hemodynamically significant stenosis        Mixed hyperlipidemia [E78.2]  Yes     Hyperlipidemia Medications               atorvastatin (LIPITOR) 10 MG tablet TK 1 T PO QD        -chronic condition. Currently stable.    -reports compliance with hyperlipidemia treatment as prescribed  -denies any known adverse effects of medications  -most recent labs listed below:  Lab Results   Component Value Date    CHOL 147 03/30/2022     Lab Results   Component Value Date    HDL 35 (L) 03/30/2022     Lab Results   Component Value Date    LDLCALC 76.2 03/30/2022     Lab Results   Component Value Date    TRIG 179 (H) 03/30/2022     Lab Results   Component Value Date    ALT 37 01/14/2022    AST 45 01/14/2022    ALKPHOS 147 (H) 01/14/2022     ANICETOITOT 0.6 01/14/2022         Essential hypertension [I10]  Yes       Hypertension Medications               furosemide (LASIX) 20 MG tablet Take 1 tablet (20 mg total) by mouth once daily.    hydrALAZINE (APRESOLINE) 100 MG tablet Take 1 tablet (100 mg total) by mouth every 8 (eight) hours.    telmisartan (MICARDIS) 80 MG Tab Take 1 tablet (80 mg total) by mouth once daily.   -above goal today but reports normal at home  -will follow up with home BP reading  -intolerant to amlodipine and beta blocker before  -continues to follow with cardiology  -continue lifestyle modification with low sodium diet and exercise   -discussed hypertension disease course and importance of treating high blood pressure  -patient understood and advised of risk of untreated blood pressure.  ER precautions were given   for symptoms of hypertensive urgency and emergency.      Moderate persistent asthma without complication [J45.40]  Yes     -managed by external pulmonology, Dr. Wright  -remains on breo and spiriva  -respiratory symptoms stable        Coronary artery disease involving native coronary artery of native heart without angina pectoris [I25.10]  Yes     -followed by cardiology  -hx of PCI with LUIS  -remains on ASA and eliquis (hx of a fib)  -also on statin  -denies recent symptoms of angina or dyspnea          Resolved Hospital Problems   No resolved problems to display.

## 2023-04-04 NOTE — TELEPHONE ENCOUNTER
The patient has been notified of this information and all questions answered.    \----- Message from Perry Osborne MD sent at 4/3/2023  6:57 PM CDT -----  Continue medications, improved lipids  ----- Message -----  From: Surendra, [facility]  Sent: 4/3/2023   6:04 PM CDT  To: Perry Osborne MD

## 2023-04-06 ENCOUNTER — TELEPHONE (OUTPATIENT)
Dept: CARDIOLOGY | Facility: CLINIC | Age: 73
End: 2023-04-06
Payer: MEDICARE

## 2023-04-06 NOTE — TELEPHONE ENCOUNTER
Patient was calling to find out if he is cleared for his upcoming surgery on 04/11/2023.  Please advise.    ----- Message from Gabriela Pratt sent at 4/6/2023 10:51 AM CDT -----  Contact: Francisco Islas is calling to speak with the nurse in regards to his biopsy. Please call him at [phone]

## 2023-04-12 VITALS
DIASTOLIC BLOOD PRESSURE: 73 MMHG | TEMPERATURE: 98 F | HEIGHT: 75 IN | HEART RATE: 65 BPM | OXYGEN SATURATION: 99 % | SYSTOLIC BLOOD PRESSURE: 151 MMHG | WEIGHT: 254 LBS | BODY MASS INDEX: 31.58 KG/M2 | RESPIRATION RATE: 15 BRPM

## 2023-04-13 ENCOUNTER — OFFICE VISIT (OUTPATIENT)
Dept: CARDIOLOGY | Facility: CLINIC | Age: 73
End: 2023-04-13
Payer: MEDICARE

## 2023-04-13 VITALS
DIASTOLIC BLOOD PRESSURE: 60 MMHG | HEART RATE: 91 BPM | WEIGHT: 254.31 LBS | SYSTOLIC BLOOD PRESSURE: 150 MMHG | BODY MASS INDEX: 31.62 KG/M2 | OXYGEN SATURATION: 95 % | HEIGHT: 75 IN

## 2023-04-13 DIAGNOSIS — Z87.891 FORMER SMOKER: ICD-10-CM

## 2023-04-13 DIAGNOSIS — I48.11 LONGSTANDING PERSISTENT ATRIAL FIBRILLATION: ICD-10-CM

## 2023-04-13 DIAGNOSIS — J44.9 CHRONIC OBSTRUCTIVE PULMONARY DISEASE, UNSPECIFIED COPD TYPE: ICD-10-CM

## 2023-04-13 DIAGNOSIS — I35.0 NONRHEUMATIC AORTIC (VALVE) STENOSIS: ICD-10-CM

## 2023-04-13 DIAGNOSIS — I35.0 NONRHEUMATIC AORTIC VALVE STENOSIS: Primary | ICD-10-CM

## 2023-04-13 DIAGNOSIS — I65.23 BILATERAL CAROTID ARTERY STENOSIS: ICD-10-CM

## 2023-04-13 DIAGNOSIS — R06.02 SOB (SHORTNESS OF BREATH): ICD-10-CM

## 2023-04-13 DIAGNOSIS — E78.2 MIXED HYPERLIPIDEMIA: ICD-10-CM

## 2023-04-13 DIAGNOSIS — G47.33 OBSTRUCTIVE SLEEP APNEA SYNDROME: ICD-10-CM

## 2023-04-13 DIAGNOSIS — I48.0 PAF (PAROXYSMAL ATRIAL FIBRILLATION): ICD-10-CM

## 2023-04-13 DIAGNOSIS — I10 ESSENTIAL HYPERTENSION: ICD-10-CM

## 2023-04-13 DIAGNOSIS — I25.110 ATHEROSCLEROSIS OF NATIVE CORONARY ARTERY OF NATIVE HEART WITH UNSTABLE ANGINA PECTORIS: ICD-10-CM

## 2023-04-13 DIAGNOSIS — I20.89 STABLE ANGINA PECTORIS: ICD-10-CM

## 2023-04-13 DIAGNOSIS — I25.10 CAD, MULTIPLE VESSEL: ICD-10-CM

## 2023-04-13 DIAGNOSIS — Z01.810 PREOP CARDIOVASCULAR EXAM: ICD-10-CM

## 2023-04-13 DIAGNOSIS — I25.10 CORONARY ARTERY DISEASE INVOLVING NATIVE CORONARY ARTERY OF NATIVE HEART WITHOUT ANGINA PECTORIS: ICD-10-CM

## 2023-04-13 PROCEDURE — 99214 PR OFFICE/OUTPT VISIT, EST, LEVL IV, 30-39 MIN: ICD-10-PCS | Mod: S$PBB,,, | Performed by: INTERNAL MEDICINE

## 2023-04-13 PROCEDURE — 99215 OFFICE O/P EST HI 40 MIN: CPT | Mod: PBBFAC,PO | Performed by: INTERNAL MEDICINE

## 2023-04-13 PROCEDURE — 99214 OFFICE O/P EST MOD 30 MIN: CPT | Mod: S$PBB,,, | Performed by: INTERNAL MEDICINE

## 2023-04-13 PROCEDURE — 99999 PR PBB SHADOW E&M-EST. PATIENT-LVL V: CPT | Mod: PBBFAC,,, | Performed by: INTERNAL MEDICINE

## 2023-04-13 PROCEDURE — 99999 PR PBB SHADOW E&M-EST. PATIENT-LVL V: ICD-10-PCS | Mod: PBBFAC,,, | Performed by: INTERNAL MEDICINE

## 2023-04-13 RX ORDER — AMLODIPINE BESYLATE 2.5 MG/1
2.5 TABLET ORAL DAILY
Qty: 30 TABLET | Refills: 11 | Status: SHIPPED | OUTPATIENT
Start: 2023-04-13 | End: 2023-04-20 | Stop reason: ALTCHOICE

## 2023-04-13 NOTE — PROGRESS NOTES
Subjective:   Patient ID:  Roshan Menard is a 73 y.o. male who presents for follow-up of No chief complaint on file.  This pleasant patient here in the office for evaluation.  History of temporal arteritis, paroxysmal atrial fibrillation and cardioversion the past with YRN.  History of essential hypertension coronary disease without angina and evidence of aortic stenosis of a mild-to-moderate degree.  This was seen on prior YRN.  Patient is a former smoker no history of drug or alcohol abuse no syncope or near syncopal episodes.  Patient has a history of hypertension on multiple medications and is on full anticoagulation due to PAF.  This visit finds patient feeling well.  His medical problems include cardiac disease with stenting of circumflex coronary artery 2 years ago repeat angiogram last year showed patent stent.  The patient has history of obesity lost over 100 lb.  Patient has cardiac disease as well including significant calcification of the aorta calcification the aortic valve and repeat echo be done.  Prior echo done last year showed evidence of at least moderate aortic stenosis.  Today's physical exam shows the patient has brief murmur but also brisk carotid upstroke which would argue against severe aortic stenosis.  Overall LV function was preserved therefore I would disagree that he has low flow state.        Today patient feels generally well.  Blood pressure is a little bit elevated and he is not taking carvedilol more than once a day.  Will discontinue the medications that he feels like he has a brain fog when he takes the medicines.  Will increase hydralazine to 25 mg q.8 hours and otherwise stable continue with Micardis.     The patient presents the office blood pressures been under higher values.  This reason will add hydralazine and now 50 mg q.8 hours he continues on Micardis 80 mg daily he was intolerant of carvedilol.  Monitor did not show significant arrhythmias.  Intermittent mild short  episodes of sinus tachycardia noted.        Patient presents the office feeling generally well.  Blood pressure still elevated will increase hydralazine 100 mg b.i.d.     Patient still is elevated blood pressure he had a trial of amlodipine from another physician and he was stop it because it did make him feel well.  I will increase hydralazine to mg 3 times a day follow-up evaluation blood pressure and less than 1 month.  Otherwise stable no acute symptoms chest pain shortness.        Is the patient is here for review of blood pressure.  Slightly elevated today however he has some mild swelling has been off Lasix and also restart that today at with double doses for the next 2 days.  Also has of toe infection on antibiotics and also has some pain in that foot.  Follow-up evaluation again within next several weeks after complete re-evaluation on all medications.  He does continue on hydralazine 3 times daily and he has good compliance with this medication.  Today the patient presents in the office and blood pressure is stable on hydralazine feels well.  Evaluation again in 3 months.  All medications reviewed and renewed as necessary.     NO FOCAL CNS SYMPTOMS OR SIGNS TO SUGGEST TIA OR STROKE  NO ANGINA OR EQUIVALENT  NO UNUSUAL GLASER. NO ORTHOPNEA OR PND  NO PALPITATIONS  NO NEAR SYNCOPE OR SYNCOPE  NO EDEMA. NO CALVE TENDERNESS     02/02/2023: Patient still has elevated blood pressure and did not take his hydralazine this morning but usually runs blood pressure 150/90 will increase the hydralazine back to 100 mg q.8 hours.  Will consider back to amlodipine in the pain as he is tried this in the past.  Patient states he sometimes has irregular heart rhythms today is heart rate is stable with sinus arrhythmia.  Will follow-up evaluation again in the next month.  Patient will have another cardiac echo to reassess heart function.        03/02/2023:   The patient has multiple issues today he had 1 episode of chest  discomfort will go ahead with a nuclear stress test SVT stent in the circumflex in the past moderate disease in the RCA and LAD 2021.    The patient has moderate to moderately severe aortic stenosis and repeat echoes now be done every 3 months as gradients are changing.    The patient also has fairly low dose Lipitor I will increase it to 40 mg daily repeat lipid profile is he needs more advanced cholesterol reduction.    Patient also has had some tachycardia and no evidence of atrial fibrillation by Holter monitor last year.  The patient will start on metoprolol XL 25 mg daily this will help with blood pressure control I will also increase the losartan now back to 100 mg 3 times daily.  All questions answered today.  The wife was present today and the daughter may call she is a nurse in [place].    The patient has to major issues 1 is coronary disease to make sure he is not having significant symptoms and nuclear stress test will be done also aortic stenosis will be rechecked as he is looking into the future to have a TAVR.    Also patient has PAF for distant past he is on anticoagulation and we need to make sure he is not having AFib in the future.           03/23/2023:   Overall patient is doing well today intermittent chest discomfort positive nuclear stress test shows inferior lateral ischemia prior.  Prior stenting of the circumflex coronary in the past paroxysmal AFib cardioversion past on Eliquis.  Clinically stable history hypertension.  Plan on heart catheterization in the near future.  Patient has contrast allergy also need to be premedicated.  Eliquis will be started several days before he is in normal sinus rhythm as this visit.  EKG today shows normal sinus rhythm.  Right bundle-branch block is chronic.    Patient continues on aspirin beta-blockers and hydralazine.  He continues on Micardis and diuretics.          04/13/2023:   Elevated blood pressure will add amlodipine 2.5 mg daily.  Heart  catheterization showed the coronary stent is open moderate disease in the other vessels plan on repeat cardiac echo to re-evaluate aortic stenosis which is more significant by heart catheterization.  Follow-up evaluation after testing is performed and may need to have TAVR sooner rather than later.  Patient was counseled to not over exert himself at this time.  The daughter was present over the phone and she  reiterated these issues.      Review of Systems   Constitutional: Negative for chills, diaphoresis, night sweats, weight gain and weight loss.   HENT:  Negative for congestion, hoarse voice, sore throat and stridor.    Eyes:  Negative for double vision and pain.   Cardiovascular:  Negative for chest pain, claudication, cyanosis, dyspnea on exertion, irregular heartbeat, leg swelling, near-syncope, orthopnea, palpitations, paroxysmal nocturnal dyspnea and syncope.   Respiratory:  Negative for cough, hemoptysis, shortness of breath, sleep disturbances due to breathing, snoring, sputum production and wheezing.    Endocrine: Negative for cold intolerance, heat intolerance and polydipsia.   Hematologic/Lymphatic: Negative for bleeding problem. Does not bruise/bleed easily.   Skin:  Negative for color change, dry skin and rash.   Musculoskeletal:  Negative for joint swelling and muscle cramps.   Gastrointestinal:  Negative for bloating, abdominal pain, constipation, diarrhea, dysphagia, melena, nausea and vomiting.   Genitourinary:  Negative for flank pain and urgency.   Neurological:  Negative for dizziness, focal weakness, headaches, light-headedness, loss of balance, seizures and weakness.   Psychiatric/Behavioral:  Negative for altered mental status and memory loss. The patient is not nervous/anxious.    Family History   Problem Relation Age of Onset    Cancer Mother     Early death Mother     Arthritis Father     Diabetes Father     Heart disease Father     Hyperlipidemia Father     Hypertension Father      Past  Medical History:   Diagnosis Date    Allergy     Anticoagulant long-term use     Arthritis     Asthma     Atrial fibrillation     Bronchitis     Cataract     COPD (chronic obstructive pulmonary disease)     Coronary artery disease     stent    General anesthetics causing adverse effect in therapeutic use     hard to awaken    GERD (gastroesophageal reflux disease)     Gout, chronic     Hypertension     Mixed hyperlipidemia      Social History     Socioeconomic History    Marital status:    Occupational History     Employer: [employer]   Tobacco Use    Smoking status: Former     Packs/day: 1.50     Years: 22.00     Pack years: 33.00     Types: Cigarettes     Quit date: 1992     Years since quittin.9    Smokeless tobacco: Never   Substance and Sexual Activity    Alcohol use: No    Drug use: No    Sexual activity: Never     Current Outpatient Medications on File Prior to Visit   Medication Sig Dispense Refill    albuterol (PROVENTIL/VENTOLIN HFA) 90 mcg/actuation inhaler Inhale 2 puffs into the lungs every 6 (six) hours as needed for Wheezing or Shortness of Breath. Rescue 18 g 11    albuterol-ipratropium (DUO-NEB) 2.5 mg-0.5 mg/3 mL nebulizer solution Take 3 mLs by nebulization every 6 (six) hours as needed for Wheezing or Shortness of Breath. Rescue 120 vial 5    allopurinoL (ZYLOPRIM) 300 MG tablet TAKE 1 TABLET BY MOUTH EVERY DAY 90 tablet 1    apixaban (ELIQUIS) 5 mg Tab Take 1 tablet (5 mg total) by mouth 2 (two) times daily. 180 tablet 3    aspirin (ECOTRIN) 81 MG EC tablet Take 81 mg by mouth every Mon, Wed, Fri.      atorvastatin (LIPITOR) 40 MG tablet TK 1 T PO QD 90 tablet 3    diphenhydrAMINE (BENADRYL) 50 MG capsule Take 1 capsule (50 mg total) by mouth every 6 (six) hours as needed for Itching. 3 capsule 1    famotidine (PEPCID) 40 MG tablet Take 1 tablet (40 mg total) by mouth once daily. 10 tablet 1    fluocinonide (LIDEX) 0.05 % gel SMARTSI-2 Gram(s) Topical Twice  Daily      fluticasone furoate-vilanteroL (BREO ELLIPTA) 200-25 mcg/dose DsDv diskus inhaler INHALE 1 PUFF BY MOUTH AT THE SAME TIME EVERY DAY 60 each 5    furosemide (LASIX) 20 MG tablet Take 1 tablet (20 mg total) by mouth once daily. 90 tablet 3    hydrALAZINE (APRESOLINE) 100 MG tablet Take 1 tablet (100 mg total) by mouth every 8 (eight) hours. 90 tablet 11    ketoconazole (NIZORAL) 2 % cream Apply topically.      lactobacillus comb no.10 (PROBIOTIC) 20 billion cell Cap Take 1 capsule by mouth once daily. Or as directed on bottle      metoprolol succinate (TOPROL-XL) 25 MG 24 hr tablet Take 1 tablet (25 mg total) by mouth once daily. 30 tablet 11    montelukast (SINGULAIR) 10 mg tablet Take 1 tablet (10 mg total) by mouth once daily. 30 tablet 1    multivitamin (THERAGRAN) per tablet Take 1 tablet by mouth once daily.      omeprazole (PRILOSEC) 10 MG capsule Take 10 mg by mouth once daily.      potassium chloride (KLOR-CON) 10 MEQ TbSR TK 1 T PO ONCE A DAY. 90 tablet 3    predniSONE (DELTASONE) 5 MG tablet Take 5 mg by mouth once daily.      predniSONE (DELTASONE) 50 MG Tab Take 1 tablet (50 mg total) by mouth 2 (two) times daily. 3 tablet 1    telmisartan (MICARDIS) 80 MG Tab Take 1 tablet (80 mg total) by mouth once daily. 90 tablet 3    tiotropium bromide (SPIRIVA RESPIMAT) 2.5 mcg/actuation inhaler Inhale 2 puffs into the lungs Daily. Controller 4 g 11    TOBRADEX 0.3-0.1 % Oint       urea (CARMOL) 40 % Crea 2 (two) times daily as needed.  0     No current facility-administered medications on file prior to visit.     Review of patient's allergies indicates:   Allergen Reactions    Sulfa (sulfonamide antibiotics)      Hives    Azithromycin      Diarrhea      Cefaclor Other (See Comments)     Other reaction(s): Hives    Iodine and iodide containing products      Other reaction(s): Unknown    Ivp dye  [iodinated contrast media] Other (See Comments)    Doxycycline Rash       Objective:     Physical Exam  Eyes:       Pupils: Pupils are equal, round, and reactive to light.   Neck:      Trachea: No tracheal deviation.   Cardiovascular:      Rate and Rhythm: Normal rate and regular rhythm.      Pulses: Intact distal pulses.           Carotid pulses are 2+ on the right side and 2+ on the left side.       Radial pulses are 2+ on the right side and 2+ on the left side.        Femoral pulses are 2+ on the right side and 2+ on the left side.       Popliteal pulses are 2+ on the right side and 2+ on the left side.        Dorsalis pedis pulses are 2+ on the right side and 2+ on the left side.        Posterior tibial pulses are 2+ on the right side and 2+ on the left side.      Heart sounds: Normal heart sounds. No murmur heard.    No friction rub. No gallop.   Pulmonary:      Effort: Pulmonary effort is normal. No respiratory distress.      Breath sounds: Normal breath sounds. No stridor. No wheezing or rales.   Chest:      Chest wall: No tenderness.   Abdominal:      General: There is no distension.      Tenderness: There is no abdominal tenderness. There is no rebound.   Musculoskeletal:         General: No tenderness.      Cervical back: Normal range of motion.   Skin:     General: Skin is warm and dry.   Neurological:      Mental Status: He is alert and oriented to person, place, and time.     Assessment:     1. SOB (shortness of breath)    2. Obstructive sleep apnea syndrome    3. Preop cardiovascular exam    4. Atherosclerosis of native coronary artery of native heart with unstable angina pectoris    5. CAD, multiple vessel    6. Longstanding persistent atrial fibrillation    7. Stable angina pectoris    8. Bilateral carotid artery stenosis    9. Nonrheumatic aortic valve stenosis    10. Mixed hyperlipidemia    11. Essential hypertension    12. Former smoker    13. Chronic obstructive pulmonary disease, unspecified COPD type    14. PAF (paroxysmal atrial fibrillation)    15. Nonrheumatic aortic (valve) stenosis    16. Coronary  artery disease involving native coronary artery of native heart without angina pectoris        Plan:     SOB (shortness of breath)    Obstructive sleep apnea syndrome    Preop cardiovascular exam    Atherosclerosis of native coronary artery of native heart with unstable angina pectoris    CAD, multiple vessel    Longstanding persistent atrial fibrillation    Stable angina pectoris    Bilateral carotid artery stenosis    Nonrheumatic aortic valve stenosis    Mixed hyperlipidemia    Essential hypertension    Former smoker    Chronic obstructive pulmonary disease, unspecified COPD type    PAF (paroxysmal atrial fibrillation)    Nonrheumatic aortic (valve) stenosis    Coronary artery disease involving native coronary artery of native heart without angina pectoris      Impression 1 CAD stable no acute change by heart catheterization.    2. Hypertension will add amlodipine to current regimen of hydralazine metoprolol   3. PAF stable on Eliquis   4. Aortic valve stenosis: All questions answered follow-up evaluation after cardiac echo for evaluation of the aortic valve.

## 2023-04-14 ENCOUNTER — TELEPHONE (OUTPATIENT)
Dept: CARDIOLOGY | Facility: CLINIC | Age: 73
End: 2023-04-14
Payer: MEDICARE

## 2023-04-14 ENCOUNTER — HOSPITAL ENCOUNTER (OUTPATIENT)
Dept: CARDIOLOGY | Facility: HOSPITAL | Age: 73
Discharge: HOME | End: 2023-04-14
Attending: INTERNAL MEDICINE
Payer: MEDICARE

## 2023-04-14 VITALS
DIASTOLIC BLOOD PRESSURE: 60 MMHG | HEIGHT: 75 IN | HEART RATE: 75 BPM | WEIGHT: 254 LBS | SYSTOLIC BLOOD PRESSURE: 150 MMHG | BODY MASS INDEX: 31.58 KG/M2

## 2023-04-14 DIAGNOSIS — G47.33 OBSTRUCTIVE SLEEP APNEA SYNDROME: ICD-10-CM

## 2023-04-14 DIAGNOSIS — I35.0 NONRHEUMATIC AORTIC (VALVE) STENOSIS: ICD-10-CM

## 2023-04-14 DIAGNOSIS — I25.110 ATHEROSCLEROSIS OF NATIVE CORONARY ARTERY OF NATIVE HEART WITH UNSTABLE ANGINA PECTORIS: ICD-10-CM

## 2023-04-14 DIAGNOSIS — I10 ESSENTIAL HYPERTENSION: ICD-10-CM

## 2023-04-14 DIAGNOSIS — I20.89 STABLE ANGINA PECTORIS: ICD-10-CM

## 2023-04-14 DIAGNOSIS — E78.2 MIXED HYPERLIPIDEMIA: ICD-10-CM

## 2023-04-14 DIAGNOSIS — R94.39 ABNORMAL CARDIOVASCULAR STRESS TEST: ICD-10-CM

## 2023-04-14 DIAGNOSIS — I25.10 CORONARY ARTERY DISEASE INVOLVING NATIVE CORONARY ARTERY OF NATIVE HEART WITHOUT ANGINA PECTORIS: ICD-10-CM

## 2023-04-14 DIAGNOSIS — Z20.822 ENCOUNTER FOR LABORATORY TESTING FOR COVID-19 VIRUS: ICD-10-CM

## 2023-04-14 DIAGNOSIS — I48.11 LONGSTANDING PERSISTENT ATRIAL FIBRILLATION: ICD-10-CM

## 2023-04-14 DIAGNOSIS — I35.0 NONRHEUMATIC AORTIC VALVE STENOSIS: Primary | ICD-10-CM

## 2023-04-14 DIAGNOSIS — J43.8 OTHER EMPHYSEMA: ICD-10-CM

## 2023-04-14 DIAGNOSIS — Z87.891 FORMER SMOKER: ICD-10-CM

## 2023-04-14 DIAGNOSIS — I35.0 NONRHEUMATIC AORTIC VALVE STENOSIS: ICD-10-CM

## 2023-04-14 DIAGNOSIS — I65.23 BILATERAL CAROTID ARTERY STENOSIS: ICD-10-CM

## 2023-04-14 DIAGNOSIS — Z01.810 PREOP CARDIOVASCULAR EXAM: ICD-10-CM

## 2023-04-14 DIAGNOSIS — I48.0 PAF (PAROXYSMAL ATRIAL FIBRILLATION): ICD-10-CM

## 2023-04-14 DIAGNOSIS — J44.9 CHRONIC OBSTRUCTIVE PULMONARY DISEASE, UNSPECIFIED COPD TYPE: ICD-10-CM

## 2023-04-14 LAB
AORTIC ROOT ANNULUS: 3.01 CM
AV INDEX (PROSTH): 0.33
AV MEAN GRADIENT: 37 MMHG
AV PEAK GRADIENT: 62 MMHG
AV VALVE AREA: 1.04 CM2
AV VELOCITY RATIO: 0.31
BSA FOR ECHO PROCEDURE: 2.47 M2
CV ECHO LV RWT: 0.44 CM
DOP CALC AO PEAK VEL: 3.94 M/S
DOP CALC AO VTI: 94 CM
DOP CALC LVOT AREA: 3.1 CM2
DOP CALC LVOT DIAMETER: 2 CM
DOP CALC LVOT PEAK VEL: 1.24 M/S
DOP CALC LVOT STROKE VOLUME: 97.65 CM3
DOP CALC RVOT PEAK VEL: 0.97 M/S
DOP CALC RVOT VTI: 22.3 CM
DOP CALCLVOT PEAK VEL VTI: 31.1 CM
E WAVE DECELERATION TIME: 269.53 MSEC
E/A RATIO: 0.89
E/E' RATIO: 14.13 M/S
ECHO LV POSTERIOR WALL: 1.09 CM (ref 0.6–1.1)
EJECTION FRACTION: 65 %
FRACTIONAL SHORTENING: 29 % (ref 28–44)
INTERVENTRICULAR SEPTUM: 1.12 CM (ref 0.6–1.1)
IVC DIAMETER: 1.65 CM
IVRT: 57.09 MSEC
LA MAJOR: 5.33 CM
LA MINOR: 5.41 CM
LA WIDTH: 4.3 CM
LEFT ATRIUM SIZE: 4.25 CM
LEFT ATRIUM VOLUME INDEX MOD: 28.3 ML/M2
LEFT ATRIUM VOLUME INDEX: 34.3 ML/M2
LEFT ATRIUM VOLUME MOD: 68.74 CM3
LEFT ATRIUM VOLUME: 83.41 CM3
LEFT INTERNAL DIMENSION IN SYSTOLE: 3.48 CM (ref 2.1–4)
LEFT VENTRICLE DIASTOLIC VOLUME INDEX: 46.6 ML/M2
LEFT VENTRICLE DIASTOLIC VOLUME: 113.25 ML
LEFT VENTRICLE MASS INDEX: 83 G/M2
LEFT VENTRICLE SYSTOLIC VOLUME INDEX: 20.7 ML/M2
LEFT VENTRICLE SYSTOLIC VOLUME: 50.3 ML
LEFT VENTRICULAR INTERNAL DIMENSION IN DIASTOLE: 4.91 CM (ref 3.5–6)
LEFT VENTRICULAR MASS: 202.42 G
LV LATERAL E/E' RATIO: 14.13 M/S
LV SEPTAL E/E' RATIO: 14.13 M/S
LVOT MG: 3.51 MMHG
LVOT MV: 0.89 CM/S
MV PEAK A VEL: 1.27 M/S
MV PEAK E VEL: 1.13 M/S
MV STENOSIS PRESSURE HALF TIME: 78.16 MS
MV VALVE AREA P 1/2 METHOD: 2.81 CM2
PISA TR MAX VEL: 3.13 M/S
PULM VEIN S/D RATIO: 1.27
PV MEAN GRADIENT: 2.01 MMHG
PV PEAK D VEL: 0.64 M/S
PV PEAK S VEL: 0.81 M/S
PV PEAK VELOCITY: 1.51 CM/S
RA MAJOR: 3.88 CM
RA PRESSURE: 3 MMHG
RA WIDTH: 3.27 CM
RIGHT VENTRICULAR END-DIASTOLIC DIMENSION: 3.9 CM
SINUS: 2.85 CM
STJ: 2.25 CM
TDI LATERAL: 0.08 M/S
TDI SEPTAL: 0.08 M/S
TDI: 0.08 M/S
TR MAX PG: 39 MMHG
TRICUSPID ANNULAR PLANE SYSTOLIC EXCURSION: 2.72 CM
TV REST PULMONARY ARTERY PRESSURE: 42 MMHG

## 2023-04-14 PROCEDURE — 93306 ECHO (CUPID ONLY): ICD-10-PCS | Mod: 26,,, | Performed by: INTERNAL MEDICINE

## 2023-04-14 PROCEDURE — 93306 TTE W/DOPPLER COMPLETE: CPT | Mod: 26,,, | Performed by: INTERNAL MEDICINE

## 2023-04-14 PROCEDURE — 93306 TTE W/DOPPLER COMPLETE: CPT

## 2023-04-14 NOTE — TELEPHONE ENCOUNTER
The patient has been notified of this information and all questions answered.      ----- Message from Perry Osborne MD sent at 4/14/2023  4:38 PM CDT -----  I called the patient results and referred him to [facility] with  can make sure that gets in to see him in the next month, thank you  ----- Message -----  From: Aleksey Krishnan MD  Sent: 4/14/2023   4:19 PM CDT  To: Perry Osborne MD

## 2023-04-17 ENCOUNTER — EDUCATION (OUTPATIENT)
Dept: CARDIOLOGY | Facility: CLINIC | Age: 73
End: 2023-04-17
Payer: MEDICARE

## 2023-04-17 ENCOUNTER — PATIENT MESSAGE (OUTPATIENT)
Dept: CARDIOLOGY | Facility: CLINIC | Age: 73
End: 2023-04-17
Payer: MEDICARE

## 2023-04-17 ENCOUNTER — LAB VISIT (OUTPATIENT)
Dept: LAB | Facility: HOSPITAL | Age: 73
End: 2023-04-17
Attending: INTERNAL MEDICINE
Payer: MEDICARE

## 2023-04-17 DIAGNOSIS — I25.10 CORONARY ARTERY DISEASE INVOLVING NATIVE CORONARY ARTERY OF NATIVE HEART WITHOUT ANGINA PECTORIS: ICD-10-CM

## 2023-04-17 DIAGNOSIS — I35.0 NONRHEUMATIC AORTIC VALVE STENOSIS: ICD-10-CM

## 2023-04-17 DIAGNOSIS — I25.10 CORONARY ARTERY DISEASE INVOLVING NATIVE CORONARY ARTERY OF NATIVE HEART WITHOUT ANGINA PECTORIS: Primary | ICD-10-CM

## 2023-04-17 DIAGNOSIS — I99.8 OTHER DISORDER OF CIRCULATORY SYSTEM: ICD-10-CM

## 2023-04-17 LAB
APTT PPP: 29.8 SEC (ref 21–32)
ERYTHROCYTE [DISTWIDTH] IN BLOOD BY AUTOMATED COUNT: 14.3 % (ref 11.5–14.5)
HCT VFR BLD AUTO: 46 % (ref 40–54)
HGB BLD-MCNC: 14.8 G/DL (ref 14–18)
INR PPP: 1.1 (ref 0.8–1.2)
MCH RBC QN AUTO: 28.5 PG (ref 27–31)
MCHC RBC AUTO-ENTMCNC: 32.2 G/DL (ref 32–36)
MCV RBC AUTO: 89 FL (ref 82–98)
PLATELET # BLD AUTO: 247 K/UL (ref 150–450)
PMV BLD AUTO: 10.7 FL (ref 9.2–12.9)
PROTHROMBIN TIME: 11.1 SEC (ref 9–12.5)
RBC # BLD AUTO: 5.2 M/UL (ref 4.6–6.2)
WBC # BLD AUTO: 10.89 K/UL (ref 3.9–12.7)

## 2023-04-17 PROCEDURE — 80048 BASIC METABOLIC PNL TOTAL CA: CPT | Performed by: INTERNAL MEDICINE

## 2023-04-17 PROCEDURE — 85027 COMPLETE CBC AUTOMATED: CPT | Mod: PO | Performed by: INTERNAL MEDICINE

## 2023-04-17 PROCEDURE — 85730 THROMBOPLASTIN TIME PARTIAL: CPT | Performed by: INTERNAL MEDICINE

## 2023-04-17 PROCEDURE — 82040 ASSAY OF SERUM ALBUMIN: CPT | Performed by: INTERNAL MEDICINE

## 2023-04-17 PROCEDURE — 36415 COLL VENOUS BLD VENIPUNCTURE: CPT | Mod: PO | Performed by: INTERNAL MEDICINE

## 2023-04-17 PROCEDURE — 85610 PROTHROMBIN TIME: CPT | Performed by: INTERNAL MEDICINE

## 2023-04-17 RX ORDER — PREDNISONE 50 MG/1
50 TABLET ORAL EVERY 6 HOURS
Qty: 3 TABLET | Refills: 0 | Status: SHIPPED | OUTPATIENT
Start: 2023-04-17 | End: 2023-04-18

## 2023-04-17 RX ORDER — DIPHENHYDRAMINE HCL 50 MG
50 CAPSULE ORAL EVERY 6 HOURS
Qty: 3 CAPSULE | Refills: 0 | Status: SHIPPED | OUTPATIENT
Start: 2023-04-17 | End: 2023-04-18

## 2023-04-17 RX ORDER — CIMETIDINE 300 MG/1
300 TABLET, FILM COATED ORAL EVERY 6 HOURS
Qty: 3 TABLET | Refills: 0 | Status: SHIPPED | OUTPATIENT
Start: 2023-04-17 | End: 2023-05-03

## 2023-04-17 NOTE — PROGRESS NOTES
[facility]  Cardiac Catheterization Laboratory    It has been suggested by your doctor that you have an angiogram and/or angioplasty.  Because you are sensitive to Iodine dye and dye is used to perform the test, your doctor would like for you to take several medications before the procedure to help prevent a severe reaction.    Prescriptions for Benadryl, Prednisone, and Tagamet will be prescribed for you.  You will be given 3 tablets of each.  If you are allergic to any of these, please notify your physician immediately.    Premedication Schedule:    At 6 pm the night before take:   Benadryl 50 mg   Prednisone 50 mg   Tagamet 300 mg    At 11 pm the night before take:   Benadryl 50 mg   Prednisone 50 mg   Tagamet 300 mg    At 6 am the morning of the procedure take:   Benadryl 50 mg   Prednisone 50 mg   Tagamet 300 mg

## 2023-04-18 LAB
ALBUMIN SERPL BCP-MCNC: 3.9 G/DL (ref 3.5–5.2)
ANION GAP SERPL CALC-SCNC: 8 MMOL/L (ref 8–16)
BUN SERPL-MCNC: 16 MG/DL (ref 8–23)
CALCIUM SERPL-MCNC: 9.5 MG/DL (ref 8.7–10.5)
CHLORIDE SERPL-SCNC: 106 MMOL/L (ref 95–110)
CO2 SERPL-SCNC: 26 MMOL/L (ref 23–29)
CREAT SERPL-MCNC: 1.1 MG/DL (ref 0.5–1.4)
EST. GFR  (NO RACE VARIABLE): >60 ML/MIN/1.73 M^2
GLUCOSE SERPL-MCNC: 108 MG/DL (ref 70–110)
POTASSIUM SERPL-SCNC: 4.7 MMOL/L (ref 3.5–5.1)
SODIUM SERPL-SCNC: 140 MMOL/L (ref 136–145)

## 2023-04-19 ENCOUNTER — HOSPITAL ENCOUNTER (OUTPATIENT)
Dept: RADIOLOGY | Facility: HOSPITAL | Age: 73
Discharge: HOME | End: 2023-04-19
Attending: INTERNAL MEDICINE
Payer: MEDICARE

## 2023-04-19 DIAGNOSIS — I35.0 NONRHEUMATIC AORTIC VALVE STENOSIS: ICD-10-CM

## 2023-04-19 DIAGNOSIS — I25.10 CORONARY ARTERY DISEASE INVOLVING NATIVE CORONARY ARTERY OF NATIVE HEART WITHOUT ANGINA PECTORIS: ICD-10-CM

## 2023-04-19 PROCEDURE — 71275 CT ANGIOGRAPHY CHEST: CPT | Mod: 26,,, | Performed by: RADIOLOGY

## 2023-04-19 PROCEDURE — 74174 CTA ABD&PLVS W/CONTRAST: CPT | Mod: TC

## 2023-04-19 PROCEDURE — 25500020 PHARM REV CODE 255: Performed by: INTERNAL MEDICINE

## 2023-04-19 PROCEDURE — 74174 CTA ABD&PLVS W/CONTRAST: CPT | Mod: 26,,, | Performed by: RADIOLOGY

## 2023-04-19 PROCEDURE — 71275 CTA CARDIAC TAVR_PARTNERS (XPD): ICD-10-PCS | Mod: 26,,, | Performed by: RADIOLOGY

## 2023-04-19 PROCEDURE — 71275 CT ANGIOGRAPHY CHEST: CPT | Mod: TC

## 2023-04-19 PROCEDURE — 74174 CTA CARDIAC TAVR_PARTNERS (XPD): ICD-10-PCS | Mod: 26,,, | Performed by: RADIOLOGY

## 2023-04-19 RX ADMIN — IOHEXOL 150 ML: 350 INJECTION, SOLUTION INTRAVENOUS at 07:04

## 2023-04-20 ENCOUNTER — OFFICE VISIT (OUTPATIENT)
Dept: CARDIOLOGY | Facility: CLINIC | Age: 73
End: 2023-04-20
Payer: MEDICARE

## 2023-04-20 VITALS
HEIGHT: 75 IN | DIASTOLIC BLOOD PRESSURE: 78 MMHG | BODY MASS INDEX: 31.47 KG/M2 | HEART RATE: 82 BPM | SYSTOLIC BLOOD PRESSURE: 160 MMHG | WEIGHT: 253.06 LBS | OXYGEN SATURATION: 97 %

## 2023-04-20 DIAGNOSIS — I25.110 ATHEROSCLEROSIS OF NATIVE CORONARY ARTERY OF NATIVE HEART WITH UNSTABLE ANGINA PECTORIS: ICD-10-CM

## 2023-04-20 DIAGNOSIS — I35.0 NONRHEUMATIC AORTIC VALVE STENOSIS: ICD-10-CM

## 2023-04-20 DIAGNOSIS — I10 ESSENTIAL HYPERTENSION: ICD-10-CM

## 2023-04-20 DIAGNOSIS — I48.11 LONGSTANDING PERSISTENT ATRIAL FIBRILLATION: ICD-10-CM

## 2023-04-20 DIAGNOSIS — I48.0 PAF (PAROXYSMAL ATRIAL FIBRILLATION): ICD-10-CM

## 2023-04-20 DIAGNOSIS — Z01.810 PREOP CARDIOVASCULAR EXAM: ICD-10-CM

## 2023-04-20 DIAGNOSIS — J43.8 OTHER EMPHYSEMA: ICD-10-CM

## 2023-04-20 DIAGNOSIS — I35.0 NONRHEUMATIC AORTIC (VALVE) STENOSIS: ICD-10-CM

## 2023-04-20 DIAGNOSIS — Z87.891 FORMER SMOKER: ICD-10-CM

## 2023-04-20 DIAGNOSIS — G47.33 OBSTRUCTIVE SLEEP APNEA SYNDROME: ICD-10-CM

## 2023-04-20 DIAGNOSIS — E78.2 MIXED HYPERLIPIDEMIA: ICD-10-CM

## 2023-04-20 DIAGNOSIS — I99.8 OTHER DISORDER OF CIRCULATORY SYSTEM: ICD-10-CM

## 2023-04-20 DIAGNOSIS — J44.9 CHRONIC OBSTRUCTIVE PULMONARY DISEASE, UNSPECIFIED COPD TYPE: ICD-10-CM

## 2023-04-20 DIAGNOSIS — Z20.822 ENCOUNTER FOR LABORATORY TESTING FOR COVID-19 VIRUS: ICD-10-CM

## 2023-04-20 DIAGNOSIS — I65.23 BILATERAL CAROTID ARTERY STENOSIS: ICD-10-CM

## 2023-04-20 DIAGNOSIS — I25.10 CORONARY ARTERY DISEASE INVOLVING NATIVE CORONARY ARTERY OF NATIVE HEART WITHOUT ANGINA PECTORIS: Primary | ICD-10-CM

## 2023-04-20 DIAGNOSIS — I20.89 STABLE ANGINA PECTORIS: ICD-10-CM

## 2023-04-20 PROCEDURE — 99214 PR OFFICE/OUTPT VISIT, EST, LEVL IV, 30-39 MIN: ICD-10-PCS | Mod: S$PBB,,, | Performed by: INTERNAL MEDICINE

## 2023-04-20 PROCEDURE — 99999 PR PBB SHADOW E&M-EST. PATIENT-LVL V: CPT | Mod: PBBFAC,,, | Performed by: INTERNAL MEDICINE

## 2023-04-20 PROCEDURE — 99214 OFFICE O/P EST MOD 30 MIN: CPT | Mod: S$PBB,,, | Performed by: INTERNAL MEDICINE

## 2023-04-20 PROCEDURE — 99999 PR PBB SHADOW E&M-EST. PATIENT-LVL V: ICD-10-PCS | Mod: PBBFAC,,, | Performed by: INTERNAL MEDICINE

## 2023-04-20 PROCEDURE — 99215 OFFICE O/P EST HI 40 MIN: CPT | Mod: PBBFAC,PO | Performed by: INTERNAL MEDICINE

## 2023-04-20 RX ORDER — METOPROLOL SUCCINATE 25 MG/1
50 TABLET, EXTENDED RELEASE ORAL DAILY
Qty: 60 TABLET | Refills: 11 | Status: SHIPPED | OUTPATIENT
Start: 2023-04-20 | End: 2023-05-18

## 2023-04-20 NOTE — PROGRESS NOTES
Subjective:   Patient ID:  Roshan Menard is a 73 y.o. male who presents for follow-up of No chief complaint on file.    This pleasant patient here in the office for evaluation.  History of temporal arteritis, paroxysmal atrial fibrillation and cardioversion the past with YRN.  History of essential hypertension coronary disease without angina and evidence of aortic stenosis of a mild-to-moderate degree.  This was seen on prior YRN.  Patient is a former smoker no history of drug or alcohol abuse no syncope or near syncopal episodes.  Patient has a history of hypertension on multiple medications and is on full anticoagulation due to PAF.  This visit finds patient feeling well.  His medical problems include cardiac disease with stenting of circumflex coronary artery 2 years ago repeat angiogram last year showed patent stent.  The patient has history of obesity lost over 100 lb.  Patient has cardiac disease as well including significant calcification of the aorta calcification the aortic valve and repeat echo be done.  Prior echo done last year showed evidence of at least moderate aortic stenosis.  Today's physical exam shows the patient has brief murmur but also brisk carotid upstroke which would argue against severe aortic stenosis.  Overall LV function was preserved therefore I would disagree that he has low flow state.        Today patient feels generally well.  Blood pressure is a little bit elevated and he is not taking carvedilol more than once a day.  Will discontinue the medications that he feels like he has a brain fog when he takes the medicines.  Will increase hydralazine to 25 mg q.8 hours and otherwise stable continue with Micardis.     The patient presents the office blood pressures been under higher values.  This reason will add hydralazine and now 50 mg q.8 hours he continues on Micardis 80 mg daily he was intolerant of carvedilol.  Monitor did not show significant arrhythmias.  Intermittent mild short  episodes of sinus tachycardia noted.        Patient presents the office feeling generally well.  Blood pressure still elevated will increase hydralazine 100 mg b.i.d.     Patient still is elevated blood pressure he had a trial of amlodipine from another physician and he was stop it because it did make him feel well.  I will increase hydralazine to mg 3 times a day follow-up evaluation blood pressure and less than 1 month.  Otherwise stable no acute symptoms chest pain shortness.        Is the patient is here for review of blood pressure.  Slightly elevated today however he has some mild swelling has been off Lasix and also restart that today at with double doses for the next 2 days.  Also has of toe infection on antibiotics and also has some pain in that foot.  Follow-up evaluation again within next several weeks after complete re-evaluation on all medications.  He does continue on hydralazine 3 times daily and he has good compliance with this medication.  Today the patient presents in the office and blood pressure is stable on hydralazine feels well.  Evaluation again in 3 months.  All medications reviewed and renewed as necessary.     NO FOCAL CNS SYMPTOMS OR SIGNS TO SUGGEST TIA OR STROKE  NO ANGINA OR EQUIVALENT  NO UNUSUAL GLASER. NO ORTHOPNEA OR PND  NO PALPITATIONS  NO NEAR SYNCOPE OR SYNCOPE  NO EDEMA. NO CALVE TENDERNESS     02/02/2023: Patient still has elevated blood pressure and did not take his hydralazine this morning but usually runs blood pressure 150/90 will increase the hydralazine back to 100 mg q.8 hours.  Will consider back to amlodipine in the pain as he is tried this in the past.  Patient states he sometimes has irregular heart rhythms today is heart rate is stable with sinus arrhythmia.  Will follow-up evaluation again in the next month.  Patient will have another cardiac echo to reassess heart function.     04/20/2023:   Overall patient is stable he fell scraped his left arm and will follow up  to make sure he does not have infection.    Patient's CT scan prior to seeing Dr.TAFUR Yao.  Clinically stable there is no evidence of significant abnormalities seen with small nodules in lungs and will follow those clinically and re-evaluate in 8 months to a year.    Otherwise stable patient mild edema with amlodipine discontinued will double up on the Toprol XL to 50 mg daily for blood pressure control and heart rate control continue other medications.  Clinically stable no edema mild wheezing today he needs take his inhalers today.      Review of Systems   Constitutional: Negative for chills, diaphoresis, night sweats, weight gain and weight loss.   HENT:  Negative for congestion, hoarse voice, sore throat and stridor.    Eyes:  Negative for double vision and pain.   Cardiovascular:  Negative for chest pain, claudication, cyanosis, dyspnea on exertion, irregular heartbeat, leg swelling, near-syncope, orthopnea, palpitations, paroxysmal nocturnal dyspnea and syncope.   Respiratory:  Negative for cough, hemoptysis, shortness of breath, sleep disturbances due to breathing, snoring, sputum production and wheezing.    Endocrine: Negative for cold intolerance, heat intolerance and polydipsia.   Hematologic/Lymphatic: Negative for bleeding problem. Does not bruise/bleed easily.   Skin:  Negative for color change, dry skin and rash.   Musculoskeletal:  Negative for joint swelling and muscle cramps.   Gastrointestinal:  Negative for bloating, abdominal pain, constipation, diarrhea, dysphagia, melena, nausea and vomiting.   Genitourinary:  Negative for flank pain and urgency.   Neurological:  Negative for dizziness, focal weakness, headaches, light-headedness, loss of balance, seizures and weakness.   Psychiatric/Behavioral:  Negative for altered mental status and memory loss. The patient is not nervous/anxious.    Family History   Problem Relation Age of Onset    Cancer Mother     Early death Mother     Arthritis  Father     Diabetes Father     Heart disease Father     Hyperlipidemia Father     Hypertension Father      Past Medical History:   Diagnosis Date    Allergy     Anticoagulant long-term use     Arthritis     Asthma     Atrial fibrillation     Bronchitis     Cataract     COPD (chronic obstructive pulmonary disease)     Coronary artery disease     stent    General anesthetics causing adverse effect in therapeutic use     hard to awaken    GERD (gastroesophageal reflux disease)     Gout, chronic     Hypertension     Mixed hyperlipidemia      Social History     Socioeconomic History    Marital status:    Occupational History     Employer: [employer]   Tobacco Use    Smoking status: Former     Packs/day: 1.50     Years: 22.00     Pack years: 33.00     Types: Cigarettes     Quit date: 1992     Years since quittin.9    Smokeless tobacco: Never   Substance and Sexual Activity    Alcohol use: No    Drug use: No    Sexual activity: Never     Current Outpatient Medications on File Prior to Visit   Medication Sig Dispense Refill    albuterol (PROVENTIL/VENTOLIN HFA) 90 mcg/actuation inhaler Inhale 2 puffs into the lungs every 6 (six) hours as needed for Wheezing or Shortness of Breath. Rescue 18 g 11    albuterol-ipratropium (DUO-NEB) 2.5 mg-0.5 mg/3 mL nebulizer solution Take 3 mLs by nebulization every 6 (six) hours as needed for Wheezing or Shortness of Breath. Rescue 120 vial 5    allopurinoL (ZYLOPRIM) 300 MG tablet TAKE 1 TABLET BY MOUTH EVERY DAY 90 tablet 1    amLODIPine (NORVASC) 2.5 MG tablet Take 1 tablet (2.5 mg total) by mouth once daily. 30 tablet 11    apixaban (ELIQUIS) 5 mg Tab Take 1 tablet (5 mg total) by mouth 2 (two) times daily. 180 tablet 3    aspirin (ECOTRIN) 81 MG EC tablet Take 81 mg by mouth every Mon, Wed, Fri.      atorvastatin (LIPITOR) 40 MG tablet TK 1 T PO QD 90 tablet 3    cimetidine (TAGAMET) 300 MG tablet Take 1 tablet (300 mg total) by mouth every 6 (six)  hours. Starting night before procedure. Take as directed on your instruction sheet. for 3 doses 3 tablet 0    diphenhydrAMINE (BENADRYL) 50 MG capsule Take 1 capsule (50 mg total) by mouth every 6 (six) hours as needed for Itching. 3 capsule 1    [] diphenhydrAMINE (BENADRYL) 50 MG capsule Take 1 capsule (50 mg total) by mouth every 6 (six) hours. Starting night before procedure. Take as directed on your instruction sheet. for 3 doses 3 capsule 0    famotidine (PEPCID) 40 MG tablet Take 1 tablet (40 mg total) by mouth once daily. 10 tablet 1    fluocinonide (LIDEX) 0.05 % gel SMARTSI-2 Gram(s) Topical Twice Daily      fluticasone furoate-vilanteroL (BREO ELLIPTA) 200-25 mcg/dose DsDv diskus inhaler INHALE 1 PUFF BY MOUTH AT THE SAME TIME EVERY DAY 60 each 5    furosemide (LASIX) 20 MG tablet Take 1 tablet (20 mg total) by mouth once daily. 90 tablet 3    hydrALAZINE (APRESOLINE) 100 MG tablet Take 1 tablet (100 mg total) by mouth every 8 (eight) hours. 90 tablet 11    ketoconazole (NIZORAL) 2 % cream Apply topically.      lactobacillus comb no.10 (PROBIOTIC) 20 billion cell Cap Take 1 capsule by mouth once daily. Or as directed on bottle      metoprolol succinate (TOPROL-XL) 25 MG 24 hr tablet Take 1 tablet (25 mg total) by mouth once daily. 30 tablet 11    montelukast (SINGULAIR) 10 mg tablet Take 1 tablet (10 mg total) by mouth once daily. 30 tablet 1    multivitamin (THERAGRAN) per tablet Take 1 tablet by mouth once daily.      omeprazole (PRILOSEC) 10 MG capsule Take 10 mg by mouth once daily.      potassium chloride (KLOR-CON) 10 MEQ TbSR TK 1 T PO ONCE A DAY. 90 tablet 3    predniSONE (DELTASONE) 5 MG tablet Take 5 mg by mouth once daily.      predniSONE (DELTASONE) 50 MG Tab Take 1 tablet (50 mg total) by mouth 2 (two) times daily. 3 tablet 1    [] predniSONE (DELTASONE) 50 MG Tab Take 1 tablet (50 mg total) by mouth every 6 (six) hours. Starting night before procedure. Take as directed on  your instruction sheet. for 3 doses 3 tablet 0    telmisartan (MICARDIS) 80 MG Tab Take 1 tablet (80 mg total) by mouth once daily. 90 tablet 3    tiotropium bromide (SPIRIVA RESPIMAT) 2.5 mcg/actuation inhaler Inhale 2 puffs into the lungs Daily. Controller 4 g 11    TOBRADEX 0.3-0.1 % Oint       urea (CARMOL) 40 % Crea 2 (two) times daily as needed.  0     Current Facility-Administered Medications on File Prior to Visit   Medication Dose Route Frequency Provider Last Rate Last Admin    [COMPLETED] iohexoL (OMNIPAQUE 350) injection 150 mL  150 mL Intravenous ONCE PRN Luis Enrique Ji MD   150 mL at 04/19/23 0745     Review of patient's allergies indicates:   Allergen Reactions    Sulfa (sulfonamide antibiotics)      Hives    Azithromycin      Diarrhea      Cefaclor Other (See Comments)     Other reaction(s): Hives    Iodine and iodide containing products      Other reaction(s): Unknown    Ivp dye  [iodinated contrast media] Other (See Comments)    Doxycycline Rash       Objective:     Physical Exam  Eyes:      Pupils: Pupils are equal, round, and reactive to light.   Neck:      Trachea: No tracheal deviation.   Cardiovascular:      Rate and Rhythm: Normal rate and regular rhythm.      Pulses: Intact distal pulses.           Carotid pulses are 2+ on the right side and 2+ on the left side.       Radial pulses are 2+ on the right side and 2+ on the left side.        Femoral pulses are 2+ on the right side and 2+ on the left side.       Popliteal pulses are 2+ on the right side and 2+ on the left side.        Dorsalis pedis pulses are 2+ on the right side and 2+ on the left side.        Posterior tibial pulses are 2+ on the right side and 2+ on the left side.      Heart sounds: Murmur heard.   Harsh midsystolic murmur is present with a grade of 3/6 at the upper right sternal border radiating to the neck.     No friction rub. No gallop.   Pulmonary:      Effort: Pulmonary effort is normal. No respiratory distress.       Breath sounds: Normal breath sounds. No stridor. No wheezing or rales.   Chest:      Chest wall: No tenderness.   Abdominal:      General: There is no distension.      Tenderness: There is no abdominal tenderness. There is no rebound.   Musculoskeletal:         General: No tenderness.      Cervical back: Normal range of motion.   Skin:     General: Skin is warm and dry.   Neurological:      Mental Status: He is alert and oriented to person, place, and time.     Assessment:     1. Coronary artery disease involving native coronary artery of native heart without angina pectoris    2. Nonrheumatic aortic (valve) stenosis    3. Longstanding persistent atrial fibrillation    4. Atherosclerosis of native coronary artery of native heart with unstable angina pectoris    5. Nonrheumatic aortic valve stenosis    6. Essential hypertension    7. Other emphysema    8. Bilateral carotid artery stenosis    9. Stable angina pectoris    10. Other disorder of circulatory system    11. Chronic obstructive pulmonary disease, unspecified COPD type    12. Obstructive sleep apnea syndrome    13. PAF (paroxysmal atrial fibrillation)        Plan:     Coronary artery disease involving native coronary artery of native heart without angina pectoris    Nonrheumatic aortic (valve) stenosis    Longstanding persistent atrial fibrillation    Atherosclerosis of native coronary artery of native heart with unstable angina pectoris    Nonrheumatic aortic valve stenosis    Essential hypertension    Other emphysema    Bilateral carotid artery stenosis    Stable angina pectoris    Other disorder of circulatory system    Chronic obstructive pulmonary disease, unspecified COPD type    Obstructive sleep apnea syndrome    PAF (paroxysmal atrial fibrillation)      Impression aortic stenosis significant and evaluation for TAVR.    2. Hypertension will increase beta-blocker continue with hydralazine and Micardis.    3. Angina pectoris stable   4. Peripheral  vascular disease stable   5 History of emphysema mild wheezing he will take his inhalers when he goes home otherwise stable   6. Mechanical fall and abrasion on left elbow and will let us know if there is any evidence of infection at this point wet-to-dry dressings and stable.  Follow-up after eating with [facility].  Patient will keep me where his symptoms and these any evidence of edema or acute shortness of breath.

## 2023-04-25 DIAGNOSIS — I35.0 SEVERE AORTIC STENOSIS: Primary | ICD-10-CM

## 2023-04-25 DIAGNOSIS — I99.8 OTHER DISORDER OF CIRCULATORY SYSTEM: ICD-10-CM

## 2023-04-25 RX ORDER — DIPHENHYDRAMINE HCL 50 MG
50 CAPSULE ORAL ONCE
Status: CANCELLED | OUTPATIENT
Start: 2023-04-25 | End: 2023-04-25

## 2023-04-25 RX ORDER — SODIUM CHLORIDE 0.9 % (FLUSH) 0.9 %
10 SYRINGE (ML) INJECTION
Status: SHIPPED | OUTPATIENT
Start: 2023-04-25

## 2023-04-25 RX ORDER — SODIUM CHLORIDE 9 MG/ML
INJECTION, SOLUTION INTRAVENOUS CONTINUOUS
Status: CANCELLED | OUTPATIENT
Start: 2023-04-25 | End: 2023-04-25

## 2023-04-28 DIAGNOSIS — Z00.6 EXAMINATION OF PARTICIPANT IN CLINICAL TRIAL: ICD-10-CM

## 2023-04-28 DIAGNOSIS — I10 ESSENTIAL HYPERTENSION: Primary | ICD-10-CM

## 2023-04-28 DIAGNOSIS — I35.0 AORTIC VALVE STENOSIS, ETIOLOGY OF CARDIAC VALVE DISEASE UNSPECIFIED: Primary | ICD-10-CM

## 2023-05-02 PROBLEM — E11.51 TYPE 2 DIABETES MELLITUS WITH DIABETIC PERIPHERAL ANGIOPATHY WITHOUT GANGRENE, UNSPECIFIED WHETHER LONG TERM INSULIN USE: Status: ACTIVE | Noted: 2023-05-02

## 2023-05-02 PROBLEM — I50.32 CHRONIC DIASTOLIC HEART FAILURE: Status: ACTIVE | Noted: 2023-05-02

## 2023-05-02 NOTE — ASSESSMENT & PLAN NOTE
Hx LCx LUIS which was patent on [facility] in 3/2023. Nonobstructive disease in the LAD and RCA.

## 2023-05-02 NOTE — PROGRESS NOTES
Subjective:     Referring Physician: Dr Osborne    HPI  Roshan Menard is a 73 y.o. male who presents for evaluation of TAVR. He is a patient of Dr Osborne. His daughter works as a nurse in pediatric cardiology. He reports shortness of breath which has been increasing over the past 6 months. He has CAD s/p Lcx PCI which was patent on his angiogram last month. Otherwise nonobstructive coronary disease. He reports one epidoe of chest pressure while pushing a buggy at the store. He also has diagnosed COPD.     Roshan Menard is a 73 y.o. male referred by Dr Osborne for evaluation of severe AS (NYHA Class II sx).    The patient has undergone the following TAVR work-up:   ECHO (Date 4/14/23): MUSA= 1.04 cm2 (AVAi 0.42), MG= 37 mmHg, Peak Corey= 4.02 m/s (echo reviewed personally), EF= 65%.   LHC (Date 4/4/23): nonobstructive 2 vessel coronary disease   STS: 2%   Frailty: 0/4   Iliacs are >6.86 on R and > 8.63 on L   LVOT area by CTA is 4.43 cm2 (27.5 mm X 22.0 mm) and Avg Diameter is 23.8 per Dr Davenport  Incidental findings on CT: lung nodule < 1 cm, renal cyst (sent to PCP)  CT Surgery risk assessment: low risk, per Dr Mack  Rhythm issues: RBBB  PFTs: deferred  KCCQ/5 meter walk:   Comorbidities: carotid disease, Afib, COPD, DM      Roshan Menard is a 29 mm  Evolut FX valve candidate via RTF access.      NYHA:II CCS:0    Review of Systems   Constitutional: Negative for chills and fever.   HENT:  Negative for sore throat.    Eyes:  Negative for blurred vision.   Cardiovascular:  Negative for chest pain, claudication, cyanosis, dyspnea on exertion, irregular heartbeat, leg swelling, near-syncope, orthopnea, palpitations, paroxysmal nocturnal dyspnea and syncope.   Respiratory:  Negative for cough and sputum production.    Hematologic/Lymphatic: Does not bruise/bleed easily.   Skin:  Negative for itching, rash and suspicious lesions.   Musculoskeletal:  Negative for falls.   Gastrointestinal:  Negative for abdominal  pain and change in bowel habit.   Genitourinary:  Negative for dysuria.   Neurological:  Negative for disturbances in coordination, dizziness and loss of balance.   Psychiatric/Behavioral:  Negative for altered mental status.         Past Medical History:   Diagnosis Date    Allergy     Anticoagulant long-term use     Arthritis     Asthma     Atrial fibrillation     Bronchitis     Cataract     COPD (chronic obstructive pulmonary disease)     Coronary artery disease     stent    General anesthetics causing adverse effect in therapeutic use     hard to awaken    GERD (gastroesophageal reflux disease)     Gout, chronic     Hypertension     Mixed hyperlipidemia     Type 2 diabetes mellitus with diabetic peripheral angiopathy without gangrene, unspecified whether long term insulin use 2023       Current Outpatient Medications:     albuterol (PROVENTIL/VENTOLIN HFA) 90 mcg/actuation inhaler, Inhale 2 puffs into the lungs every 6 (six) hours as needed for Wheezing or Shortness of Breath. Rescue, Disp: 18 g, Rfl: 11    allopurinoL (ZYLOPRIM) 300 MG tablet, TAKE 1 TABLET BY MOUTH EVERY DAY, Disp: 90 tablet, Rfl: 2    apixaban (ELIQUIS) 5 mg Tab, Take 1 tablet (5 mg total) by mouth 2 (two) times daily., Disp: 180 tablet, Rfl: 3    aspirin (ECOTRIN) 81 MG EC tablet, Take 81 mg by mouth every Mon, Wed, Fri., Disp: , Rfl:     atorvastatin (LIPITOR) 40 MG tablet, TK 1 T PO QD, Disp: 90 tablet, Rfl: 3    fluocinonide (LIDEX) 0.05 % gel, SMARTSI-2 Gram(s) Topical Twice Daily, Disp: , Rfl:     fluticasone furoate-vilanteroL (BREO ELLIPTA) 200-25 mcg/dose DsDv diskus inhaler, INHALE 1 PUFF BY MOUTH AT THE SAME TIME EVERY DAY, Disp: 60 each, Rfl: 5    furosemide (LASIX) 20 MG tablet, Take 1 tablet (20 mg total) by mouth once daily., Disp: 90 tablet, Rfl: 3    hydrALAZINE (APRESOLINE) 100 MG tablet, Take 1 tablet (100 mg total) by mouth every 8 (eight) hours., Disp: 90 tablet, Rfl: 11    ketoconazole (NIZORAL) 2 % cream, Apply  topically., Disp: , Rfl:     lactobacillus comb no.10 (PROBIOTIC) 20 billion cell Cap, Take 1 capsule by mouth once daily. Or as directed on bottle, Disp: , Rfl:     metoprolol succinate (TOPROL-XL) 25 MG 24 hr tablet, Take 2 tablets (50 mg total) by mouth once daily., Disp: 60 tablet, Rfl: 11    montelukast (SINGULAIR) 10 mg tablet, Take 1 tablet (10 mg total) by mouth once daily., Disp: 30 tablet, Rfl: 1    multivitamin (THERAGRAN) per tablet, Take 1 tablet by mouth once daily., Disp: , Rfl:     omeprazole (PRILOSEC) 10 MG capsule, Take 10 mg by mouth once daily., Disp: , Rfl:     potassium chloride (KLOR-CON) 10 MEQ TbSR, TK 1 T PO ONCE A DAY., Disp: 90 tablet, Rfl: 3    predniSONE (DELTASONE) 5 MG tablet, Take 5 mg by mouth once daily., Disp: , Rfl:     telmisartan (MICARDIS) 80 MG Tab, Take 1 tablet (80 mg total) by mouth once daily., Disp: 90 tablet, Rfl: 3    tiotropium bromide (SPIRIVA RESPIMAT) 2.5 mcg/actuation inhaler, Inhale 2 puffs into the lungs Daily. Controller, Disp: 4 g, Rfl: 11    TOBRADEX 0.3-0.1 % Oint, , Disp: , Rfl:     urea (CARMOL) 40 % Crea, 2 (two) times daily as needed., Disp: , Rfl: 0    albuterol-ipratropium (DUO-NEB) 2.5 mg-0.5 mg/3 mL nebulizer solution, Take 3 mLs by nebulization every 6 (six) hours as needed for Wheezing or Shortness of Breath. Rescue, Disp: 120 vial, Rfl: 5    cimetidine (TAGAMET) 300 MG tablet, Take 1 tablet (300 mg total) by mouth every 6 (six) hours. Starting night before procedure. Take as directed on your instruction sheet. for 3 doses, Disp: 3 tablet, Rfl: 0    diphenhydrAMINE (BENADRYL) 50 MG capsule, Take 1 capsule (50 mg total) by mouth every 6 (six) hours as needed for Itching., Disp: 3 capsule, Rfl: 1    famotidine (PEPCID) 40 MG tablet, Take 1 tablet (40 mg total) by mouth once daily., Disp: 10 tablet, Rfl: 1    predniSONE (DELTASONE) 50 MG Tab, Take 1 tablet (50 mg total) by mouth 2 (two) times daily., Disp: 3 tablet, Rfl: 1    Current  "Facility-Administered Medications:     sodium chloride 0.9% flush 10 mL, 10 mL, Intravenous, PRN, Luis Enrique Ji MD    Objective:    Physical Exam  Vitals reviewed.   Constitutional:       General: He is not in acute distress.     Appearance: He is well-developed. He is not diaphoretic.   HENT:      Head: Normocephalic and atraumatic.   Neck:      Vascular: JVD present.   Cardiovascular:      Rate and Rhythm: Normal rate and regular rhythm.      Pulses: Intact distal pulses.      Heart sounds: Murmur heard.   Harsh midsystolic murmur is present at the upper right sternal border radiating to the neck.   Pulmonary:      Effort: Pulmonary effort is normal. No respiratory distress.   Musculoskeletal:      Cervical back: Normal range of motion.      Right lower leg: Edema present.      Left lower leg: Edema present.   Skin:     General: Skin is warm and dry.   Neurological:      Mental Status: He is alert and oriented to person, place, and time.           Vitals:    05/03/23 1017   BP: (!) 148/80   Pulse: 81   SpO2: 95%   Weight: 116.6 kg (257 lb 0.9 oz)   Height: 6' 3" (1.905 m)     Body mass index is 32.13 kg/m².    Test(s) Reviewed  I have reviewed the following in detail:  [] Stress test   [] Angiography   [x] Echocardiogram   [x] Labs   [] Other       Chemistry        Component Value Date/Time     04/17/2023 1329    K 4.7 04/17/2023 1329     04/17/2023 1329    CO2 26 04/17/2023 1329    BUN 16 04/17/2023 1329    CREATININE 1.1 04/17/2023 1329     04/17/2023 1329        Component Value Date/Time    CALCIUM 9.5 04/17/2023 1329    ALKPHOS 79 03/27/2023 0926    AST 20 03/27/2023 0926    ALT 23 03/27/2023 0926    BILITOT 0.4 03/27/2023 0926    ESTGFRAFRICA >60.0 03/30/2022 0736    EGFRNONAA >60.0 03/30/2022 0736            Assessment:   Nonrheumatic aortic valve stenosis  Roshan Menard is a 73 y.o. male referred by Dr Osborne for evaluation of severe AS (NYHA Class II sx).    The patient has " undergone the following TAVR work-up:   ECHO (Date 4/14/23): MUSA= 1.04 cm2 (AVAi 0.42), MG= 37 mmHg, Peak Corey= 4.02 m/s (echo reviewed personally), EF= 65%.   [facility] (Date 4/4/23): nonobstructive 2 vessel coronary disease   STS: 2%   Frailty: 0/4   Iliacs are >6.86 on R and > 8.63 on L   LVOT area by CTA is 4.43 cm2 (27.5 mm X 22.0 mm) and Avg Diameter is 23.8 per Dr Davenport  Incidental findings on CT: lung nodule < 1 cm, renal cyst (sent to PCP)  CT Surgery risk assessment: low risk, per Dr Mack  Rhythm issues: RBBB  PFTs: deferred  KCCQ/5 meter walk:   Comorbidities: carotid disease, Afib, COPD, DM      Roshan Menard is a 29 mm  Evolut FX valve candidate via RTF access.    PAF (paroxysmal atrial fibrillation)  On Eliquis. Failed DCCV in the past. Consider Watchman.     Coronary artery disease involving native coronary artery of native heart without angina pectoris  Hx LCx LUIS which was patent on [facility] in 3/2023. Nonobstructive disease in the LAD and RCA.     Chronic diastolic heart failure  NYHA II. Proceed with TAVR    Type 2 diabetes mellitus with diabetic peripheral angiopathy without gangrene, unspecified whether long term insulin use  Chronic. Controlled. Follow up with PCP.    Plan:     Transcatheter Aortic valve replacement   Valve: 29 mm Evolut  ECMO:  On Call  TAVR access: RTF  Valvuloplasty balloon: 18 mm  Viabahn size if needed: 7x5  Antithrombotic therapy: ASA/Eliquis  Temporary pacing wire: Yes  Pacemaker risk factors: RBBB   Post op destination: ICU  Antibiotics given: (to be done during procedure)  Heart failure on admission?  CONSENTING:  The patient was told that the procedure carries around a 12.5% risk of permanent pacemaker requirement and that risk depends on the patients underlying conduction system.  Prior to the clinc visit, all available records from referring provider were reviewed.  I have personally reviewed all the lab tests available related to the patient's case  The patient's images  were reviewed by myself and the procedural planning was done with my own interpretation of the iliac and aortic anatomy based on CTA, angiography or YRN. I have reviewed all other imaging studies relevant to the patient including echocardiography and coronary angiography.  Patient was educated abut the pathophysiology and natural history of severe aortic stenosis and was educated about the treatment options including surgical and transcatheter valve replacement. She agrees to be full code for the forseable future and to undergo workup for treatment of severe AS.   The risks, benefits, and alternatives of transcatheter aortic valve replacement were discussed with the patient. All questions were answered and informed consent was obtained. I had a detailed discussion with the patient regarding risk of stroke, MI, bleeding access site complications including limb loss, allergy, kidney failure including dialysis and death.  The patient understands the risks and benefits and wishes to go ahead with the procedure.  The referring Cardiologist was notified of the plan  All patient's questions were answered    Shared Decision Making:  This patient was seen today by a multidisciplinary heart team involving both a Structural Heart Specialist (Interventional Cardiologist) and a Cardiothoracic Surgeon. The patient was involved in shared decision-making to define clearer goals for treatment and align health decisions with their current values.  The patient understands the risks and expected benefits of their treatment options.        No orders of the defined types were placed in this encounter.         Velvet Owens PA-C  Valve and Structural Heart Disease  [facility]

## 2023-05-02 NOTE — H&P (VIEW-ONLY)
Subjective:     Referring Physician: Dr Osborne    HPI  Roshan Menard is a 73 y.o. male who presents for evaluation of TAVR. He is a patient of Dr Osborne. His daughter works as a nurse in pediatric cardiology. He reports shortness of breath which has been increasing over the past 6 months. He has CAD s/p Lcx PCI which was patent on his angiogram last month. Otherwise nonobstructive coronary disease. He reports one epidoe of chest pressure while pushing a buggy at the store. He also has diagnosed COPD.     Roshan Menard is a 73 y.o. male referred by Dr Osborne for evaluation of severe AS (NYHA Class II sx).    The patient has undergone the following TAVR work-up:   ECHO (Date 4/14/23): MUSA= 1.04 cm2 (AVAi 0.42), MG= 37 mmHg, Peak Corey= 4.02 m/s (echo reviewed personally), EF= 65%.   LHC (Date 4/4/23): nonobstructive 2 vessel coronary disease   STS: 2%   Frailty: 0/4   Iliacs are >6.86 on R and > 8.63 on L   LVOT area by CTA is 4.43 cm2 (27.5 mm X 22.0 mm) and Avg Diameter is 23.8 per Dr Davenport  Incidental findings on CT: lung nodule < 1 cm, renal cyst (sent to PCP)  CT Surgery risk assessment: low risk, per Dr Mack  Rhythm issues: RBBB  PFTs: deferred  KCCQ/5 meter walk:   Comorbidities: carotid disease, Afib, COPD, DM      Roshan Menard is a 29 mm  Evolut FX valve candidate via RTF access.      NYHA:II CCS:0    Review of Systems   Constitutional: Negative for chills and fever.   HENT:  Negative for sore throat.    Eyes:  Negative for blurred vision.   Cardiovascular:  Negative for chest pain, claudication, cyanosis, dyspnea on exertion, irregular heartbeat, leg swelling, near-syncope, orthopnea, palpitations, paroxysmal nocturnal dyspnea and syncope.   Respiratory:  Negative for cough and sputum production.    Hematologic/Lymphatic: Does not bruise/bleed easily.   Skin:  Negative for itching, rash and suspicious lesions.   Musculoskeletal:  Negative for falls.   Gastrointestinal:  Negative for abdominal  pain and change in bowel habit.   Genitourinary:  Negative for dysuria.   Neurological:  Negative for disturbances in coordination, dizziness and loss of balance.   Psychiatric/Behavioral:  Negative for altered mental status.         Past Medical History:   Diagnosis Date    Allergy     Anticoagulant long-term use     Arthritis     Asthma     Atrial fibrillation     Bronchitis     Cataract     COPD (chronic obstructive pulmonary disease)     Coronary artery disease     stent    General anesthetics causing adverse effect in therapeutic use     hard to awaken    GERD (gastroesophageal reflux disease)     Gout, chronic     Hypertension     Mixed hyperlipidemia     Type 2 diabetes mellitus with diabetic peripheral angiopathy without gangrene, unspecified whether long term insulin use 2023       Current Outpatient Medications:     albuterol (PROVENTIL/VENTOLIN HFA) 90 mcg/actuation inhaler, Inhale 2 puffs into the lungs every 6 (six) hours as needed for Wheezing or Shortness of Breath. Rescue, Disp: 18 g, Rfl: 11    allopurinoL (ZYLOPRIM) 300 MG tablet, TAKE 1 TABLET BY MOUTH EVERY DAY, Disp: 90 tablet, Rfl: 2    apixaban (ELIQUIS) 5 mg Tab, Take 1 tablet (5 mg total) by mouth 2 (two) times daily., Disp: 180 tablet, Rfl: 3    aspirin (ECOTRIN) 81 MG EC tablet, Take 81 mg by mouth every Mon, Wed, Fri., Disp: , Rfl:     atorvastatin (LIPITOR) 40 MG tablet, TK 1 T PO QD, Disp: 90 tablet, Rfl: 3    fluocinonide (LIDEX) 0.05 % gel, SMARTSI-2 Gram(s) Topical Twice Daily, Disp: , Rfl:     fluticasone furoate-vilanteroL (BREO ELLIPTA) 200-25 mcg/dose DsDv diskus inhaler, INHALE 1 PUFF BY MOUTH AT THE SAME TIME EVERY DAY, Disp: 60 each, Rfl: 5    furosemide (LASIX) 20 MG tablet, Take 1 tablet (20 mg total) by mouth once daily., Disp: 90 tablet, Rfl: 3    hydrALAZINE (APRESOLINE) 100 MG tablet, Take 1 tablet (100 mg total) by mouth every 8 (eight) hours., Disp: 90 tablet, Rfl: 11    ketoconazole (NIZORAL) 2 % cream, Apply  topically., Disp: , Rfl:     lactobacillus comb no.10 (PROBIOTIC) 20 billion cell Cap, Take 1 capsule by mouth once daily. Or as directed on bottle, Disp: , Rfl:     metoprolol succinate (TOPROL-XL) 25 MG 24 hr tablet, Take 2 tablets (50 mg total) by mouth once daily., Disp: 60 tablet, Rfl: 11    montelukast (SINGULAIR) 10 mg tablet, Take 1 tablet (10 mg total) by mouth once daily., Disp: 30 tablet, Rfl: 1    multivitamin (THERAGRAN) per tablet, Take 1 tablet by mouth once daily., Disp: , Rfl:     omeprazole (PRILOSEC) 10 MG capsule, Take 10 mg by mouth once daily., Disp: , Rfl:     potassium chloride (KLOR-CON) 10 MEQ TbSR, TK 1 T PO ONCE A DAY., Disp: 90 tablet, Rfl: 3    predniSONE (DELTASONE) 5 MG tablet, Take 5 mg by mouth once daily., Disp: , Rfl:     telmisartan (MICARDIS) 80 MG Tab, Take 1 tablet (80 mg total) by mouth once daily., Disp: 90 tablet, Rfl: 3    tiotropium bromide (SPIRIVA RESPIMAT) 2.5 mcg/actuation inhaler, Inhale 2 puffs into the lungs Daily. Controller, Disp: 4 g, Rfl: 11    TOBRADEX 0.3-0.1 % Oint, , Disp: , Rfl:     urea (CARMOL) 40 % Crea, 2 (two) times daily as needed., Disp: , Rfl: 0    albuterol-ipratropium (DUO-NEB) 2.5 mg-0.5 mg/3 mL nebulizer solution, Take 3 mLs by nebulization every 6 (six) hours as needed for Wheezing or Shortness of Breath. Rescue, Disp: 120 vial, Rfl: 5    cimetidine (TAGAMET) 300 MG tablet, Take 1 tablet (300 mg total) by mouth every 6 (six) hours. Starting night before procedure. Take as directed on your instruction sheet. for 3 doses, Disp: 3 tablet, Rfl: 0    diphenhydrAMINE (BENADRYL) 50 MG capsule, Take 1 capsule (50 mg total) by mouth every 6 (six) hours as needed for Itching., Disp: 3 capsule, Rfl: 1    famotidine (PEPCID) 40 MG tablet, Take 1 tablet (40 mg total) by mouth once daily., Disp: 10 tablet, Rfl: 1    predniSONE (DELTASONE) 50 MG Tab, Take 1 tablet (50 mg total) by mouth 2 (two) times daily., Disp: 3 tablet, Rfl: 1    Current  "Facility-Administered Medications:     sodium chloride 0.9% flush 10 mL, 10 mL, Intravenous, PRN, Luis Enrique Ji MD    Objective:    Physical Exam  Vitals reviewed.   Constitutional:       General: He is not in acute distress.     Appearance: He is well-developed. He is not diaphoretic.   HENT:      Head: Normocephalic and atraumatic.   Neck:      Vascular: JVD present.   Cardiovascular:      Rate and Rhythm: Normal rate and regular rhythm.      Pulses: Intact distal pulses.      Heart sounds: Murmur heard.   Harsh midsystolic murmur is present at the upper right sternal border radiating to the neck.   Pulmonary:      Effort: Pulmonary effort is normal. No respiratory distress.   Musculoskeletal:      Cervical back: Normal range of motion.      Right lower leg: Edema present.      Left lower leg: Edema present.   Skin:     General: Skin is warm and dry.   Neurological:      Mental Status: He is alert and oriented to person, place, and time.           Vitals:    05/03/23 1017   BP: (!) 148/80   Pulse: 81   SpO2: 95%   Weight: 116.6 kg (257 lb 0.9 oz)   Height: 6' 3" (1.905 m)     Body mass index is 32.13 kg/m².    Test(s) Reviewed  I have reviewed the following in detail:  [] Stress test   [] Angiography   [x] Echocardiogram   [x] Labs   [] Other       Chemistry        Component Value Date/Time     04/17/2023 1329    K 4.7 04/17/2023 1329     04/17/2023 1329    CO2 26 04/17/2023 1329    BUN 16 04/17/2023 1329    CREATININE 1.1 04/17/2023 1329     04/17/2023 1329        Component Value Date/Time    CALCIUM 9.5 04/17/2023 1329    ALKPHOS 79 03/27/2023 0926    AST 20 03/27/2023 0926    ALT 23 03/27/2023 0926    BILITOT 0.4 03/27/2023 0926    ESTGFRAFRICA >60.0 03/30/2022 0736    EGFRNONAA >60.0 03/30/2022 0736            Assessment:   Nonrheumatic aortic valve stenosis  Roshan Menard is a 73 y.o. male referred by Dr Osborne for evaluation of severe AS (NYHA Class II sx).    The patient has " undergone the following TAVR work-up:   ECHO (Date 4/14/23): MUSA= 1.04 cm2 (AVAi 0.42), MG= 37 mmHg, Peak Corey= 4.02 m/s (echo reviewed personally), EF= 65%.   [facility] (Date 4/4/23): nonobstructive 2 vessel coronary disease   STS: 2%   Frailty: 0/4   Iliacs are >6.86 on R and > 8.63 on L   LVOT area by CTA is 4.43 cm2 (27.5 mm X 22.0 mm) and Avg Diameter is 23.8 per Dr Davenport  Incidental findings on CT: lung nodule < 1 cm, renal cyst (sent to PCP)  CT Surgery risk assessment: low risk, per Dr Mack  Rhythm issues: RBBB  PFTs: deferred  KCCQ/5 meter walk:   Comorbidities: carotid disease, Afib, COPD, DM      Roshan Menard is a 29 mm  Evolut FX valve candidate via RTF access.    PAF (paroxysmal atrial fibrillation)  On Eliquis. Failed DCCV in the past. Consider Watchman.     Coronary artery disease involving native coronary artery of native heart without angina pectoris  Hx LCx LUIS which was patent on [facility] in 3/2023. Nonobstructive disease in the LAD and RCA.     Chronic diastolic heart failure  NYHA II. Proceed with TAVR    Type 2 diabetes mellitus with diabetic peripheral angiopathy without gangrene, unspecified whether long term insulin use  Chronic. Controlled. Follow up with PCP.    Plan:     Transcatheter Aortic valve replacement   Valve: 29 mm Evolut  ECMO:  On Call  TAVR access: RTF  Valvuloplasty balloon: 18 mm  Viabahn size if needed: 7x5  Antithrombotic therapy: ASA/Eliquis  Temporary pacing wire: Yes  Pacemaker risk factors: RBBB   Post op destination: ICU  Antibiotics given: (to be done during procedure)  Heart failure on admission?  CONSENTING:  The patient was told that the procedure carries around a 12.5% risk of permanent pacemaker requirement and that risk depends on the patients underlying conduction system.  Prior to the clinc visit, all available records from referring provider were reviewed.  I have personally reviewed all the lab tests available related to the patient's case  The patient's images  were reviewed by myself and the procedural planning was done with my own interpretation of the iliac and aortic anatomy based on CTA, angiography or YRN. I have reviewed all other imaging studies relevant to the patient including echocardiography and coronary angiography.  Patient was educated abut the pathophysiology and natural history of severe aortic stenosis and was educated about the treatment options including surgical and transcatheter valve replacement. She agrees to be full code for the forseable future and to undergo workup for treatment of severe AS.   The risks, benefits, and alternatives of transcatheter aortic valve replacement were discussed with the patient. All questions were answered and informed consent was obtained. I had a detailed discussion with the patient regarding risk of stroke, MI, bleeding access site complications including limb loss, allergy, kidney failure including dialysis and death.  The patient understands the risks and benefits and wishes to go ahead with the procedure.  The referring Cardiologist was notified of the plan  All patient's questions were answered    Shared Decision Making:  This patient was seen today by a multidisciplinary heart team involving both a Structural Heart Specialist (Interventional Cardiologist) and a Cardiothoracic Surgeon. The patient was involved in shared decision-making to define clearer goals for treatment and align health decisions with their current values.  The patient understands the risks and expected benefits of their treatment options.        No orders of the defined types were placed in this encounter.         Velvet Owens PA-C  Valve and Structural Heart Disease  [facility]

## 2023-05-03 ENCOUNTER — CLINICAL SUPPORT (OUTPATIENT)
Dept: CARDIOLOGY | Facility: CLINIC | Age: 73
End: 2023-05-03
Payer: MEDICARE

## 2023-05-03 ENCOUNTER — EDUCATION (OUTPATIENT)
Dept: CARDIOLOGY | Facility: CLINIC | Age: 73
End: 2023-05-03
Payer: MEDICARE

## 2023-05-03 ENCOUNTER — OFFICE VISIT (OUTPATIENT)
Dept: CARDIOLOGY | Facility: CLINIC | Age: 73
End: 2023-05-03
Payer: MEDICARE

## 2023-05-03 VITALS
WEIGHT: 257.06 LBS | SYSTOLIC BLOOD PRESSURE: 148 MMHG | HEART RATE: 81 BPM | OXYGEN SATURATION: 95 % | DIASTOLIC BLOOD PRESSURE: 80 MMHG | HEIGHT: 75 IN | BODY MASS INDEX: 31.96 KG/M2

## 2023-05-03 DIAGNOSIS — I35.0 NONRHEUMATIC AORTIC VALVE STENOSIS: Primary | ICD-10-CM

## 2023-05-03 DIAGNOSIS — I48.0 PAF (PAROXYSMAL ATRIAL FIBRILLATION): ICD-10-CM

## 2023-05-03 DIAGNOSIS — M31.6 TEMPORAL ARTERITIS: ICD-10-CM

## 2023-05-03 DIAGNOSIS — I50.32 CHRONIC DIASTOLIC HEART FAILURE: ICD-10-CM

## 2023-05-03 DIAGNOSIS — E78.2 MIXED HYPERLIPIDEMIA: ICD-10-CM

## 2023-05-03 DIAGNOSIS — I25.10 CORONARY ARTERY DISEASE INVOLVING NATIVE CORONARY ARTERY OF NATIVE HEART WITHOUT ANGINA PECTORIS: ICD-10-CM

## 2023-05-03 DIAGNOSIS — E11.51 TYPE 2 DIABETES MELLITUS WITH DIABETIC PERIPHERAL ANGIOPATHY WITHOUT GANGRENE, UNSPECIFIED WHETHER LONG TERM INSULIN USE: Primary | ICD-10-CM

## 2023-05-03 DIAGNOSIS — I35.0 NONRHEUMATIC AORTIC VALVE STENOSIS: ICD-10-CM

## 2023-05-03 DIAGNOSIS — M10.00 IDIOPATHIC GOUT, UNSPECIFIED CHRONICITY, UNSPECIFIED SITE: ICD-10-CM

## 2023-05-03 PROCEDURE — 99203 OFFICE O/P NEW LOW 30 MIN: CPT | Mod: S$PBB,,, | Performed by: THORACIC SURGERY (CARDIOTHORACIC VASCULAR SURGERY)

## 2023-05-03 PROCEDURE — 99999 PR PBB SHADOW E&M-EST. PATIENT-LVL IV: CPT | Mod: PBBFAC,,, | Performed by: INTERNAL MEDICINE

## 2023-05-03 PROCEDURE — 99205 OFFICE O/P NEW HI 60 MIN: CPT | Mod: S$PBB,,, | Performed by: INTERNAL MEDICINE

## 2023-05-03 PROCEDURE — 99214 OFFICE O/P EST MOD 30 MIN: CPT | Mod: PBBFAC | Performed by: INTERNAL MEDICINE

## 2023-05-03 PROCEDURE — 99205 PR OFFICE/OUTPT VISIT, NEW, LEVL V, 60-74 MIN: ICD-10-PCS | Mod: S$PBB,,, | Performed by: INTERNAL MEDICINE

## 2023-05-03 PROCEDURE — 99203 PR OFFICE/OUTPT VISIT, NEW, LEVL III, 30-44 MIN: ICD-10-PCS | Mod: S$PBB,,, | Performed by: THORACIC SURGERY (CARDIOTHORACIC VASCULAR SURGERY)

## 2023-05-03 PROCEDURE — 99999 PR PBB SHADOW E&M-EST. PATIENT-LVL IV: ICD-10-PCS | Mod: PBBFAC,,, | Performed by: INTERNAL MEDICINE

## 2023-05-03 NOTE — PROGRESS NOTES
[facility] Interventional Cardiology  Cardiac Catheterization Laboratory    It has been suggested by your doctor that you have an angiogram and/or angioplasty.  Because you are sensitive to Iodine dye and dye is used to perform the test, your doctor would like for you to take several medications before the procedure to help prevent a severe reaction.    Prescriptions for Benadryl, Prednisone, and Tagamet will be prescribed for you.  You will be given 3 tablets of each.  If you are allergic to any of these, please notify your physician immediately.    Premedication Schedule:    At 6 pm the night before take:   Benadryl 50 mg   Prednisone 50 mg   Tagamet 300 mg    At 11 pm the night before take:   Benadryl 50 mg   Prednisone 50 mg   Tagamet 300 mg    At 6 am the morning of the procedure take:   Benadryl 50 mg   Prednisone 50 mg   Tagamet 300 mg

## 2023-05-03 NOTE — PROGRESS NOTES
Subjective:      Patient ID: Roshan Menard is a 73 y.o. male.    Chief Complaint: No chief complaint on file.    HPI:  73-year-old male with a symptomatic severe aortic stenosis is here for discussion about the valve replacement for his severe aortic stenosis.  Patient does have symptoms of dyspnea on moderate exertion no angina no syncope.  Patient has a history of i paroxysmal atrial fibrillation and his EKG shows right bundle branch block which has been diagnosed in the past.  The patient also had PCIs for his coronary artery disease.    Review of patient's allergies indicates:   Allergen Reactions    Sulfa (sulfonamide antibiotics)      Hives    Azithromycin      Diarrhea      Cefaclor Other (See Comments)     Other reaction(s): Hives    Iodine and iodide containing products      Other reaction(s): Unknown    Ivp dye  [iodinated contrast media] Other (See Comments)    Doxycycline Rash       Past Medical History:   Diagnosis Date    Allergy     Anticoagulant long-term use     Arthritis     Asthma     Atrial fibrillation     Bronchitis     Cataract     COPD (chronic obstructive pulmonary disease)     Coronary artery disease     stent    General anesthetics causing adverse effect in therapeutic use     hard to awaken    GERD (gastroesophageal reflux disease)     Gout, chronic     Hypertension     Mixed hyperlipidemia     Type 2 diabetes mellitus with diabetic peripheral angiopathy without gangrene, unspecified whether long term insulin use 5/2/2023       Family History   Problem Relation Age of Onset    Cancer Mother     Early death Mother     Arthritis Father     Diabetes Father     Heart disease Father     Hyperlipidemia Father     Hypertension Father        Social History     Socioeconomic History    Marital status:    Occupational History     Employer: [employer]   Tobacco Use    Smoking status: Former     Packs/day: 1.50     Years: 22.00     Pack years: 33.00     Types: Cigarettes      Quit date: 1992     Years since quittin.0    Smokeless tobacco: Never   Substance and Sexual Activity    Alcohol use: No    Drug use: No    Sexual activity: Never       Past Surgical History:   Procedure Laterality Date    ANGIOGRAM, CORONARY, WITH LEFT HEART CATHETERIZATION  2021    Procedure: Angiogram, Coronary, with Left Heart Cath;  Surgeon: Vincent Gonzalez MD;  Location: [facility] CATH;  Service: Cardiology;;    BACK SURGERY      L3-5; ruptured disc; laminectomy x 2 surgery    CHOLECYSTECTOMY      CORONARY ANGIOPLASTY WITH STENT PLACEMENT      EYE SURGERY      Cateract    JOINT REPLACEMENT      RT  KNEE    LEFT HEART CATHETERIZATION Left 2023    Procedure: Left heart cath;  Surgeon: Aleksey Krishnan MD;  Location: [facility] CATH LAB;  Service: Cardiology;  Laterality: Left;    SPINE SURGERY      Lamanectomy /Capps     TONSILLECTOMY      TOTAL KNEE ARTHROPLASTY      VASECTOMY         Review of Systems   Constitutional:  Negative for activity change and appetite change.   HENT:  Negative for dental problem, nosebleeds and sore throat.    Eyes:  Negative for discharge and visual disturbance.   Respiratory:  Positive for shortness of breath. Negative for cough, chest tightness and stridor.    Cardiovascular:  Negative for leg swelling.   Gastrointestinal:  Negative for abdominal distention and abdominal pain.   Genitourinary:  Negative for difficulty urinating and dysuria.   Musculoskeletal:  Negative for arthralgias, back pain and joint swelling.   Allergic/Immunologic: Negative for environmental allergies.   Neurological:  Negative for dizziness, syncope and headaches.   Hematological:  Does not bruise/bleed easily.   Psychiatric/Behavioral:  Negative for behavioral problems.         Objective:   There were no vitals taken for this visit.    CTA Cardiac TAVR_Partners (xpd)  Narrative: EXAMINATION:  CTA CARDIAC TAVR_PARTNERS (XPD)    CLINICAL HISTORY:  TAVR;  Atherosclerotic heart  disease of native coronary artery without angina pectoris    TECHNIQUE:  Initial noncontrast images of the chest, abdomen, and pelvis. Using 120 cc of Omnipaque 350 IV contrast material, and multi-detector helical CT technique, axial CT angiogram 0.625-mm images of the abdomen were obtained from just above the level of the aortic arch to the proximal femurs. 2D post-processing coronal and sagittal reconstructions of the abdominal aorta and visceral arteries performed.    COMPARISON:  None    FINDINGS:  Thoracic soft tissues: No significant abnormality.    Aorta: Normal in course and caliber.  Mild aortic atherosclerotic plaque.  There are three branching vessels at the arch.    Heart: Normal in size.  Coronary atherosclerotic disease.  No pericardial effusion.    Meri/Mediastinum: No significant lymphadenopathy    Lungs: There scattered tiny pulmonary nodules throughout both lungs.  There are cluster of nodules in the superior segment left lower lobe, the largest measures 8.8 mm (series 2, image 58).  No pulmonary consolidation or pleural effusion.  No pneumothorax.  Trachea and mainstem bronchi are patent.    Liver: Normal in size and attenuation, with no focal hepatic lesions.    Gallbladder: Surgically absent.    Bile Ducts: No evidence of dilated ducts.    Pancreas: No mass or peripancreatic fat stranding.    Spleen: Within normal limits.    Adrenals: Within normal limits.    Kidneys/ Ureters: Normal in size and location. Normal concentration and excretion of contrast. No right-sided nephrolithiasis or hydronephrosis.  Tiny nonobstructing left renal nephrolith.  There are left renal cysts including a 1.5 cm hemorrhagic or high protein content cyst.  There is left renal scarring.  No ureteral dilatation.    Bladder: No evidence of wall thickening.    Reproductive organs: Within normal limits for size with small dystrophic calcifications.    GI Tract/Mesentery: Stomach and small bowel loops appear within normal  limits.  There are scattered diverticula within the descending and sigmoid colon.  No evidence of bowel obstruction or acute inflammatory change.  The appendix is visualized and appears within normal limits.    Peritoneal Space: No ascites.  No organized intra-abdominal fluid collection.  No intraperitoneal free air.    Retroperitoneum:  No pathologic retroperitoneal lymphadenopathy.    Abdominal wall:  Small fat containing bilateral inguinal hernias, otherwise within normal limits.    Bones: No acute fracture. Degenerative changes of the osseous structures.  No acute or concerning osseous abnormalities.    Vasculature: There is minimal calcific atherosclerotic plaque of the abdominal aorta.    Left ventricular outflow tract: 2.3 x 2.6 cm.    CTA smallest diameter:    Celiac: 0.6 cm.    SMA: 0.4 cm.    Right Renal artery: 0.7 cm.    Left Renal artery: 0.5 cm.    ABHAY: 0.5 cm.    Infrarenal abdominal aorta: 1.2    Left common iliac artery 0.6 cm.    Left external iliac artery 0.9 cm.    Left common femoral artery 0.6 cm.    Right common iliac artery 0.8 cm.    Right external iliac artery 0.7 cm.    Right common femoral artery 0.8 cm.  Impression: TAVR measurements as discussed above.    Scattered pulmonary nodules throughout both lungs.  Cluster pulmonary nodules in the superior segment left upper lobe measures up to 8.8 mm, which may reflect infectious/inflammatory process For a solid nodule 6-8 mm, Fleischner Society 2017 guidelines recommend follow up with non-contrast chest CT at 6-12 months and 18-24 months after discovery.    Left renal cysts including a 1.4 cm hemorrhagic or high protein content cyst.    Punctate nonobstructing left renal nephrolith.    All CT scans at this facility use dose modulation, iterative reconstruction, and/or weight based dosing when appropriate to reduce radiation dose to as low as reasonable achievable.    Electronically signed by: Victor Hugo  Juanjose  Date:    04/19/2023  Time:    09:15         Physical Exam  Vitals and nursing note reviewed.   Constitutional:       Appearance: He is obese.   HENT:      Head: Normocephalic.      Mouth/Throat:      Mouth: Mucous membranes are moist.   Eyes:      Extraocular Movements: Extraocular movements intact.      Pupils: Pupils are equal, round, and reactive to light.   Cardiovascular:      Rate and Rhythm: Normal rate and regular rhythm.   Pulmonary:      Effort: Pulmonary effort is normal.      Breath sounds: Normal breath sounds.   Abdominal:      Palpations: Abdomen is soft.   Musculoskeletal:         General: Normal range of motion.      Cervical back: Normal range of motion and neck supple.   Skin:     General: Skin is warm.      Capillary Refill: Capillary refill takes less than 2 seconds.   Neurological:      General: No focal deficit present.      Mental Status: He is alert and oriented to person, place, and time.   Psychiatric:         Mood and Affect: Mood normal.     Reviewed his echocardiogram which showed velocity of 4 meters per 2nd unless his left heart catheterization shows nonocclusive coronary artery disease.  Assessment:     73-year-old male with symptomatic severe aortic stenosis  Paroxysmal atrial fibrillation  Coronary artery disease status post PCI  Right bundle-branch block  Hypertension  Hyperlipidemia  STS risk stratification less than 2% we will falls in the low risk for surgical valve  replacement    Plan   After explaining the risks benefits of the procedure the patient agreed to have TAVR with full code and backup surgical valve replacement as an option.  The patient is being prepared for TAVR after reviewing the preop studies..        Holly Pleitez MD,   [facility]  [place]

## 2023-05-03 NOTE — PROGRESS NOTES
Transcatheter Valve Replacement (TAVR)    You have been scheduled for your valve replacement on Tuesday, May 16, 2023.     Please report to the Cardiology Waiting Area on the Third floor of the hospital and check in at 6 AM.   You will then be taken to the SSCU (Short Stay Cardiac Unit) and prepared for your procedure. Please be aware that this is not the time of your procedure but the time you are to arrive.     Preperations for your procedure:  Shower with Dial soap the night before and the morning of your procedure.  No solid foods 8 hours prior to your procedure.  You may have clear liquids until the time of your admission which should be 2 hours prior to your procedure.  You are encouraged to have at least 8 ounces of clear liquids prior to admission to SSCU.  Patients with gastric emptying issues should be fasting for 6- 8 hours prior to the procedure.  Clear liquids include water, black coffee, clear juices, and performance drinks - no pulp or milk.    Heart failure or dialysis patients will be limited to 8 ounces (1 cup) of clear liquids up until 2 hours of the procedure.  You may take your regular morning medications         with as much water as necessary.   Bring your cane and/or walker with you to the hospital.  Bring CPAP/BiPAP, or oxygen if you are on oxygen at home.  Call the office for any signs of infection ( fever, cough, pneumonia, urinary tract, etc.) We cannot implant a device in an infected patient.    Medications:  You may take your regular scheduled medications the morning of your procedure with as much water as necessary.  If you are diabetic and on Metformin (Glucophage), do not take it the day before, the day of, and 2 days after your procedure.  If you are on Pradaxa, Xarelto, Eliquis, or Coumadin hold 3 days prior to your procedure.     How long will the procedure take?  The entire procedure takes three to four hours.  After the procedure, you will go to an ICU where you will be  monitored closely for the next several hours.  You will remain in the ICU overnight.        If you walk with a cane or walker, please bring it with you to the hospital so that we can get you out of bed several hours after your valve replacement.  Our goal is to get you up in a chair and moving as quickly as possible as long as it is safe for you to do so.    When can I go home?  Your length of stay in the hospital, will be determined by your physician.  Be prepared to stay 1-3 days.  You will be discharged when your physician thinks it is safe for you to go home.  You must have someone to drive you home when you are discharged.    Follow Up After Valve Replacement:  It is required that you return to [facility] for the follow up in one month and one year with an echo, lab work, and a clinic visit.  If you are in a research trial, your follow up will be slightly different and according to the trial requirements.  You can follow up with your regular cardiologist regarding any other heart issues.    Contacts one of our nurses at [phone] for any questions.  Brina Santiago, HERO Elizabeth RN        THE ABOVE INSTRUCTIONS WERE GIVEN TO THE PATIENT VERBALLY AND THEY VERBALIZED UNDERSTANDING.  THEY DO NOT REQUIRE ANY SPECIAL NEEDS AND DO NOT HAVE ANY LEARNING BARRIERS.          Directions for Reporting to Cardiology Waiting Area in the Hospital  If you park in the Parking Garage:  Take elevators to the1st floor of the parking garage.  Continue past the gift shop, coffee shop, and piano.  Take a right and go to the gold elevators. (Elevator B)  Take the elevator to the 3rd floor.  Follow the arrow on the sign on the wall that says Cath Lab Registration/EP Lab Registration.  Follow the long hallway all the way around until you come to a big open area.  This is the registration area.  Check in at Reception Desk.    OR    If family is dropping you off:  Have them drop you off at the front of the Hospital under the green  overhang.  Enter through the doors and take a right.  Take the E elevators to the 3rd floor Cardiology Waiting Area.  Check in at the Reception Desk in the waiting room.

## 2023-05-05 PROBLEM — R91.1 PULMONARY NODULE: Status: ACTIVE | Noted: 2023-05-05

## 2023-05-09 ENCOUNTER — PATIENT OUTREACH (OUTPATIENT)
Dept: ADMINISTRATIVE | Facility: HOSPITAL | Age: 73
End: 2023-05-09
Payer: MEDICARE

## 2023-05-09 RX ORDER — PREDNISONE 50 MG/1
50 TABLET ORAL EVERY 6 HOURS
Qty: 3 TABLET | Refills: 0 | Status: SHIPPED | OUTPATIENT
Start: 2023-05-09 | End: 2023-05-10

## 2023-05-09 RX ORDER — DIPHENHYDRAMINE HCL 50 MG
50 CAPSULE ORAL EVERY 6 HOURS
Qty: 3 CAPSULE | Refills: 0 | Status: SHIPPED | OUTPATIENT
Start: 2023-05-09 | End: 2023-05-10

## 2023-05-09 RX ORDER — CIMETIDINE 300 MG/1
300 TABLET, FILM COATED ORAL EVERY 6 HOURS
Qty: 3 TABLET | Refills: 0 | Status: ON HOLD | OUTPATIENT
Start: 2023-05-09 | End: 2023-05-17 | Stop reason: HOSPADM

## 2023-05-15 ENCOUNTER — ANESTHESIA EVENT (OUTPATIENT)
Dept: MEDSURG UNIT | Facility: HOSPITAL | Age: 73
DRG: 266 | End: 2023-05-15
Payer: MEDICARE

## 2023-05-15 NOTE — ANESTHESIA PREPROCEDURE EVALUATION
[facility]  Anesthesia Pre-Operative Evaluation        Patient Name: Roshan Menard  YOB: 1950  MRN: [id number]    SUBJECTIVE:     Pre-operative Evaluation for Procedure(s) (LRB):  REPLACEMENT, AORTIC VALVE, TRANSCATHETER (TAVR) (N/A)     05/15/2023    Roshan Menard is a 73 y.o. male with a PMHx significant for CAD, , Aortic stenosis, paroxysmal A-fib, right bundle branch block, HTN, HLD, GERD, asthma, T2DM, bilateral carotid artery stenosis. He reports shortness of breath which has been increasing over the past 6 months. He has CAD s/p Lcx PCI which was patent on his angiogram last month. Otherwise nonobstructive coronary disease.    Pt in acute on chronic heart failure this AM with worsening dyspnea    He now presents for the above procedure(s)     Previous Airway: None documented.    Patient Active Problem List   Diagnosis    Coronary artery disease involving native coronary artery of native heart without angina pectoris    Gastroesophageal reflux disease    Gout    Moderate persistent asthma without complication    Former smoker    Essential hypertension    Mixed hyperlipidemia    Charcot's joint of right foot    Bilateral carotid artery stenosis    Abnormal cardiovascular stress test    PAF (paroxysmal atrial fibrillation)    Temporal arteritis    Nonrheumatic aortic valve stenosis    Chronic diastolic heart failure    Type 2 diabetes mellitus with diabetic peripheral angiopathy without gangrene, unspecified whether long term insulin use    Pulmonary nodule       Review of patient's allergies indicates:   Allergen Reactions    Sulfa (sulfonamide antibiotics)      Hives    Azithromycin      Diarrhea      Cefaclor Other (See Comments)     Other reaction(s): Hives    Iodine and iodide containing products      Other reaction(s): Unknown    Ivp dye  [iodinated contrast media] Other (See Comments)    Doxycycline Rash       Current Outpatient Medications    Medication Instructions    albuterol (PROVENTIL/VENTOLIN HFA) 90 mcg/actuation inhaler 2 puffs, Inhalation, Every 6 hours PRN, Rescue    albuterol-ipratropium (DUO-NEB) 2.5 mg-0.5 mg/3 mL nebulizer solution 3 mLs, Nebulization, Every 6 hours PRN, Rescue    allopurinoL (ZYLOPRIM) 300 MG tablet TAKE 1 TABLET BY MOUTH EVERY DAY    apixaban (ELIQUIS) 5 mg, Oral, 2 times daily    aspirin (ECOTRIN) 81 mg, Oral, Every Mon, Wed, Fri    atorvastatin (LIPITOR) 40 MG tablet TK 1 T PO QD    cimetidine (TAGAMET) 300 mg, Oral, Every 6 hours, Starting night before procedure. Take as directed on your instruction sheet.    fluocinonide (LIDEX) 0.05 % gel SMARTSI-2 Gram(s) Topical Twice Daily    fluticasone furoate-vilanteroL (BREO ELLIPTA) 200-25 mcg/dose DsDv diskus inhaler INHALE 1 PUFF BY MOUTH AT THE SAME TIME EVERY DAY    furosemide (LASIX) 20 mg, Oral, Daily    hydrALAZINE (APRESOLINE) 100 mg, Oral, Every 8 hours    ketoconazole (NIZORAL) 2 % cream Topical (Top)    lactobacillus comb no.10 (PROBIOTIC) 20 billion cell Cap 1 capsule, Oral, Daily, Or as directed on bottle    metoprolol succinate (TOPROL-XL) 50 mg, Oral, Daily    montelukast (SINGULAIR) 10 mg, Oral, Daily    multivitamin (THERAGRAN) per tablet 1 tablet, Oral, Daily    omeprazole (PRILOSEC) 10 mg, Oral, Daily    potassium chloride (KLOR-CON) 10 MEQ TbSR TK 1 T PO ONCE A DAY.    predniSONE (DELTASONE) 5 mg, Oral, Daily    telmisartan (MICARDIS) 80 mg, Oral, Daily    tiotropium bromide (SPIRIVA RESPIMAT) 2.5 mcg/actuation inhaler 2 puffs, Inhalation, Daily, Controller    TOBRADEX 0.3-0.1 % Oint No dose, route, or frequency recorded.    urea (CARMOL) 40 % Crea 2 times daily PRN       Past Surgical History:   Procedure Laterality Date    ANGIOGRAM, CORONARY, WITH LEFT HEART CATHETERIZATION  2021    Procedure: Angiogram, Coronary, with Left Heart Cath;  Surgeon: Vincent Gonzalez MD;  Location: [facility] CATH;  Service: Cardiology;;    BACK  SURGERY      L3-5; ruptured disc; laminectomy x 2 surgery    CHOLECYSTECTOMY      CORONARY ANGIOPLASTY WITH STENT PLACEMENT      EYE SURGERY  2012    Cateract    JOINT REPLACEMENT  '08    RT  KNEE    LEFT HEART CATHETERIZATION Left 4/4/2023    Procedure: Left heart cath;  Surgeon: Aleksey Krishnan MD;  Location: [facility] CATH LAB;  Service: Cardiology;  Laterality: Left;    SPINE SURGERY  1988/2004    Lamanectomy '88/Capps '04    TONSILLECTOMY      TOTAL KNEE ARTHROPLASTY      VASECTOMY         Social History     Substance and Sexual Activity   Drug Use No     Alcohol Use: Not on file     Tobacco Use: Medium Risk    Smoking Tobacco Use: Former    Smokeless Tobacco Use: Never    Passive Exposure: Not on file       OBJECTIVE:     Vital Signs Range (Last 24H):         Significant Labs    Heme Profile  Lab Results   Component Value Date    WBC 10.89 04/17/2023    HGB 14.8 04/17/2023    HCT 46.0 04/17/2023     04/17/2023       Coagulation Studies  Lab Results   Component Value Date    LABPROT 11.1 04/17/2023    INR 1.1 04/17/2023    APTT 29.8 04/17/2023       BMP  Lab Results   Component Value Date     04/17/2023    K 4.7 04/17/2023     04/17/2023    CO2 26 04/17/2023    BUN 16 04/17/2023    CREATININE 1.1 04/17/2023       Liver Function Tests  Lab Results   Component Value Date    AST 20 03/27/2023    ALT 23 03/27/2023    ALKPHOS 79 03/27/2023    BILITOT 0.4 03/27/2023    PROT 6.4 03/27/2023    ALBUMIN 3.9 04/17/2023       Lipid Profile  Lab Results   Component Value Date    CHOL 156 04/03/2023    HDL 39 (L) 04/03/2023    TRIG 137 04/03/2023       Endocrine Profile  Lab Results   Component Value Date    HGBA1C 5.4 01/20/2023    TSH 1.648 01/28/2013       Diagnostic Studies    Cardiac Studies    EKG:   Results for orders placed or performed in visit on 03/23/23   IN OFFICE EKG 12-LEAD (to [place])    Collection Time: 03/23/23 12:52 PM    Narrative    Test Reason :  I48.0,I35.0,J44.9,J43.8,I20.8,E78.2,Z20.822,G47.33,Z01.818,I10,~    Vent. Rate : 101 BPM     Atrial Rate : 101 BPM     P-R Int : 170 ms          QRS Dur : 134 ms      QT Int : 372 ms       P-R-T Axes : 072 256 038 degrees     QTc Int : 482 ms    Sinus tachycardia  Right bundle branch block  Abnormal ECG  When compared with ECG of 17-MAR-2023 13:32,  Previous ECG has undetermined rhythm, needs review  Confirmed by FILIBERTO VILLARREAL MD ([id number]) on 3/23/2023 4:59:03 PM    Referred By: OMARI TRUJILLO           Confirmed By:FILIBERTO VILLARREAL MD       TTE:  Results for orders placed during the hospital encounter of 04/14/23    Echo    Interpretation Summary  · The left ventricle is normal in size with concentric remodeling and normal systolic function.  · The estimated ejection fraction is 65%.  · Grade II left ventricular diastolic dysfunction.  · Normal right ventricular size with normal right ventricular systolic function.  · There is moderate aortic valve stenosis.  · Aortic valve area is 1.04 cm2; peak velocity is 3.94 m/s; mean gradient is 37 mmHg.  · There is pulmonary hypertension.  · Normal central venous pressure (3 mmHg).  · The estimated PA systolic pressure is 42 mmHg.  · Mild left atrial enlargement.      YRN:  No results found for this or any previous visit.      Nuclear Stress Echo  Results for orders placed during the hospital encounter of 03/17/23    Nuclear Stress - Cardiology Interpreted    Interpretation Summary    Abnormal myocardial perfusion scan.    There is a moderate to severe intensity, small to moderate sized, reversible perfusion abnormality that is consistent with ischemia in the basal to mid inferolateral wall(s).    There are no other significant perfusion abnormalities.    The gated perfusion images showed an ejection fraction of 72% post stress.    There is normal wall motion post stress.    LV cavity size is normal at rest and normal at stress.    The ECG portion of the study is negative for  ischemia.    The patient reported no chest pain during the stress test.      Cardiac Catheterization:  Results for orders placed during the hospital encounter of 04/04/23    Cardiac catheterization    Conclusion    The Prox LAD lesion was 50% stenosed.    The Prox RCA lesion was 50% stenosed.    The ejection fraction was calculated to be 60%.    The pre-procedure left ventricular end diastolic pressure was 25.    The post-procedure left ventricular end diastolic pressure was 28.    The estimated blood loss was none.    There was two vessel coronary artery disease.    There was diastolic dysfunction.    There was no mitral valve stenosis.    There was moderate aortic valve stenosis.    There was no mitral valve prolapse evident. The annulus was normal. There was normal mitral valve motion.      Cardiac Device Check  No results found for this or any previous visit.    No results found for this or any previous visit.      ASSESSMENT/PLAN:     Pre-op Assessment    I have reviewed the Patient Summary Reports.     I have reviewed the Nursing Notes. I have reviewed the NPO Status.   I have reviewed the Medications.     Review of Systems  Social:  Former Smoker    Hematology/Oncology:  Hematology Normal   Oncology Normal     EENT/Dental:EENT/Dental Normal   Cardiovascular:   Hypertension Valvular problems/Murmurs, AS CAD      Pulmonary:   COPD Asthma    Renal/:  Renal/ Normal     Hepatic/GI:   GERD    Musculoskeletal:  Musculoskeletal Normal    Neurological:  Neurology Normal    Endocrine:   Diabetes  Obesity / BMI > 30  Psych:  Psychiatric Normal           Physical Exam    Airway:  Mouth Opening: Normal  Tongue: Normal    Chest/Lungs:  Normal Respiratory Rate    Heart:  Rhythm: Regular Rhythm        Anesthesia Plan  Type of Anesthesia, risks & benefits discussed:    Anesthesia Type: Gen Natural Airway  Intra-op Monitoring Plan: Standard ASA Monitors, Art Line and Central Line  Post Op Pain Control Plan:  multimodal analgesia and IV/PO Opioids PRN  Induction:  IV  Informed Consent: Informed consent signed with the Patient and all parties understand the risks and agree with anesthesia plan.  All questions answered.   ASA Score: 4  Day of Surgery Review of History & Physical: H&P Update referred to the surgeon/provider.    Ready For Surgery From Anesthesia Perspective.     .

## 2023-05-16 ENCOUNTER — ANESTHESIA (OUTPATIENT)
Dept: MEDSURG UNIT | Facility: HOSPITAL | Age: 73
DRG: 266 | End: 2023-05-16
Payer: MEDICARE

## 2023-05-16 ENCOUNTER — HOSPITAL ENCOUNTER (INPATIENT)
Facility: HOSPITAL | Age: 73
LOS: 1 days | Discharge: HOME | DRG: 266 | End: 2023-05-17
Attending: INTERNAL MEDICINE | Admitting: INTERNAL MEDICINE
Payer: MEDICARE

## 2023-05-16 DIAGNOSIS — I99.8 OTHER DISORDER OF CIRCULATORY SYSTEM: ICD-10-CM

## 2023-05-16 DIAGNOSIS — Z95.2 S/P TAVR (TRANSCATHETER AORTIC VALVE REPLACEMENT): Primary | ICD-10-CM

## 2023-05-16 DIAGNOSIS — I35.0 AORTIC STENOSIS: ICD-10-CM

## 2023-05-16 DIAGNOSIS — I35.0 SEVERE AORTIC STENOSIS: ICD-10-CM

## 2023-05-16 DIAGNOSIS — I45.10 RBBB: ICD-10-CM

## 2023-05-16 LAB
ABO + RH BLD: NORMAL
ANION GAP SERPL CALC-SCNC: 10 MMOL/L (ref 8–16)
APTT PPP: 26.1 SEC (ref 21–32)
BLD GP AB SCN CELLS X3 SERPL QL: NORMAL
BNP SERPL-MCNC: 161 PG/ML (ref 0–99)
BUN SERPL-MCNC: 15 MG/DL (ref 8–23)
CALCIUM SERPL-MCNC: 9.3 MG/DL (ref 8.7–10.5)
CHLORIDE SERPL-SCNC: 107 MMOL/L (ref 95–110)
CO2 SERPL-SCNC: 23 MMOL/L (ref 23–29)
CREAT SERPL-MCNC: 0.9 MG/DL (ref 0.5–1.4)
ERYTHROCYTE [DISTWIDTH] IN BLOOD BY AUTOMATED COUNT: 14.4 % (ref 11.5–14.5)
EST. GFR  (NO RACE VARIABLE): >60 ML/MIN/1.73 M^2
GLUCOSE SERPL-MCNC: 119 MG/DL (ref 70–110)
HCT VFR BLD AUTO: 42.8 % (ref 40–54)
HGB BLD-MCNC: 13.8 G/DL (ref 14–18)
INR PPP: 1 (ref 0.8–1.2)
MCH RBC QN AUTO: 28.4 PG (ref 27–31)
MCHC RBC AUTO-ENTMCNC: 32.2 G/DL (ref 32–36)
MCV RBC AUTO: 88 FL (ref 82–98)
PLATELET # BLD AUTO: 248 K/UL (ref 150–450)
PMV BLD AUTO: 11.2 FL (ref 9.2–12.9)
POCT GLUCOSE: 100 MG/DL (ref 70–110)
POTASSIUM SERPL-SCNC: 3.9 MMOL/L (ref 3.5–5.1)
PROTHROMBIN TIME: 11 SEC (ref 9–12.5)
RBC # BLD AUTO: 4.86 M/UL (ref 4.6–6.2)
SODIUM SERPL-SCNC: 140 MMOL/L (ref 136–145)
SPECIMEN OUTDATE: NORMAL
WBC # BLD AUTO: 6.43 K/UL (ref 3.9–12.7)

## 2023-05-16 PROCEDURE — 85610 PROTHROMBIN TIME: CPT | Performed by: INTERNAL MEDICINE

## 2023-05-16 PROCEDURE — 25000003 PHARM REV CODE 250: Performed by: INTERNAL MEDICINE

## 2023-05-16 PROCEDURE — C1769 GUIDE WIRE: HCPCS | Performed by: INTERNAL MEDICINE

## 2023-05-16 PROCEDURE — 27000191 HC C-V MONITORING

## 2023-05-16 PROCEDURE — 33361 REPLACE AORTIC VALVE PERQ: CPT | Mod: Q0 | Performed by: INTERNAL MEDICINE

## 2023-05-16 PROCEDURE — D9220A PRA ANESTHESIA: Mod: Q0,,, | Performed by: ANESTHESIOLOGY

## 2023-05-16 PROCEDURE — 36620 ARTERIAL: ICD-10-PCS | Mod: 59,,, | Performed by: ANESTHESIOLOGY

## 2023-05-16 PROCEDURE — 33361 REPLACE AORTIC VALVE PERQ: CPT | Mod: 62,Q0,GC, | Performed by: THORACIC SURGERY (CARDIOTHORACIC VASCULAR SURGERY)

## 2023-05-16 PROCEDURE — 93010 ELECTROCARDIOGRAM REPORT: CPT | Mod: 76,,, | Performed by: INTERNAL MEDICINE

## 2023-05-16 PROCEDURE — C1760 CLOSURE DEV, VASC: HCPCS | Performed by: INTERNAL MEDICINE

## 2023-05-16 PROCEDURE — 86900 BLOOD TYPING SEROLOGIC ABO: CPT | Performed by: INTERNAL MEDICINE

## 2023-05-16 PROCEDURE — D9220A PRA ANESTHESIA: ICD-10-PCS | Mod: Q0,,, | Performed by: ANESTHESIOLOGY

## 2023-05-16 PROCEDURE — C1725 CATH, TRANSLUMIN NON-LASER: HCPCS | Performed by: INTERNAL MEDICINE

## 2023-05-16 PROCEDURE — 94660 CPAP INITIATION&MGMT: CPT

## 2023-05-16 PROCEDURE — 93010 EKG 12-LEAD: ICD-10-PCS | Mod: 76,,, | Performed by: INTERNAL MEDICINE

## 2023-05-16 PROCEDURE — 94761 N-INVAS EAR/PLS OXIMETRY MLT: CPT

## 2023-05-16 PROCEDURE — 33361 PR TAVR, PERCUTANEOUS FEMORAL: ICD-10-PCS | Mod: 62,Q0,GC, | Performed by: THORACIC SURGERY (CARDIOTHORACIC VASCULAR SURGERY)

## 2023-05-16 PROCEDURE — 93010 ELECTROCARDIOGRAM REPORT: CPT | Mod: ,,, | Performed by: INTERNAL MEDICINE

## 2023-05-16 PROCEDURE — 93010 EKG 12-LEAD: ICD-10-PCS | Mod: ,,, | Performed by: INTERNAL MEDICINE

## 2023-05-16 PROCEDURE — 85027 COMPLETE CBC AUTOMATED: CPT | Performed by: INTERNAL MEDICINE

## 2023-05-16 PROCEDURE — 99233 SBSQ HOSP IP/OBS HIGH 50: CPT | Mod: ,,, | Performed by: INTERNAL MEDICINE

## 2023-05-16 PROCEDURE — C1894 INTRO/SHEATH, NON-LASER: HCPCS | Performed by: INTERNAL MEDICINE

## 2023-05-16 PROCEDURE — 20000000 HC ICU ROOM

## 2023-05-16 PROCEDURE — 25000003 PHARM REV CODE 250

## 2023-05-16 PROCEDURE — 33361 REPLACE AORTIC VALVE PERQ: CPT | Mod: 62,Q0,GC, | Performed by: INTERNAL MEDICINE

## 2023-05-16 PROCEDURE — 25000003 PHARM REV CODE 250: Performed by: STUDENT IN AN ORGANIZED HEALTH CARE EDUCATION/TRAINING PROGRAM

## 2023-05-16 PROCEDURE — 63600175 PHARM REV CODE 636 W HCPCS

## 2023-05-16 PROCEDURE — 99233 PR SUBSEQUENT HOSPITAL CARE,LEVL III: ICD-10-PCS | Mod: ,,, | Performed by: INTERNAL MEDICINE

## 2023-05-16 PROCEDURE — 37000009 HC ANESTHESIA EA ADD 15 MINS: Performed by: INTERNAL MEDICINE

## 2023-05-16 PROCEDURE — 36415 COLL VENOUS BLD VENIPUNCTURE: CPT | Performed by: INTERNAL MEDICINE

## 2023-05-16 PROCEDURE — 83880 ASSAY OF NATRIURETIC PEPTIDE: CPT | Performed by: INTERNAL MEDICINE

## 2023-05-16 PROCEDURE — 99900035 HC TECH TIME PER 15 MIN (STAT)

## 2023-05-16 PROCEDURE — 33361 PR TAVR, PERCUTANEOUS FEMORAL: ICD-10-PCS | Mod: 62,Q0,GC, | Performed by: INTERNAL MEDICINE

## 2023-05-16 PROCEDURE — 93005 ELECTROCARDIOGRAM TRACING: CPT

## 2023-05-16 PROCEDURE — 36620 INSERTION CATHETER ARTERY: CPT | Mod: 59,,, | Performed by: ANESTHESIOLOGY

## 2023-05-16 PROCEDURE — 25500020 PHARM REV CODE 255: Performed by: INTERNAL MEDICINE

## 2023-05-16 PROCEDURE — 36556 INSERT NON-TUNNEL CV CATH: CPT | Mod: 59,Q0,, | Performed by: ANESTHESIOLOGY

## 2023-05-16 PROCEDURE — 27201423 OPTIME MED/SURG SUP & DEVICES STERILE SUPPLY: Performed by: INTERNAL MEDICINE

## 2023-05-16 PROCEDURE — 37000008 HC ANESTHESIA 1ST 15 MINUTES: Performed by: INTERNAL MEDICINE

## 2023-05-16 PROCEDURE — C1779 LEAD, PMKR, TRANSVENOUS VDD: HCPCS | Performed by: INTERNAL MEDICINE

## 2023-05-16 PROCEDURE — 27800903 OPTIME MED/SURG SUP & DEVICES OTHER IMPLANTS: Performed by: INTERNAL MEDICINE

## 2023-05-16 PROCEDURE — 36556 CENTRAL LINE: ICD-10-PCS | Mod: 59,Q0,, | Performed by: ANESTHESIOLOGY

## 2023-05-16 PROCEDURE — 85730 THROMBOPLASTIN TIME PARTIAL: CPT | Performed by: INTERNAL MEDICINE

## 2023-05-16 PROCEDURE — 80048 BASIC METABOLIC PNL TOTAL CA: CPT | Performed by: INTERNAL MEDICINE

## 2023-05-16 PROCEDURE — 27000190 HC CPAP FULL FACE MASK W/VALVE

## 2023-05-16 DEVICE — SYS EVOLUT FX DELIVERY 23-29MM: Type: IMPLANTABLE DEVICE | Site: OTHER (ADD COMMENT) | Status: FUNCTIONAL

## 2023-05-16 DEVICE — VALVE EVOLUT TM FX AORTIC 29MM: Type: IMPLANTABLE DEVICE | Site: HEART | Status: FUNCTIONAL

## 2023-05-16 DEVICE — SYS EVOLUT FX LOADING 23-29MM: Type: IMPLANTABLE DEVICE | Site: OTHER (ADD COMMENT) | Status: FUNCTIONAL

## 2023-05-16 RX ORDER — HYDRALAZINE HYDROCHLORIDE 50 MG/1
100 TABLET, FILM COATED ORAL EVERY 8 HOURS
Status: DISCONTINUED | OUTPATIENT
Start: 2023-05-16 | End: 2023-05-17 | Stop reason: HOSPADM

## 2023-05-16 RX ORDER — LANOLIN ALCOHOL/MO/W.PET/CERES
800 CREAM (GRAM) TOPICAL
Status: DISCONTINUED | OUTPATIENT
Start: 2023-05-16 | End: 2023-05-17 | Stop reason: HOSPADM

## 2023-05-16 RX ORDER — METOPROLOL SUCCINATE 50 MG/1
50 TABLET, EXTENDED RELEASE ORAL DAILY
Status: DISCONTINUED | OUTPATIENT
Start: 2023-05-16 | End: 2023-05-16

## 2023-05-16 RX ORDER — CLINDAMYCIN PHOSPHATE 900 MG/50ML
INJECTION, SOLUTION INTRAVENOUS
Status: DISCONTINUED | OUTPATIENT
Start: 2023-05-16 | End: 2023-05-16

## 2023-05-16 RX ORDER — DEXMEDETOMIDINE HYDROCHLORIDE 100 UG/ML
INJECTION, SOLUTION INTRAVENOUS
Status: DISCONTINUED | OUTPATIENT
Start: 2023-05-16 | End: 2023-05-16

## 2023-05-16 RX ORDER — ALBUTEROL SULFATE 90 UG/1
2 AEROSOL, METERED RESPIRATORY (INHALATION) EVERY 6 HOURS PRN
Status: DISCONTINUED | OUTPATIENT
Start: 2023-05-16 | End: 2023-05-17 | Stop reason: HOSPADM

## 2023-05-16 RX ORDER — HEPARIN SOD,PORCINE/0.9 % NACL 1000/500ML
INTRAVENOUS SOLUTION INTRAVENOUS
Status: DISCONTINUED | OUTPATIENT
Start: 2023-05-16 | End: 2023-05-16 | Stop reason: HOSPADM

## 2023-05-16 RX ORDER — ACETAMINOPHEN 500 MG
1000 TABLET ORAL ONCE
Status: COMPLETED | OUTPATIENT
Start: 2023-05-16 | End: 2023-05-16

## 2023-05-16 RX ORDER — ASPIRIN 81 MG/1
81 TABLET ORAL
Status: DISCONTINUED | OUTPATIENT
Start: 2023-05-17 | End: 2023-05-16

## 2023-05-16 RX ORDER — SODIUM,POTASSIUM PHOSPHATES 280-250MG
2 POWDER IN PACKET (EA) ORAL
Status: DISCONTINUED | OUTPATIENT
Start: 2023-05-16 | End: 2023-05-17 | Stop reason: HOSPADM

## 2023-05-16 RX ORDER — METOPROLOL SUCCINATE 50 MG/1
50 TABLET, EXTENDED RELEASE ORAL DAILY
Status: DISCONTINUED | OUTPATIENT
Start: 2023-05-17 | End: 2023-05-17 | Stop reason: HOSPADM

## 2023-05-16 RX ORDER — PROPOFOL 10 MG/ML
VIAL (ML) INTRAVENOUS
Status: DISCONTINUED | OUTPATIENT
Start: 2023-05-16 | End: 2023-05-16

## 2023-05-16 RX ORDER — ACETAMINOPHEN 325 MG/1
650 TABLET ORAL EVERY 4 HOURS PRN
Status: DISCONTINUED | OUTPATIENT
Start: 2023-05-16 | End: 2023-05-17 | Stop reason: HOSPADM

## 2023-05-16 RX ORDER — SODIUM CHLORIDE 0.9 % (FLUSH) 0.9 %
10 SYRINGE (ML) INJECTION
Status: DISCONTINUED | OUTPATIENT
Start: 2023-05-16 | End: 2023-05-17 | Stop reason: HOSPADM

## 2023-05-16 RX ORDER — PROTAMINE SULFATE 10 MG/ML
INJECTION, SOLUTION INTRAVENOUS
Status: DISCONTINUED | OUTPATIENT
Start: 2023-05-16 | End: 2023-05-16

## 2023-05-16 RX ORDER — ALLOPURINOL 300 MG/1
300 TABLET ORAL DAILY
Status: DISCONTINUED | OUTPATIENT
Start: 2023-05-16 | End: 2023-05-17 | Stop reason: HOSPADM

## 2023-05-16 RX ORDER — DIPHENHYDRAMINE HCL 50 MG
50 CAPSULE ORAL ONCE
Status: DISCONTINUED | OUTPATIENT
Start: 2023-05-16 | End: 2023-05-16

## 2023-05-16 RX ORDER — NAPROXEN SODIUM 220 MG/1
TABLET, FILM COATED ORAL
Status: DISCONTINUED | OUTPATIENT
Start: 2023-05-16 | End: 2023-05-16 | Stop reason: HOSPADM

## 2023-05-16 RX ORDER — ONDANSETRON 8 MG/1
8 TABLET, ORALLY DISINTEGRATING ORAL EVERY 8 HOURS PRN
Status: DISCONTINUED | OUTPATIENT
Start: 2023-05-16 | End: 2023-05-17 | Stop reason: HOSPADM

## 2023-05-16 RX ORDER — HEPARIN SODIUM 1000 [USP'U]/ML
INJECTION, SOLUTION INTRAVENOUS; SUBCUTANEOUS
Status: DISCONTINUED | OUTPATIENT
Start: 2023-05-16 | End: 2023-05-16

## 2023-05-16 RX ORDER — HALOPERIDOL 5 MG/ML
0.5 INJECTION INTRAMUSCULAR EVERY 10 MIN PRN
Status: DISCONTINUED | OUTPATIENT
Start: 2023-05-16 | End: 2023-05-16 | Stop reason: HOSPADM

## 2023-05-16 RX ORDER — SODIUM CHLORIDE 9 MG/ML
INJECTION, SOLUTION INTRAVENOUS CONTINUOUS
Status: DISCONTINUED | OUTPATIENT
Start: 2023-05-16 | End: 2023-05-16

## 2023-05-16 RX ORDER — LIDOCAINE HYDROCHLORIDE 20 MG/ML
INJECTION, SOLUTION EPIDURAL; INFILTRATION; INTRACAUDAL; PERINEURAL
Status: DISCONTINUED | OUTPATIENT
Start: 2023-05-16 | End: 2023-05-16 | Stop reason: HOSPADM

## 2023-05-16 RX ORDER — LABETALOL HCL 20 MG/4 ML
10 SYRINGE (ML) INTRAVENOUS EVERY 6 HOURS PRN
Status: DISCONTINUED | OUTPATIENT
Start: 2023-05-16 | End: 2023-05-17 | Stop reason: HOSPADM

## 2023-05-16 RX ORDER — VALSARTAN 160 MG/1
320 TABLET ORAL DAILY
Status: DISCONTINUED | OUTPATIENT
Start: 2023-05-17 | End: 2023-05-17 | Stop reason: HOSPADM

## 2023-05-16 RX ORDER — POTASSIUM CHLORIDE 20 MEQ/1
40 TABLET, EXTENDED RELEASE ORAL 3 TIMES DAILY PRN
Status: DISCONTINUED | OUTPATIENT
Start: 2023-05-16 | End: 2023-05-17 | Stop reason: HOSPADM

## 2023-05-16 RX ORDER — ASPIRIN 81 MG/1
81 TABLET ORAL DAILY
Status: DISCONTINUED | OUTPATIENT
Start: 2023-05-17 | End: 2023-05-17 | Stop reason: HOSPADM

## 2023-05-16 RX ORDER — NOREPINEPHRINE BITARTRATE/D5W 4MG/250ML
0-3 PLASTIC BAG, INJECTION (ML) INTRAVENOUS CONTINUOUS
Status: DISCONTINUED | OUTPATIENT
Start: 2023-05-16 | End: 2023-05-17 | Stop reason: HOSPADM

## 2023-05-16 RX ORDER — FENTANYL CITRATE 50 UG/ML
25 INJECTION, SOLUTION INTRAMUSCULAR; INTRAVENOUS EVERY 5 MIN PRN
Status: DISCONTINUED | OUTPATIENT
Start: 2023-05-16 | End: 2023-05-16 | Stop reason: HOSPADM

## 2023-05-16 RX ORDER — ATORVASTATIN CALCIUM 40 MG/1
40 TABLET, FILM COATED ORAL NIGHTLY
Status: DISCONTINUED | OUTPATIENT
Start: 2023-05-16 | End: 2023-05-17 | Stop reason: HOSPADM

## 2023-05-16 RX ORDER — HYDROMORPHONE HYDROCHLORIDE 1 MG/ML
0.2 INJECTION, SOLUTION INTRAMUSCULAR; INTRAVENOUS; SUBCUTANEOUS EVERY 10 MIN PRN
Status: DISCONTINUED | OUTPATIENT
Start: 2023-05-16 | End: 2023-05-16 | Stop reason: HOSPADM

## 2023-05-16 RX ADMIN — DEXMEDETOMIDINE HYDROCHLORIDE 114 MCG: 100 INJECTION, SOLUTION INTRAVENOUS at 07:05

## 2023-05-16 RX ADMIN — CLINDAMYCIN PHOSPHATE 900 MG: 900 INJECTION, SOLUTION INTRAVENOUS at 08:05

## 2023-05-16 RX ADMIN — PROPOFOL 20 MG: 10 INJECTION, EMULSION INTRAVENOUS at 08:05

## 2023-05-16 RX ADMIN — HYDRALAZINE HYDROCHLORIDE 100 MG: 50 TABLET ORAL at 09:05

## 2023-05-16 RX ADMIN — ATORVASTATIN CALCIUM 40 MG: 40 TABLET, FILM COATED ORAL at 09:05

## 2023-05-16 RX ADMIN — ACETAMINOPHEN 1000 MG: 500 TABLET ORAL at 07:05

## 2023-05-16 RX ADMIN — PROTAMINE SULFATE 50 MG: 10 INJECTION, SOLUTION INTRAVENOUS at 09:05

## 2023-05-16 RX ADMIN — ALLOPURINOL 300 MG: 300 TABLET ORAL at 11:05

## 2023-05-16 RX ADMIN — HEPARIN SODIUM 10000 UNITS: 1000 INJECTION, SOLUTION INTRAVENOUS; SUBCUTANEOUS at 09:05

## 2023-05-16 NOTE — ANESTHESIA PROCEDURE NOTES
Arterial    Diagnosis: Severe aortic stenosis    Patient location during procedure: done in OR    Staffing  Authorizing Provider: Alan Moran MD  Performing Provider: Kilo Esparza MD    Anesthesiologist was present at the time of the procedure.    Preanesthetic Checklist  Completed: patient identified, IV checked, site marked, risks and benefits discussed, surgical consent, monitors and equipment checked, pre-op evaluation, timeout performed and anesthesia consent givenArterial  Skin Prep: chlorhexidine gluconate and isopropyl alcohol  Local Infiltration: lidocaine  Orientation: right  Location: radial    Catheter Size: 20 G  Catheter placement by Anatomical landmarks. Heme positive aspiration all ports. Insertion Attempts: 1  Assessment  Dressing: secured with tape and tegaderm  Patient: Tolerated well

## 2023-05-16 NOTE — CONSULTS
CARDIAC ELECTROPHYSIOLOGY CONSULTATION NOTE    PATIENT: Roshan Menard  MRN: [id number]  : 1950  DATE OF SERVICE: 2023  REFERRING PRACTITIONER: Luis Enrique Rangel MD  PRIMARY CARE PROVIDER: Catrina Yanez MD  ATTENDING PHYSICIAN: Luis Enrique Ji MD    Reason for Consultation:   Pt with RBBB going for TAVR     HPI:     Roshan Menard is a 73 y.o. male with PMH of RBBB (EKG 23), CAD s/p Lcx PCI which was patent on his angiogram last month Otherwise nonobstructive coronary disease, CHFpEF (ef 65%), severe AS, COPD  who presents for TAVR. EP was consulted for high risk for CHB.     TVP  Threshold 0.4   Rate 40   Output 25       Active Problems:     Patient Active Problem List   Diagnosis    Coronary artery disease involving native coronary artery of native heart without angina pectoris    Gastroesophageal reflux disease    Gout    Moderate persistent asthma without complication    Former smoker    Essential hypertension    Mixed hyperlipidemia    Charcot's joint of right foot    Bilateral carotid artery stenosis    Abnormal cardiovascular stress test    PAF (paroxysmal atrial fibrillation)    Temporal arteritis    Nonrheumatic aortic valve stenosis    Chronic diastolic heart failure    Type 2 diabetes mellitus with diabetic peripheral angiopathy without gangrene, unspecified whether long term insulin use    Pulmonary nodule       Past Medical History:     Past Medical History:   Diagnosis Date    Allergy     Anticoagulant long-term use     Aortic stenosis     Arthritis     Asthma     Atrial fibrillation     Bronchitis     Cataract     COPD (chronic obstructive pulmonary disease)     Coronary artery disease     stent    General anesthetics causing adverse effect in therapeutic use     hard to awaken    GERD (gastroesophageal reflux disease)     Gout, chronic     Hypertension     Mixed hyperlipidemia     Sleep apnea     Type 2 diabetes mellitus with diabetic peripheral angiopathy without  gangrene, unspecified whether long term insulin use 2023       Past Surgical History:     Past Surgical History:   Procedure Laterality Date    ANGIOGRAM, CORONARY, WITH LEFT HEART CATHETERIZATION  2021    Procedure: Angiogram, Coronary, with Left Heart Cath;  Surgeon: Vincent Gonzalez MD;  Location: [facility] CATH;  Service: Cardiology;;    BACK SURGERY      L3-5; ruptured disc; laminectomy x 2 surgery    CARDIOVERSION      CHOLECYSTECTOMY      CORONARY ANGIOPLASTY WITH STENT PLACEMENT      EYE SURGERY      Cateract    JOINT REPLACEMENT      RT  KNEE    LEFT HEART CATHETERIZATION Left 2023    Procedure: Left heart cath;  Surgeon: Aleksey Krishnan MD;  Location: [facility] CATH LAB;  Service: Cardiology;  Laterality: Left;    SPINE SURGERY      Lamanectomy /    TONSILLECTOMY      TOTAL KNEE ARTHROPLASTY      VASECTOMY         Social History/Family History:     Social History     Socioeconomic History    Marital status:    Occupational History     Employer: [employer]   Tobacco Use    Smoking status: Former     Packs/day: 1.50     Years: 22.00     Pack years: 33.00     Types: Cigarettes     Quit date: 1992     Years since quittin.0    Smokeless tobacco: Never   Substance and Sexual Activity    Alcohol use: No    Drug use: No    Sexual activity: Never     Family History   Problem Relation Age of Onset    Cancer Mother     Early death Mother     Arthritis Father     Diabetes Father     Heart disease Father     Hyperlipidemia Father     Hypertension Father        Medications:     Facility-Administered Medications Prior to Admission   Medication Dose Route Frequency Provider Last Rate Last Admin    sodium chloride 0.9% flush 10 mL  10 mL Intravenous PRN Luis Enrique Ji MD         Medications Prior to Admission   Medication Sig Dispense Refill Last Dose    albuterol (PROVENTIL/VENTOLIN HFA) 90 mcg/actuation inhaler Inhale 2 puffs into the lungs every 6  (six) hours as needed for Wheezing or Shortness of Breath. Rescue 18 g 11 Past Month    allopurinoL (ZYLOPRIM) 300 MG tablet TAKE 1 TABLET BY MOUTH EVERY DAY 90 tablet 2 5/15/2023    cimetidine (TAGAMET) 300 MG tablet Take 1 tablet (300 mg total) by mouth every 6 (six) hours. Starting night before procedure. Take as directed on your instruction sheet. for 3 doses 3 tablet 0 5/16/2023 at 0610    fluticasone furoate-vilanteroL (BREO ELLIPTA) 200-25 mcg/dose DsDv diskus inhaler INHALE 1 PUFF BY MOUTH AT THE SAME TIME EVERY DAY 60 each 5 5/16/2023 at 0300    furosemide (LASIX) 20 MG tablet Take 1 tablet (20 mg total) by mouth once daily. 90 tablet 3 5/15/2023 at 1000    hydrALAZINE (APRESOLINE) 100 MG tablet Take 1 tablet (100 mg total) by mouth every 8 (eight) hours. 90 tablet 11 5/16/2023 at 0530    lactobacillus comb no.10 (PROBIOTIC) 20 billion cell Cap Take 1 capsule by mouth once daily. Or as directed on bottle   Past Month    metoprolol succinate (TOPROL-XL) 25 MG 24 hr tablet Take 2 tablets (50 mg total) by mouth once daily. 60 tablet 11 5/16/2023 at 0530    montelukast (SINGULAIR) 10 mg tablet Take 1 tablet (10 mg total) by mouth once daily. 30 tablet 1 5/16/2023 at 0530    multivitamin (THERAGRAN) per tablet Take 1 tablet by mouth once daily.   5/15/2023 at 1400    omeprazole (PRILOSEC) 10 MG capsule Take 10 mg by mouth once daily.   Past Week    potassium chloride (KLOR-CON) 10 MEQ TbSR TK 1 T PO ONCE A DAY. 90 tablet 3 5/15/2023 at 1000    predniSONE (DELTASONE) 5 MG tablet Take 5 mg by mouth once daily.   5/16/2023 at 0610    telmisartan (MICARDIS) 80 MG Tab Take 1 tablet (80 mg total) by mouth once daily. 90 tablet 3 5/16/2023 at 0530    tiotropium bromide (SPIRIVA RESPIMAT) 2.5 mcg/actuation inhaler Inhale 2 puffs into the lungs Daily. Controller 4 g 11 5/16/2023 at 0300    albuterol-ipratropium (DUO-NEB) 2.5 mg-0.5 mg/3 mL nebulizer solution Take 3 mLs by nebulization every 6 (six) hours as needed for  "Wheezing or Shortness of Breath. Rescue 120 vial 5     apixaban (ELIQUIS) 5 mg Tab Take 1 tablet (5 mg total) by mouth 2 (two) times daily. 180 tablet 3 2023 at 2200    aspirin (ECOTRIN) 81 MG EC tablet Take 81 mg by mouth every Mon, Wed, Fri.   2023 at 0700    atorvastatin (LIPITOR) 40 MG tablet TK 1 T PO QD 90 tablet 3 2023 at 1700    fluocinonide (LIDEX) 0.05 % gel SMARTSI-2 Gram(s) Topical Twice Daily   More than a month    ketoconazole (NIZORAL) 2 % cream Apply topically.   More than a month    TOBRADEX 0.3-0.1 % Oint    More than a month    urea (CARMOL) 40 % Crea 2 (two) times daily as needed.  0 More than a month       Allergies:     Review of patient's allergies indicates:   Allergen Reactions    Sulfa (sulfonamide antibiotics)      Hives    Azithromycin      Diarrhea      Cefaclor Other (See Comments)     Other reaction(s): Hives    Iodine and iodide containing products      Other reaction(s): Unknown    Ivp dye  [iodinated contrast media] Other (See Comments)    Doxycycline Rash       Review of Systems:   Review of Systems   All other systems reviewed and are negative.       Physical Exam:     VITALS: BP (!) 153/74 (BP Location: Left arm, Patient Position: Lying)   Pulse 78   Temp 99.5 °F (37.5 °C) (Temporal)   Resp 20   Ht 6' 1" (1.854 m)   Wt 113.9 kg (251 lb)   SpO2 95%   BMI 33.12 kg/m²        Eyes: Sclerae anicteric. Ears/nose/throat: Mucous membranes moist. Neck: No thyromegaly, lymphadenopathy, or jugular venous distention. Respiratory: Lungs clear to auscultation bilaterally. Cardiovascular: Heart was regular with normal first and second heart sounds. No S3 or S4. GI: Soft, nontender, nondistended, with normal bowel sounds. Musculoskeletal: extremities without cyanosis, clubbing or pitting edema. Neurologic: Patient is alert and oriented x3 and there is no focal strength deficit. Skin: no rashes, cuts, sores. Psychiatric: normal affect. Hematologic: no abnormal bruising or " bleeding.    Laboratory:     BUN/Cr/glu/ALT/AST/amyl/lip:  15/0.9/--/--/--/--/-- (05/16 0605)  WBC/Hgb/Hct/Plts:  6.43/13.8/42.8/248 (05/16 0605)  PT/INR/PTT:  11.0/1.0/26.1 (05/16 0605)    Diagnostic Studies:   EKG  05/16/23 NSR           3/23/23 NSR RBBB pr 170       Echo    Result Date: 4/14/2023  · The left ventricle is normal in size with concentric remodeling and   normal systolic function.  · The estimated ejection fraction is 65%.  · Grade II left ventricular diastolic dysfunction.  · Normal right ventricular size with normal right ventricular systolic   function.  · There is moderate aortic valve stenosis.  · Aortic valve area is 1.04 cm2; peak velocity is 3.94 m/s; mean gradient   is 37 mmHg.  · There is pulmonary hypertension.  · Normal central venous pressure (3 mmHg).  · The estimated PA systolic pressure is 42 mmHg.  · Mild left atrial enlargement.         Echo    Result Date: 2/28/2023  · The left ventricle is normal in size with concentric hypertrophy and   normal systolic function.  · Moderate left atrial enlargement.  · Right atrial enlargement.  · The estimated ejection fraction is 65%.  · Grade II left ventricular diastolic dysfunction.  · Normal right ventricular size with normal right ventricular systolic   function.  · There is moderate aortic valve stenosis.  · Aortic valve area is 1.35 cm2; peak velocity is 3.81 m/s; mean gradient   is 36 mmHg.  · Normal central venous pressure (3 mmHg).  · The estimated PA systolic pressure is 30 mmHg.             Plan:   Post TAVR   PMH: CHFpEF (LVEF 65%), CAD  Underlying intermittent RBBB   PreTAVR EKG: NSR     PosTAVR EKG: NSR      TVP  Threshold 0.4   Rate 40   Output 25    Plan:   Will continue to monitor       Edmond Curtis MD  [facility]  PGY 4 Cardiology Fellow

## 2023-05-16 NOTE — Clinical Note
dry, intact, no bleeding and no hematoma. R. CFA perclose and manual pressure hemostasis at 0934, L. CFA perclose and manual pressure with hemostasis at 0935

## 2023-05-16 NOTE — TRANSFER OF CARE
"Anesthesia Transfer of Care Note    Patient: Roshan Menard    Procedure(s) Performed: Procedure(s) (LRB):  REPLACEMENT, AORTIC VALVE, TRANSCATHETER (TAVR) (N/A)  Cardiac Cath Cosurgeon (N/A)    Patient location: ICU    Anesthesia Type: general    Transport from OR: Transported from OR on 6-10 L/min O2 by face mask with adequate spontaneous ventilation. Continuous ECG monitoring in transport. Continuous SpO2 monitoring in transport. Continuos invasive BP monitoring in transport    Post pain: adequate analgesia    Post assessment: no apparent anesthetic complications    Post vital signs: stable    Level of consciousness: awake and alert    Nausea/Vomiting: no nausea/vomiting    Complications: none    Transfer of care protocol was followed      Last vitals:   Visit Vitals  BP (!) 153/74 (BP Location: Left arm, Patient Position: Lying)   Pulse 78   Temp 37.5 °C (99.5 °F) (Temporal)   Resp 20   Ht 6' 1" (1.854 m)   Wt 113.9 kg (251 lb)   SpO2 95%   BMI 33.12 kg/m²     "

## 2023-05-16 NOTE — Clinical Note
20 ml of contrast were injected throughout the case. 130 mL of contrast was the total wasted during the case. 150 mL was the total amount used during the case.

## 2023-05-16 NOTE — INTERVAL H&P NOTE
The patient has been examined and the H&P has been reviewed:    I concur with the findings and no changes have occurred since H&P was written.    Anesthesia/Surgery risks, benefits and alternative options discussed and understood by patient/family.      Plan:      Transcatheter Aortic valve replacement   Valve: 29 mm Evolut  ECMO:  On Call  TAVR access: RTF  Valvuloplasty balloon: 18 mm  Viabahn size if needed: 7x5  Antithrombotic therapy: ASA/Eliquis  Temporary pacing wire: Yes  Pacemaker risk factors: RBBB   Post op destination: ICU  Antibiotics given: (to be done during procedure)  Heart failure on admission: Acute on chronic heart failure with preserved EF  CONSENTING:  The patient was told that the procedure carries around a 12.5% risk of permanent pacemaker requirement and that risk depends on the patients underlying conduction system.  Prior to the clin visit, all available records from referring provider were reviewed.  I have personally reviewed all the lab tests available related to the patient's case  The patient's images were reviewed by myself and the procedural planning was done with my own interpretation of the iliac and aortic anatomy based on CTA, angiography or YRN. I have reviewed all other imaging studies relevant to the patient including echocardiography and coronary angiography.  Patient was educated abut the pathophysiology and natural history of severe aortic stenosis and was educated about the treatment options including surgical and transcatheter valve replacement. She agrees to be full code for the forseable future and to undergo workup for treatment of severe AS.   The risks, benefits, and alternatives of transcatheter aortic valve replacement were discussed with the patient. All questions were answered and informed consent was obtained. I had a detailed discussion with the patient regarding risk of stroke, MI, bleeding access site complications including limb loss, allergy, kidney  failure including dialysis and death.  The patient understands the risks and benefits and wishes to go ahead with the procedure.  The referring Cardiologist was notified of the plan  All patient's questions were answered     Shared Decision Making:  This patient was seen today by a multidisciplinary heart team involving both a Structural Heart Specialist (Interventional Cardiologist) and a Cardiothoracic Surgeon. The patient was involved in shared decision-making to define clearer goals for treatment and align health decisions with their current values.  The patient understands the risks and expected benefits of their treatment options.    There are no hospital problems to display for this patient.

## 2023-05-16 NOTE — ANESTHESIA POSTPROCEDURE EVALUATION
Anesthesia Post Evaluation    Patient: Roshan Menard    Procedure(s) Performed: Procedure(s) (LRB):  REPLACEMENT, AORTIC VALVE, TRANSCATHETER (TAVR) (N/A)  Cardiac Cath Cosurgeon (N/A)    Final Anesthesia Type: general      Patient location during evaluation: ICU  Patient participation: Yes- Able to Participate  Level of consciousness: awake  Post-procedure vital signs: reviewed and stable  Pain management: adequate  Airway patency: patent    PONV status at discharge: No PONV  Anesthetic complications: no      Cardiovascular status: hemodynamically stable  Respiratory status: unassisted  Follow-up not needed.          Vitals Value Taken Time   /69 05/16/23 1202   Temp 36 °C (96.8 °F) 05/16/23 1200   Pulse 48 05/16/23 1443   Resp 13 05/16/23 1443   SpO2 97 % 05/16/23 1415   Vitals shown include unvalidated device data.      No case tracking events are documented in the log.      Pain/Doni Score: Pain Rating Prior to Med Admin: 0 (5/16/2023  7:07 AM)  Doni Score: 10 (5/16/2023  7:47 AM)

## 2023-05-16 NOTE — Clinical Note
The catheter was inserted in the right ventricle. The transvenous pacing lead was secured in place in the RV and was placed and capture was confirmed.

## 2023-05-16 NOTE — OP NOTE
[facility] - Cardiac Intensive Care  Cardiothoracic Surgery  Operative Note    SUMMARY     Date of Procedure: 5/16/2023     Procedure: Procedure(s) (LRB):  REPLACEMENT, AORTIC VALVE, TRANSCATHETER (TAVR) (N/A)  Cardiac Cath Cosurgeon (N/A)    Surgeon(s) and Role:  Panel 1:     * Luis Enrique Ji MD - Primary     * Kushal De La Torre MD - Assisting     * Loreta Bustillos MD - Fellow     * Herbert Richter MD - Fellow  Panel 2:     * Holly Pleitez MD - Primary        Pre-Operative Diagnosis: Severe aortic stenosis [I35.0]  Other disorder of circulatory system [I99.8]    Post-Operative Diagnosis: Post-Op Diagnosis Codes:     * Severe aortic stenosis [I35.0]     * Other disorder of circulatory system [I99.8]    Anesthesia: General/MAC    Operative Findings (including complications, if any):  Successful deployment of a 29 Evolut TAVR valve    Description of Technical Procedures:     Significant Surgical Tasks Conducted by the Assistant(s), if Applicable:     Estimated Blood Loss (EBL): * No values recorded between 5/16/2023  8:52 AM and 5/16/2023  9:22 AM *           Implants:   Implant Name Type Inv. Item Serial No.  Lot No. LRB No. Used Action   VALVE EVOLUT TM FX AORTIC 29MM - [id number] TAVR Valve VALVE EVOLUT TM FX AORTIC 29MM [id number] [employer] [place]  N/A 1 Implanted   SYS EVOLUT FX DELIVERY 23-29MM - [id number] TAVR Valve SYS EVOLUT FX DELIVERY 23-29MM  [employer] [place] NOT AN IMPLANT N/A 1 Implanted   SYS EVOLUT FX LOADING 23-29MM - [id number] TAVR Valve SYS EVOLUT FX LOADING 23-29MM  [employer] [place] NOT AN IMPLANT N/A 1 Implanted       Specimens:   Specimen (24h ago, onward)      None                    Condition: Stable    Disposition: ICU - extubated and stable.    Attestation: I was present and scrubbed for the entire procedure.       BRIEF HISTORY:    Subjective:     Patient is a 73 y.o. male who was admitted with dyspnea on exertion. Workup has revealed CHF. Cardiac catheterization demonstrated  aortic stenosis with a valve area of 0.4 centimeters squared and a gradient of 4.03 mm Hg. Cardiac risk factors include advanced age (older than 55 for men, 65 for women), diabetes mellitus, hypertension, male gender, and obesity (BMI >= 30 kg/m2)    Patient Active Problem List    Diagnosis Date Noted    Pulmonary nodule 05/05/2023    Chronic diastolic heart failure 05/02/2023    Type 2 diabetes mellitus with diabetic peripheral angiopathy without gangrene, unspecified whether long term insulin use 05/02/2023    Nonrheumatic aortic valve stenosis 07/05/2022    Temporal arteritis 03/20/2022    PAF (paroxysmal atrial fibrillation) 10/14/2021    Abnormal cardiovascular stress test     Bilateral carotid artery stenosis 08/02/2021    Essential hypertension 06/28/2021    Mixed hyperlipidemia 06/28/2021    Charcot's joint of right foot 06/28/2021    Moderate persistent asthma without complication 02/20/2020    Former smoker 02/20/2020    Gastroesophageal reflux disease 01/07/2020    Gout 01/07/2020    Coronary artery disease involving native coronary artery of native heart without angina pectoris 05/01/2014     Past Medical History:   Diagnosis Date    Allergy     Anticoagulant long-term use     Aortic stenosis     Arthritis     Asthma     Atrial fibrillation     Bronchitis     Cataract     COPD (chronic obstructive pulmonary disease)     Coronary artery disease     stent    General anesthetics causing adverse effect in therapeutic use     hard to awaken    GERD (gastroesophageal reflux disease)     Gout, chronic     Hypertension     Mixed hyperlipidemia     Sleep apnea     Type 2 diabetes mellitus with diabetic peripheral angiopathy without gangrene, unspecified whether long term insulin use 05/02/2023      Past Surgical History:   Procedure Laterality Date    ANGIOGRAM, CORONARY, WITH LEFT HEART CATHETERIZATION  09/27/2021    Procedure: Angiogram, Coronary, with Left Heart Cath;  Surgeon: Vincent Gonzalez MD;  Location:  [facility] CATH;  Service: Cardiology;;    BACK SURGERY      L3-5; ruptured disc; laminectomy x 2 surgery    CARDIOVERSION      CHOLECYSTECTOMY      CORONARY ANGIOPLASTY WITH STENT PLACEMENT      EYE SURGERY  2012    Cateract    JOINT REPLACEMENT  '08    RT  KNEE    LEFT HEART CATHETERIZATION Left 04/04/2023    Procedure: Left heart cath;  Surgeon: Aleksey Krishnan MD;  Location: [facility] CATH LAB;  Service: Cardiology;  Laterality: Left;    SPINE SURGERY  1988/2004    Lamanectomy '88/Lam '04    TONSILLECTOMY      TOTAL KNEE ARTHROPLASTY      VASECTOMY        Facility-Administered Medications Prior to Admission   Medication Dose Route Frequency Provider Last Rate Last Admin    sodium chloride 0.9% flush 10 mL  10 mL Intravenous PRN Luis Enrique Ji MD         Medications Prior to Admission   Medication Sig Dispense Refill Last Dose    albuterol (PROVENTIL/VENTOLIN HFA) 90 mcg/actuation inhaler Inhale 2 puffs into the lungs every 6 (six) hours as needed for Wheezing or Shortness of Breath. Rescue 18 g 11 Past Month    allopurinoL (ZYLOPRIM) 300 MG tablet TAKE 1 TABLET BY MOUTH EVERY DAY 90 tablet 2 5/15/2023    cimetidine (TAGAMET) 300 MG tablet Take 1 tablet (300 mg total) by mouth every 6 (six) hours. Starting night before procedure. Take as directed on your instruction sheet. for 3 doses 3 tablet 0 5/16/2023 at 0610    fluticasone furoate-vilanteroL (BREO ELLIPTA) 200-25 mcg/dose DsDv diskus inhaler INHALE 1 PUFF BY MOUTH AT THE SAME TIME EVERY DAY 60 each 5 5/16/2023 at 0300    furosemide (LASIX) 20 MG tablet Take 1 tablet (20 mg total) by mouth once daily. 90 tablet 3 5/15/2023 at 1000    hydrALAZINE (APRESOLINE) 100 MG tablet Take 1 tablet (100 mg total) by mouth every 8 (eight) hours. 90 tablet 11 5/16/2023 at 0530    lactobacillus comb no.10 (PROBIOTIC) 20 billion cell Cap Take 1 capsule by mouth once daily. Or as directed on bottle   Past Month    metoprolol succinate (TOPROL-XL) 25 MG 24 hr tablet Take 2 tablets  (50 mg total) by mouth once daily. 60 tablet 11 2023 at 0530    montelukast (SINGULAIR) 10 mg tablet Take 1 tablet (10 mg total) by mouth once daily. 30 tablet 1 2023 at 0530    multivitamin (THERAGRAN) per tablet Take 1 tablet by mouth once daily.   5/15/2023 at 1400    omeprazole (PRILOSEC) 10 MG capsule Take 10 mg by mouth once daily.   Past Week    potassium chloride (KLOR-CON) 10 MEQ TbSR TK 1 T PO ONCE A DAY. 90 tablet 3 5/15/2023 at 1000    predniSONE (DELTASONE) 5 MG tablet Take 5 mg by mouth once daily.   2023 at 0610    telmisartan (MICARDIS) 80 MG Tab Take 1 tablet (80 mg total) by mouth once daily. 90 tablet 3 2023 at 0530    tiotropium bromide (SPIRIVA RESPIMAT) 2.5 mcg/actuation inhaler Inhale 2 puffs into the lungs Daily. Controller 4 g 11 2023 at 0300    albuterol-ipratropium (DUO-NEB) 2.5 mg-0.5 mg/3 mL nebulizer solution Take 3 mLs by nebulization every 6 (six) hours as needed for Wheezing or Shortness of Breath. Rescue 120 vial 5     apixaban (ELIQUIS) 5 mg Tab Take 1 tablet (5 mg total) by mouth 2 (two) times daily. 180 tablet 3 2023 at 2200    aspirin (ECOTRIN) 81 MG EC tablet Take 81 mg by mouth every Mon, Wed, Fri.   2023 at 0700    atorvastatin (LIPITOR) 40 MG tablet TK 1 T PO QD 90 tablet 3 2023 at 1700    fluocinonide (LIDEX) 0.05 % gel SMARTSI-2 Gram(s) Topical Twice Daily   More than a month    ketoconazole (NIZORAL) 2 % cream Apply topically.   More than a month    TOBRADEX 0.3-0.1 % Oint    More than a month    urea (CARMOL) 40 % Crea 2 (two) times daily as needed.  0 More than a month       Data Review: CBC:   Lab Results   Component Value Date    WBC 6.43 2023    RBC 4.86 2023    HGB 13.8 (L) 2023    HCT 42.8 2023     2023     BMP:   Lab Results   Component Value Date     (H) 2023     2023    K 3.9 2023     2023    CO2 23 2023    BUN 15 2023     CREATININE 0.9 05/16/2023    CALCIUM 9.3 05/16/2023     His echocardiogram showed aortic valve area of 0.4 cm choir preserved ejection fraction and the CT angiogram showed tortuosity of the iliacs but amenable for trans femoral approach.  His left heart catheterization shows no significant stenosis of his coronary artery disease    ECG:  Right bundle branch block right bundle branch block heart block, RBBB, and left ventricular hypertrophy      The patient was consented for transfemoral transcatheter aortic valve replacement with surgical backup after explaining the risks (ncluding but not limited to stroke, bleeding, kidney injury, and death), benefits and alternatives. All the patients questions were answered and the patient agrees with the plan to proceed with surgery.     PROCEDURE IN DETAIL:  The patient was brought to the hybrid operating room and placed on the operating room table in the supine position. A perea catheter placed by the surgical team. After adequate induction of general endotracheal anesthesia and placement of an arterial line, a central venous line, and a right jugular temporary pacemaker  by the anesthesia team, the patient's chest, abdomen, pelvis, and bilateral lower extremities were prepped and draped in the usual sterile fashion.  Surgical time-out was then done.     Bilateral common femoral artery access was obtained by percutaneous technique with fluoro guidance using micropuncture technique the position was confirmed to be common femoral arteries.  Heparin was then given.    Pigtail catheter was accessed through the left side placed in the non coronary cusp as a marker and another catheter was placed in the left coronary cusp for a marker.  The right side sheath was upsized to a 16 [place].  We used a ALT catheter and a Glidewire to cross the aortic valve without any problems it was then exchanged for a pigtail catheter and the gradients were measured across the valve.  A Confida wire  was then placed in the left ventricular cavity and with rapid ventricular pacing and a drop in mean arterial pressure to less than 50 a 20 balloon was inflated across the valve to do a balloon valvuloplasty.  The gradient was measured post valvuloplasty and there was a drop.  We then exchanged the balloon and the sheath on the right side with a 29 Evolut R Medtronic transcatheter valve.  Valve was parked in the descending aorta before we gave carotid pressure to negotiate the curve and the valve was gently positioned across the aortic valve with just above the left ventricular outflow track.  After confirming the position and 2 guidewires in the noncoronary and left coronary cusp as the markers with rapid ventricular pacing we slowly start deploying the valve.  The patient was rapidly paced at this point and after deployment of the valve we pulled the sheath back into the descending aorta.  Transthoracic echocardiogram was used to check no perivalvular leak and the patient removed from the pacer to find out that he was in sinus rhythm with good P waves.  He completely recovered hemodynamically.  Protamine sulfate was then given and the 16 [place] sheath was removed from the right groin and there were 2 already pre fired Perclose devices were closed and pressure was held.  A completion angiogram was done through the left approach found to have a minor leak which stopped with pressure.  We pulled the 7 [place] sheath from the left groin and used the Perclose device to close the left groin and pressure was held.  The patient tolerated the procedure and he was transferred to the ICU for further recovery with complete monitoring of his hemodynamics and EKG..    DATE OF SERVICE      Holly Pleitez MD  Cardiothoracic Surgery  [facility] -

## 2023-05-16 NOTE — PLAN OF CARE
Cardiac ICU Care Plan    POC reviewed with Roshan Menard and family. Questions and concerns addressed. No acute events today. Patient admitted to CICU s/p TAVR. Bilateral groin sites remain without hematoma or signs of bleeding. See below and flowsheets for full assessment and VS info.       Neuro:  [place] Coma Scale  Best Eye Response: 4-->(E4) spontaneous  Best Motor Response: 6-->(M6) obeys commands  Best Verbal Response: 5-->(V5) oriented  [place] Coma Scale Score: 15       24 hr Temp:  [96.8 °F (36 °C)-99.5 °F (37.5 °C)]      CV:  Rhythm: sinus bradycardia   DVT prophylaxis: VTE Required Core Measure: Provider determined low risk VTE                            Pulses  Right Radial Pulse: 2+ (normal)  Left Radial Pulse: 2+ (normal)  Right Dorsalis Pedis Pulse: Doppler  Left Dorsalis Pedis Pulse: Doppler  Right Posterior Tibial Pulse: Doppler  Left Posterior Tibial Pulse: Doppler    Resp:  Flow (L/min): 2       GI/:  GI prophylaxis: no  Diet/Nutrition Received: low saturated fat/low cholesterol, 2 gram sodium  Last Bowel Movement: 05/16/23      Intake/Output Summary (Last 24 hours) at 5/16/2023 1851  Last data filed at 5/16/2023 1800  Gross per 24 hour   Intake 2077.7 ml   Output 300 ml   Net 1777.7 ml        Nutritional Supplement Intake: None    Labs/Accuchecks:  Recent Labs   Lab 05/16/23  0605   WBC 6.43   RBC 4.86   HGB 13.8*   HCT 42.8         Recent Labs   Lab 05/16/23  0605   INR 1.0   APTT 26.1      Recent Labs     05/16/23  0605      K 3.9   CO2 23      BUN 15   CREATININE 0.9     No results for input(s): CPK, CPKMB, MB, TROPONINI in the last 168 hours. No results for input(s): PH, PCO2, PO2, HCO3, POCSATURATED, BE in the last 72 hours.    Electrolytes: N/A - electrolytes WDL  Accuchecks: none    Gtts/LDAs:   NORepinephrine bitartrate-D5W         Lines/Drains/Airways       Central Venous Catheter Line  Duration             Introducer 05/16/23 0920 <1 day              Arterial Line   Duration             Arterial Line 05/16/23 0914 Right Radial <1 day              Peripheral Intravenous Line  Duration                  Peripheral IV - Single Lumen 05/16/23 0650 20 G Left Wrist <1 day                    Skin/Wounds  Bathing/Skin Care: bath, complete;dressed/undressed;electrode patches/site rotation;linen changed (05/16/23 1200)  Wounds: No  Wound care consulted: No

## 2023-05-16 NOTE — PROCEDURES
Brief Operative Note:    : Luis Enrique Ji MD     Referring Physician: Luis Enrique Rangel     All Operators: Surgeon(s):  MD Herbert Cabezas MD Shafi G. Mohamed, MD Stephanie Maria Madonis, MD Stephen R. Ramee, MD     Preoperative Diagnosis: Severe aortic stenosis [I35.0]  Other disorder of circulatory system [I99.8]     Postop Diagnosis: Severe aortic stenosis [I35.0]  Other disorder of circulatory system [I99.8]    Treatments/Procedures: Procedure(s) (LRB):  REPLACEMENT, AORTIC VALVE, TRANSCATHETER (TAVR) (N/A)  Cardiac Cath Cosurgeon (N/A)    Access: Right CFA, Left CFA    Findings:Severe structural heart disease is present.     See catheterization report for full details.    Intervention: S/P 29 mm Evolut FX TAVR via RTF access.    See catheterization report for full details.    Closure device: Perclose          Estimated Blood loss: 20 cc    Specimens removed: No    Loreta Bustillos MD

## 2023-05-16 NOTE — ANESTHESIA PROCEDURE NOTES
Central Line    Diagnosis: Severe aortic stenosis  Patient location during procedure: done in OR    Staffing  Authorizing Provider: Alan Moran MD  Performing Provider: Kilo Esparza MD    Staffing  Anesthesiologist: Alan Moran MD  Resident/CRNA: Kilo Esparza MD  Anesthesiologist was present at the time of the procedure.  Preanesthetic Checklist  Completed: patient identified, IV checked, site marked, risks and benefits discussed, surgical consent, monitors and equipment checked, pre-op evaluation, timeout performed and anesthesia consent given  Indication   Indication: vascular access, hemodynamic monitoring (TVP)     Anesthesia   local infiltration    Central Line   Skin Prep: skin prepped with ChloraPrep, skin prep agent completely dried prior to procedure  Sterile Barriers Followed: Yes    All five maximal barriers used- gloves, gown, cap, mask, and large sterile sheet    hand hygiene performed prior to central venous catheter insertion  Location: right internal jugular.   Catheter type: introducer  Catheter Size: 6 Fr  Ultrasound: vascular probe with ultrasound   Vessel Caliber: large, patent, compressibility normal  Needle advanced into vessel with real time Ultrasound guidance.  Guidewire confirmed in vessel.  sterile gel and probe cover used in ultrasound-guided central venous catheter insertion   Manometry: Venous cannualation confirmed by visual estimation of blood vessel pressure using manometry.  Insertion Attempts: 2   Securement:line sutured, sterile dressing applied, chlorhexidine patch and blood return through all ports    Post-Procedure        Guidewire  Guidewire removed intact, verified with nurse.

## 2023-05-17 ENCOUNTER — CLINICAL SUPPORT (OUTPATIENT)
Dept: CARDIOLOGY | Facility: HOSPITAL | Age: 73
DRG: 266 | End: 2023-05-17
Attending: INTERNAL MEDICINE
Payer: MEDICARE

## 2023-05-17 VITALS
RESPIRATION RATE: 20 BRPM | TEMPERATURE: 98 F | BODY MASS INDEX: 33.27 KG/M2 | WEIGHT: 251 LBS | HEART RATE: 77 BPM | OXYGEN SATURATION: 96 % | SYSTOLIC BLOOD PRESSURE: 180 MMHG | HEIGHT: 73 IN | DIASTOLIC BLOOD PRESSURE: 82 MMHG

## 2023-05-17 PROBLEM — Z95.3 S/P TAVR (TRANSCATHETER AORTIC VALVE REPLACEMENT): Status: ACTIVE | Noted: 2023-05-17

## 2023-05-17 PROBLEM — I50.33 ACUTE ON CHRONIC DIASTOLIC HEART FAILURE: Status: ACTIVE | Noted: 2023-05-02

## 2023-05-17 PROBLEM — Z95.2 S/P TAVR (TRANSCATHETER AORTIC VALVE REPLACEMENT): Status: ACTIVE | Noted: 2023-05-17

## 2023-05-17 PROBLEM — I70.0 AORTIC ATHEROSCLEROSIS: Status: ACTIVE | Noted: 2023-05-17

## 2023-05-17 LAB
ALBUMIN SERPL BCP-MCNC: 3.4 G/DL (ref 3.5–5.2)
ALP SERPL-CCNC: 75 U/L (ref 55–135)
ALT SERPL W/O P-5'-P-CCNC: 26 U/L (ref 10–44)
ANION GAP SERPL CALC-SCNC: 9 MMOL/L (ref 8–16)
AST SERPL-CCNC: 28 U/L (ref 10–40)
BASOPHILS # BLD AUTO: 0.05 K/UL (ref 0–0.2)
BASOPHILS NFR BLD: 0.4 % (ref 0–1.9)
BILIRUB SERPL-MCNC: 0.6 MG/DL (ref 0.1–1)
BUN SERPL-MCNC: 20 MG/DL (ref 8–23)
CALCIUM SERPL-MCNC: 9.3 MG/DL (ref 8.7–10.5)
CHLORIDE SERPL-SCNC: 109 MMOL/L (ref 95–110)
CO2 SERPL-SCNC: 24 MMOL/L (ref 23–29)
CREAT SERPL-MCNC: 1.2 MG/DL (ref 0.5–1.4)
DIFFERENTIAL METHOD: ABNORMAL
EOSINOPHIL # BLD AUTO: 0 K/UL (ref 0–0.5)
EOSINOPHIL NFR BLD: 0.2 % (ref 0–8)
ERYTHROCYTE [DISTWIDTH] IN BLOOD BY AUTOMATED COUNT: 14.7 % (ref 11.5–14.5)
EST. GFR  (NO RACE VARIABLE): >60 ML/MIN/1.73 M^2
GLUCOSE SERPL-MCNC: 103 MG/DL (ref 70–110)
HCT VFR BLD AUTO: 40.2 % (ref 40–54)
HGB BLD-MCNC: 12.7 G/DL (ref 14–18)
IMM GRANULOCYTES # BLD AUTO: 0.05 K/UL (ref 0–0.04)
IMM GRANULOCYTES NFR BLD AUTO: 0.4 % (ref 0–0.5)
LYMPHOCYTES # BLD AUTO: 1.2 K/UL (ref 1–4.8)
LYMPHOCYTES NFR BLD: 9.6 % (ref 18–48)
MCH RBC QN AUTO: 28 PG (ref 27–31)
MCHC RBC AUTO-ENTMCNC: 31.6 G/DL (ref 32–36)
MCV RBC AUTO: 89 FL (ref 82–98)
MONOCYTES # BLD AUTO: 1.3 K/UL (ref 0.3–1)
MONOCYTES NFR BLD: 10.7 % (ref 4–15)
NEUTROPHILS # BLD AUTO: 9.5 K/UL (ref 1.8–7.7)
NEUTROPHILS NFR BLD: 78.7 % (ref 38–73)
NRBC BLD-RTO: 0 /100 WBC
PLATELET # BLD AUTO: 194 K/UL (ref 150–450)
PMV BLD AUTO: 10.3 FL (ref 9.2–12.9)
POC ACTIVATED CLOTTING TIME K: 143 SEC (ref 74–137)
POC ACTIVATED CLOTTING TIME K: 143 SEC (ref 74–137)
POC ACTIVATED CLOTTING TIME K: 257 SEC (ref 74–137)
POCT GLUCOSE: 93 MG/DL (ref 70–110)
POTASSIUM SERPL-SCNC: 4.1 MMOL/L (ref 3.5–5.1)
PROT SERPL-MCNC: 5.9 G/DL (ref 6–8.4)
RBC # BLD AUTO: 4.54 M/UL (ref 4.6–6.2)
SAMPLE: ABNORMAL
SODIUM SERPL-SCNC: 142 MMOL/L (ref 136–145)
WBC # BLD AUTO: 12.13 K/UL (ref 3.9–12.7)

## 2023-05-17 PROCEDURE — 93272 CARDIAC EVENT MONITOR (CUPID ONLY): ICD-10-PCS | Mod: ,,, | Performed by: INTERNAL MEDICINE

## 2023-05-17 PROCEDURE — 93270 REMOTE 30 DAY ECG REV/REPORT: CPT

## 2023-05-17 PROCEDURE — 93010 ELECTROCARDIOGRAM REPORT: CPT | Mod: ,,, | Performed by: INTERNAL MEDICINE

## 2023-05-17 PROCEDURE — 25000003 PHARM REV CODE 250: Performed by: STUDENT IN AN ORGANIZED HEALTH CARE EDUCATION/TRAINING PROGRAM

## 2023-05-17 PROCEDURE — 99239 HOSP IP/OBS DSCHRG MGMT >30: CPT | Mod: 25,,, | Performed by: PHYSICIAN ASSISTANT

## 2023-05-17 PROCEDURE — 99223 PR INITIAL HOSPITAL CARE,LEVL III: ICD-10-PCS | Mod: GC,,, | Performed by: STUDENT IN AN ORGANIZED HEALTH CARE EDUCATION/TRAINING PROGRAM

## 2023-05-17 PROCEDURE — 93010 EKG 12-LEAD: ICD-10-PCS | Mod: ,,, | Performed by: INTERNAL MEDICINE

## 2023-05-17 PROCEDURE — 99223 1ST HOSP IP/OBS HIGH 75: CPT | Mod: GC,,, | Performed by: STUDENT IN AN ORGANIZED HEALTH CARE EDUCATION/TRAINING PROGRAM

## 2023-05-17 PROCEDURE — 93272 ECG/REVIEW INTERPRET ONLY: CPT | Mod: ,,, | Performed by: INTERNAL MEDICINE

## 2023-05-17 PROCEDURE — 25000003 PHARM REV CODE 250: Performed by: INTERNAL MEDICINE

## 2023-05-17 PROCEDURE — 94761 N-INVAS EAR/PLS OXIMETRY MLT: CPT

## 2023-05-17 PROCEDURE — 85025 COMPLETE CBC W/AUTO DIFF WBC: CPT | Performed by: STUDENT IN AN ORGANIZED HEALTH CARE EDUCATION/TRAINING PROGRAM

## 2023-05-17 PROCEDURE — 25000003 PHARM REV CODE 250: Performed by: PHYSICIAN ASSISTANT

## 2023-05-17 PROCEDURE — 80053 COMPREHEN METABOLIC PANEL: CPT | Performed by: STUDENT IN AN ORGANIZED HEALTH CARE EDUCATION/TRAINING PROGRAM

## 2023-05-17 PROCEDURE — 93005 ELECTROCARDIOGRAM TRACING: CPT

## 2023-05-17 PROCEDURE — 99900035 HC TECH TIME PER 15 MIN (STAT)

## 2023-05-17 PROCEDURE — 99239 PR HOSPITAL DISCHARGE DAY,>30 MIN: ICD-10-PCS | Mod: 25,,, | Performed by: PHYSICIAN ASSISTANT

## 2023-05-17 PROCEDURE — 94660 CPAP INITIATION&MGMT: CPT

## 2023-05-17 RX ADMIN — HYDRALAZINE HYDROCHLORIDE 100 MG: 50 TABLET ORAL at 05:05

## 2023-05-17 RX ADMIN — METOPROLOL SUCCINATE 50 MG: 50 TABLET, EXTENDED RELEASE ORAL at 09:05

## 2023-05-17 RX ADMIN — VALSARTAN 320 MG: 160 TABLET, FILM COATED ORAL at 09:05

## 2023-05-17 RX ADMIN — APIXABAN 5 MG: 5 TABLET, FILM COATED ORAL at 09:05

## 2023-05-17 RX ADMIN — ASPIRIN 81 MG: 81 TABLET, COATED ORAL at 09:05

## 2023-05-17 RX ADMIN — ALLOPURINOL 300 MG: 300 TABLET ORAL at 09:05

## 2023-05-17 NOTE — NURSING
Nurses Note -- 4 Eyes      5/17/2023   11:08 AM      Skin assessed during: Daily Assessment      [x] No Altered Skin Integrity Present    []Prevention Measures Documented      [] Yes- Altered Skin Integrity Present or Discovered   [] LDA Added if Not in Epic (Describe Wound)   [] New Altered Skin Integrity was Present on Admit and Documented in LDA   [] Wound Image Taken    Wound Care Consulted? No    Attending Nurse:  Barbara Hsu RN     Second RN/Staff Member:  PATRICIA Vega

## 2023-05-17 NOTE — ASSESSMENT & PLAN NOTE
Successful transfemoral aortic valve replacement with a 26 mm EvolutFX valve.   A transthoracic echo was performed immediately post procedure which showed no paravalvular leak. An aortic valve mean gradient of 5 mmHg and a maximum velocity through the aortic valve of 1.2 m/s.     Discharge Plans:  1. Follow up with [facility] in 1 month and 1 year with labs and Echo.  2. ASA / Eliquis (consider watchman at 1 month).   3. No non sterile procedures which could cause endocarditis for 6 months post TAVR including dental work, endoscopy, colonoscopy, and  procedures.   4. SBE prophylaxis for life.

## 2023-05-17 NOTE — HOSPITAL COURSE
Roshan Menard was admitted and underwent successful placement of a 26 mm EvolutFX TAVR via TF access under MAC sedation on 5/16/23. Please see full cath report for details. A transthoracic echo was performed immediately post procedure which showed no paravalvular leak. An aortic valve mean gradient of 5 mmHg and a maximum velocity through the aortic valve of 1.2 m/s. Patient had a preexisting RBBB. EP was consulted.  He remained hemodynamically stable overnight. This morning, he ambulated without difficulty and was eager to go home. It was felt he was stable for discharge and will go home on [facility] for antithrombotic therapy post TAVR.   Assistance with ambulation/Assistance OOB with selected safe patient handling equipment/Communicate Risk of Fall with Harm to all staff/Reinforce activity limits and safety measures with patient and family/Tailored Fall Risk Interventions/Visual Cue: Yellow wristband and red socks/Bed in lowest position, wheels locked, appropriate side rails in place/Call bell, personal items and telephone in reach/Instruct patient to call for assistance before getting out of bed or chair/Non-slip footwear when patient is out of bed/[place] to call system/Physically safe environment - no spills, clutter or unnecessary equipment/Purposeful Proactive Rounding/Room/bathroom lighting operational, light cord in reach

## 2023-05-17 NOTE — PROGRESS NOTES
CARDIAC ELECTROPHYSIOLOGY CONSULTATION NOTE    PATIENT: Roshan Menard  MRN: [id number]  : 1950  DATE OF SERVICE: 2023  REFERRING PRACTITIONER: Luis Enrique Rangel MD  PRIMARY CARE PROVIDER: Catrina Yanez MD  ATTENDING PHYSICIAN: No att. providers found    Reason for Consultation:   Pt with RBBB going for TAVR     HPI:     Roshan Menard is a 73 y.o. male with PMH of RBBB (EKG 23), CAD s/p Lcx PCI which was patent on his angiogram last month Otherwise nonobstructive coronary disease, CHFpEF (ef 65%), severe AS, COPD  who presents for TAVR. EP was consulted for high risk for CHB.     TVP  Threshold 0.4   Rate 40   Output 25       Active Problems:     Patient Active Problem List   Diagnosis    Coronary artery disease involving native coronary artery of native heart without angina pectoris    Gastroesophageal reflux disease    Gout    Moderate persistent asthma without complication    Former smoker    Essential hypertension    Mixed hyperlipidemia    Charcot's joint of right foot    Bilateral carotid artery stenosis    Abnormal cardiovascular stress test    PAF (paroxysmal atrial fibrillation)    Temporal arteritis    Nonrheumatic aortic valve stenosis    Chronic diastolic heart failure    Type 2 diabetes mellitus with diabetic peripheral angiopathy without gangrene, unspecified whether long term insulin use    Pulmonary nodule       Past Medical History:     Past Medical History:   Diagnosis Date    Allergy     Anticoagulant long-term use     Aortic stenosis     Arthritis     Asthma     Atrial fibrillation     Bronchitis     Cataract     COPD (chronic obstructive pulmonary disease)     Coronary artery disease     stent    General anesthetics causing adverse effect in therapeutic use     hard to awaken    GERD (gastroesophageal reflux disease)     Gout, chronic     Hypertension     Mixed hyperlipidemia     Sleep apnea     Type 2 diabetes mellitus with diabetic peripheral angiopathy without  gangrene, unspecified whether long term insulin use 2023       Past Surgical History:     Past Surgical History:   Procedure Laterality Date    ANGIOGRAM, CORONARY, WITH LEFT HEART CATHETERIZATION  2021    Procedure: Angiogram, Coronary, with Left Heart Cath;  Surgeon: Vincent Gonzalez MD;  Location: [facility] CATH;  Service: Cardiology;;    BACK SURGERY      L3-5; ruptured disc; laminectomy x 2 surgery    CARDIAC CATH COSURGEON N/A 2023    Procedure: Cardiac Cath Cosurgeon;  Surgeon: Holly Pleitez MD;  Location: [facility] CATH LAB;  Service: Cardiothoracic;  Laterality: N/A;    CARDIOVERSION      CHOLECYSTECTOMY      CORONARY ANGIOPLASTY WITH STENT PLACEMENT      EYE SURGERY      Cateract    JOINT REPLACEMENT      RT  KNEE    LEFT HEART CATHETERIZATION Left 2023    Procedure: Left heart cath;  Surgeon: Aleksey Krishnan MD;  Location: [facility] CATH LAB;  Service: Cardiology;  Laterality: Left;    SPINE SURGERY      Lamanectomy /    TONSILLECTOMY      TOTAL KNEE ARTHROPLASTY      TRANSCATHETER AORTIC VALVE REPLACEMENT (TAVR) N/A 2023    Procedure: REPLACEMENT, AORTIC VALVE, TRANSCATHETER (TAVR);  Surgeon: Luis Enrique Ji MD;  Location: [facility] CATH LAB;  Service: Cardiology;  Laterality: N/A;    VASECTOMY         Social History/Family History:     Social History     Socioeconomic History    Marital status:    Occupational History     Employer: [employer]   Tobacco Use    Smoking status: Former     Packs/day: 1.50     Years: 22.00     Pack years: 33.00     Types: Cigarettes     Quit date: 1992     Years since quittin.0    Smokeless tobacco: Never   Substance and Sexual Activity    Alcohol use: No    Drug use: No    Sexual activity: Never     Family History   Problem Relation Age of Onset    Cancer Mother     Early death Mother     Arthritis Father     Diabetes Father     Heart disease Father     Hyperlipidemia Father     Hypertension Father   "      Medications:     No medications prior to admission.       Allergies:     Review of patient's allergies indicates:   Allergen Reactions    Sulfa (sulfonamide antibiotics)      Hives    Azithromycin      Diarrhea      Cefaclor Other (See Comments)     Other reaction(s): Hives    Iodine and iodide containing products      Other reaction(s): Unknown    Ivp dye  [iodinated contrast media] Other (See Comments)    Doxycycline Rash       Review of Systems:   Review of Systems   All other systems reviewed and are negative.       Physical Exam:     VITALS: BP (!) 180/82 (BP Location: Left arm, Patient Position: Lying)   Pulse 77   Temp 98 °F (36.7 °C) (Oral)   Resp 20   Ht 6' 1" (1.854 m)   Wt 113.9 kg (251 lb)   SpO2 96%   BMI 33.12 kg/m²        Eyes: Sclerae anicteric. Ears/nose/throat: Mucous membranes moist. Neck: No thyromegaly, lymphadenopathy, or jugular venous distention. Respiratory: Lungs clear to auscultation bilaterally. Cardiovascular: Heart was regular with normal first and second heart sounds. No S3 or S4. GI: Soft, nontender, nondistended, with normal bowel sounds. Musculoskeletal: extremities without cyanosis, clubbing or pitting edema. Neurologic: Patient is alert and oriented x3 and there is no focal strength deficit. Skin: no rashes, cuts, sores. Psychiatric: normal affect. Hematologic: no abnormal bruising or bleeding.    Laboratory:     BUN/Cr/glu/ALT/AST/amyl/lip:  20/1.2/--/26/28/--/-- (05/17 0519)  WBC/Hgb/Hct/Plts:  12.13/12.7/40.2/194 (05/17 0519)       Diagnostic Studies:   EKG  05/16/23 NSR           3/23/23 NSR RBBB pr 170       Echo    Result Date: 4/14/2023  · The left ventricle is normal in size with concentric remodeling and   normal systolic function.  · The estimated ejection fraction is 65%.  · Grade II left ventricular diastolic dysfunction.  · Normal right ventricular size with normal right ventricular systolic   function.  · There is moderate aortic " valve stenosis.  · Aortic valve area is 1.04 cm2; peak velocity is 3.94 m/s; mean gradient   is 37 mmHg.  · There is pulmonary hypertension.  · Normal central venous pressure (3 mmHg).  · The estimated PA systolic pressure is 42 mmHg.  · Mild left atrial enlargement.         Echo    Result Date: 2/28/2023  · The left ventricle is normal in size with concentric hypertrophy and   normal systolic function.  · Moderate left atrial enlargement.  · Right atrial enlargement.  · The estimated ejection fraction is 65%.  · Grade II left ventricular diastolic dysfunction.  · Normal right ventricular size with normal right ventricular systolic   function.  · There is moderate aortic valve stenosis.  · Aortic valve area is 1.35 cm2; peak velocity is 3.81 m/s; mean gradient   is 36 mmHg.  · Normal central venous pressure (3 mmHg).  · The estimated PA systolic pressure is 30 mmHg.             Plan:   Post TAVR   PMH: CHFpEF (LVEF 65%), CAD  Underlying intermittent RBBB   PreTAVR EKG: NSR     PosTAVR EKG: NSR    Day posTAVR EKG: NSR      TVP  Threshold 0.4   Rate 40   Output 25    Plan:   [facility] [place] [facility]   Will sign off      Edmond Curtis MD  [facility]  PGY 4 Cardiology Fellow

## 2023-05-17 NOTE — PLAN OF CARE
05/17/23 1417   Final Note   Assessment Type Final Discharge Note   Anticipated Discharge Disposition Home   What phone number can be called within the next 1-3 days to see how you are doing after discharge? [phone]   Hospital Resources/Appts/Education Provided Provided patient/caregiver with written discharge plan information;Appointments scheduled and added to AVS;Appointments scheduled by Navigator/Coordinator   Post-Acute Status   Discharge Delays None known at this time     Patient discharged home / self care prior to assessment .    Carlota Merlos RN    [phone]    Future Appointments   Date Time Provider Department Center   5/18/2023  3:00 PM Perry Osborne MD [facility] CARDIO [facility]   6/7/2023  8:00 AM Catrina Yanez MD [facility] FAM MED [facility]   6/15/2023  9:50 AM LAB, APPOINTMENT [facility] LAB VNP [facility]   6/15/2023 10:15 AM ECHO, [facility] ECHO[facility]   6/15/2023 11:30 AM Velvet Owens PA-C [facility] CARDVAL [facility]

## 2023-05-17 NOTE — NURSING
Patient discharged via wheelchair with all belongings by RN and PCT. AVS reviewed with patient and given to patients wife.

## 2023-05-17 NOTE — DISCHARGE SUMMARY
[facility] - Cardiac Intensive Care  Interventional Cardiology  Discharge Summary      Patient Name: Roshan Menard  MRN: [id number]  Admission Date: 5/16/2023  Hospital Length of Stay: 1 days  Discharge Date and Time:  05/17/2023 3:09 PM  Attending Physician: No att. providers found  Discharging Provider: Velvet Owens PA-C  Primary Care Physician: Catrina aYnez MD    HPI:  Roshan Menard is a 73 y.o. male who presents for evaluation of TAVR. He is a patient of Dr Osborne. His daughter works as a nurse in pediatric cardiology. He reports shortness of breath which has been increasing over the past 6 months. He has CAD s/p Lcx PCI which was patent on his angiogram last month. Otherwise nonobstructive coronary disease. He reports one epidoe of chest pressure while pushing a buggy at the store. He also has diagnosed COPD.      Roshan Menard is a 73 y.o. male referred by Dr Osborne for evaluation of severe AS (NYHA Class II sx).     The patient has undergone the following TAVR work-up:    ECHO (Date 4/14/23): MUSA= 1.04 cm2 (AVAi 0.42), MG= 37 mmHg, Peak Corey= 4.02 m/s (echo reviewed personally), EF= 65%.    [facility] (Date 4/4/23): nonobstructive 2 vessel coronary disease    STS: 2%    Frailty: 0/4    Iliacs are >6.86 on R and > 8.63 on L    LVOT area by CTA is 4.43 cm2 (27.5 mm X 22.0 mm) and Avg Diameter is 23.8 per Dr Davenport   Incidental findings on CT: lung nodule < 1 cm, renal cyst (sent to PCP)   CT Surgery risk assessment: low risk, per Dr Mack   Rhythm issues: RBBB   PFTs: deferred   KCCQ/5 meter walk:    Comorbidities: carotid disease, Afib, COPD, DM        Roshan Menard is a 29 mm  Evolut FX valve candidate via RTF access.      Procedure(s) (LRB):  REPLACEMENT, AORTIC VALVE, TRANSCATHETER (TAVR) (N/A)  Cardiac Cath Cosurgeon (N/A)     Indwelling Lines/Drains at time of discharge:  Lines/Drains/Airways     None                 Hospital Course:  Roshan Menard was admitted and underwent successful  placement of a 26 mm EvolutFX TAVR via TF access under MAC sedation on 5/16/23. Please see full cath report for details. A transthoracic echo was performed immediately post procedure which showed no paravalvular leak. An aortic valve mean gradient of 5 mmHg and a maximum velocity through the aortic valve of 1.2 m/s. Patient had a preexisting RBBB. EP was consulted.  He remained hemodynamically stable overnight. This morning, he ambulated without difficulty and was eager to go home. It was felt he was stable for discharge and will go home on Eliquis for antithrombotic therapy post TAVR.      Goals of Care Treatment Preferences:         Consults (From admission, onward)        Status Ordering Provider     Inpatient consult to Electrophysiology  Once        Provider:  (Not yet assigned)    Completed HAROLDO ARMENTA          Significant Diagnostic Studies: Labs:   BMP:   Recent Labs   Lab 05/16/23  0605 05/17/23  0519   * 103    142   K 3.9 4.1    109   CO2 23 24   BUN 15 20   CREATININE 0.9 1.2   CALCIUM 9.3 9.3   , CBC   Recent Labs   Lab 05/16/23  0605 05/17/23  0519   WBC 6.43 12.13   HGB 13.8* 12.7*   HCT 42.8 40.2    194    and All labs within the past 24 hours have been reviewed    Pending Diagnostic Studies:     Procedure Component Value Units Date/Time    EKG 12-lead [[id number]]     Order Status: Sent Lab Status: No result         Cardiac/Vascular  * S/P TAVR (transcatheter aortic valve replacement)  Successful transfemoral aortic valve replacement with a 26 mm EvolutFX valve.   A transthoracic echo was performed immediately post procedure which showed no paravalvular leak. An aortic valve mean gradient of 5 mmHg and a maximum velocity through the aortic valve of 1.2 m/s.     Discharge Plans:  1. Follow up with [facility] in 1 month and 1 year with labs and Echo.  2. ASA / Eliquis (consider watchman at 1 month).   3. No non sterile procedures which could cause  endocarditis for 6 months post TAVR including dental work, endoscopy, colonoscopy, and  procedures.   4. SBE prophylaxis for life.    Nonrheumatic aortic valve stenosis  See above    Aortic atherosclerosis  See imaging. Follow up with Cardiology.     Acute on chronic diastolic heart failure  Acute on chronic diastolic heart failure with LVEDP 30 mmHg pre procedure    PAF (paroxysmal atrial fibrillation)  Continue Eliquis. Discuss Watchman at 1 month follow up.         Discharged Condition: good    Follow Up:    Patient Instructions:   No discharge procedures on file.  Medications:  Reconciled Home Medications:      Medication List      CONTINUE taking these medications    albuterol 90 mcg/actuation inhaler  Commonly known as: PROVENTIL/VENTOLIN HFA  Inhale 2 puffs into the lungs every 6 (six) hours as needed for Wheezing or Shortness of Breath. Rescue     albuterol-ipratropium 2.5 mg-0.5 mg/3 mL nebulizer solution  Commonly known as: DUO-NEB  Take 3 mLs by nebulization every 6 (six) hours as needed for Wheezing or Shortness of Breath. Rescue     allopurinoL 300 MG tablet  Commonly known as: ZYLOPRIM  TAKE 1 TABLET BY MOUTH EVERY DAY     apixaban 5 mg Tab  Commonly known as: ELIQUIS  Take 1 tablet (5 mg total) by mouth 2 (two) times daily.     aspirin 81 MG EC tablet  Commonly known as: ECOTRIN  Take 81 mg by mouth every Mon, Wed, Fri.     atorvastatin 40 MG tablet  Commonly known as: LIPITOR  TK 1 T PO QD     fluocinonide 0.05 % gel  Commonly known as: LIDEX  SMARTSI-2 Gram(s) Topical Twice Daily     fluticasone furoate-vilanteroL 200-25 mcg/dose Dsdv diskus inhaler  Commonly known as: BREO ELLIPTA  INHALE 1 PUFF BY MOUTH AT THE SAME TIME EVERY DAY     furosemide 20 MG tablet  Commonly known as: LASIX  Take 1 tablet (20 mg total) by mouth once daily.     hydrALAZINE 100 MG tablet  Commonly known as: APRESOLINE  Take 1 tablet (100 mg total) by mouth every 8 (eight) hours.     ketoconazole 2 % cream  Commonly  known as: NIZORAL  Apply topically.     lactobacillus comb no.10 20 billion cell Cap  Commonly known as: PROBIOTIC  Take 1 capsule by mouth once daily. Or as directed on bottle     metoprolol succinate 25 MG 24 hr tablet  Commonly known as: TOPROL-XL  Take 2 tablets (50 mg total) by mouth once daily.     montelukast 10 mg tablet  Commonly known as: SINGULAIR  Take 1 tablet (10 mg total) by mouth once daily.     multivitamin per tablet  Commonly known as: THERAGRAN  Take 1 tablet by mouth once daily.     omeprazole 10 MG capsule  Commonly known as: PRILOSEC  Take 10 mg by mouth once daily.     potassium chloride 10 MEQ Tbsr  Commonly known as: KLOR-CON  TK 1 T PO ONCE A DAY.     predniSONE 5 MG tablet  Commonly known as: DELTASONE  Take 5 mg by mouth once daily.     SPIRIVA RESPIMAT 2.5 mcg/actuation inhaler  Generic drug: tiotropium bromide  Inhale 2 puffs into the lungs Daily. Controller     telmisartan 80 MG Tab  Commonly known as: MICARDIS  Take 1 tablet (80 mg total) by mouth once daily.     Tobradex 0.3-0.1 % Oint  Generic drug: tobramycin-dexAMETHasone 0.3-0.1%     urea 40 % Crea  Commonly known as: CARMOL  2 (two) times daily as needed.        STOP taking these medications    cimetidine 300 MG tablet  Commonly known as: TAGAMET            Time spent on the discharge of patient: 40 minutes    Velvet Owens PA-C  Interventional Cardiology  [facility] - Cardiac Intensive Care

## 2023-05-18 ENCOUNTER — OFFICE VISIT (OUTPATIENT)
Dept: CARDIOLOGY | Facility: CLINIC | Age: 73
End: 2023-05-18
Payer: MEDICARE

## 2023-05-18 ENCOUNTER — PATIENT OUTREACH (OUTPATIENT)
Dept: ADMINISTRATIVE | Facility: CLINIC | Age: 73
End: 2023-05-18
Payer: MEDICARE

## 2023-05-18 VITALS
HEART RATE: 92 BPM | BODY MASS INDEX: 33.65 KG/M2 | WEIGHT: 253.88 LBS | DIASTOLIC BLOOD PRESSURE: 70 MMHG | HEIGHT: 73 IN | OXYGEN SATURATION: 97 % | SYSTOLIC BLOOD PRESSURE: 164 MMHG

## 2023-05-18 DIAGNOSIS — I20.89 STABLE ANGINA PECTORIS: ICD-10-CM

## 2023-05-18 DIAGNOSIS — E11.51 TYPE 2 DIABETES MELLITUS WITH DIABETIC PERIPHERAL ANGIOPATHY WITHOUT GANGRENE, UNSPECIFIED WHETHER LONG TERM INSULIN USE: ICD-10-CM

## 2023-05-18 DIAGNOSIS — Z01.810 PREOP CARDIOVASCULAR EXAM: ICD-10-CM

## 2023-05-18 DIAGNOSIS — I45.10 RBBB: ICD-10-CM

## 2023-05-18 DIAGNOSIS — E78.2 MIXED HYPERLIPIDEMIA: ICD-10-CM

## 2023-05-18 DIAGNOSIS — Z20.822 ENCOUNTER FOR LABORATORY TESTING FOR COVID-19 VIRUS: ICD-10-CM

## 2023-05-18 DIAGNOSIS — I35.0 SEVERE AORTIC STENOSIS: ICD-10-CM

## 2023-05-18 DIAGNOSIS — I48.0 PAF (PAROXYSMAL ATRIAL FIBRILLATION): ICD-10-CM

## 2023-05-18 DIAGNOSIS — Z87.891 FORMER SMOKER: ICD-10-CM

## 2023-05-18 DIAGNOSIS — I50.32 CHRONIC DIASTOLIC HEART FAILURE: ICD-10-CM

## 2023-05-18 DIAGNOSIS — J44.9 CHRONIC OBSTRUCTIVE PULMONARY DISEASE, UNSPECIFIED COPD TYPE: ICD-10-CM

## 2023-05-18 DIAGNOSIS — M31.6 TEMPORAL ARTERITIS: ICD-10-CM

## 2023-05-18 DIAGNOSIS — I25.10 CORONARY ARTERY DISEASE INVOLVING NATIVE CORONARY ARTERY OF NATIVE HEART WITHOUT ANGINA PECTORIS: ICD-10-CM

## 2023-05-18 DIAGNOSIS — R94.39 ABNORMAL CARDIOVASCULAR STRESS TEST: ICD-10-CM

## 2023-05-18 DIAGNOSIS — M10.00 IDIOPATHIC GOUT, UNSPECIFIED CHRONICITY, UNSPECIFIED SITE: ICD-10-CM

## 2023-05-18 DIAGNOSIS — I65.23 BILATERAL CAROTID ARTERY STENOSIS: ICD-10-CM

## 2023-05-18 DIAGNOSIS — I10 ESSENTIAL HYPERTENSION: Primary | ICD-10-CM

## 2023-05-18 DIAGNOSIS — I99.8 OTHER DISORDER OF CIRCULATORY SYSTEM: ICD-10-CM

## 2023-05-18 DIAGNOSIS — G47.33 OBSTRUCTIVE SLEEP APNEA SYNDROME: ICD-10-CM

## 2023-05-18 DIAGNOSIS — I35.0 NONRHEUMATIC AORTIC VALVE STENOSIS: ICD-10-CM

## 2023-05-18 DIAGNOSIS — I48.11 LONGSTANDING PERSISTENT ATRIAL FIBRILLATION: ICD-10-CM

## 2023-05-18 DIAGNOSIS — Z95.2 S/P TAVR (TRANSCATHETER AORTIC VALVE REPLACEMENT): ICD-10-CM

## 2023-05-18 DIAGNOSIS — I35.0 NONRHEUMATIC AORTIC (VALVE) STENOSIS: ICD-10-CM

## 2023-05-18 DIAGNOSIS — J43.8 OTHER EMPHYSEMA: ICD-10-CM

## 2023-05-18 DIAGNOSIS — R94.39 ABNORMAL NUCLEAR STRESS TEST: ICD-10-CM

## 2023-05-18 PROCEDURE — 99214 PR OFFICE/OUTPT VISIT, EST, LEVL IV, 30-39 MIN: ICD-10-PCS | Mod: S$PBB,,, | Performed by: INTERNAL MEDICINE

## 2023-05-18 PROCEDURE — 99999 PR PBB SHADOW E&M-EST. PATIENT-LVL IV: CPT | Mod: PBBFAC,,, | Performed by: INTERNAL MEDICINE

## 2023-05-18 PROCEDURE — 99999 PR PBB SHADOW E&M-EST. PATIENT-LVL IV: ICD-10-PCS | Mod: PBBFAC,,, | Performed by: INTERNAL MEDICINE

## 2023-05-18 PROCEDURE — 99214 OFFICE O/P EST MOD 30 MIN: CPT | Mod: PBBFAC,PO | Performed by: INTERNAL MEDICINE

## 2023-05-18 PROCEDURE — 99214 OFFICE O/P EST MOD 30 MIN: CPT | Mod: S$PBB,,, | Performed by: INTERNAL MEDICINE

## 2023-05-18 RX ORDER — CLONIDINE HYDROCHLORIDE 0.1 MG/1
0.1 TABLET ORAL EVERY 6 HOURS PRN
Qty: 60 TABLET | Refills: 11 | Status: SHIPPED | OUTPATIENT
Start: 2023-05-18 | End: 2024-05-17

## 2023-05-18 RX ORDER — METOPROLOL SUCCINATE 50 MG/1
50 TABLET, EXTENDED RELEASE ORAL DAILY
Qty: 90 TABLET | Refills: 3 | Status: SHIPPED | OUTPATIENT
Start: 2023-05-18 | End: 2023-05-18

## 2023-05-18 RX ORDER — METOPROLOL SUCCINATE 25 MG/1
25 TABLET, EXTENDED RELEASE ORAL DAILY
Qty: 90 TABLET | Refills: 3 | Status: SHIPPED | OUTPATIENT
Start: 2023-05-18 | End: 2023-11-30 | Stop reason: SDUPTHER

## 2023-05-18 NOTE — PROGRESS NOTES
C3 nurse spoke with Roshan Menard  for a TCC post hospital discharge follow up call. The patient has a scheduled [facility] appointment with Catrina Yanez MD  on 05/24/2023 @ 0900.

## 2023-05-18 NOTE — PHYSICIAN QUERY
PT Name: Roshan Menard  MR #: [id number]     DOCUMENTATION CLARIFICATION     CDS/: JAKE Fisher, RN, CCDS              Contact information: [email or web address]  This form is a permanent document in the medical record.     Query Date: May 18, 2023    By submitting this query, we are merely seeking further clarification of documentation.  Please utilize your independent clinical judgment when addressing the question(s) below.    The Medical Record contains the following   Indicators Supporting Clinical Findings Location in Medical Record   X Heart Failure documented Acute on chronic heart failure with preserved EF    Chronic diastolic heart failure  NYHA II. Proceed with TAVR    Chronic diastolic heart failure    Acute on chronic diastolic heart failure   Interval H & P: Dr. Ji 5/16          Op Note: Dr. Pleitez 5/16    DCS: Dr. Ji 5/17   X    Lab: 5/16   X EF/Echo Result Date: 4/14/2023  · The left ventricle is normal in size with concentric remodeling and   normal systolic function.  · The estimated ejection fraction is 65%.  · Grade II left ventricular diastolic dysfunction Arrhythmia: Dr. Donovan 5/17    Radiology findings     X Subjective/Objective Respiratory Conditions reports shortness of breath which has been increasing over the past 6 months    Negative for dyspnea on exertion, leg swelling, orthopnea, paroxysmal nocturnal dyspnea   Interval H & P: Dr. Ji 5/16    Recent/Current MI      Heart Transplant, LVAD      Edema, JVD Vascular: JVD present.   Right lower leg: Edema present.    Left lower leg: Edema present    No jugular venous distention    extremities without cyanosis, clubbing or pitting edema   Interval H & P: Dr. Ji 5/16        Arrhythmia: Dr. Donovan  5/17    Ascites      Diuretics/Meds      Other Treatment      Other       Heart failure is a clinical diagnosis which includes symptomatic fluid retention, elevated intracardiac pressures, and/or the inability of the heart  to deliver adequate blood flow.    Heart Failure with reduced Ejection Fraction (HFrEF) or Systolic Heart Failure (loses ability to contract normally, EF is <40%)    Heart Failure with preserved Ejection Fraction (HFpEF) or Diastolic Heart Failure (stiff ventricles, does not relax properly, EF is >50%)     Heart Failure with Combined Systolic and Diastolic Failure (stiff ventricles, does not relax properly and EF is <50%)    Mid-range or mildly reduced ejection fraction (HFmrEF) is classified as systolic heart failure.  Congestive heart failure with a recovered EF is classified as Diastolic Heart Failure.  Common clues to acute exacerbation:  Rapidly progressive symptoms (w/in 2 weeks of presentation), using IV diuretics, using supplemental O2, pulmonary edema on Xray, new or worsening pleural effusion, +JVD or other signs of volume overload, MI w/in 4 weeks, and/or BNP >500  The clinical guidelines noted are only system guidelines, and do not replace the providers clinical judgment.    Provider, please clarify conflicting acuity of diastolic CHF associated with the above clinical findings.    [  X ]  Acute on Chronic Diastolic Heart Failure (HFpEF) - worsening of CHF signs/symptoms in preexisting CHF   [   ]  Chronic Diastolic Heart Failure (HFpEF) - preexisting and stable   [   ]  Other (please specify): ___________________________________     Please document in your progress notes daily for the duration of treatment until resolved and include in your discharge summary.    References:  American Heart Association editorial staff. (2017, May). Ejection Fraction Heart Failure Measurement. American Heart Association. https://www.heart.org/en/health-topics/heart-failure/diagnosing-heart-failure/ejection-fraction-heart-failure-measurement#:~:text=Ejection%20fraction%20(EF)%20is%20a,pushed%20out%20with%20each%20heartbeat  ARLET Kou (2020, December 15). Heart failure with preserved ejection fraction: Clinical  manifestations and diagnosis. UpToDate. https://www.[facility].com/contents/heart-failure-with-preserved-ejection-fraction-clinical-manifestations-and-diagnosis.  ICD-10-CM/PCS Coding Clinic Third Quarter ICD-10, Effective with discharges: September 8, 2020 [facility] § Heart failure with mid-range or mildly reduced ejection fraction (2020).  ICD-10-CM/PCS Coding Clinic Third Quarter ICD-10, Effective with discharges: September 8, 2020 [facility] § Heart failure with recovered ejection fraction (2020).  Form No. [id number]

## 2023-05-18 NOTE — PROGRESS NOTES
Subjective:   Patient ID:  Roshan Menard is a 73 y.o. male who presents for follow-up of No chief complaint on file.  This pleasant patient here in the office for evaluation.  History of temporal arteritis, paroxysmal atrial fibrillation and cardioversion the past with YRN.  History of essential hypertension coronary disease without angina and evidence of aortic stenosis of a mild-to-moderate degree.  This was seen on prior YRN.  Patient is a former smoker no history of drug or alcohol abuse no syncope or near syncopal episodes.  Patient has a history of hypertension on multiple medications and is on full anticoagulation due to PAF.  This visit finds patient feeling well.  His medical problems include cardiac disease with stenting of circumflex coronary artery 2 years ago repeat angiogram last year showed patent stent.  The patient has history of obesity lost over 100 lb.  Patient has cardiac disease as well including significant calcification of the aorta calcification the aortic valve and repeat echo be done.  Prior echo done last year showed evidence of at least moderate aortic stenosis.  Today's physical exam shows the patient has brief murmur but also brisk carotid upstroke which would argue against severe aortic stenosis.  Overall LV function was preserved therefore I would disagree that he has low flow state.        Today patient feels generally well.  Blood pressure is a little bit elevated and he is not taking carvedilol more than once a day.  Will discontinue the medications that he feels like he has a brain fog when he takes the medicines.  Will increase hydralazine to 25 mg q.8 hours and otherwise stable continue with Micardis.     The patient presents the office blood pressures been under higher values.  This reason will add hydralazine and now 50 mg q.8 hours he continues on Micardis 80 mg daily he was intolerant of carvedilol.  Monitor did not show significant arrhythmias.  Intermittent mild short  episodes of sinus tachycardia noted.        Patient presents the office feeling generally well.  Blood pressure still elevated will increase hydralazine 100 mg b.i.d.     Patient still is elevated blood pressure he had a trial of amlodipine from another physician and he was stop it because it did make him feel well.  I will increase hydralazine to mg 3 times a day follow-up evaluation blood pressure and less than 1 month.  Otherwise stable no acute symptoms chest pain shortness.        Is the patient is here for review of blood pressure.  Slightly elevated today however he has some mild swelling has been off Lasix and also restart that today at with double doses for the next 2 days.  Also has of toe infection on antibiotics and also has some pain in that foot.  Follow-up evaluation again within next several weeks after complete re-evaluation on all medications.  He does continue on hydralazine 3 times daily and he has good compliance with this medication.  Today the patient presents in the office and blood pressure is stable on hydralazine feels well.  Evaluation again in 3 months.  All medications reviewed and renewed as necessary.     NO FOCAL CNS SYMPTOMS OR SIGNS TO SUGGEST TIA OR STROKE  NO ANGINA OR EQUIVALENT  NO UNUSUAL GLASER. NO ORTHOPNEA OR PND  NO PALPITATIONS  NO NEAR SYNCOPE OR SYNCOPE  NO EDEMA. NO CALVE TENDERNESS     02/02/2023: Patient still has elevated blood pressure and did not take his hydralazine this morning but usually runs blood pressure 150/90 will increase the hydralazine back to 100 mg q.8 hours.  Will consider back to amlodipine in the pain as he is tried this in the past.  Patient states he sometimes has irregular heart rhythms today is heart rate is stable with sinus arrhythmia.  Will follow-up evaluation again in the next month.  Patient will have another cardiac echo to reassess heart function.     04/20/2023:   Overall patient is stable he fell scraped his left arm and will follow up  to make sure he does not have infection.    Patient's CT scan prior to seeing Dr.TAFUR Yao.  Clinically stable there is no evidence of significant abnormalities seen with small nodules in lungs and will follow those clinically and re-evaluate in 8 months to a year.    Otherwise stable patient mild edema with amlodipine discontinued will double up on the Toprol XL to 50 mg daily for blood pressure control and heart rate control continue other medications.  Clinically stable no edema mild wheezing today he needs take his inhalers today.       05/18/2023:   Status post TAVR in stable doing well this time no exertional symptoms cardiac echo repeat pending in the near future.  No exertional symptoms.  No edema.  Patient tolerated procedure well did well for out throughout the TAVR procedure.    Review of Systems   Constitutional: Negative for chills, diaphoresis, night sweats, weight gain and weight loss.   HENT:  Negative for congestion, hoarse voice, sore throat and stridor.    Eyes:  Negative for double vision and pain.   Cardiovascular:  Negative for chest pain, claudication, cyanosis, dyspnea on exertion, irregular heartbeat, leg swelling, near-syncope, orthopnea, palpitations, paroxysmal nocturnal dyspnea and syncope.   Respiratory:  Negative for cough, hemoptysis, shortness of breath, sleep disturbances due to breathing, snoring, sputum production and wheezing.    Endocrine: Negative for cold intolerance, heat intolerance and polydipsia.   Hematologic/Lymphatic: Negative for bleeding problem. Does not bruise/bleed easily.   Skin:  Negative for color change, dry skin and rash.   Musculoskeletal:  Negative for joint swelling and muscle cramps.   Gastrointestinal:  Negative for bloating, abdominal pain, constipation, diarrhea, dysphagia, melena, nausea and vomiting.   Genitourinary:  Negative for flank pain and urgency.   Neurological:  Negative for dizziness, focal weakness, headaches, light-headedness, loss of  balance, seizures and weakness.   Psychiatric/Behavioral:  Negative for altered mental status and memory loss. The patient is not nervous/anxious.    Family History   Problem Relation Age of Onset    Cancer Mother     Early death Mother     Arthritis Father     Diabetes Father     Heart disease Father     Hyperlipidemia Father     Hypertension Father      Past Medical History:   Diagnosis Date    Allergy     Anticoagulant long-term use     Aortic stenosis     Arthritis     Asthma     Atrial fibrillation     Bronchitis     Cataract     COPD (chronic obstructive pulmonary disease)     Coronary artery disease     stent    General anesthetics causing adverse effect in therapeutic use     hard to awaken    GERD (gastroesophageal reflux disease)     Gout, chronic     Hypertension     Mixed hyperlipidemia     Sleep apnea     Type 2 diabetes mellitus with diabetic peripheral angiopathy without gangrene, unspecified whether long term insulin use 2023     Social History     Socioeconomic History    Marital status:    Occupational History     Employer: [employer]   Tobacco Use    Smoking status: Former     Packs/day: 1.50     Years: 22.00     Pack years: 33.00     Types: Cigarettes     Quit date: 1992     Years since quittin.0    Smokeless tobacco: Never   Substance and Sexual Activity    Alcohol use: No    Drug use: No    Sexual activity: Never     Current Outpatient Medications on File Prior to Visit   Medication Sig Dispense Refill    albuterol (PROVENTIL/VENTOLIN HFA) 90 mcg/actuation inhaler Inhale 2 puffs into the lungs every 6 (six) hours as needed for Wheezing or Shortness of Breath. Rescue 18 g 11    albuterol-ipratropium (DUO-NEB) 2.5 mg-0.5 mg/3 mL nebulizer solution Take 3 mLs by nebulization every 6 (six) hours as needed for Wheezing or Shortness of Breath. Rescue 120 vial 5    allopurinoL (ZYLOPRIM) 300 MG tablet TAKE 1 TABLET BY MOUTH EVERY DAY 90 tablet 2    apixaban  (ELIQUIS) 5 mg Tab Take 1 tablet (5 mg total) by mouth 2 (two) times daily. 180 tablet 3    aspirin (ECOTRIN) 81 MG EC tablet Take 81 mg by mouth every Mon, Wed, Fri.      atorvastatin (LIPITOR) 40 MG tablet TK 1 T PO QD 90 tablet 3    fluocinonide (LIDEX) 0.05 % gel SMARTSI-2 Gram(s) Topical Twice Daily      fluticasone furoate-vilanteroL (BREO ELLIPTA) 200-25 mcg/dose DsDv diskus inhaler INHALE 1 PUFF BY MOUTH AT THE SAME TIME EVERY DAY 60 each 5    furosemide (LASIX) 20 MG tablet Take 1 tablet (20 mg total) by mouth once daily. 90 tablet 3    hydrALAZINE (APRESOLINE) 100 MG tablet Take 1 tablet (100 mg total) by mouth every 8 (eight) hours. 90 tablet 11    ketoconazole (NIZORAL) 2 % cream Apply topically.      lactobacillus comb no.10 (PROBIOTIC) 20 billion cell Cap Take 1 capsule by mouth once daily. Or as directed on bottle      metoprolol succinate (TOPROL-XL) 25 MG 24 hr tablet Take 2 tablets (50 mg total) by mouth once daily. 60 tablet 11    montelukast (SINGULAIR) 10 mg tablet Take 1 tablet (10 mg total) by mouth once daily. 30 tablet 1    multivitamin (THERAGRAN) per tablet Take 1 tablet by mouth once daily.      omeprazole (PRILOSEC) 10 MG capsule Take 10 mg by mouth once daily.      potassium chloride (KLOR-CON) 10 MEQ TbSR TK 1 T PO ONCE A DAY. 90 tablet 3    predniSONE (DELTASONE) 5 MG tablet Take 5 mg by mouth once daily.      telmisartan (MICARDIS) 80 MG Tab Take 1 tablet (80 mg total) by mouth once daily. 90 tablet 3    tiotropium bromide (SPIRIVA RESPIMAT) 2.5 mcg/actuation inhaler Inhale 2 puffs into the lungs Daily. Controller 4 g 11    TOBRADEX 0.3-0.1 % Oint       urea (CARMOL) 40 % Crea 2 (two) times daily as needed.  0    [DISCONTINUED] cimetidine (TAGAMET) 300 MG tablet Take 1 tablet (300 mg total) by mouth every 6 (six) hours. Starting night before procedure. Take as directed on your instruction sheet. for 3 doses 3 tablet 0     Current Facility-Administered Medications on File Prior to  Visit   Medication Dose Route Frequency Provider Last Rate Last Admin    sodium chloride 0.9% flush 10 mL  10 mL Intravenous PRN Luis Enrique Ji MD        [DISCONTINUED] acetaminophen tablet 650 mg  650 mg Oral Q4H PRN Loreta Bustillos MD        [DISCONTINUED] albuterol inhaler 2 puff  2 puff Inhalation Q6H PRN Herbert Richter MD        [DISCONTINUED] allopurinoL tablet 300 mg  300 mg Oral Daily Herbert Richter MD   300 mg at 05/17/23 0905    [DISCONTINUED] apixaban tablet 5 mg  5 mg Oral BID Velvet Owens PA-C   5 mg at 05/17/23 0905    [DISCONTINUED] aspirin EC tablet 81 mg  81 mg Oral Daily Loreta Bustillos MD   81 mg at 05/17/23 0905    [DISCONTINUED] atorvastatin tablet 40 mg  40 mg Oral QHS Herbert Richter MD   40 mg at 05/16/23 2109    [DISCONTINUED] hydrALAZINE tablet 100 mg  100 mg Oral Q8H Loreta Bustillos MD   100 mg at 05/17/23 0515    [DISCONTINUED] labetalol 20 mg/4 mL (5 mg/mL) IV syring  10 mg Intravenous Q6H PRN Loreta Bustillos MD        [DISCONTINUED] magnesium oxide tablet 800 mg  800 mg Oral PRN Loreta Bustillos MD        [DISCONTINUED] magnesium oxide tablet 800 mg  800 mg Oral PRN Loreta Bustillos MD        [DISCONTINUED] metoprolol succinate (TOPROL-XL) 24 hr tablet 50 mg  50 mg Oral Daily Loreta Bustillos MD   50 mg at 05/17/23 0905    [DISCONTINUED] NORepinephrine 4 mg in dextrose 5% 250 mL infusion (premix) (titrating)  0-3 mcg/kg/min Intravenous Continuous Loreta Bustillos MD        [DISCONTINUED] ondansetron disintegrating tablet 8 mg  8 mg Oral Q8H PRN Loreta Bustillos MD        [DISCONTINUED] potassium bicarbonate disintegrating tablet 35 mEq  35 mEq Oral PRN Loreta Bustillos MD        [DISCONTINUED] potassium bicarbonate disintegrating tablet 50 mEq  50 mEq Oral PRN Loreta Bustillos MD        [DISCONTINUED] potassium bicarbonate disintegrating tablet 60 mEq  60 mEq Oral PRN Loreta Robb  MD [facility]        [DISCONTINUED] potassium chloride SA CR tablet 40 mEq  40 mEq Oral TID PRN Loreta Bustillos MD        [DISCONTINUED] potassium, sodium phosphates 280-160-250 mg packet 2 packet  2 packet Oral PRN Loreta Bustillos MD        [DISCONTINUED] potassium, sodium phosphates 280-160-250 mg packet 2 packet  2 packet Oral PRN Loreta Bustillos MD        [DISCONTINUED] potassium, sodium phosphates 280-160-250 mg packet 2 packet  2 packet Oral PRN Loreta Bustillos MD        [DISCONTINUED] sodium chloride 0.9% flush 10 mL  10 mL Intravenous PRN Loreta Bustillos MD        [DISCONTINUED] valsartan tablet 320 mg  320 mg Oral Daily Loreta Bustillos MD   320 mg at 05/17/23 0905     Review of patient's allergies indicates:   Allergen Reactions    Sulfa (sulfonamide antibiotics)      Hives    Azithromycin      Diarrhea      Cefaclor Other (See Comments)     Other reaction(s): Hives    Iodine and iodide containing products      Other reaction(s): Unknown    Ivp dye  [iodinated contrast media] Other (See Comments)    Doxycycline Rash       Objective:     Physical Exam  Eyes:      Pupils: Pupils are equal, round, and reactive to light.   Neck:      Trachea: No tracheal deviation.   Cardiovascular:      Rate and Rhythm: Normal rate and regular rhythm.      Pulses: Intact distal pulses.           Carotid pulses are 2+ on the right side and 2+ on the left side.       Radial pulses are 2+ on the right side and 2+ on the left side.        Femoral pulses are 2+ on the right side and 2+ on the left side.       Popliteal pulses are 2+ on the right side and 2+ on the left side.        Dorsalis pedis pulses are 2+ on the right side and 2+ on the left side.        Posterior tibial pulses are 2+ on the right side and 2+ on the left side.      Heart sounds: Normal heart sounds. No murmur heard.    No friction rub. No gallop.   Pulmonary:      Effort: Pulmonary effort is normal. No  respiratory distress.      Breath sounds: Normal breath sounds. No stridor. No wheezing or rales.   Chest:      Chest wall: No tenderness.   Abdominal:      General: There is no distension.      Tenderness: There is no abdominal tenderness. There is no rebound.   Musculoskeletal:         General: No tenderness.      Cervical back: Normal range of motion.   Skin:     General: Skin is warm and dry.   Neurological:      Mental Status: He is alert and oriented to person, place, and time.     Assessment:     1. Nonrheumatic aortic valve stenosis    2. Temporal arteritis    3. Severe aortic stenosis    4. RBBB    5. Essential hypertension    6. Chronic obstructive pulmonary disease, unspecified COPD type    7. Abnormal nuclear stress test    8. Encounter for laboratory testing for COVID-19 virus    9. Coronary artery disease involving native coronary artery of native heart without angina pectoris    10. Idiopathic gout, unspecified chronicity, unspecified site    11. Other disorder of circulatory system    12. Nonrheumatic aortic (valve) stenosis    13. Other emphysema    14. Obstructive sleep apnea syndrome    15. Abnormal cardiovascular stress test    16. PAF (paroxysmal atrial fibrillation)    17. Chronic diastolic heart failure    18. Longstanding persistent atrial fibrillation    19. Bilateral carotid artery stenosis    20. Mixed hyperlipidemia    21. Type 2 diabetes mellitus with diabetic peripheral angiopathy without gangrene, unspecified whether long term insulin use    22. S/P TAVR (transcatheter aortic valve replacement)    23. Stable angina pectoris    24. Former smoker        Plan:     Nonrheumatic aortic valve stenosis    Temporal arteritis    Severe aortic stenosis    RBBB    Essential hypertension    Chronic obstructive pulmonary disease, unspecified COPD type    Abnormal nuclear stress test    Encounter for laboratory testing for COVID-19 virus    Coronary artery disease involving native coronary artery of  native heart without angina pectoris    Idiopathic gout, unspecified chronicity, unspecified site    Other disorder of circulatory system    Nonrheumatic aortic (valve) stenosis    Other emphysema    Obstructive sleep apnea syndrome    Abnormal cardiovascular stress test    PAF (paroxysmal atrial fibrillation)    Chronic diastolic heart failure    Longstanding persistent atrial fibrillation    Bilateral carotid artery stenosis    Mixed hyperlipidemia    Type 2 diabetes mellitus with diabetic peripheral angiopathy without gangrene, unspecified whether long term insulin use    S/P TAVR (transcatheter aortic valve replacement)    Stable angina pectoris    Former smoker        Impression 1 status post TAVR stable doing well current medications.    2 hypertension will add clonidine for increased hypertension continues on metoprolol, hydralazine and telmisartan.    Follow-up evaluation again within the next month.  Patient does have a Holter monitor for evaluation of low heart rates.  All questions answered patient otherwise stable doing well.

## 2023-05-24 ENCOUNTER — OFFICE VISIT (OUTPATIENT)
Dept: FAMILY MEDICINE | Facility: CLINIC | Age: 73
End: 2023-05-24
Payer: MEDICARE

## 2023-05-24 VITALS
DIASTOLIC BLOOD PRESSURE: 74 MMHG | HEIGHT: 73 IN | BODY MASS INDEX: 33.4 KG/M2 | HEART RATE: 88 BPM | WEIGHT: 252 LBS | TEMPERATURE: 98 F | SYSTOLIC BLOOD PRESSURE: 165 MMHG

## 2023-05-24 DIAGNOSIS — E78.2 MIXED HYPERLIPIDEMIA: ICD-10-CM

## 2023-05-24 DIAGNOSIS — I10 ESSENTIAL HYPERTENSION: Primary | ICD-10-CM

## 2023-05-24 DIAGNOSIS — I48.0 PAF (PAROXYSMAL ATRIAL FIBRILLATION): ICD-10-CM

## 2023-05-24 DIAGNOSIS — E11.51 TYPE 2 DIABETES MELLITUS WITH DIABETIC PERIPHERAL ANGIOPATHY WITHOUT GANGRENE, UNSPECIFIED WHETHER LONG TERM INSULIN USE: ICD-10-CM

## 2023-05-24 DIAGNOSIS — M31.6 TEMPORAL ARTERITIS: ICD-10-CM

## 2023-05-24 DIAGNOSIS — Z95.2 S/P TAVR (TRANSCATHETER AORTIC VALVE REPLACEMENT): ICD-10-CM

## 2023-05-24 DIAGNOSIS — Z09 HOSPITAL DISCHARGE FOLLOW-UP: ICD-10-CM

## 2023-05-24 PROCEDURE — 99999 PR PBB SHADOW E&M-EST. PATIENT-LVL V: CPT | Mod: PBBFAC,,, | Performed by: INTERNAL MEDICINE

## 2023-05-24 PROCEDURE — 99215 OFFICE O/P EST HI 40 MIN: CPT | Mod: PBBFAC,PO | Performed by: INTERNAL MEDICINE

## 2023-05-24 PROCEDURE — 99214 PR OFFICE/OUTPT VISIT, EST, LEVL IV, 30-39 MIN: ICD-10-PCS | Mod: S$PBB,,, | Performed by: INTERNAL MEDICINE

## 2023-05-24 PROCEDURE — 99999 PR PBB SHADOW E&M-EST. PATIENT-LVL V: ICD-10-PCS | Mod: PBBFAC,,, | Performed by: INTERNAL MEDICINE

## 2023-05-24 PROCEDURE — 99214 OFFICE O/P EST MOD 30 MIN: CPT | Mod: S$PBB,,, | Performed by: INTERNAL MEDICINE

## 2023-05-24 NOTE — PROGRESS NOTES
Transitional Care Note    Family and/or Caretaker present at visit?  No.  Diagnostic tests reviewed/disposition: No diagnosic tests pending after this hospitalization.  Disease/illness education: provided at time of discharge  Home health/community services discussion/referrals: Patient does not have home health established from hospital visit.  They do not need home health.  If needed, we will set up home health for the patient.   Establishment or re-establishment of referral orders for community resources: No other necessary community resources.   Discussion with other health care providers: No discussion with other health care providers necessary.

## 2023-05-24 NOTE — PROGRESS NOTES
Assessment/Plan:    Problem List Items Addressed This Visit          Cardiac/Vascular    Essential hypertension - Primary    Overview       Hypertension Medications               cloNIDine (CATAPRES) 0.1 MG tablet Take 1 tablet (0.1 mg total) by mouth every 6 (six) hours as needed (for systolic blood pressure greater than 165).    furosemide (LASIX) 20 MG tablet Take 1 tablet (20 mg total) by mouth once daily.    hydrALAZINE (APRESOLINE) 100 MG tablet Take 1 tablet (50 mg total) by mouth every 8 (eight) hours.    metoprolol succinate (TOPROL-XL) 25 MG 24 hr tablet Take 1 tablet (25 mg total) by mouth once daily.    telmisartan (MICARDIS) 80 MG Tab Take 1 tablet (40 mg total) by mouth BID   -above goal today and varying at home  -working with cardiology and has an appointment in several weeks  -intolerant to amlodipine and spironolactone in the past  -continue lifestyle modification with low sodium diet and exercise   -discussed hypertension disease course and importance of treating high blood pressure  -patient understood and advised of risk of untreated blood pressure.  ER precautions were given   for symptoms of hypertensive urgency and emergency.         Mixed hyperlipidemia    Overview       Hyperlipidemia Medications               atorvastatin (LIPITOR) 40 MG tablet TK 1 T PO QD   -chronic condition. Currently stable.    -reports compliance with hyperlipidemia treatment as prescribed  -denies any known adverse effects of medications  -most recent labs listed below:  Lab Results   Component Value Date    CHOL 156 04/03/2023     Lab Results   Component Value Date    HDL 39 (L) 04/03/2023     Lab Results   Component Value Date    LDLCALC 89.6 04/03/2023     Lab Results   Component Value Date    TRIG 137 04/03/2023     Lab Results   Component Value Date    ALT 26 05/17/2023    AST 28 05/17/2023    ALKPHOS 75 05/17/2023    BILITOT 0.6 05/17/2023          PAF (paroxysmal atrial fibrillation)    Overview     -followed  by cardiology  -on toprol  -eliquis for anticoagulation  -s/p cardioversion in the past, failed  -asymptomatic           S/P TAVR (transcatheter aortic valve replacement)       Immunology/Multi System    Temporal arteritis    Overview     -followed now by retinal specialist  -s/p temporal biopsy normal, however improved with prednisone  -remains on pred 5 mg QD              Endocrine    Type 2 diabetes mellitus with diabetic peripheral angiopathy without gangrene, unspecified whether long term insulin use    Overview     -condition is currently controlled without medications  -plan to repeat labs 6 months from previous  -see diabetic health maintenance listed below  -on statin: Yes  -on ACE-I/ARB: Yes  -counseling provided on importance of diabetic diet and medication compliance in order to treat diabetes  -discussed diabetes disease course and potential complications         Relevant Orders    Hemoglobin A1C     Other Visit Diagnoses       Hospital discharge follow-up                  Catrina Yanez MD  _____________________________________________________________________________________________________________________________________________________    CC: discharge follow up    HPI:    Patient is in clinic today as an established patient. Patient recently admitted for planned TAVR.     Admission Date: 5/16/2023  Discharge Date:  05/17/2023 3:09 PM    HPI:  Roshan Menard is a 73 y.o. male who presents for evaluation of TAVR. He is a patient of Dr Osborne. His daughter works as a nurse in pediatric cardiology. He reports shortness of breath which has been increasing over the past 6 months. He has CAD s/p Lcx PCI which was patent on his angiogram last month. Otherwise nonobstructive coronary disease. He reports one epidoe of chest pressure while pushing a buggy at the store. He also has diagnosed COPD.      Roshan Menard is a 73 y.o. male referred by Dr Rokowski for evaluation of severe AS (NYHA Class II sx).      The patient has undergone the following TAVR work-up:   ECHO (Date 4/14/23): MUSA= 1.04 cm2 (AVAi 0.42), MG= 37 mmHg, Peak Corey= 4.02 m/s (echo reviewed personally), EF= 65%.   LHC (Date 4/4/23): nonobstructive 2 vessel coronary disease   STS: 2%   Frailty: 0/4   Iliacs are >6.86 on R and > 8.63 on L   LVOT area by CTA is 4.43 cm2 (27.5 mm X 22.0 mm) and Avg Diameter is 23.8 per Dr Davenport  Incidental findings on CT: lung nodule < 1 cm, renal cyst (sent to PCP)  CT Surgery risk assessment: low risk, per Dr Mack  Rhythm issues: RBBB  PFTs: deferred  KCCQ/5 meter walk:   Comorbidities: carotid disease, Afib, COPD, DM     Roshan Menard is a 29 mm  Evolut FX valve candidate via RTF access.    Procedure(s) (LRB):  REPLACEMENT, AORTIC VALVE, TRANSCATHETER (TAVR) (N/A)  Cardiac Cath Cosurgeon (N/A)     Hospital Course:  Roshan Meanrd was admitted and underwent successful placement of a 26 mm EvolutFX TAVR via TF access under MAC sedation on 5/16/23. Please see full cath report for details. A transthoracic echo was performed immediately post procedure which showed no paravalvular leak. An aortic valve mean gradient of 5 mmHg and a maximum velocity through the aortic valve of 1.2 m/s. Patient had a preexisting RBBB. EP was consulted.  He remained hemodynamically stable overnight. This morning, he ambulated without difficulty and was eager to go home. It was felt he was stable for discharge and will go home on [facility] for antithrombotic therapy post TAVR.    Since d/c: reports that's he has been doing well. He is still having some elevated BP at times at home but not very high. He still believes that hydralazine is causing dizziness but he is going to discuss with cardiology at next visit. He does report having more energy since surgery. He does have upcoming labs, echo and cardiology follow up in the next few weeks.     No other new complaints today.      All discharge medications have been reviewed and reconciled on  chart.     Current Outpatient Medications on File Prior to Visit   Medication Sig Dispense Refill    albuterol (PROVENTIL/VENTOLIN HFA) 90 mcg/actuation inhaler Inhale 2 puffs into the lungs every 6 (six) hours as needed for Wheezing or Shortness of Breath. Rescue 18 g 11    albuterol-ipratropium (DUO-NEB) 2.5 mg-0.5 mg/3 mL nebulizer solution Take 3 mLs by nebulization every 6 (six) hours as needed for Wheezing or Shortness of Breath. Rescue 120 vial 5    allopurinoL (ZYLOPRIM) 300 MG tablet TAKE 1 TABLET BY MOUTH EVERY DAY 90 tablet 2    apixaban (ELIQUIS) 5 mg Tab Take 1 tablet (5 mg total) by mouth 2 (two) times daily. 180 tablet 3    aspirin (ECOTRIN) 81 MG EC tablet Take 81 mg by mouth every Mon, Wed, Fri.      atorvastatin (LIPITOR) 40 MG tablet TK 1 T PO QD 90 tablet 3    cloNIDine (CATAPRES) 0.1 MG tablet Take 1 tablet (0.1 mg total) by mouth every 6 (six) hours as needed (for systolic blood pressure greater than 165). 60 tablet 11    fluocinonide (LIDEX) 0.05 % gel SMARTSI-2 Gram(s) Topical Twice Daily      fluticasone furoate-vilanteroL (BREO ELLIPTA) 200-25 mcg/dose DsDv diskus inhaler INHALE 1 PUFF BY MOUTH AT THE SAME TIME EVERY DAY 60 each 5    furosemide (LASIX) 20 MG tablet Take 1 tablet (20 mg total) by mouth once daily. 90 tablet 3    hydrALAZINE (APRESOLINE) 100 MG tablet Take 1 tablet (100 mg total) by mouth every 8 (eight) hours. 90 tablet 11    ketoconazole (NIZORAL) 2 % cream Apply topically.      lactobacillus comb no.10 (PROBIOTIC) 20 billion cell Cap Take 1 capsule by mouth once daily. Or as directed on bottle      metoprolol succinate (TOPROL-XL) 25 MG 24 hr tablet Take 1 tablet (25 mg total) by mouth once daily. 90 tablet 3    montelukast (SINGULAIR) 10 mg tablet Take 1 tablet (10 mg total) by mouth once daily. 30 tablet 1    multivitamin (THERAGRAN) per tablet Take 1 tablet by mouth once daily.      omeprazole (PRILOSEC) 10 MG capsule Take 10 mg by mouth once daily.      potassium  "chloride (KLOR-CON) 10 MEQ TbSR TK 1 T PO ONCE A DAY. 90 tablet 3    predniSONE (DELTASONE) 2.5 MG tablet Take 5 mg by mouth once daily.      telmisartan (MICARDIS) 80 MG Tab Take 1 tablet (80 mg total) by mouth once daily. 90 tablet 3    tiotropium bromide (SPIRIVA RESPIMAT) 2.5 mcg/actuation inhaler Inhale 2 puffs into the lungs Daily. Controller 4 g 11    TOBRADEX 0.3-0.1 % Oint       urea (CARMOL) 40 % Crea 2 (two) times daily as needed.  0     Current Facility-Administered Medications on File Prior to Visit   Medication Dose Route Frequency Provider Last Rate Last Admin    sodium chloride 0.9% flush 10 mL  10 mL Intravenous PRN Luis Enrique Ji MD           Review of Systems   Constitutional:  Negative for chills, diaphoresis, fatigue and fever.   HENT:  Negative for congestion, ear pain, postnasal drip, sinus pain and sore throat.    Eyes:  Negative for pain and redness.   Respiratory:  Negative for cough, chest tightness and shortness of breath.    Cardiovascular:  Negative for chest pain and leg swelling.   Gastrointestinal:  Negative for abdominal pain, constipation, diarrhea, nausea and vomiting.   Genitourinary:  Negative for dysuria and hematuria.   Musculoskeletal:  Negative for arthralgias and joint swelling.   Skin:  Negative for rash.   Neurological:  Negative for dizziness, syncope and headaches.   Psychiatric/Behavioral:  Negative for dysphoric mood. The patient is not nervous/anxious.      Vitals:    05/24/23 0858 05/24/23 0950   BP: (!) 185/87 (!) 165/74   Pulse: 88    Temp: 97.9 °F (36.6 °C)    TempSrc: Temporal    Weight: 114.3 kg (252 lb)    Height: 6' 1" (1.854 m)        Wt Readings from Last 3 Encounters:   05/24/23 114.3 kg (252 lb)   05/18/23 115.2 kg (253 lb 13.8 oz)   05/16/23 113.9 kg (251 lb)       Physical Exam  Constitutional:       General: He is not in acute distress.     Appearance: Normal appearance. He is well-developed.   HENT:      Head: Normocephalic and atraumatic. "   Eyes:      Conjunctiva/sclera: Conjunctivae normal.   Cardiovascular:      Rate and Rhythm: Normal rate and regular rhythm.      Pulses: Normal pulses.      Heart sounds: Normal heart sounds. No murmur heard.  Pulmonary:      Effort: Pulmonary effort is normal. No respiratory distress.      Breath sounds: Normal breath sounds.   Abdominal:      General: Bowel sounds are normal. There is no distension.      Palpations: Abdomen is soft.      Tenderness: There is no abdominal tenderness.   Musculoskeletal:         General: Normal range of motion.      Cervical back: Normal range of motion and neck supple.   Skin:     General: Skin is warm and dry.      Findings: No rash.   Neurological:      General: No focal deficit present.      Mental Status: He is alert and oriented to person, place, and time.   Psychiatric:         Mood and Affect: Mood normal.         Behavior: Behavior normal.       Health Maintenance   Topic Date Due    Lipid Panel  04/03/2024    High Dose Statin  05/24/2024    TETANUS VACCINE  10/09/2031    Hepatitis C Screening  Completed    Abdominal Aortic Aneurysm Screening  Completed

## 2023-05-25 ENCOUNTER — TELEPHONE (OUTPATIENT)
Dept: CARDIOLOGY | Facility: CLINIC | Age: 73
End: 2023-05-25
Payer: MEDICARE

## 2023-05-25 NOTE — TELEPHONE ENCOUNTER
Called pt and informed him of med changes that Dr. Osborne recommended: telmisartan 80 mg q morning, hydralazine 100 mg q hs. Pt verbalized understanding and will send in BP log. Pt scheduled to see Dr. Osborne 6/22/23 and will call back with any further questions or concerns.

## 2023-05-26 ENCOUNTER — TELEPHONE (OUTPATIENT)
Dept: CARDIOLOGY | Facility: CLINIC | Age: 73
End: 2023-05-26
Payer: MEDICARE

## 2023-05-26 RX ORDER — AMLODIPINE BESYLATE 5 MG/1
5 TABLET ORAL DAILY
Qty: 30 TABLET | Refills: 11 | Status: SHIPPED | OUTPATIENT
Start: 2023-05-26 | End: 2023-06-22

## 2023-05-26 NOTE — TELEPHONE ENCOUNTER
Good Morning.    Word to .   Over last 4 o 5 days awakekng pressure has shown to 147 - 155 /70's. as I look back. Today it was 161/71.  I also have had a crick in my neck. For that i had taken 1000. Mg of Ex tylenol early evening before     Couple points I know  1.  50 mg metropolol makes me real woosey 2.  100 mg of hydralazene same.   Both of those together I cannot function. Puts me to bed.    Will continue to collect data possibly we can meet next week.  I dont quite know why pressure up little upon waking this morning.  Unless moving the dosage time of telmesartan did that.  1 other point. Steve Medrano was my pcp even before [facility].  He had me on  attenolol and losartan for decades  worked pretty well Magbe you can locate statistical data that will help Just a thought.    Thanks

## 2023-05-30 ENCOUNTER — TELEPHONE (OUTPATIENT)
Dept: PRIMARY CARE CLINIC | Facility: CLINIC | Age: 73
End: 2023-05-30
Payer: MEDICARE

## 2023-05-30 NOTE — TELEPHONE ENCOUNTER
Pt calling regarding getting surgery clearance for Dr. Rendon. Advised he would need an appt for this to be done but states he just saw you on 5/24/23 and would like to know if you can do the clearance based off that appt

## 2023-05-30 NOTE — TELEPHONE ENCOUNTER
----- Message from Lexi Armas sent at 5/30/2023  2:21 PM CDT -----  Contact: Roshan hawkins [phone]  [email or web address]     Patient is calling for Medical Advice regarding:    How long has patient had these symptoms:    Pharmacy name and phone#:    Would like response via Videostript:call back    Comments: Pt is calling because he needs to get a medical clearance filled out and wants to know how he can submit the paperwork

## 2023-06-08 ENCOUNTER — PATIENT MESSAGE (OUTPATIENT)
Dept: PRIMARY CARE CLINIC | Facility: CLINIC | Age: 73
End: 2023-06-08
Payer: MEDICARE

## 2023-06-08 ENCOUNTER — TELEPHONE (OUTPATIENT)
Dept: CARDIOLOGY | Facility: CLINIC | Age: 73
End: 2023-06-08
Payer: MEDICARE

## 2023-06-08 NOTE — TELEPHONE ENCOUNTER
Spoke to patient.  Reports fever this morning of 101.6.  Advised him to call his PCP for evaluation.  OK to take Tylenol for fever.      ----- Message from Yuki Ball sent at 6/8/2023  8:55 AM CDT -----  Regarding: please call  The pt is calling to report that he has fever. Please call him back @ [phone]. Thanks, Yuki

## 2023-06-09 ENCOUNTER — HOSPITAL ENCOUNTER (OUTPATIENT)
Dept: RADIOLOGY | Facility: HOSPITAL | Age: 73
Discharge: HOME | End: 2023-06-09
Attending: PHYSICIAN ASSISTANT
Payer: MEDICARE

## 2023-06-09 ENCOUNTER — OFFICE VISIT (OUTPATIENT)
Dept: FAMILY MEDICINE | Facility: CLINIC | Age: 73
End: 2023-06-09
Payer: MEDICARE

## 2023-06-09 VITALS
TEMPERATURE: 99 F | HEART RATE: 92 BPM | BODY MASS INDEX: 33.4 KG/M2 | SYSTOLIC BLOOD PRESSURE: 161 MMHG | DIASTOLIC BLOOD PRESSURE: 84 MMHG | WEIGHT: 252 LBS | HEIGHT: 73 IN

## 2023-06-09 DIAGNOSIS — J98.8 BACTERIAL RESPIRATORY INFECTION: Primary | ICD-10-CM

## 2023-06-09 DIAGNOSIS — R06.2 WHEEZING: ICD-10-CM

## 2023-06-09 DIAGNOSIS — B96.89 BACTERIAL RESPIRATORY INFECTION: Primary | ICD-10-CM

## 2023-06-09 DIAGNOSIS — R50.9 FEVER, UNSPECIFIED FEVER CAUSE: ICD-10-CM

## 2023-06-09 LAB
CTP QC/QA: YES
CTP QC/QA: YES
POC MOLECULAR INFLUENZA A AGN: NEGATIVE
POC MOLECULAR INFLUENZA B AGN: NEGATIVE
SARS-COV-2 RDRP RESP QL NAA+PROBE: NEGATIVE

## 2023-06-09 PROCEDURE — 99213 PR OFFICE/OUTPT VISIT, EST, LEVL III, 20-29 MIN: ICD-10-PCS | Mod: S$PBB,,, | Performed by: PHYSICIAN ASSISTANT

## 2023-06-09 PROCEDURE — 71046 X-RAY EXAM CHEST 2 VIEWS: CPT | Mod: 26,,, | Performed by: RADIOLOGY

## 2023-06-09 PROCEDURE — 99999 PR PBB SHADOW E&M-EST. PATIENT-LVL V: ICD-10-PCS | Mod: PBBFAC,,, | Performed by: PHYSICIAN ASSISTANT

## 2023-06-09 PROCEDURE — 87502 INFLUENZA DNA AMP PROBE: CPT | Mod: PBBFAC,PO | Performed by: PHYSICIAN ASSISTANT

## 2023-06-09 PROCEDURE — 99999 PR PBB SHADOW E&M-EST. PATIENT-LVL V: CPT | Mod: PBBFAC,,, | Performed by: PHYSICIAN ASSISTANT

## 2023-06-09 PROCEDURE — 87635 SARS-COV-2 COVID-19 AMP PRB: CPT | Mod: PBBFAC,PO | Performed by: PHYSICIAN ASSISTANT

## 2023-06-09 PROCEDURE — 99213 OFFICE O/P EST LOW 20 MIN: CPT | Mod: S$PBB,,, | Performed by: PHYSICIAN ASSISTANT

## 2023-06-09 PROCEDURE — 71046 XR CHEST PA AND LATERAL: ICD-10-PCS | Mod: 26,,, | Performed by: RADIOLOGY

## 2023-06-09 PROCEDURE — 99215 OFFICE O/P EST HI 40 MIN: CPT | Mod: PBBFAC,25,PO | Performed by: PHYSICIAN ASSISTANT

## 2023-06-09 PROCEDURE — 71046 X-RAY EXAM CHEST 2 VIEWS: CPT | Mod: TC,PO

## 2023-06-09 RX ORDER — AMOXICILLIN AND CLAVULANATE POTASSIUM 875; 125 MG/1; MG/1
1 TABLET, FILM COATED ORAL 2 TIMES DAILY
Qty: 14 TABLET | Refills: 0 | Status: SHIPPED | OUTPATIENT
Start: 2023-06-09 | End: 2023-06-16

## 2023-06-09 RX ORDER — AMOXICILLIN AND CLAVULANATE POTASSIUM 875; 125 MG/1; MG/1
1 TABLET, FILM COATED ORAL 2 TIMES DAILY
Qty: 20 TABLET | Refills: 0 | Status: SHIPPED | OUTPATIENT
Start: 2023-06-09 | End: 2023-06-09

## 2023-06-09 NOTE — PROGRESS NOTES
Results have been released via Butlr. Please verify that these have been viewed by patient. If not, please call patient with results.     I have sent a message to them with the following interpretation (see below).    I have reviewed your recent CXR which was normal.      Please do not hesitate to call or message with any additional questions or concerns.    Aide Stephens PA-C

## 2023-06-09 NOTE — PROGRESS NOTES
Assessment/Plan:    Problem List Items Addressed This Visit    None  Visit Diagnoses       Bacterial respiratory infection    -  Primary    Relevant Medications    amoxicillin-clavulanate 875-125mg (AUGMENTIN) 875-125 mg per tablet    Wheezing        Relevant Orders    X-Ray Chest PA And Lateral (Completed)    Fever, unspecified fever cause        Relevant Orders    X-Ray Chest PA And Lateral (Completed)    POCT COVID-19 Rapid Screening (Completed)    POCT Influenza A/B Molecular (Completed)        -start antibiotics as prescribed  -recommend Flonase and Mucinex  -supportive care- rest, increase hydration with water, OTC Tylenol/Ibuprofen for pain/fever  -follow up if no improvement in symptoms   -ER precautions for severe or worsening of symptoms     Follow up if symptoms worsen or fail to improve.    Aide Stephens PA-C  _____________________________________________________________________________________________________________________________________________________    CC: fever    HPI: Patient is in clinic today as an established patient here for fever. Patient reports waking up yesterday morning with fever (Tmax of 101.6). He also reports nasal congestion, sinus pressure, sore throat, postnasal drip, and cough which started 4 days prior. He denies ear pain, headaches, hoarse voice, neck pain, shortness of breath, sneezing, or swollen glands. He has been taking Tylenol which has helped with fever. He has not tried anything else for relief of symptoms. He denies recent sick contacts. He has a history of COPD in which he remains on Breo and Spiriva. He is also s/p TAVR on 5/16. No other complaints today.    Past Medical History:   Diagnosis Date    Allergy     Anticoagulant long-term use     Aortic stenosis     Arthritis     Asthma     Atrial fibrillation     Bronchitis     Cataract     COPD (chronic obstructive pulmonary disease)     Coronary artery disease     stent    General anesthetics causing adverse  effect in therapeutic use     hard to awaken    GERD (gastroesophageal reflux disease)     Gout, chronic     Hypertension     Mixed hyperlipidemia     Sleep apnea     Type 2 diabetes mellitus with diabetic peripheral angiopathy without gangrene, unspecified whether long term insulin use 05/02/2023     Past Surgical History:   Procedure Laterality Date    ANGIOGRAM, CORONARY, WITH LEFT HEART CATHETERIZATION  09/27/2021    Procedure: Angiogram, Coronary, with Left Heart Cath;  Surgeon: Vincent Gonzalez MD;  Location: [facility] CATH;  Service: Cardiology;;    BACK SURGERY      L3-5; ruptured disc; laminectomy x 2 surgery    CARDIAC CATH COSURGEON N/A 5/16/2023    Procedure: Cardiac Cath Cosurgeon;  Surgeon: Holly Pleitez MD;  Location: [facility] CATH LAB;  Service: Cardiothoracic;  Laterality: N/A;    CARDIOVERSION      CHOLECYSTECTOMY      CORONARY ANGIOPLASTY WITH STENT PLACEMENT      EYE SURGERY  2012    Cateract    JOINT REPLACEMENT  '08    RT  KNEE    LEFT HEART CATHETERIZATION Left 04/04/2023    Procedure: Left heart cath;  Surgeon: Aleksey Krishnan MD;  Location: [facility] CATH LAB;  Service: Cardiology;  Laterality: Left;    SPINE SURGERY  1988/2004    Lamanectomy '88/Lam '04    TONSILLECTOMY      TOTAL KNEE ARTHROPLASTY      TRANSCATHETER AORTIC VALVE REPLACEMENT (TAVR) N/A 5/16/2023    Procedure: REPLACEMENT, AORTIC VALVE, TRANSCATHETER (TAVR);  Surgeon: Luis Enrique Ji MD;  Location: [facility] CATH LAB;  Service: Cardiology;  Laterality: N/A;    VASECTOMY       Review of patient's allergies indicates:   Allergen Reactions    Sulfa (sulfonamide antibiotics)      Hives    Azithromycin      Diarrhea      Cefaclor Other (See Comments)     Other reaction(s): Hives    Iodine and iodide containing products      Other reaction(s): Unknown    Ivp dye  [iodinated contrast media] Other (See Comments)    Doxycycline Rash     Social History     Tobacco Use    Smoking status: Former     Packs/day: 1.50     Years: 22.00     Pack years:  33.00     Types: Cigarettes     Quit date: 1992     Years since quittin.1    Smokeless tobacco: Never   Substance Use Topics    Alcohol use: No    Drug use: No     Family History   Problem Relation Age of Onset    Cancer Mother     Early death Mother     Arthritis Father     Diabetes Father     Heart disease Father     Hyperlipidemia Father     Hypertension Father      Current Outpatient Medications on File Prior to Visit   Medication Sig Dispense Refill    albuterol (PROVENTIL/VENTOLIN HFA) 90 mcg/actuation inhaler Inhale 2 puffs into the lungs every 6 (six) hours as needed for Wheezing or Shortness of Breath. Rescue 18 g 11    albuterol-ipratropium (DUO-NEB) 2.5 mg-0.5 mg/3 mL nebulizer solution Take 3 mLs by nebulization every 6 (six) hours as needed for Wheezing or Shortness of Breath. Rescue 120 vial 5    allopurinoL (ZYLOPRIM) 300 MG tablet TAKE 1 TABLET BY MOUTH EVERY DAY 90 tablet 2    amLODIPine (NORVASC) 5 MG tablet Take 1 tablet (5 mg total) by mouth once daily. 30 tablet 11    apixaban (ELIQUIS) 5 mg Tab Take 1 tablet (5 mg total) by mouth 2 (two) times daily. 180 tablet 3    aspirin (ECOTRIN) 81 MG EC tablet Take 81 mg by mouth every Mon, Wed, Fri.      atorvastatin (LIPITOR) 40 MG tablet TK 1 T PO QD 90 tablet 3    cloNIDine (CATAPRES) 0.1 MG tablet Take 1 tablet (0.1 mg total) by mouth every 6 (six) hours as needed (for systolic blood pressure greater than 165). 60 tablet 11    fluocinonide (LIDEX) 0.05 % gel SMARTSI-2 Gram(s) Topical Twice Daily      fluticasone furoate-vilanteroL (BREO ELLIPTA) 200-25 mcg/dose DsDv diskus inhaler INHALE 1 PUFF BY MOUTH AT THE SAME TIME EVERY DAY 60 each 5    furosemide (LASIX) 20 MG tablet Take 1 tablet (20 mg total) by mouth once daily. 90 tablet 3    hydrALAZINE (APRESOLINE) 100 MG tablet Take 1 tablet (100 mg total) by mouth every 8 (eight) hours. 90 tablet 11    ketoconazole (NIZORAL) 2 % cream Apply topically.      lactobacillus comb no.10  (PROBIOTIC) 20 billion cell Cap Take 1 capsule by mouth once daily. Or as directed on bottle      metoprolol succinate (TOPROL-XL) 25 MG 24 hr tablet Take 1 tablet (25 mg total) by mouth once daily. 90 tablet 3    montelukast (SINGULAIR) 10 mg tablet Take 1 tablet (10 mg total) by mouth once daily. 30 tablet 1    multivitamin (THERAGRAN) per tablet Take 1 tablet by mouth once daily.      omeprazole (PRILOSEC) 10 MG capsule Take 10 mg by mouth once daily.      potassium chloride (KLOR-CON) 10 MEQ TbSR TK 1 T PO ONCE A DAY. 90 tablet 3    predniSONE (DELTASONE) 2.5 MG tablet Take 5 mg by mouth once daily.      telmisartan (MICARDIS) 80 MG Tab Take 1 tablet (80 mg total) by mouth once daily. 90 tablet 3    tiotropium bromide (SPIRIVA RESPIMAT) 2.5 mcg/actuation inhaler Inhale 2 puffs into the lungs Daily. Controller 4 g 11    TOBRADEX 0.3-0.1 % Oint       urea (CARMOL) 40 % Crea 2 (two) times daily as needed.  0     Current Facility-Administered Medications on File Prior to Visit   Medication Dose Route Frequency Provider Last Rate Last Admin    sodium chloride 0.9% flush 10 mL  10 mL Intravenous PRN Luis Enrique Ji MD           Review of Systems   Constitutional:  Positive for fever. Negative for chills, diaphoresis and fatigue.   HENT:  Positive for congestion, postnasal drip, sinus pressure, sinus pain and sore throat. Negative for ear pain.    Eyes:  Negative for pain and redness.   Respiratory:  Positive for cough. Negative for chest tightness and shortness of breath.    Cardiovascular:  Negative for chest pain and leg swelling.   Gastrointestinal:  Negative for abdominal pain, constipation, diarrhea, nausea and vomiting.   Genitourinary:  Negative for dysuria and hematuria.   Musculoskeletal:  Negative for arthralgias and joint swelling.   Skin:  Negative for rash.   Neurological:  Negative for dizziness, syncope and headaches.   Psychiatric/Behavioral:  Negative for dysphoric mood. The patient is not  "nervous/anxious.      Vitals:    06/09/23 1428   BP: (!) 161/84   Pulse: 92   Temp: 99 °F (37.2 °C)   TempSrc: Temporal   Weight: 114.3 kg (252 lb)   Height: 6' 1" (1.854 m)       Wt Readings from Last 3 Encounters:   06/09/23 114.3 kg (252 lb)   05/24/23 114.3 kg (252 lb)   05/18/23 115.2 kg (253 lb 13.8 oz)       Physical Exam  Constitutional:       General: He is not in acute distress.     Appearance: Normal appearance. He is well-developed.   HENT:      Head: Normocephalic and atraumatic.      Right Ear: Tympanic membrane normal.      Left Ear: Tympanic membrane normal.      Nose: Congestion present.      Mouth/Throat:      Mouth: Mucous membranes are moist.      Pharynx: Posterior oropharyngeal erythema present.   Eyes:      Conjunctiva/sclera: Conjunctivae normal.   Cardiovascular:      Rate and Rhythm: Normal rate and regular rhythm.      Pulses: Normal pulses.      Heart sounds: Normal heart sounds. No murmur heard.  Pulmonary:      Effort: Pulmonary effort is normal. No respiratory distress.      Breath sounds: Wheezing present.   Abdominal:      General: Bowel sounds are normal. There is no distension.      Palpations: Abdomen is soft.      Tenderness: There is no abdominal tenderness.   Musculoskeletal:         General: Normal range of motion.      Cervical back: Normal range of motion and neck supple.   Lymphadenopathy:      Cervical: No cervical adenopathy.   Skin:     General: Skin is warm and dry.      Findings: No rash.   Neurological:      General: No focal deficit present.      Mental Status: He is alert and oriented to person, place, and time.   Psychiatric:         Mood and Affect: Mood normal.         Behavior: Behavior normal.       Health Maintenance   Topic Date Due    Lipid Panel  04/03/2024    High Dose Statin  05/24/2024    TETANUS VACCINE  10/09/2031    Hepatitis C Screening  Completed    Abdominal Aortic Aneurysm Screening  Completed     "

## 2023-06-15 ENCOUNTER — OFFICE VISIT (OUTPATIENT)
Dept: CARDIOLOGY | Facility: CLINIC | Age: 73
End: 2023-06-15
Payer: MEDICARE

## 2023-06-15 ENCOUNTER — HOSPITAL ENCOUNTER (OUTPATIENT)
Dept: CARDIOLOGY | Facility: HOSPITAL | Age: 73
Discharge: HOME | End: 2023-06-15
Attending: PHYSICIAN ASSISTANT
Payer: MEDICARE

## 2023-06-15 VITALS
HEIGHT: 73 IN | DIASTOLIC BLOOD PRESSURE: 84 MMHG | BODY MASS INDEX: 33.19 KG/M2 | OXYGEN SATURATION: 96 % | SYSTOLIC BLOOD PRESSURE: 173 MMHG | WEIGHT: 250.44 LBS | HEART RATE: 77 BPM

## 2023-06-15 VITALS
HEART RATE: 80 BPM | HEIGHT: 73 IN | BODY MASS INDEX: 33.4 KG/M2 | SYSTOLIC BLOOD PRESSURE: 190 MMHG | WEIGHT: 252 LBS | DIASTOLIC BLOOD PRESSURE: 80 MMHG

## 2023-06-15 DIAGNOSIS — I48.0 PAF (PAROXYSMAL ATRIAL FIBRILLATION): ICD-10-CM

## 2023-06-15 DIAGNOSIS — Z95.2 S/P TAVR (TRANSCATHETER AORTIC VALVE REPLACEMENT): ICD-10-CM

## 2023-06-15 DIAGNOSIS — I50.32 CHRONIC DIASTOLIC HEART FAILURE: ICD-10-CM

## 2023-06-15 DIAGNOSIS — I25.10 CORONARY ARTERY DISEASE INVOLVING NATIVE CORONARY ARTERY OF NATIVE HEART WITHOUT ANGINA PECTORIS: ICD-10-CM

## 2023-06-15 DIAGNOSIS — I70.0 AORTIC ATHEROSCLEROSIS: ICD-10-CM

## 2023-06-15 LAB
ASCENDING AORTA: 3.1 CM
AV INDEX (PROSTH): 0.49
AV MEAN GRADIENT: 10 MMHG
AV PEAK GRADIENT: 19 MMHG
AV VALVE AREA: 2.14 CM2
AV VELOCITY RATIO: 0.54
BSA FOR ECHO PROCEDURE: 2.43 M2
CV ECHO LV RWT: 0.33 CM
DOP CALC AO PEAK VEL: 2.18 M/S
DOP CALC AO VTI: 53.15 CM
DOP CALC LVOT AREA: 4.4 CM2
DOP CALC LVOT DIAMETER: 2.37 CM
DOP CALC LVOT PEAK VEL: 1.18 M/S
DOP CALC LVOT STROKE VOLUME: 113.76 CM3
DOP CALCLVOT PEAK VEL VTI: 25.8 CM
E WAVE DECELERATION TIME: 343.19 MSEC
E/A RATIO: 0.83
E/E' RATIO: 16.29 M/S
ECHO LV POSTERIOR WALL: 0.9 CM (ref 0.6–1.1)
EJECTION FRACTION: 65 %
FRACTIONAL SHORTENING: 33 % (ref 28–44)
INTERVENTRICULAR SEPTUM: 1.08 CM (ref 0.6–1.1)
IVRT: 64.7 MSEC
LA MAJOR: 5.56 CM
LA MINOR: 5.59 CM
LA WIDTH: 4.77 CM
LEFT ATRIUM SIZE: 4.1 CM
LEFT ATRIUM VOLUME INDEX MOD: 38.9 ML/M2
LEFT ATRIUM VOLUME INDEX: 39.1 ML/M2
LEFT ATRIUM VOLUME MOD: 92.26 CM3
LEFT ATRIUM VOLUME: 92.68 CM3
LEFT INTERNAL DIMENSION IN SYSTOLE: 3.62 CM (ref 2.1–4)
LEFT VENTRICLE DIASTOLIC VOLUME INDEX: 60.28 ML/M2
LEFT VENTRICLE DIASTOLIC VOLUME: 142.87 ML
LEFT VENTRICLE MASS INDEX: 87 G/M2
LEFT VENTRICLE SYSTOLIC VOLUME INDEX: 23.2 ML/M2
LEFT VENTRICLE SYSTOLIC VOLUME: 55 ML
LEFT VENTRICULAR INTERNAL DIMENSION IN DIASTOLE: 5.43 CM (ref 3.5–6)
LEFT VENTRICULAR MASS: 205.91 G
LV LATERAL E/E' RATIO: 16.29 M/S
LV SEPTAL E/E' RATIO: 16.29 M/S
MV PEAK A VEL: 1.37 M/S
MV PEAK E VEL: 1.14 M/S
PISA TR MAX VEL: 2.7 M/S
RA MAJOR: 4.57 CM
RA PRESSURE: 3 MMHG
RA WIDTH: 4.59 CM
RIGHT VENTRICULAR END-DIASTOLIC DIMENSION: 4.42 CM
SINUS: 3.32 CM
STJ: 2.73 CM
TDI LATERAL: 0.07 M/S
TDI SEPTAL: 0.07 M/S
TDI: 0.07 M/S
TR MAX PG: 29 MMHG
TRICUSPID ANNULAR PLANE SYSTOLIC EXCURSION: 3.45 CM
TV REST PULMONARY ARTERY PRESSURE: 32 MMHG

## 2023-06-15 PROCEDURE — 99214 OFFICE O/P EST MOD 30 MIN: CPT | Mod: PBBFAC,25 | Performed by: PHYSICIAN ASSISTANT

## 2023-06-15 PROCEDURE — 93306 ECHO (CUPID ONLY): ICD-10-PCS | Mod: 26,,, | Performed by: INTERNAL MEDICINE

## 2023-06-15 PROCEDURE — 99214 OFFICE O/P EST MOD 30 MIN: CPT | Mod: S$PBB,,, | Performed by: PHYSICIAN ASSISTANT

## 2023-06-15 PROCEDURE — 99214 PR OFFICE/OUTPT VISIT, EST, LEVL IV, 30-39 MIN: ICD-10-PCS | Mod: S$GLB,,, | Performed by: PHYSICIAN ASSISTANT

## 2023-06-15 PROCEDURE — 99999 PR PBB SHADOW E&M-EST. PATIENT-LVL IV: CPT | Mod: PBBFAC,,, | Performed by: PHYSICIAN ASSISTANT

## 2023-06-15 PROCEDURE — 99999 PR PBB SHADOW E&M-EST. PATIENT-LVL IV: ICD-10-PCS | Mod: PBBFAC,,, | Performed by: PHYSICIAN ASSISTANT

## 2023-06-15 PROCEDURE — 93306 TTE W/DOPPLER COMPLETE: CPT

## 2023-06-15 PROCEDURE — 93306 TTE W/DOPPLER COMPLETE: CPT | Mod: 26,,, | Performed by: INTERNAL MEDICINE

## 2023-06-15 NOTE — PROGRESS NOTES
Subjective:     Referring Physician: Dr Dylon BONILLA  Roshan Menard is a 73 y.o. male who presents for TAVR follow up.    Hospital Course:  Roshan Menard was admitted and underwent successful placement of a 26 mm EvolutFX TAVR via TF access under MAC sedation on 5/16/23. Please see full cath report for details. A transthoracic echo was performed immediately post procedure which showed no paravalvular leak. An aortic valve mean gradient of 5 mmHg and a maximum velocity through the aortic valve of 1.2 m/s. Patient had a preexisting RBBB. EP was consulted.  He remained hemodynamically stable overnight. This morning, he ambulated without difficulty and was eager to go home. It was felt he was stable for discharge and will go home on [facility] for antithrombotic therapy post TAVR.    Interval History  Patient is doing very well since TAVR. He denies heart failure symptoms. He is able to do what he wants to do without SOB.       NYHA:I CCS:0    Review of Systems   Constitutional: Negative for chills and fever.   HENT:  Negative for sore throat.    Eyes:  Negative for blurred vision.   Cardiovascular:  Negative for chest pain, claudication, cyanosis, dyspnea on exertion, irregular heartbeat, leg swelling, near-syncope, orthopnea, palpitations, paroxysmal nocturnal dyspnea and syncope.   Respiratory:  Negative for cough and sputum production.    Hematologic/Lymphatic: Does not bruise/bleed easily.   Skin:  Negative for itching, rash and suspicious lesions.   Musculoskeletal:  Negative for falls.   Gastrointestinal:  Negative for abdominal pain and change in bowel habit.   Genitourinary:  Negative for dysuria.   Neurological:  Negative for disturbances in coordination, dizziness and loss of balance.   Psychiatric/Behavioral:  Negative for altered mental status.         Past Medical History:   Diagnosis Date    Allergy     Anticoagulant long-term use     Aortic stenosis     Arthritis     Asthma     Atrial fibrillation      Bronchitis     Cataract     COPD (chronic obstructive pulmonary disease)     Coronary artery disease     stent    General anesthetics causing adverse effect in therapeutic use     hard to awaken    GERD (gastroesophageal reflux disease)     Gout, chronic     Hypertension     Mixed hyperlipidemia     Sleep apnea     Type 2 diabetes mellitus with diabetic peripheral angiopathy without gangrene, unspecified whether long term insulin use 2023       Current Outpatient Medications:     albuterol (PROVENTIL/VENTOLIN HFA) 90 mcg/actuation inhaler, Inhale 2 puffs into the lungs every 6 (six) hours as needed for Wheezing or Shortness of Breath. Rescue, Disp: 18 g, Rfl: 11    allopurinoL (ZYLOPRIM) 300 MG tablet, TAKE 1 TABLET BY MOUTH EVERY DAY, Disp: 90 tablet, Rfl: 2    amLODIPine (NORVASC) 5 MG tablet, Take 1 tablet (5 mg total) by mouth once daily., Disp: 30 tablet, Rfl: 11    amoxicillin-clavulanate 875-125mg (AUGMENTIN) 875-125 mg per tablet, Take 1 tablet by mouth 2 (two) times daily. for 7 days, Disp: 14 tablet, Rfl: 0    apixaban (ELIQUIS) 5 mg Tab, Take 1 tablet (5 mg total) by mouth 2 (two) times daily., Disp: 180 tablet, Rfl: 3    aspirin (ECOTRIN) 81 MG EC tablet, Take 81 mg by mouth every Mon, Wed, Fri., Disp: , Rfl:     atorvastatin (LIPITOR) 40 MG tablet, TK 1 T PO QD, Disp: 90 tablet, Rfl: 3    cloNIDine (CATAPRES) 0.1 MG tablet, Take 1 tablet (0.1 mg total) by mouth every 6 (six) hours as needed (for systolic blood pressure greater than 165)., Disp: 60 tablet, Rfl: 11    fluocinonide (LIDEX) 0.05 % gel, SMARTSI-2 Gram(s) Topical Twice Daily, Disp: , Rfl:     fluticasone furoate-vilanteroL (BREO ELLIPTA) 200-25 mcg/dose DsDv diskus inhaler, INHALE 1 PUFF BY MOUTH AT THE SAME TIME EVERY DAY, Disp: 60 each, Rfl: 5    furosemide (LASIX) 20 MG tablet, Take 1 tablet (20 mg total) by mouth once daily., Disp: 90 tablet, Rfl: 3    hydrALAZINE (APRESOLINE) 100 MG tablet, Take 1 tablet (100 mg total) by mouth  every 8 (eight) hours., Disp: 90 tablet, Rfl: 11    ketoconazole (NIZORAL) 2 % cream, Apply topically., Disp: , Rfl:     lactobacillus comb no.10 (PROBIOTIC) 20 billion cell Cap, Take 1 capsule by mouth once daily. Or as directed on bottle, Disp: , Rfl:     metoprolol succinate (TOPROL-XL) 25 MG 24 hr tablet, Take 1 tablet (25 mg total) by mouth once daily., Disp: 90 tablet, Rfl: 3    montelukast (SINGULAIR) 10 mg tablet, Take 1 tablet (10 mg total) by mouth once daily., Disp: 30 tablet, Rfl: 1    multivitamin (THERAGRAN) per tablet, Take 1 tablet by mouth once daily., Disp: , Rfl:     omeprazole (PRILOSEC) 10 MG capsule, Take 10 mg by mouth once daily., Disp: , Rfl:     potassium chloride (KLOR-CON) 10 MEQ TbSR, TK 1 T PO ONCE A DAY., Disp: 90 tablet, Rfl: 3    predniSONE (DELTASONE) 2.5 MG tablet, Take 5 mg by mouth once daily., Disp: , Rfl:     telmisartan (MICARDIS) 80 MG Tab, Take 1 tablet (80 mg total) by mouth once daily., Disp: 90 tablet, Rfl: 3    tiotropium bromide (SPIRIVA RESPIMAT) 2.5 mcg/actuation inhaler, Inhale 2 puffs into the lungs Daily. Controller, Disp: 4 g, Rfl: 11    TOBRADEX 0.3-0.1 % Oint, , Disp: , Rfl:     urea (CARMOL) 40 % Crea, 2 (two) times daily as needed., Disp: , Rfl: 0    albuterol-ipratropium (DUO-NEB) 2.5 mg-0.5 mg/3 mL nebulizer solution, Take 3 mLs by nebulization every 6 (six) hours as needed for Wheezing or Shortness of Breath. Rescue, Disp: 120 vial, Rfl: 5    Current Facility-Administered Medications:     sodium chloride 0.9% flush 10 mL, 10 mL, Intravenous, PRN, Luis Enrique Ji MD    Objective:    Physical Exam  Vitals reviewed.   Constitutional:       General: He is not in acute distress.     Appearance: He is well-developed. He is not diaphoretic.   HENT:      Head: Normocephalic and atraumatic.   Neck:      Vascular: No JVD.   Cardiovascular:      Rate and Rhythm: Normal rate and regular rhythm.      Pulses: Intact distal pulses.      Heart sounds: No murmur  "heard.  Pulmonary:      Effort: Pulmonary effort is normal. No respiratory distress.   Musculoskeletal:      Cervical back: Normal range of motion.      Right lower leg: No edema.      Left lower leg: No edema.   Skin:     General: Skin is warm and dry.   Neurological:      Mental Status: He is alert and oriented to person, place, and time.           Vitals:    06/15/23 1058 06/15/23 1101   BP: (!) 166/76 (!) 173/84   BP Location: Right arm Left arm   Patient Position: Sitting Sitting   BP Method: Large (Automatic) Large (Automatic)   Pulse: 77 77   SpO2: 96%    Weight: 113.6 kg (250 lb 7.1 oz)    Height: 6' 1" (1.854 m)      Body mass index is 33.04 kg/m².    Test(s) Reviewed  I have reviewed the following in detail:  [] Stress test   [] Angiography   [x] Echocardiogram   [x] Labs   [] Other       Chemistry        Component Value Date/Time     06/15/2023 0944    K 4.2 06/15/2023 0944     06/15/2023 0944    CO2 27 06/15/2023 0944    BUN 15 06/15/2023 0944    CREATININE 1.0 06/15/2023 0944     06/15/2023 0944        Component Value Date/Time    CALCIUM 9.7 06/15/2023 0944    ALKPHOS 75 05/17/2023 0519    AST 28 05/17/2023 0519    ALT 26 05/17/2023 0519    BILITOT 0.6 05/17/2023 0519    ESTGFRAFRICA >60.0 03/30/2022 0736    EGFRNONAA >60.0 03/30/2022 0736            TTE 06/15/2023  The left ventricle is normal in size with normal systolic function.  The estimated ejection fraction is 65%.  Grade II left ventricular diastolic dysfunction.  Normal right ventricular size with normal right ventricular systolic function.  Mild left atrial enlargement.  There is a 26 mm Medtronic EVolutFX transcutaneously-placed aortic bioprosthesis present. There is trivial paravalvular aortic insufficiency present.  The aortic valve mean gradient is 10 mmHg with a dimensionless index of 0.49.  Mild tricuspid regurgitation.  The estimated PA systolic pressure is 32 mmHg.  Normal central venous pressure (3 mmHg).  Trivial " lateral pericardial effusion.    Assessment:   S/P TAVR (transcatheter aortic valve replacement)  Successful transfemoral aortic valve replacement with a 26 mm EvolutFX valve. TTE shows a well functioning valve.     PAF (paroxysmal atrial fibrillation)  On Eliquis. Failed DCCV in the past. Will call back if he is interested in [facility].     Coronary artery disease involving native coronary artery of native heart without angina pectoris  Hx LCx LUIS which was patent on [facility] in 3/2023. Nonobstructive disease in the LAD and RCA.     Chronic diastolic heart failure  Chronic. Controlled. Follow up with Cardiology/PCP.    Aortic atherosclerosis  See imaging. Follow up with Cardiology.     Plan:       Follow up with [facility] in 1 year with labs and Echo.  ASA / Eliquis. Will call back if he is interested in [facility].   No non sterile procedures which could cause endocarditis for 6 months post TAVR including dental work, endoscopy, colonoscopy, and  procedures.   SBE prophylaxis for life.             Velvet Owens PA-C  Valve and Structural Heart Disease  [facility]

## 2023-06-15 NOTE — ASSESSMENT & PLAN NOTE
Hx LCx LUIS which was patent on [facility] in 3/2023. Nonobstructive disease in the LAD and RCA.

## 2023-06-21 NOTE — PROGRESS NOTES
Subjective:   Patient ID:  Roshan Menard is a 73 y.o. male who presents for follow-up of No chief complaint on file.  This pleasant patient here in the office for evaluation.  History of temporal arteritis, paroxysmal atrial fibrillation and cardioversion the past with YRN.  History of essential hypertension coronary disease without angina and evidence of aortic stenosis of a mild-to-moderate degree.  This was seen on prior YRN.  Patient is a former smoker no history of drug or alcohol abuse no syncope or near syncopal episodes.  Patient has a history of hypertension on multiple medications and is on full anticoagulation due to PAF.  This visit finds patient feeling well.  His medical problems include cardiac disease with stenting of circumflex coronary artery 2 years ago repeat angiogram last year showed patent stent.  The patient has history of obesity lost over 100 lb.  Patient has cardiac disease as well including significant calcification of the aorta calcification the aortic valve and repeat echo be done.  Prior echo done last year showed evidence of at least moderate aortic stenosis.  Today's physical exam shows the patient has brief murmur but also brisk carotid upstroke which would argue against severe aortic stenosis.  Overall LV function was preserved therefore I would disagree that he has low flow state.        Today patient feels generally well.  Blood pressure is a little bit elevated and he is not taking carvedilol more than once a day.  Will discontinue the medications that he feels like he has a brain fog when he takes the medicines.  Will increase hydralazine to 25 mg q.8 hours and otherwise stable continue with Micardis.     The patient presents the office blood pressures been under higher values.  This reason will add hydralazine and now 50 mg q.8 hours he continues on Micardis 80 mg daily he was intolerant of carvedilol.  Monitor did not show significant arrhythmias.  Intermittent mild short  episodes of sinus tachycardia noted.        Patient presents the office feeling generally well.  Blood pressure still elevated will increase hydralazine 100 mg b.i.d.     Patient still is elevated blood pressure he had a trial of amlodipine from another physician and he was stop it because it did make him feel well.  I will increase hydralazine to mg 3 times a day follow-up evaluation blood pressure and less than 1 month.  Otherwise stable no acute symptoms chest pain shortness.        Is the patient is here for review of blood pressure.  Slightly elevated today however he has some mild swelling has been off Lasix and also restart that today at with double doses for the next 2 days.  Also has of toe infection on antibiotics and also has some pain in that foot.  Follow-up evaluation again within next several weeks after complete re-evaluation on all medications.  He does continue on hydralazine 3 times daily and he has good compliance with this medication.  Today the patient presents in the office and blood pressure is stable on hydralazine feels well.  Evaluation again in 3 months.  All medications reviewed and renewed as necessary.     NO FOCAL CNS SYMPTOMS OR SIGNS TO SUGGEST TIA OR STROKE  NO ANGINA OR EQUIVALENT  NO UNUSUAL GLASER. NO ORTHOPNEA OR PND  NO PALPITATIONS  NO NEAR SYNCOPE OR SYNCOPE  NO EDEMA. NO CALVE TENDERNESS     02/02/2023: Patient still has elevated blood pressure and did not take his hydralazine this morning but usually runs blood pressure 150/90 will increase the hydralazine back to 100 mg q.8 hours.  Will consider back to amlodipine in the pain as he is tried this in the past.  Patient states he sometimes has irregular heart rhythms today is heart rate is stable with sinus arrhythmia.  Will follow-up evaluation again in the next month.  Patient will have another cardiac echo to reassess heart function.     04/20/2023:   Overall patient is stable he fell scraped his left arm and will follow up  to make sure he does not have infection.    Patient's CT scan prior to seeing Dr.TAFUR Yao.  Clinically stable there is no evidence of significant abnormalities seen with small nodules in lungs and will follow those clinically and re-evaluate in 8 months to a year.    Otherwise stable patient mild edema with amlodipine discontinued will double up on the Toprol XL to 50 mg daily for blood pressure control and heart rate control continue other medications.  Clinically stable no edema mild wheezing today he needs take his inhalers today.        05/18/2023:   Status post TAVR in stable doing well this time no exertional symptoms cardiac echo repeat pending in the near future.  No exertional symptoms.  No edema.  Patient tolerated procedure well did well for out throughout the TAVR procedure.       06/22/2023:   Overall patient doing well status post TAVR about a month ago at this point clinically doing well recently seen T[facility]R Clinic continues on aspirin and Eliquis.  The patient at this point should be on dental antibiotic prophylaxis lifetime.  The patient should also be pretreated for SBE prophylaxis lifetime.  Patient is to avoid all procedures for the next 6 months.  Including colonoscopy.    06/22/2023:   Overall patient is doing the patient today is doing well blood pressures been fairly stable at home he will try Norvasc 2.5 mg twice daily.    Overall he is stable he can come off Eliquis for 2 days to have biopsy of skin lesion and will stay on aspirin.  Go back on medications thereafter within 1 day.  Patient currently normal sinus rhythm.    Review of Systems   Constitutional: Negative for chills, diaphoresis, night sweats, weight gain and weight loss.   HENT:  Negative for congestion, hoarse voice, sore throat and stridor.    Eyes:  Negative for double vision and pain.   Cardiovascular:  Negative for chest pain, claudication, cyanosis, dyspnea on exertion, irregular heartbeat, leg swelling, near-syncope,  orthopnea, palpitations, paroxysmal nocturnal dyspnea and syncope.   Respiratory:  Negative for cough, hemoptysis, shortness of breath, sleep disturbances due to breathing, snoring, sputum production and wheezing.    Endocrine: Negative for cold intolerance, heat intolerance and polydipsia.   Hematologic/Lymphatic: Negative for bleeding problem. Does not bruise/bleed easily.   Skin:  Negative for color change, dry skin and rash.   Musculoskeletal:  Negative for joint swelling and muscle cramps.   Gastrointestinal:  Negative for bloating, abdominal pain, constipation, diarrhea, dysphagia, melena, nausea and vomiting.   Genitourinary:  Negative for flank pain and urgency.   Neurological:  Negative for dizziness, focal weakness, headaches, light-headedness, loss of balance, seizures and weakness.   Psychiatric/Behavioral:  Negative for altered mental status and memory loss. The patient is not nervous/anxious.    Family History   Problem Relation Age of Onset    Cancer Mother     Early death Mother     Arthritis Father     Diabetes Father     Heart disease Father     Hyperlipidemia Father     Hypertension Father      Past Medical History:   Diagnosis Date    Allergy     Anticoagulant long-term use     Aortic stenosis     Arthritis     Asthma     Atrial fibrillation     Bronchitis     Cataract     COPD (chronic obstructive pulmonary disease)     Coronary artery disease     stent    General anesthetics causing adverse effect in therapeutic use     hard to awaken    GERD (gastroesophageal reflux disease)     Gout, chronic     Hypertension     Mixed hyperlipidemia     Sleep apnea     Type 2 diabetes mellitus with diabetic peripheral angiopathy without gangrene, unspecified whether long term insulin use 05/02/2023     Social History     Socioeconomic History    Marital status:    Occupational History     Employer: [employer]   Tobacco Use    Smoking status: Former     Packs/day: 1.50     Years: 22.00      Pack years: 33.00     Types: Cigarettes     Quit date: 1992     Years since quittin.1    Smokeless tobacco: Never   Substance and Sexual Activity    Alcohol use: No    Drug use: No    Sexual activity: Never     Current Outpatient Medications on File Prior to Visit   Medication Sig Dispense Refill    albuterol (PROVENTIL/VENTOLIN HFA) 90 mcg/actuation inhaler Inhale 2 puffs into the lungs every 6 (six) hours as needed for Wheezing or Shortness of Breath. Rescue 18 g 11    albuterol-ipratropium (DUO-NEB) 2.5 mg-0.5 mg/3 mL nebulizer solution Take 3 mLs by nebulization every 6 (six) hours as needed for Wheezing or Shortness of Breath. Rescue 120 vial 5    allopurinoL (ZYLOPRIM) 300 MG tablet TAKE 1 TABLET BY MOUTH EVERY DAY 90 tablet 2    amLODIPine (NORVASC) 5 MG tablet Take 1 tablet (5 mg total) by mouth once daily. 30 tablet 11    apixaban (ELIQUIS) 5 mg Tab Take 1 tablet (5 mg total) by mouth 2 (two) times daily. 180 tablet 3    aspirin (ECOTRIN) 81 MG EC tablet Take 81 mg by mouth every Mon, Wed, Fri.      atorvastatin (LIPITOR) 40 MG tablet TK 1 T PO QD 90 tablet 3    cloNIDine (CATAPRES) 0.1 MG tablet Take 1 tablet (0.1 mg total) by mouth every 6 (six) hours as needed (for systolic blood pressure greater than 165). 60 tablet 11    fluocinonide (LIDEX) 0.05 % gel SMARTSI-2 Gram(s) Topical Twice Daily      fluticasone furoate-vilanteroL (BREO ELLIPTA) 200-25 mcg/dose DsDv diskus inhaler INHALE 1 PUFF BY MOUTH AT THE SAME TIME EVERY DAY 60 each 5    furosemide (LASIX) 20 MG tablet Take 1 tablet (20 mg total) by mouth once daily. 90 tablet 3    hydrALAZINE (APRESOLINE) 100 MG tablet Take 1 tablet (100 mg total) by mouth every 8 (eight) hours. 90 tablet 11    ketoconazole (NIZORAL) 2 % cream Apply topically.      lactobacillus comb no.10 (PROBIOTIC) 20 billion cell Cap Take 1 capsule by mouth once daily. Or as directed on bottle      metoprolol succinate (TOPROL-XL) 25 MG 24 hr tablet Take 1 tablet (25  mg total) by mouth once daily. 90 tablet 3    montelukast (SINGULAIR) 10 mg tablet Take 1 tablet (10 mg total) by mouth once daily. 30 tablet 1    multivitamin (THERAGRAN) per tablet Take 1 tablet by mouth once daily.      omeprazole (PRILOSEC) 10 MG capsule Take 10 mg by mouth once daily.      potassium chloride (KLOR-CON) 10 MEQ TbSR TK 1 T PO ONCE A DAY. 90 tablet 3    predniSONE (DELTASONE) 2.5 MG tablet Take 5 mg by mouth once daily.      telmisartan (MICARDIS) 80 MG Tab Take 1 tablet (80 mg total) by mouth once daily. 90 tablet 3    tiotropium bromide (SPIRIVA RESPIMAT) 2.5 mcg/actuation inhaler Inhale 2 puffs into the lungs Daily. Controller 4 g 11    TOBRADEX 0.3-0.1 % Oint       urea (CARMOL) 40 % Crea 2 (two) times daily as needed.  0     Current Facility-Administered Medications on File Prior to Visit   Medication Dose Route Frequency Provider Last Rate Last Admin    sodium chloride 0.9% flush 10 mL  10 mL Intravenous PRN Luis Enrique Ji MD         Review of patient's allergies indicates:   Allergen Reactions    Sulfa (sulfonamide antibiotics)      Hives    Azithromycin      Diarrhea      Cefaclor Other (See Comments)     Other reaction(s): Hives    Iodine and iodide containing products      Other reaction(s): Unknown    Ivp dye  [iodinated contrast media] Other (See Comments)    Doxycycline Rash       Objective:     Physical Exam  Eyes:      Pupils: Pupils are equal, round, and reactive to light.   Neck:      Trachea: No tracheal deviation.   Cardiovascular:      Rate and Rhythm: Normal rate and regular rhythm.      Pulses: Intact distal pulses.           Carotid pulses are 2+ on the right side and 2+ on the left side.       Radial pulses are 2+ on the right side and 2+ on the left side.        Femoral pulses are 2+ on the right side and 2+ on the left side.       Popliteal pulses are 2+ on the right side and 2+ on the left side.        Dorsalis pedis pulses are 2+ on the right side and 2+ on the  left side.        Posterior tibial pulses are 2+ on the right side and 2+ on the left side.      Heart sounds: Normal heart sounds. No murmur heard.    No friction rub. No gallop.   Pulmonary:      Effort: Pulmonary effort is normal. No respiratory distress.      Breath sounds: Normal breath sounds. No stridor. No wheezing or rales.   Chest:      Chest wall: No tenderness.   Abdominal:      General: There is no distension.      Tenderness: There is no abdominal tenderness. There is no rebound.   Musculoskeletal:         General: No tenderness.      Cervical back: Normal range of motion.   Skin:     General: Skin is warm and dry.   Neurological:      Mental Status: He is alert and oriented to person, place, and time.     Assessment:     1. S/P TAVR (transcatheter aortic valve replacement)    2. Aortic atherosclerosis    3. Encounter for laboratory testing for COVID-19 virus    4. Other emphysema    5. Bilateral carotid artery stenosis    6. Former smoker    7. Nonrheumatic aortic valve stenosis    8. PAF (paroxysmal atrial fibrillation)    9. Essential hypertension    10. SOB (shortness of breath)    11. Mixed hyperlipidemia    12. Obstructive sleep apnea syndrome    13. Preop cardiovascular exam    14. CAD, multiple vessel        Plan:     S/P TAVR (transcatheter aortic valve replacement)    Aortic atherosclerosis    Encounter for laboratory testing for COVID-19 virus    Other emphysema    Bilateral carotid artery stenosis    Former smoker    Nonrheumatic aortic valve stenosis    PAF (paroxysmal atrial fibrillation)    Essential hypertension    SOB (shortness of breath)    Mixed hyperlipidemia    Obstructive sleep apnea syndrome    Preop cardiovascular exam    CAD, multiple vessel      Impression 1. CAD stable continue on aspirin   2. PAF stable on Eliquis and can come off it for 2 days transiently for skin biopsy  3. Hypertension will try Norvasc 2.5 mg twice daily he will continue with metoprolol and telmisartan.   He is off hydralazine as he is much more stable on medications and blood pressures have been stable at home more in the 120-140 range clinically he is doing well this time he is losing weight and walking and exercising.  I will see him back in 2 months for review.

## 2023-06-22 ENCOUNTER — OFFICE VISIT (OUTPATIENT)
Dept: CARDIOLOGY | Facility: CLINIC | Age: 73
End: 2023-06-22
Payer: MEDICARE

## 2023-06-22 VITALS
SYSTOLIC BLOOD PRESSURE: 168 MMHG | DIASTOLIC BLOOD PRESSURE: 72 MMHG | OXYGEN SATURATION: 98 % | BODY MASS INDEX: 33.48 KG/M2 | HEIGHT: 73 IN | WEIGHT: 252.63 LBS | HEART RATE: 80 BPM

## 2023-06-22 DIAGNOSIS — I70.0 AORTIC ATHEROSCLEROSIS: Primary | ICD-10-CM

## 2023-06-22 DIAGNOSIS — G47.33 OBSTRUCTIVE SLEEP APNEA SYNDROME: ICD-10-CM

## 2023-06-22 DIAGNOSIS — I35.0 NONRHEUMATIC AORTIC VALVE STENOSIS: ICD-10-CM

## 2023-06-22 DIAGNOSIS — Z87.891 FORMER SMOKER: ICD-10-CM

## 2023-06-22 DIAGNOSIS — I65.23 BILATERAL CAROTID ARTERY STENOSIS: ICD-10-CM

## 2023-06-22 DIAGNOSIS — I48.0 PAF (PAROXYSMAL ATRIAL FIBRILLATION): ICD-10-CM

## 2023-06-22 DIAGNOSIS — Z01.810 PREOP CARDIOVASCULAR EXAM: ICD-10-CM

## 2023-06-22 DIAGNOSIS — I25.10 CAD, MULTIPLE VESSEL: ICD-10-CM

## 2023-06-22 DIAGNOSIS — E78.2 MIXED HYPERLIPIDEMIA: ICD-10-CM

## 2023-06-22 DIAGNOSIS — R06.02 SOB (SHORTNESS OF BREATH): ICD-10-CM

## 2023-06-22 DIAGNOSIS — I10 ESSENTIAL HYPERTENSION: ICD-10-CM

## 2023-06-22 DIAGNOSIS — J43.8 OTHER EMPHYSEMA: ICD-10-CM

## 2023-06-22 DIAGNOSIS — Z95.2 S/P TAVR (TRANSCATHETER AORTIC VALVE REPLACEMENT): ICD-10-CM

## 2023-06-22 DIAGNOSIS — Z20.822 ENCOUNTER FOR LABORATORY TESTING FOR COVID-19 VIRUS: ICD-10-CM

## 2023-06-22 PROCEDURE — 99214 OFFICE O/P EST MOD 30 MIN: CPT | Mod: PBBFAC,PO | Performed by: INTERNAL MEDICINE

## 2023-06-22 PROCEDURE — 99214 OFFICE O/P EST MOD 30 MIN: CPT | Mod: S$PBB,,, | Performed by: INTERNAL MEDICINE

## 2023-06-22 PROCEDURE — 99214 PR OFFICE/OUTPT VISIT, EST, LEVL IV, 30-39 MIN: ICD-10-PCS | Mod: S$PBB,,, | Performed by: INTERNAL MEDICINE

## 2023-06-22 PROCEDURE — 99999 PR PBB SHADOW E&M-EST. PATIENT-LVL IV: ICD-10-PCS | Mod: PBBFAC,,, | Performed by: INTERNAL MEDICINE

## 2023-06-22 PROCEDURE — 99999 PR PBB SHADOW E&M-EST. PATIENT-LVL IV: CPT | Mod: PBBFAC,,, | Performed by: INTERNAL MEDICINE

## 2023-06-22 RX ORDER — AMLODIPINE BESYLATE 5 MG/1
2.5 TABLET ORAL DAILY
Qty: 60 TABLET | Refills: 2 | Status: SHIPPED | OUTPATIENT
Start: 2023-06-22 | End: 2024-01-02 | Stop reason: SDUPTHER

## 2023-06-22 RX ORDER — ASPIRIN 81 MG/1
81 TABLET ORAL DAILY
Start: 2023-06-22

## 2023-06-23 ENCOUNTER — PATIENT MESSAGE (OUTPATIENT)
Dept: FAMILY MEDICINE | Facility: CLINIC | Age: 73
End: 2023-06-23
Payer: MEDICARE

## 2023-06-26 NOTE — TELEPHONE ENCOUNTER
He can proceed with his surgery. His recent labs were stable and it looks like Dr. Osborne said it was OK to hold Eliquis 2 days before surgery. Does he need anything from us?

## 2023-06-27 ENCOUNTER — PATIENT MESSAGE (OUTPATIENT)
Dept: FAMILY MEDICINE | Facility: CLINIC | Age: 73
End: 2023-06-27
Payer: MEDICARE

## 2023-07-11 ENCOUNTER — TELEPHONE (OUTPATIENT)
Dept: FAMILY MEDICINE | Facility: CLINIC | Age: 73
End: 2023-07-11
Payer: MEDICARE

## 2023-07-11 NOTE — TELEPHONE ENCOUNTER
----- Message from [place] Alex sent at 7/11/2023  2:19 PM CDT -----  .Type:  Patient Returning Call    Who Called:.Roshan Menard   Who Left Message for Patient:  Does the patient know what this is regarding?:  Would the patient rather a call back or a response via [facility]? Call back  Best Call Back Number:.[phone]  Additional Information:

## 2023-08-23 ENCOUNTER — TELEPHONE (OUTPATIENT)
Dept: CARDIOLOGY | Facility: CLINIC | Age: 73
End: 2023-08-23
Payer: MEDICARE

## 2023-08-23 NOTE — TELEPHONE ENCOUNTER
Called patient and l left a message to give me a call back.     ----- Message from Luh [place] sent at 8/23/2023  3:54 PM CDT -----  Contact: Andrew  .Type:  Patient Returning Call    Who Called:Andrew  Who Left Message for Patient:nurse  Does the patient know what this is regarding?:yes  Would the patient rather a call back or a response via [facility]? Call back  Best Call Back Number:[phone]  Additional Information: na        Thanks  SW

## 2023-08-24 ENCOUNTER — OFFICE VISIT (OUTPATIENT)
Dept: CARDIOLOGY | Facility: CLINIC | Age: 73
End: 2023-08-24
Payer: MEDICARE

## 2023-08-24 VITALS
WEIGHT: 251.88 LBS | OXYGEN SATURATION: 98 % | SYSTOLIC BLOOD PRESSURE: 152 MMHG | HEART RATE: 91 BPM | BODY MASS INDEX: 33.38 KG/M2 | HEIGHT: 73 IN | DIASTOLIC BLOOD PRESSURE: 70 MMHG

## 2023-08-24 DIAGNOSIS — E78.2 MIXED HYPERLIPIDEMIA: ICD-10-CM

## 2023-08-24 DIAGNOSIS — Z01.810 PREOP CARDIOVASCULAR EXAM: ICD-10-CM

## 2023-08-24 DIAGNOSIS — M31.6 TEMPORAL ARTERITIS: ICD-10-CM

## 2023-08-24 DIAGNOSIS — I35.0 AORTIC VALVE STENOSIS, ETIOLOGY OF CARDIAC VALVE DISEASE UNSPECIFIED: ICD-10-CM

## 2023-08-24 DIAGNOSIS — I25.110 ATHEROSCLEROSIS OF NATIVE CORONARY ARTERY OF NATIVE HEART WITH UNSTABLE ANGINA PECTORIS: ICD-10-CM

## 2023-08-24 DIAGNOSIS — Z95.2 S/P TAVR (TRANSCATHETER AORTIC VALVE REPLACEMENT): Primary | ICD-10-CM

## 2023-08-24 DIAGNOSIS — J43.8 OTHER EMPHYSEMA: ICD-10-CM

## 2023-08-24 DIAGNOSIS — I10 ESSENTIAL HYPERTENSION: ICD-10-CM

## 2023-08-24 DIAGNOSIS — Z87.891 FORMER SMOKER: ICD-10-CM

## 2023-08-24 DIAGNOSIS — I65.23 BILATERAL CAROTID ARTERY STENOSIS: ICD-10-CM

## 2023-08-24 DIAGNOSIS — R94.39 ABNORMAL NUCLEAR STRESS TEST: ICD-10-CM

## 2023-08-24 DIAGNOSIS — I25.10 CAD, MULTIPLE VESSEL: ICD-10-CM

## 2023-08-24 DIAGNOSIS — I50.32 CHRONIC DIASTOLIC HEART FAILURE: ICD-10-CM

## 2023-08-24 DIAGNOSIS — I48.11 LONGSTANDING PERSISTENT ATRIAL FIBRILLATION: ICD-10-CM

## 2023-08-24 DIAGNOSIS — I70.0 AORTIC ATHEROSCLEROSIS: ICD-10-CM

## 2023-08-24 DIAGNOSIS — G47.33 OBSTRUCTIVE SLEEP APNEA SYNDROME: ICD-10-CM

## 2023-08-24 DIAGNOSIS — E11.51 TYPE 2 DIABETES MELLITUS WITH DIABETIC PERIPHERAL ANGIOPATHY WITHOUT GANGRENE, UNSPECIFIED WHETHER LONG TERM INSULIN USE: ICD-10-CM

## 2023-08-24 DIAGNOSIS — I35.0 SEVERE AORTIC STENOSIS: ICD-10-CM

## 2023-08-24 DIAGNOSIS — I35.0 NONRHEUMATIC AORTIC (VALVE) STENOSIS: ICD-10-CM

## 2023-08-24 DIAGNOSIS — R94.39 ABNORMAL CARDIOVASCULAR STRESS TEST: ICD-10-CM

## 2023-08-24 DIAGNOSIS — R06.02 SOB (SHORTNESS OF BREATH): ICD-10-CM

## 2023-08-24 DIAGNOSIS — M10.00 IDIOPATHIC GOUT, UNSPECIFIED CHRONICITY, UNSPECIFIED SITE: ICD-10-CM

## 2023-08-24 DIAGNOSIS — Z20.822 ENCOUNTER FOR LABORATORY TESTING FOR COVID-19 VIRUS: ICD-10-CM

## 2023-08-24 DIAGNOSIS — I45.10 RBBB: ICD-10-CM

## 2023-08-24 PROCEDURE — 99214 PR OFFICE/OUTPT VISIT, EST, LEVL IV, 30-39 MIN: ICD-10-PCS | Mod: S$PBB,,, | Performed by: INTERNAL MEDICINE

## 2023-08-24 PROCEDURE — 99214 OFFICE O/P EST MOD 30 MIN: CPT | Mod: S$PBB,,, | Performed by: INTERNAL MEDICINE

## 2023-08-24 PROCEDURE — 99999 PR PBB SHADOW E&M-EST. PATIENT-LVL IV: CPT | Mod: PBBFAC,,, | Performed by: INTERNAL MEDICINE

## 2023-08-24 PROCEDURE — 99999 PR PBB SHADOW E&M-EST. PATIENT-LVL IV: ICD-10-PCS | Mod: PBBFAC,,, | Performed by: INTERNAL MEDICINE

## 2023-08-24 PROCEDURE — 99214 OFFICE O/P EST MOD 30 MIN: CPT | Mod: PBBFAC,PO | Performed by: INTERNAL MEDICINE

## 2023-08-24 NOTE — PROGRESS NOTES
Subjective:   Patient ID:  Roshan Menard is a 73 y.o. male who presents for follow-up of No chief complaint on file.  This pleasant patient here in the office for evaluation.  History of temporal arteritis, paroxysmal atrial fibrillation and cardioversion the past with YRN.  History of essential hypertension coronary disease without angina and evidence of aortic stenosis of a mild-to-moderate degree.  This was seen on prior YRN.  Patient is a former smoker no history of drug or alcohol abuse no syncope or near syncopal episodes.  Patient has a history of hypertension on multiple medications and is on full anticoagulation due to PAF.  This visit finds patient feeling well.  His medical problems include cardiac disease with stenting of circumflex coronary artery 2 years ago repeat angiogram last year showed patent stent.  The patient has history of obesity lost over 100 lb.  Patient has cardiac disease as well including significant calcification of the aorta calcification the aortic valve and repeat echo be done.  Prior echo done last year showed evidence of at least moderate aortic stenosis.  Today's physical exam shows the patient has brief murmur but also brisk carotid upstroke which would argue against severe aortic stenosis.  Overall LV function was preserved therefore I would disagree that he has low flow state.        Today patient feels generally well.  Blood pressure is a little bit elevated and he is not taking carvedilol more than once a day.  Will discontinue the medications that he feels like he has a brain fog when he takes the medicines.  Will increase hydralazine to 25 mg q.8 hours and otherwise stable continue with Micardis.     The patient presents the office blood pressures been under higher values.  This reason will add hydralazine and now 50 mg q.8 hours he continues on Micardis 80 mg daily he was intolerant of carvedilol.  Monitor did not show significant arrhythmias.  Intermittent mild short  episodes of sinus tachycardia noted.        Patient presents the office feeling generally well.  Blood pressure still elevated will increase hydralazine 100 mg b.i.d.     Patient still is elevated blood pressure he had a trial of amlodipine from another physician and he was stop it because it did make him feel well.  I will increase hydralazine to mg 3 times a day follow-up evaluation blood pressure and less than 1 month.  Otherwise stable no acute symptoms chest pain shortness.        Is the patient is here for review of blood pressure.  Slightly elevated today however he has some mild swelling has been off Lasix and also restart that today at with double doses for the next 2 days.  Also has of toe infection on antibiotics and also has some pain in that foot.  Follow-up evaluation again within next several weeks after complete re-evaluation on all medications.  He does continue on hydralazine 3 times daily and he has good compliance with this medication.  Today the patient presents in the office and blood pressure is stable on hydralazine feels well.  Evaluation again in 3 months.  All medications reviewed and renewed as necessary.     NO FOCAL CNS SYMPTOMS OR SIGNS TO SUGGEST TIA OR STROKE  NO ANGINA OR EQUIVALENT  NO UNUSUAL GLASER. NO ORTHOPNEA OR PND  NO PALPITATIONS  NO NEAR SYNCOPE OR SYNCOPE  NO EDEMA. NO CALVE TENDERNESS     02/02/2023: Patient still has elevated blood pressure and did not take his hydralazine this morning but usually runs blood pressure 150/90 will increase the hydralazine back to 100 mg q.8 hours.  Will consider back to amlodipine in the pain as he is tried this in the past.  Patient states he sometimes has irregular heart rhythms today is heart rate is stable with sinus arrhythmia.  Will follow-up evaluation again in the next month.  Patient will have another cardiac echo to reassess heart function.     04/20/2023:   Overall patient is stable he fell scraped his left arm and will follow up  to make sure he does not have infection.    Patient's CT scan prior to seeing Dr.TAFUR Yao.  Clinically stable there is no evidence of significant abnormalities seen with small nodules in lungs and will follow those clinically and re-evaluate in 8 months to a year.    Otherwise stable patient mild edema with amlodipine discontinued will double up on the Toprol XL to 50 mg daily for blood pressure control and heart rate control continue other medications.  Clinically stable no edema mild wheezing today he needs take his inhalers today.        05/18/2023:   Status post TAVR in stable doing well this time no exertional symptoms cardiac echo repeat pending in the near future.  No exertional symptoms.  No edema.  Patient tolerated procedure well did well for out throughout the TAVR procedure.        06/22/2023:   Overall patient doing well status post TAVR about a month ago at this point clinically doing well recently seen T[facility] Clinic continues on aspirin and Eliquis.  The patient at this point should be on dental antibiotic prophylaxis lifetime.  The patient should also be pretreated for SBE prophylaxis lifetime.  Patient is to avoid all procedures for the next 6 months.  Including colonoscopy.     06/22/2023:   Overall patient is doing the patient today is doing well blood pressures been fairly stable at home he will try Norvasc 2.5 mg twice daily.    Overall he is stable he can come off Eliquis for 2 days to have biopsy of skin lesion and will stay on aspirin.  Go back on medications thereafter within 1 day.  Patient currently normal sinus rhythm.       08/24/2023 overall patient is doing quite well no exertional chest pain shortness breath blood pressure is under reasonable control and stable follow-up evaluation every 4-6 months clinically doing well this time TAVR earlier in the year and stable.  No acute shortness breath or edema feeling generally well.        Review of Systems   Constitutional: Negative for  chills, diaphoresis, night sweats, weight gain and weight loss.   HENT:  Negative for congestion, hoarse voice, sore throat and stridor.    Eyes:  Negative for double vision and pain.   Cardiovascular:  Negative for chest pain, claudication, cyanosis, dyspnea on exertion, irregular heartbeat, leg swelling, near-syncope, orthopnea, palpitations, paroxysmal nocturnal dyspnea and syncope.   Respiratory:  Negative for cough, hemoptysis, shortness of breath, sleep disturbances due to breathing, snoring, sputum production and wheezing.    Endocrine: Negative for cold intolerance, heat intolerance and polydipsia.   Hematologic/Lymphatic: Negative for bleeding problem. Does not bruise/bleed easily.   Skin:  Negative for color change, dry skin and rash.   Musculoskeletal:  Negative for joint swelling and muscle cramps.   Gastrointestinal:  Negative for bloating, abdominal pain, constipation, diarrhea, dysphagia, melena, nausea and vomiting.   Genitourinary:  Negative for flank pain and urgency.   Neurological:  Negative for dizziness, focal weakness, headaches, light-headedness, loss of balance, seizures and weakness.   Psychiatric/Behavioral:  Negative for altered mental status and memory loss. The patient is not nervous/anxious.    Family History   Problem Relation Age of Onset    Cancer Mother     Early death Mother     Arthritis Father     Diabetes Father     Heart disease Father     Hyperlipidemia Father     Hypertension Father      Past Medical History:   Diagnosis Date    Allergy     Anticoagulant long-term use     Aortic stenosis     Arthritis     Asthma     Atrial fibrillation     Bronchitis     Cataract     COPD (chronic obstructive pulmonary disease)     Coronary artery disease     stent    General anesthetics causing adverse effect in therapeutic use     hard to awaken    GERD (gastroesophageal reflux disease)     Gout, chronic     Hypertension     Mixed hyperlipidemia     Sleep apnea     Type 2 diabetes mellitus  with diabetic peripheral angiopathy without gangrene, unspecified whether long term insulin use 2023     Social History     Socioeconomic History    Marital status:    Occupational History     Employer: [employer]   Tobacco Use    Smoking status: Former     Current packs/day: 0.00     Average packs/day: 1.5 packs/day for 22.0 years (33.0 ttl pk-yrs)     Types: Cigarettes     Start date: 1970     Quit date: 1992     Years since quittin.3    Smokeless tobacco: Never   Substance and Sexual Activity    Alcohol use: No    Drug use: No    Sexual activity: Never     Current Outpatient Medications on File Prior to Visit   Medication Sig Dispense Refill    albuterol (PROVENTIL/VENTOLIN HFA) 90 mcg/actuation inhaler Inhale 2 puffs into the lungs every 6 (six) hours as needed for Wheezing or Shortness of Breath. Rescue 18 g 11    albuterol-ipratropium (DUO-NEB) 2.5 mg-0.5 mg/3 mL nebulizer solution Take 3 mLs by nebulization every 6 (six) hours as needed for Wheezing or Shortness of Breath. Rescue 120 vial 5    allopurinoL (ZYLOPRIM) 300 MG tablet TAKE 1 TABLET BY MOUTH EVERY DAY 90 tablet 2    amLODIPine (NORVASC) 5 MG tablet Take 0.5 tablets (2.5 mg total) by mouth once daily. 60 tablet 2    apixaban (ELIQUIS) 5 mg Tab Take 1 tablet (5 mg total) by mouth 2 (two) times daily. 180 tablet 3    aspirin (ECOTRIN) 81 MG EC tablet Take 1 tablet (81 mg total) by mouth once daily.      atorvastatin (LIPITOR) 40 MG tablet TK 1 T PO QD 90 tablet 3    cloNIDine (CATAPRES) 0.1 MG tablet Take 1 tablet (0.1 mg total) by mouth every 6 (six) hours as needed (for systolic blood pressure greater than 165). 60 tablet 11    fluocinonide (LIDEX) 0.05 % gel SMARTSI-2 Gram(s) Topical Twice Daily      fluticasone furoate-vilanteroL (BREO ELLIPTA) 200-25 mcg/dose DsDv diskus inhaler INHALE 1 PUFF BY MOUTH AT THE SAME TIME EVERY DAY 60 each 5    furosemide (LASIX) 20 MG tablet Take 1 tablet (20 mg total) by  mouth once daily. 90 tablet 3    ketoconazole (NIZORAL) 2 % cream Apply topically.      lactobacillus comb no.10 (PROBIOTIC) 20 billion cell Cap Take 1 capsule by mouth once daily. Or as directed on bottle      metoprolol succinate (TOPROL-XL) 25 MG 24 hr tablet Take 1 tablet (25 mg total) by mouth once daily. 90 tablet 3    montelukast (SINGULAIR) 10 mg tablet Take 1 tablet (10 mg total) by mouth once daily. 30 tablet 1    multivitamin (THERAGRAN) per tablet Take 1 tablet by mouth once daily.      omeprazole (PRILOSEC) 10 MG capsule Take 10 mg by mouth once daily.      potassium chloride (KLOR-CON) 10 MEQ TbSR TK 1 T PO ONCE A DAY. 90 tablet 3    predniSONE (DELTASONE) 2.5 MG tablet Take 5 mg by mouth once daily.      telmisartan (MICARDIS) 80 MG Tab Take 1 tablet (80 mg total) by mouth once daily. 90 tablet 3    tiotropium bromide (SPIRIVA RESPIMAT) 2.5 mcg/actuation inhaler Inhale 2 puffs into the lungs Daily. Controller 4 g 11    TOBRADEX 0.3-0.1 % Oint       urea (CARMOL) 40 % Crea 2 (two) times daily as needed.  0     Current Facility-Administered Medications on File Prior to Visit   Medication Dose Route Frequency Provider Last Rate Last Admin    sodium chloride 0.9% flush 10 mL  10 mL Intravenous PRN Luis Enrique Rangel MD         Review of patient's allergies indicates:   Allergen Reactions    Sulfa (sulfonamide antibiotics)      Hives    Azithromycin      Diarrhea      Cefaclor Other (See Comments)     Other reaction(s): Hives    Iodine and iodide containing products      Other reaction(s): Unknown    Ivp dye  [iodinated contrast media] Other (See Comments)    Doxycycline Rash       Objective:     Physical Exam  Eyes:      Pupils: Pupils are equal, round, and reactive to light.   Neck:      Trachea: No tracheal deviation.   Cardiovascular:      Rate and Rhythm: Normal rate and regular rhythm.      Pulses: Intact distal pulses.           Carotid pulses are 2+ on the right side and 2+ on the left side.        Radial pulses are 2+ on the right side and 2+ on the left side.        Femoral pulses are 2+ on the right side and 2+ on the left side.       Popliteal pulses are 2+ on the right side and 2+ on the left side.        Dorsalis pedis pulses are 2+ on the right side and 2+ on the left side.        Posterior tibial pulses are 2+ on the right side and 2+ on the left side.      Heart sounds: Normal heart sounds. No murmur heard.     No friction rub. No gallop.   Pulmonary:      Effort: Pulmonary effort is normal. No respiratory distress.      Breath sounds: Normal breath sounds. No stridor. No wheezing or rales.   Chest:      Chest wall: No tenderness.   Abdominal:      General: There is no distension.      Tenderness: There is no abdominal tenderness. There is no rebound.   Musculoskeletal:         General: No tenderness.      Cervical back: Normal range of motion.   Skin:     General: Skin is warm and dry.   Neurological:      Mental Status: He is alert and oriented to person, place, and time.     Assessment:     1. S/P TAVR (transcatheter aortic valve replacement)    2. Bilateral carotid artery stenosis    3. Temporal arteritis    4. Preop cardiovascular exam    5. Essential hypertension    6. Abnormal nuclear stress test    7. Abnormal cardiovascular stress test    8. Aortic valve stenosis, etiology of cardiac valve disease unspecified    9. Aortic atherosclerosis    10. Former smoker    11. SOB (shortness of breath)    12. CAD, multiple vessel    13. Severe aortic stenosis    14. Idiopathic gout, unspecified chronicity, unspecified site    15. Longstanding persistent atrial fibrillation    16. Atherosclerosis of native coronary artery of native heart with unstable angina pectoris    17. Encounter for laboratory testing for COVID-19 virus    18. Mixed hyperlipidemia    19. RBBB    20. Type 2 diabetes mellitus with diabetic peripheral angiopathy without gangrene, unspecified whether long term insulin use    21. Other  emphysema    22. Obstructive sleep apnea syndrome    23. Chronic diastolic heart failure    24. Nonrheumatic aortic (valve) stenosis        Plan:     S/P TAVR (transcatheter aortic valve replacement)    Bilateral carotid artery stenosis    Temporal arteritis    Preop cardiovascular exam    Essential hypertension    Abnormal nuclear stress test    Abnormal cardiovascular stress test    Aortic valve stenosis, etiology of cardiac valve disease unspecified    Aortic atherosclerosis    Former smoker    SOB (shortness of breath)    CAD, multiple vessel    Severe aortic stenosis    Idiopathic gout, unspecified chronicity, unspecified site    Longstanding persistent atrial fibrillation    Atherosclerosis of native coronary artery of native heart with unstable angina pectoris    Encounter for laboratory testing for COVID-19 virus    Mixed hyperlipidemia    RBBB    Type 2 diabetes mellitus with diabetic peripheral angiopathy without gangrene, unspecified whether long term insulin use    Other emphysema    Obstructive sleep apnea syndrome    Chronic diastolic heart failure    Nonrheumatic aortic (valve) stenosis        Impression 1 aortic stenosis and TAVR doing well this time no recurrence symptoms  2. Bundle-branch block stable    3. History emphysema stable no acute shortness breath   4. Hypertension under reasonable control continues with current medications and continues on amlodipine, telmisartan, metoprolol clonidine when necessary and stable this time.  5. Hyperlipidemia stable on statin medication.  Follow-up evaluation again in 4 months.  Sooner if acute recurrence symptoms.  We will follow his blood pressures at home.

## 2023-08-28 ENCOUNTER — OFFICE VISIT (OUTPATIENT)
Dept: FAMILY MEDICINE | Facility: CLINIC | Age: 73
End: 2023-08-28
Payer: MEDICARE

## 2023-08-28 VITALS
HEART RATE: 65 BPM | BODY MASS INDEX: 33.66 KG/M2 | WEIGHT: 254 LBS | TEMPERATURE: 98 F | DIASTOLIC BLOOD PRESSURE: 74 MMHG | HEIGHT: 73 IN | SYSTOLIC BLOOD PRESSURE: 139 MMHG

## 2023-08-28 DIAGNOSIS — I48.0 PAF (PAROXYSMAL ATRIAL FIBRILLATION): ICD-10-CM

## 2023-08-28 DIAGNOSIS — I10 ESSENTIAL HYPERTENSION: Primary | ICD-10-CM

## 2023-08-28 DIAGNOSIS — I50.32 CHRONIC DIASTOLIC HEART FAILURE: ICD-10-CM

## 2023-08-28 DIAGNOSIS — I25.10 CORONARY ARTERY DISEASE INVOLVING NATIVE CORONARY ARTERY OF NATIVE HEART WITHOUT ANGINA PECTORIS: ICD-10-CM

## 2023-08-28 DIAGNOSIS — K21.9 GASTROESOPHAGEAL REFLUX DISEASE, UNSPECIFIED WHETHER ESOPHAGITIS PRESENT: ICD-10-CM

## 2023-08-28 DIAGNOSIS — E11.51 TYPE 2 DIABETES MELLITUS WITH DIABETIC PERIPHERAL ANGIOPATHY WITHOUT GANGRENE, UNSPECIFIED WHETHER LONG TERM INSULIN USE: ICD-10-CM

## 2023-08-28 DIAGNOSIS — R91.1 PULMONARY NODULE: ICD-10-CM

## 2023-08-28 DIAGNOSIS — E78.2 MIXED HYPERLIPIDEMIA: ICD-10-CM

## 2023-08-28 DIAGNOSIS — Z95.2 S/P TAVR (TRANSCATHETER AORTIC VALVE REPLACEMENT): ICD-10-CM

## 2023-08-28 DIAGNOSIS — J45.40 MODERATE PERSISTENT ASTHMA WITHOUT COMPLICATION: ICD-10-CM

## 2023-08-28 DIAGNOSIS — M31.6 TEMPORAL ARTERITIS: ICD-10-CM

## 2023-08-28 PROCEDURE — 99215 OFFICE O/P EST HI 40 MIN: CPT | Mod: PBBFAC,PO | Performed by: PHYSICIAN ASSISTANT

## 2023-08-28 PROCEDURE — 99214 PR OFFICE/OUTPT VISIT, EST, LEVL IV, 30-39 MIN: ICD-10-PCS | Mod: S$PBB,,, | Performed by: PHYSICIAN ASSISTANT

## 2023-08-28 PROCEDURE — 99999 PR PBB SHADOW E&M-EST. PATIENT-LVL V: ICD-10-PCS | Mod: PBBFAC,,, | Performed by: PHYSICIAN ASSISTANT

## 2023-08-28 PROCEDURE — 99214 OFFICE O/P EST MOD 30 MIN: CPT | Mod: S$PBB,,, | Performed by: PHYSICIAN ASSISTANT

## 2023-08-28 PROCEDURE — 99999 PR PBB SHADOW E&M-EST. PATIENT-LVL V: CPT | Mod: PBBFAC,,, | Performed by: PHYSICIAN ASSISTANT

## 2023-08-28 RX ORDER — EPINEPHRINE 0.22MG
200 AEROSOL WITH ADAPTER (ML) INHALATION DAILY
COMMUNITY

## 2023-08-28 NOTE — PROGRESS NOTES
Assessment/Plan:    Problem List Items Addressed This Visit          Pulmonary    Moderate persistent asthma without complication    Overview     -managed by external pulmonology, Dr. Wright  -remains on breo and spiriva  -respiratory symptoms stable         Pulmonary nodule    Overview     -incidental finding on previous CT  -CTA May 2023- radiologist recommending repeat in 6-12 months            Cardiac/Vascular    Coronary artery disease involving native coronary artery of native heart without angina pectoris    Overview     -followed by cardiology  -hx of PCI with LUIS  -remains on ASA and eliquis (hx of a fib)  -also on statin  -denies recent symptoms of angina or dyspnea         Essential hypertension - Primary    Overview       Hypertension Medications               amLODIPine (NORVASC) 5 MG tablet Take 0.5 tablets (2.5 mg total) by mouth once daily.    cloNIDine (CATAPRES) 0.1 MG tablet Take 1 tablet (0.1 mg total) by mouth every 6 (six) hours as needed (for systolic blood pressure greater than 165).    furosemide (LASIX) 20 MG tablet Take 1 tablet (20 mg total) by mouth once daily.    metoprolol succinate (TOPROL-XL) 25 MG 24 hr tablet Take 1 tablet (25 mg total) by mouth once daily.    telmisartan (MICARDIS) 80 MG Tab Take 1 tablet (80 mg total) by mouth once daily.   -at goal today  -intolerant to amlodipine and spironolactone in the past  -continue lifestyle modification with low sodium diet and exercise   -discussed hypertension disease course and importance of treating high blood pressure  -patient understood and advised of risk of untreated blood pressure. ER precautions were given for symptoms of hypertensive urgency and emergency.           Mixed hyperlipidemia    Overview       Hyperlipidemia Medications               atorvastatin (LIPITOR) 40 MG tablet TK 1 T PO QD   -chronic condition. Currently stable.    -reports compliance with hyperlipidemia treatment as prescribed  -denies any known  adverse effects of medications  -most recent labs listed below:  Lab Results   Component Value Date    CHOL 156 04/03/2023     Lab Results   Component Value Date    HDL 39 (L) 04/03/2023     Lab Results   Component Value Date    LDLCALC 89.6 04/03/2023     Lab Results   Component Value Date    TRIG 137 04/03/2023     Lab Results   Component Value Date    ALT 26 05/17/2023    AST 28 05/17/2023    ALKPHOS 75 05/17/2023    BILITOT 0.6 05/17/2023            PAF (paroxysmal atrial fibrillation)    Overview     -followed by cardiology  -on toprol for rate control and eliquis for anticoagulation  -s/p cardioversion in the past, failed  -asymptomatic         Chronic diastolic heart failure    Overview     -TTE 6/2023- grade II left ventricular diastolic dysfunction with EF 65%  -compensated on exam   -followed by cardiology           S/P TAVR (transcatheter aortic valve replacement)       Immunology/Multi System    Temporal arteritis    Overview     -followed now by retinal specialist  -s/p temporal biopsy normal, however, improved with prednisone  -remains on pred 2.5 mg QD            Endocrine    Type 2 diabetes mellitus with diabetic peripheral angiopathy without gangrene, unspecified whether long term insulin use    Overview     -condition is currently controlled without medications  -most recent A1c normal (5.3)  -see diabetic health maintenance listed below  -on statin: Yes  -on ACE-I/ARB: Yes  -counseling provided on importance of diabetic diet and medication compliance in order to treat diabetes  -discussed diabetes disease course and potential complications              GI    Gastroesophageal reflux disease    Overview     -symptoms controlled with daily PPI  -denies alarm symptoms, such as dysphagia, weight loss or N/V  -continue lifestyle modification with avoidance of acidic foods, caffeine, late night eating            Follow up in about 6 months (around 2/28/2024), or if symptoms worsen or fail to  improve.    MANISH BryantC  _____________________________________________________________________________________________________________________________________________________    CC: follow up for chronic conditions     HPI: Patient is in clinic today as an established patient here for follow up for chronic conditions.    HTN: The patient is currently being treated for essential hypertension. This condition is chronic and stable. The patient is tolerating their medication well with good compliance. Denies any adverse effects of medications. Counseling was offered regarding low sodium diet. The patient has a reduced salt intake. Routine exercise recommended. The patient denies headache, vision changes, chest pain, palpitations, shortness of breath, or lower extremity edema.    S/p TAVR: Patient s/p TAVR in 5/2023. He has been doing well since surgery. Remains asymptomatic. He is continuing to follow up with cardiology.     Remaining chronic conditions have been reviewed and remain stable. Further detail as stated above. No other complaints today.     Past Medical History:   Diagnosis Date    Allergy     Anticoagulant long-term use     Aortic stenosis     Arthritis     Asthma     Atrial fibrillation     Bronchitis     Cataract     COPD (chronic obstructive pulmonary disease)     Coronary artery disease     stent    General anesthetics causing adverse effect in therapeutic use     hard to awaken    GERD (gastroesophageal reflux disease)     Gout, chronic     Hypertension     Mixed hyperlipidemia     Sleep apnea     Type 2 diabetes mellitus with diabetic peripheral angiopathy without gangrene, unspecified whether long term insulin use 05/02/2023     Past Surgical History:   Procedure Laterality Date    ANGIOGRAM, CORONARY, WITH LEFT HEART CATHETERIZATION  09/27/2021    Procedure: Angiogram, Coronary, with Left Heart Cath;  Surgeon: Vincent Gonzalez MD;  Location: [facility];  Service: Cardiology;;    BACK  SURGERY      L3-5; ruptured disc; laminectomy x 2 surgery    CARDIAC CATH COSURGEON N/A 2023    Procedure: Cardiac Cath Cosurgeon;  Surgeon: Holly Pleitez MD;  Location: [facility] CATH LAB;  Service: Cardiothoracic;  Laterality: N/A;    CARDIAC VALVE REPLACEMENT  23    TAVR    CARDIAC VALVE SURGERY      CARDIOVERSION      CHOLECYSTECTOMY      CORONARY ANGIOPLASTY WITH STENT PLACEMENT      EYE SURGERY      Cateract    JOINT REPLACEMENT      RT  KNEE    LEFT HEART CATHETERIZATION Left 2023    Procedure: Left heart cath;  Surgeon: Aleksey Krishnan MD;  Location: [facility] CATH LAB;  Service: Cardiology;  Laterality: Left;    SPINE SURGERY      Lamanectomy '/    TONSILLECTOMY      TOTAL KNEE ARTHROPLASTY      TRANSCATHETER AORTIC VALVE REPLACEMENT (TAVR) N/A 2023    Procedure: REPLACEMENT, AORTIC VALVE, TRANSCATHETER (TAVR);  Surgeon: Luis Enrique Ji MD;  Location: [facility] CATH LAB;  Service: Cardiology;  Laterality: N/A;    VASECTOMY       Review of patient's allergies indicates:   Allergen Reactions    Sulfa (sulfonamide antibiotics)      Hives    Azithromycin      Diarrhea      Cefaclor Other (See Comments)     Other reaction(s): Hives    Iodine and iodide containing products      Other reaction(s): Unknown    Ivp dye  [iodinated contrast media] Other (See Comments)    Doxycycline Rash     Social History     Tobacco Use    Smoking status: Former     Current packs/day: 0.00     Average packs/day: 1.5 packs/day for 22.0 years (33.0 ttl pk-yrs)     Types: Cigarettes     Start date: 1970     Quit date: 1992     Years since quittin.3    Smokeless tobacco: Never   Substance Use Topics    Alcohol use: No    Drug use: No     Family History   Problem Relation Age of Onset    Cancer Mother     Early death Mother     Arthritis Father     Diabetes Father     Heart disease Father     Hyperlipidemia Father     Hypertension Father      Current Outpatient Medications on File  Prior to Visit   Medication Sig Dispense Refill    albuterol (PROVENTIL/VENTOLIN HFA) 90 mcg/actuation inhaler Inhale 2 puffs into the lungs every 6 (six) hours as needed for Wheezing or Shortness of Breath. Rescue 18 g 11    albuterol-ipratropium (DUO-NEB) 2.5 mg-0.5 mg/3 mL nebulizer solution Take 3 mLs by nebulization every 6 (six) hours as needed for Wheezing or Shortness of Breath. Rescue 120 vial 5    allopurinoL (ZYLOPRIM) 300 MG tablet TAKE 1 TABLET BY MOUTH EVERY DAY 90 tablet 2    amLODIPine (NORVASC) 5 MG tablet Take 0.5 tablets (2.5 mg total) by mouth once daily. 60 tablet 2    apixaban (ELIQUIS) 5 mg Tab Take 1 tablet (5 mg total) by mouth 2 (two) times daily. 180 tablet 3    aspirin (ECOTRIN) 81 MG EC tablet Take 1 tablet (81 mg total) by mouth once daily.      atorvastatin (LIPITOR) 40 MG tablet TK 1 T PO QD 90 tablet 3    cloNIDine (CATAPRES) 0.1 MG tablet Take 1 tablet (0.1 mg total) by mouth every 6 (six) hours as needed (for systolic blood pressure greater than 165). 60 tablet 11    coenzyme Q10 100 mg capsule Take 200 mg by mouth once daily.      fluocinonide (LIDEX) 0.05 % gel SMARTSI-2 Gram(s) Topical Twice Daily      fluticasone furoate-vilanteroL (BREO ELLIPTA) 200-25 mcg/dose DsDv diskus inhaler INHALE 1 PUFF BY MOUTH AT THE SAME TIME EVERY DAY 60 each 5    furosemide (LASIX) 20 MG tablet Take 1 tablet (20 mg total) by mouth once daily. 90 tablet 3    ketoconazole (NIZORAL) 2 % cream Apply topically.      lactobacillus comb no.10 (PROBIOTIC) 20 billion cell Cap Take 1 capsule by mouth once daily. Or as directed on bottle      metoprolol succinate (TOPROL-XL) 25 MG 24 hr tablet Take 1 tablet (25 mg total) by mouth once daily. 90 tablet 3    montelukast (SINGULAIR) 10 mg tablet Take 1 tablet (10 mg total) by mouth once daily. 30 tablet 1    multivitamin (THERAGRAN) per tablet Take 1 tablet by mouth once daily.      omeprazole (PRILOSEC) 10 MG capsule Take 10 mg by mouth once daily.       "potassium chloride (KLOR-CON) 10 MEQ TbSR TK 1 T PO ONCE A DAY. 90 tablet 3    predniSONE (DELTASONE) 2.5 MG tablet Take 5 mg by mouth once daily.      telmisartan (MICARDIS) 80 MG Tab Take 1 tablet (80 mg total) by mouth once daily. 90 tablet 3    tiotropium bromide (SPIRIVA RESPIMAT) 2.5 mcg/actuation inhaler Inhale 2 puffs into the lungs Daily. Controller 4 g 11    TOBRADEX 0.3-0.1 % Oint       urea (CARMOL) 40 % Crea 2 (two) times daily as needed.  0     Current Facility-Administered Medications on File Prior to Visit   Medication Dose Route Frequency Provider Last Rate Last Admin    sodium chloride 0.9% flush 10 mL  10 mL Intravenous PRN Luis Enrique Rangel MD           Review of Systems   Constitutional:  Negative for chills, diaphoresis, fatigue and fever.   HENT:  Negative for congestion, ear pain, postnasal drip, sinus pain and sore throat.    Eyes:  Negative for pain and redness.   Respiratory:  Negative for cough, chest tightness and shortness of breath.    Cardiovascular:  Negative for chest pain and leg swelling.   Gastrointestinal:  Negative for abdominal pain, constipation, diarrhea, nausea and vomiting.   Genitourinary:  Negative for dysuria and hematuria.   Musculoskeletal:  Negative for arthralgias and joint swelling.   Skin:  Negative for rash.   Neurological:  Negative for dizziness, syncope and headaches.   Psychiatric/Behavioral:  Negative for dysphoric mood. The patient is not nervous/anxious.        Vitals:    08/28/23 0853   BP: 139/74   Pulse: 65   Temp: 97.9 °F (36.6 °C)   Weight: 115.2 kg (254 lb)   Height: 6' 1" (1.854 m)       Wt Readings from Last 3 Encounters:   08/28/23 115.2 kg (254 lb)   08/24/23 114.3 kg (251 lb 14.4 oz)   06/22/23 114.6 kg (252 lb 9.6 oz)       Physical Exam  Constitutional:       General: He is not in acute distress.     Appearance: Normal appearance. He is well-developed.   HENT:      Head: Normocephalic and atraumatic.   Eyes:      Conjunctiva/sclera: " Conjunctivae normal.   Cardiovascular:      Rate and Rhythm: Normal rate and regular rhythm.      Pulses: Normal pulses.      Heart sounds: Normal heart sounds. No murmur heard.  Pulmonary:      Effort: Pulmonary effort is normal. No respiratory distress.      Breath sounds: Normal breath sounds.   Abdominal:      General: Bowel sounds are normal. There is no distension.      Palpations: Abdomen is soft.      Tenderness: There is no abdominal tenderness.   Musculoskeletal:         General: Normal range of motion.      Cervical back: Normal range of motion and neck supple.   Skin:     General: Skin is warm and dry.      Findings: No rash.   Neurological:      General: No focal deficit present.      Mental Status: He is alert and oriented to person, place, and time.   Psychiatric:         Mood and Affect: Mood normal.         Behavior: Behavior normal.         Health Maintenance   Topic Date Due    Lipid Panel  04/03/2024    High Dose Statin  08/28/2024    Colorectal Cancer Screening  08/26/2029    TETANUS VACCINE  10/09/2031    Hepatitis C Screening  Completed    Shingles Vaccine  Completed    Abdominal Aortic Aneurysm Screening  Completed

## 2023-09-14 ENCOUNTER — HOSPITAL ENCOUNTER (OUTPATIENT)
Dept: RADIOLOGY | Facility: HOSPITAL | Age: 73
Discharge: HOME | End: 2023-09-14
Attending: PHYSICIAN ASSISTANT
Payer: MEDICARE

## 2023-09-14 ENCOUNTER — OFFICE VISIT (OUTPATIENT)
Dept: FAMILY MEDICINE | Facility: CLINIC | Age: 73
End: 2023-09-14
Payer: MEDICARE

## 2023-09-14 VITALS
RESPIRATION RATE: 12 BRPM | TEMPERATURE: 98 F | HEIGHT: 73 IN | OXYGEN SATURATION: 98 % | WEIGHT: 255.88 LBS | BODY MASS INDEX: 33.91 KG/M2 | HEART RATE: 79 BPM | SYSTOLIC BLOOD PRESSURE: 130 MMHG | DIASTOLIC BLOOD PRESSURE: 72 MMHG

## 2023-09-14 DIAGNOSIS — M25.551 RIGHT HIP PAIN: ICD-10-CM

## 2023-09-14 DIAGNOSIS — M25.551 RIGHT HIP PAIN: Primary | ICD-10-CM

## 2023-09-14 DIAGNOSIS — M79.651 RIGHT THIGH PAIN: ICD-10-CM

## 2023-09-14 PROCEDURE — 99213 PR OFFICE/OUTPT VISIT, EST, LEVL III, 20-29 MIN: ICD-10-PCS | Mod: S$PBB,,, | Performed by: PHYSICIAN ASSISTANT

## 2023-09-14 PROCEDURE — 99213 OFFICE O/P EST LOW 20 MIN: CPT | Mod: S$PBB,,, | Performed by: PHYSICIAN ASSISTANT

## 2023-09-14 PROCEDURE — 73502 X-RAY EXAM HIP UNI 2-3 VIEWS: CPT | Mod: 26,RT,, | Performed by: RADIOLOGY

## 2023-09-14 PROCEDURE — 99999 PR PBB SHADOW E&M-EST. PATIENT-LVL V: ICD-10-PCS | Mod: PBBFAC,,, | Performed by: PHYSICIAN ASSISTANT

## 2023-09-14 PROCEDURE — 99999 PR PBB SHADOW E&M-EST. PATIENT-LVL V: CPT | Mod: PBBFAC,,, | Performed by: PHYSICIAN ASSISTANT

## 2023-09-14 PROCEDURE — 99215 OFFICE O/P EST HI 40 MIN: CPT | Mod: PBBFAC,PO | Performed by: PHYSICIAN ASSISTANT

## 2023-09-14 PROCEDURE — 73502 XR HIP WITH PELVIS WHEN PERFORMED, 2 OR 3  VIEWS RIGHT: ICD-10-PCS | Mod: 26,RT,, | Performed by: RADIOLOGY

## 2023-09-14 PROCEDURE — 73502 X-RAY EXAM HIP UNI 2-3 VIEWS: CPT | Mod: TC,PO,RT

## 2023-09-14 RX ORDER — NAPROXEN 500 MG/1
500 TABLET ORAL 2 TIMES DAILY PRN
Qty: 60 TABLET | Refills: 2 | Status: SHIPPED | OUTPATIENT
Start: 2023-09-14 | End: 2023-12-13

## 2023-09-14 RX ORDER — TIZANIDINE 4 MG/1
4 TABLET ORAL EVERY 8 HOURS PRN
Qty: 30 TABLET | Refills: 0 | Status: SHIPPED | OUTPATIENT
Start: 2023-09-14 | End: 2024-01-02

## 2023-09-14 NOTE — PROGRESS NOTES
Results have been released via [employer]. Please verify that these have been viewed by patient. If not, please call patient with results.     I have sent a message to them with the following interpretation (see below).    I have reviewed your recent R hip XR which showed no acute findings.     Please do not hesitate to call or message with any additional questions or concerns.    Aide Stephens PA-C

## 2023-09-14 NOTE — PROGRESS NOTES
Assessment/Plan:    Problem List Items Addressed This Visit    None  Visit Diagnoses       Right hip pain    -  Primary    Relevant Medications    tiZANidine (ZANAFLEX) 4 MG tablet    naproxen (NAPROSYN) 500 MG tablet    Other Relevant Orders    X-Ray Hip 2 or 3 views Right (with Pelvis when performed) (Completed)    Right thigh pain        Relevant Medications    tiZANidine (ZANAFLEX) 4 MG tablet    naproxen (NAPROSYN) 500 MG tablet    Other Relevant Orders    X-Ray Hip 2 or 3 views Right (with Pelvis when performed) (Completed)        -R hip/thigh pain >2 days due to injury  -no alarm symptoms or signs present  -will obtain right hip XR today  -continue conservative treatment, rest, ice/heat applications, PRN anti-inflammatory medication  -consider PT if symptoms persist  -ER precautions for severe or worsening of symptoms    Follow up if symptoms worsen or fail to improve.    Aide Stephens PA-C  _____________________________________________________________________________________________________________________________________________________    CC: R hip pain    HPI: Patient is in clinic today as an established patient here for R hip pain. Patient stated that about 2 days ago, he was working in his barn and went to step up and injured his R hip/thigh. Pain has continued to persist since that time. He is able to ambulate without difficulty. Pain is worse with weightbearing activities and improves with rest. He has been using ice/heat applications and Naproxen with some temporary relief of pain. No other complaints today.     Past Medical History:   Diagnosis Date    Allergy     Anticoagulant long-term use     Aortic stenosis     Arthritis     Asthma     Atrial fibrillation     Bronchitis     Cataract     COPD (chronic obstructive pulmonary disease)     Coronary artery disease     stent    General anesthetics causing adverse effect in therapeutic use     hard to awaken    GERD (gastroesophageal reflux disease)      Gout, chronic     Hypertension     Mixed hyperlipidemia     Sleep apnea     Type 2 diabetes mellitus with diabetic peripheral angiopathy without gangrene, unspecified whether long term insulin use 05/02/2023     Past Surgical History:   Procedure Laterality Date    ANGIOGRAM, CORONARY, WITH LEFT HEART CATHETERIZATION  09/27/2021    Procedure: Angiogram, Coronary, with Left Heart Cath;  Surgeon: Vincent Gonzalez MD;  Location: [facility] CATH;  Service: Cardiology;;    BACK SURGERY      L3-5; ruptured disc; laminectomy x 2 surgery    CARDIAC CATH COSURGEON N/A 05/16/2023    Procedure: Cardiac Cath Cosurgeon;  Surgeon: Holly Pleitez MD;  Location: [facility] CATH LAB;  Service: Cardiothoracic;  Laterality: N/A;    CARDIAC VALVE REPLACEMENT  5/16/23    TAVR    CARDIAC VALVE SURGERY      CARDIOVERSION      CHOLECYSTECTOMY      CORONARY ANGIOPLASTY WITH STENT PLACEMENT      EYE SURGERY  2012    Cateract    JOINT REPLACEMENT  '08    RT  KNEE    LEFT HEART CATHETERIZATION Left 04/04/2023    Procedure: Left heart cath;  Surgeon: Aleksey Krishnan MD;  Location: [facility] CATH LAB;  Service: Cardiology;  Laterality: Left;    SPINE SURGERY  1988/2004    Lamanectomy '88/Lam '04    TONSILLECTOMY      TOTAL KNEE ARTHROPLASTY      TRANSCATHETER AORTIC VALVE REPLACEMENT (TAVR) N/A 05/16/2023    Procedure: REPLACEMENT, AORTIC VALVE, TRANSCATHETER (TAVR);  Surgeon: Luis Enrique Ji MD;  Location: [facility] CATH LAB;  Service: Cardiology;  Laterality: N/A;    VASECTOMY       Review of patient's allergies indicates:   Allergen Reactions    Sulfa (sulfonamide antibiotics)      Hives    Azithromycin      Diarrhea      Cefaclor Other (See Comments)     Other reaction(s): Hives    Iodine and iodide containing products      Other reaction(s): Unknown    Ivp dye  [iodinated contrast media] Other (See Comments)    Doxycycline Rash     Social History     Tobacco Use    Smoking status: Former     Current packs/day: 0.00     Average packs/day: 1.5 packs/day  for 22.0 years (33.0 ttl pk-yrs)     Types: Cigarettes     Start date: 1970     Quit date: 1992     Years since quittin.3    Smokeless tobacco: Never   Substance Use Topics    Alcohol use: No    Drug use: No     Family History   Problem Relation Age of Onset    Cancer Mother     Early death Mother     Arthritis Father     Diabetes Father     Heart disease Father     Hyperlipidemia Father     Hypertension Father      Current Outpatient Medications on File Prior to Visit   Medication Sig Dispense Refill    albuterol (PROVENTIL/VENTOLIN HFA) 90 mcg/actuation inhaler Inhale 2 puffs into the lungs every 6 (six) hours as needed for Wheezing or Shortness of Breath. Rescue 18 g 11    albuterol-ipratropium (DUO-NEB) 2.5 mg-0.5 mg/3 mL nebulizer solution Take 3 mLs by nebulization every 6 (six) hours as needed for Wheezing or Shortness of Breath. Rescue 120 vial 5    allopurinoL (ZYLOPRIM) 300 MG tablet TAKE 1 TABLET BY MOUTH EVERY DAY 90 tablet 2    amLODIPine (NORVASC) 5 MG tablet Take 0.5 tablets (2.5 mg total) by mouth once daily. 60 tablet 2    apixaban (ELIQUIS) 5 mg Tab Take 1 tablet (5 mg total) by mouth 2 (two) times daily. 180 tablet 3    aspirin (ECOTRIN) 81 MG EC tablet Take 1 tablet (81 mg total) by mouth once daily.      atorvastatin (LIPITOR) 40 MG tablet TK 1 T PO QD 90 tablet 3    cloNIDine (CATAPRES) 0.1 MG tablet Take 1 tablet (0.1 mg total) by mouth every 6 (six) hours as needed (for systolic blood pressure greater than 165). 60 tablet 11    coenzyme Q10 100 mg capsule Take 200 mg by mouth once daily.      fluocinonide (LIDEX) 0.05 % gel SMARTSI-2 Gram(s) Topical Twice Daily      fluticasone furoate-vilanteroL (BREO ELLIPTA) 200-25 mcg/dose DsDv diskus inhaler INHALE 1 PUFF BY MOUTH AT THE SAME TIME EVERY DAY 60 each 5    furosemide (LASIX) 20 MG tablet Take 1 tablet (20 mg total) by mouth once daily. 90 tablet 3    ketoconazole (NIZORAL) 2 % cream Apply topically.      lactobacillus comb  no.10 (PROBIOTIC) 20 billion cell Cap Take 1 capsule by mouth once daily. Or as directed on bottle      metoprolol succinate (TOPROL-XL) 25 MG 24 hr tablet Take 1 tablet (25 mg total) by mouth once daily. 90 tablet 3    montelukast (SINGULAIR) 10 mg tablet Take 1 tablet (10 mg total) by mouth once daily. 30 tablet 1    multivitamin (THERAGRAN) per tablet Take 1 tablet by mouth once daily.      omeprazole (PRILOSEC) 10 MG capsule Take 10 mg by mouth once daily.      potassium chloride (KLOR-CON) 10 MEQ TbSR TK 1 T PO ONCE A DAY. 90 tablet 3    predniSONE (DELTASONE) 2.5 MG tablet Take 5 mg by mouth once daily.      telmisartan (MICARDIS) 80 MG Tab Take 1 tablet (80 mg total) by mouth once daily. 90 tablet 3    tiotropium bromide (SPIRIVA RESPIMAT) 2.5 mcg/actuation inhaler Inhale 2 puffs into the lungs Daily. Controller 4 g 11    TOBRADEX 0.3-0.1 % Oint       urea (CARMOL) 40 % Crea 2 (two) times daily as needed.  0     Current Facility-Administered Medications on File Prior to Visit   Medication Dose Route Frequency Provider Last Rate Last Admin    sodium chloride 0.9% flush 10 mL  10 mL Intravenous PRN Luis Enrique Rangel MD           Review of Systems   Constitutional:  Negative for chills, diaphoresis, fatigue and fever.   HENT:  Negative for congestion, ear pain, postnasal drip, sinus pain and sore throat.    Eyes:  Negative for pain and redness.   Respiratory:  Negative for cough, chest tightness and shortness of breath.    Cardiovascular:  Negative for chest pain and leg swelling.   Gastrointestinal:  Negative for abdominal pain, constipation, diarrhea, nausea and vomiting.   Genitourinary:  Negative for dysuria and hematuria.   Musculoskeletal:  Positive for arthralgias and myalgias.   Skin:  Negative for rash.   Neurological:  Negative for dizziness, syncope and headaches.   Psychiatric/Behavioral:  Negative for dysphoric mood. The patient is not nervous/anxious.        Vitals:    09/14/23 1131   BP: 130/72  "  BP Location: Right arm   Patient Position: Sitting   BP Method: Large (Automatic)   Pulse: 79   Resp: 12   Temp: 97.8 °F (36.6 °C)   TempSrc: Tympanic   SpO2: 98%   Weight: 116.1 kg (255 lb 14.4 oz)   Height: 6' 1" (1.854 m)       Wt Readings from Last 3 Encounters:   09/14/23 116.1 kg (255 lb 14.4 oz)   08/28/23 115.2 kg (254 lb)   08/24/23 114.3 kg (251 lb 14.4 oz)       Physical Exam  Constitutional:       General: He is not in acute distress.     Appearance: Normal appearance. He is well-developed.   HENT:      Head: Normocephalic and atraumatic.   Eyes:      Conjunctiva/sclera: Conjunctivae normal.   Cardiovascular:      Rate and Rhythm: Normal rate and regular rhythm.      Pulses: Normal pulses.      Heart sounds: Normal heart sounds. No murmur heard.  Pulmonary:      Effort: Pulmonary effort is normal. No respiratory distress.      Breath sounds: Normal breath sounds.   Abdominal:      General: Bowel sounds are normal. There is no distension.      Palpations: Abdomen is soft.      Tenderness: There is no abdominal tenderness.   Musculoskeletal:         General: Normal range of motion.      Cervical back: Normal range of motion and neck supple.      Right hip: Tenderness present. No deformity. Normal range of motion.      Right upper leg: Tenderness present. No swelling or deformity.   Skin:     General: Skin is warm and dry.      Findings: No rash.   Neurological:      General: No focal deficit present.      Mental Status: He is alert and oriented to person, place, and time.   Psychiatric:         Mood and Affect: Mood normal.         Behavior: Behavior normal.         Health Maintenance   Topic Date Due    Lipid Panel  04/03/2024    High Dose Statin  09/14/2024    Colorectal Cancer Screening  08/26/2029    TETANUS VACCINE  10/09/2031    Hepatitis C Screening  Completed    Shingles Vaccine  Completed    Abdominal Aortic Aneurysm Screening  Completed       "

## 2023-10-16 PROBLEM — I51.89 DIASTOLIC DYSFUNCTION: Status: ACTIVE | Noted: 2023-10-16

## 2023-10-23 ENCOUNTER — PATIENT MESSAGE (OUTPATIENT)
Dept: CARDIOLOGY | Facility: CLINIC | Age: 73
End: 2023-10-23
Payer: MEDICARE

## 2023-11-28 ENCOUNTER — TELEPHONE (OUTPATIENT)
Dept: CARDIOLOGY | Facility: CLINIC | Age: 73
End: 2023-11-28
Payer: MEDICARE

## 2023-11-28 NOTE — TELEPHONE ENCOUNTER
I returned pts call no answer. LVM for him to return our call    ----- Message from Van Bright sent at 11/28/2023 11:36 AM CST -----  Contact: Andrew  Pt is calling in regards to getting appt 12/28 reschedule. Please call back at [phone]                          Thanks  KT

## 2023-11-30 ENCOUNTER — HOSPITAL ENCOUNTER (OUTPATIENT)
Dept: CARDIOLOGY | Facility: HOSPITAL | Age: 73
Discharge: HOME | End: 2023-11-30
Attending: INTERNAL MEDICINE
Payer: MEDICARE

## 2023-11-30 ENCOUNTER — OFFICE VISIT (OUTPATIENT)
Dept: CARDIOLOGY | Facility: CLINIC | Age: 73
End: 2023-11-30
Payer: MEDICARE

## 2023-11-30 VITALS
OXYGEN SATURATION: 97 % | SYSTOLIC BLOOD PRESSURE: 130 MMHG | WEIGHT: 253.06 LBS | DIASTOLIC BLOOD PRESSURE: 70 MMHG | HEIGHT: 73 IN | BODY MASS INDEX: 33.54 KG/M2 | HEART RATE: 144 BPM

## 2023-11-30 DIAGNOSIS — I35.0 NONRHEUMATIC AORTIC (VALVE) STENOSIS: ICD-10-CM

## 2023-11-30 DIAGNOSIS — J44.9 CHRONIC OBSTRUCTIVE PULMONARY DISEASE, UNSPECIFIED COPD TYPE: ICD-10-CM

## 2023-11-30 DIAGNOSIS — J43.8 OTHER EMPHYSEMA: ICD-10-CM

## 2023-11-30 DIAGNOSIS — I70.0 AORTIC ATHEROSCLEROSIS: ICD-10-CM

## 2023-11-30 DIAGNOSIS — Z01.810 PREOP CARDIOVASCULAR EXAM: ICD-10-CM

## 2023-11-30 DIAGNOSIS — Z20.822 ENCOUNTER FOR LABORATORY TESTING FOR COVID-19 VIRUS: ICD-10-CM

## 2023-11-30 DIAGNOSIS — I25.10 CORONARY ARTERY DISEASE INVOLVING NATIVE CORONARY ARTERY OF NATIVE HEART WITHOUT ANGINA PECTORIS: ICD-10-CM

## 2023-11-30 DIAGNOSIS — G47.33 OBSTRUCTIVE SLEEP APNEA SYNDROME: ICD-10-CM

## 2023-11-30 DIAGNOSIS — I65.23 BILATERAL CAROTID ARTERY STENOSIS: ICD-10-CM

## 2023-11-30 DIAGNOSIS — I10 ESSENTIAL HYPERTENSION: ICD-10-CM

## 2023-11-30 DIAGNOSIS — E78.2 MIXED HYPERLIPIDEMIA: ICD-10-CM

## 2023-11-30 DIAGNOSIS — I25.110 ATHEROSCLEROSIS OF NATIVE CORONARY ARTERY OF NATIVE HEART WITH UNSTABLE ANGINA PECTORIS: ICD-10-CM

## 2023-11-30 DIAGNOSIS — I48.0 PAF (PAROXYSMAL ATRIAL FIBRILLATION): Primary | ICD-10-CM

## 2023-11-30 DIAGNOSIS — Z95.2 S/P TAVR (TRANSCATHETER AORTIC VALVE REPLACEMENT): ICD-10-CM

## 2023-11-30 DIAGNOSIS — I25.10 CAD, MULTIPLE VESSEL: ICD-10-CM

## 2023-11-30 DIAGNOSIS — R94.39 ABNORMAL CARDIOVASCULAR STRESS TEST: ICD-10-CM

## 2023-11-30 DIAGNOSIS — Z87.891 FORMER SMOKER: ICD-10-CM

## 2023-11-30 DIAGNOSIS — E11.51 TYPE 2 DIABETES MELLITUS WITH DIABETIC PERIPHERAL ANGIOPATHY WITHOUT GANGRENE, UNSPECIFIED WHETHER LONG TERM INSULIN USE: ICD-10-CM

## 2023-11-30 DIAGNOSIS — I48.11 LONGSTANDING PERSISTENT ATRIAL FIBRILLATION: ICD-10-CM

## 2023-11-30 DIAGNOSIS — I35.0 NONRHEUMATIC AORTIC VALVE STENOSIS: ICD-10-CM

## 2023-11-30 DIAGNOSIS — R06.02 SOB (SHORTNESS OF BREATH): ICD-10-CM

## 2023-11-30 PROCEDURE — 99999 PR PBB SHADOW E&M-EST. PATIENT-LVL V: ICD-10-PCS | Mod: PBBFAC,,, | Performed by: INTERNAL MEDICINE

## 2023-11-30 PROCEDURE — 99214 OFFICE O/P EST MOD 30 MIN: CPT | Mod: S$PBB,,, | Performed by: INTERNAL MEDICINE

## 2023-11-30 PROCEDURE — 93010 ELECTROCARDIOGRAM REPORT: CPT | Mod: ,,, | Performed by: INTERNAL MEDICINE

## 2023-11-30 PROCEDURE — 93010 EKG 12-LEAD: ICD-10-PCS | Mod: ,,, | Performed by: INTERNAL MEDICINE

## 2023-11-30 PROCEDURE — 99215 OFFICE O/P EST HI 40 MIN: CPT | Mod: PBBFAC,PO | Performed by: INTERNAL MEDICINE

## 2023-11-30 PROCEDURE — 99999 PR PBB SHADOW E&M-EST. PATIENT-LVL V: CPT | Mod: PBBFAC,,, | Performed by: INTERNAL MEDICINE

## 2023-11-30 PROCEDURE — 93005 ELECTROCARDIOGRAM TRACING: CPT | Mod: PO

## 2023-11-30 PROCEDURE — 99214 PR OFFICE/OUTPT VISIT, EST, LEVL IV, 30-39 MIN: ICD-10-PCS | Mod: S$PBB,,, | Performed by: INTERNAL MEDICINE

## 2023-11-30 RX ORDER — METOPROLOL SUCCINATE 50 MG/1
100 TABLET, EXTENDED RELEASE ORAL DAILY
Qty: 90 TABLET | Refills: 1 | Status: SHIPPED | OUTPATIENT
Start: 2023-11-30 | End: 2024-01-02 | Stop reason: SDUPTHER

## 2023-11-30 NOTE — PROGRESS NOTES
Subjective:   Patient ID:  Roshan Menard is a 73 y.o. male who presents for follow-up of No chief complaint on file.    This pleasant patient here in the office for evaluation.  History of temporal arteritis, paroxysmal atrial fibrillation and cardioversion the past with YRN.  History of essential hypertension coronary disease without angina and evidence of aortic stenosis of a mild-to-moderate degree.  This was seen on prior YRN.  Patient is a former smoker no history of drug or alcohol abuse no syncope or near syncopal episodes.  Patient has a history of hypertension on multiple medications and is on full anticoagulation due to PAF.  This visit finds patient feeling well.  His medical problems include cardiac disease with stenting of circumflex coronary artery 2 years ago repeat angiogram last year showed patent stent.  The patient has history of obesity lost over 100 lb.  Patient has cardiac disease as well including significant calcification of the aorta calcification the aortic valve and repeat echo be done.  Prior echo done last year showed evidence of at least moderate aortic stenosis.  Today's physical exam shows the patient has brief murmur but also brisk carotid upstroke which would argue against severe aortic stenosis.  Overall LV function was preserved therefore I would disagree that he has low flow state.        Today patient feels generally well.  Blood pressure is a little bit elevated and he is not taking carvedilol more than once a day.  Will discontinue the medications that he feels like he has a brain fog when he takes the medicines.  Will increase hydralazine to 25 mg q.8 hours and otherwise stable continue with Micardis.     The patient presents the office blood pressures been under higher values.  This reason will add hydralazine and now 50 mg q.8 hours he continues on Micardis 80 mg daily he was intolerant of carvedilol.  Monitor did not show significant arrhythmias.  Intermittent mild short  episodes of sinus tachycardia noted.        Patient presents the office feeling generally well.  Blood pressure still elevated will increase hydralazine 100 mg b.i.d.     Patient still is elevated blood pressure he had a trial of amlodipine from another physician and he was stop it because it did make him feel well.  I will increase hydralazine to mg 3 times a day follow-up evaluation blood pressure and less than 1 month.  Otherwise stable no acute symptoms chest pain shortness.        Is the patient is here for review of blood pressure.  Slightly elevated today however he has some mild swelling has been off Lasix and also restart that today at with double doses for the next 2 days.  Also has of toe infection on antibiotics and also has some pain in that foot.  Follow-up evaluation again within next several weeks after complete re-evaluation on all medications.  He does continue on hydralazine 3 times daily and he has good compliance with this medication.  Today the patient presents in the office and blood pressure is stable on hydralazine feels well.  Evaluation again in 3 months.  All medications reviewed and renewed as necessary.     NO FOCAL CNS SYMPTOMS OR SIGNS TO SUGGEST TIA OR STROKE  NO ANGINA OR EQUIVALENT  NO UNUSUAL GLASER. NO ORTHOPNEA OR PND  NO PALPITATIONS  NO NEAR SYNCOPE OR SYNCOPE  NO EDEMA. NO CALVE TENDERNESS     02/02/2023: Patient still has elevated blood pressure and did not take his hydralazine this morning but usually runs blood pressure 150/90 will increase the hydralazine back to 100 mg q.8 hours.  Will consider back to amlodipine in the pain as he is tried this in the past.  Patient states he sometimes has irregular heart rhythms today is heart rate is stable with sinus arrhythmia.  Will follow-up evaluation again in the next month.  Patient will have another cardiac echo to reassess heart function.     04/20/2023:   Overall patient is stable he fell scraped his left arm and will follow up  to make sure he does not have infection.    Patient's CT scan prior to seeing Dr.TAFUR Yao.  Clinically stable there is no evidence of significant abnormalities seen with small nodules in lungs and will follow those clinically and re-evaluate in 8 months to a year.    Otherwise stable patient mild edema with amlodipine discontinued will double up on the Toprol XL to 50 mg daily for blood pressure control and heart rate control continue other medications.  Clinically stable no edema mild wheezing today he needs take his inhalers today.        05/18/2023:   Status post TAVR in stable doing well this time no exertional symptoms cardiac echo repeat pending in the near future.  No exertional symptoms.  No edema.  Patient tolerated procedure well did well for out throughout the TAVR procedure.        06/22/2023:   Overall patient doing well status post TAVR about a month ago at this point clinically doing well recently seen [facility] continues on aspirin and Eliquis.  The patient at this point should be on dental antibiotic prophylaxis lifetime.  The patient should also be pretreated for SBE prophylaxis lifetime.  Patient is to avoid all procedures for the next 6 months.  Including colonoscopy.     06/22/2023:   Overall patient is doing the patient today is doing well blood pressures been fairly stable at home he will try Norvasc 2.5 mg twice daily.    Overall he is stable he can come off Eliquis for 2 days to have biopsy of skin lesion and will stay on aspirin.  Go back on medications thereafter within 1 day.  Patient currently normal sinus rhythm.        08/24/2023 overall patient is doing quite well no exertional chest pain shortness breath blood pressure is under reasonable control and stable follow-up evaluation every 4-6 months clinically doing well this time TAVR earlier in the year and stable.  No acute shortness breath or edema feeling generally well.          11/30/2023 overall stable irregular heart  rhythm has prompt evaluation today.  The patient is now back in atrial fibrillation continues on Eliquis and aspirin beta-blockers will increase metoprolol 50 mg daily.  He had been on amiodarone in the past history of stents as well as TAVR.  Will plan on evaluating the patient through EP for possible change in medications or ablation procedure.        Review of Systems   Constitutional: Negative for chills, diaphoresis, night sweats, weight gain and weight loss.   HENT:  Negative for congestion, hoarse voice, sore throat and stridor.    Eyes:  Negative for double vision and pain.   Cardiovascular:  Negative for chest pain, claudication, cyanosis, dyspnea on exertion, irregular heartbeat, leg swelling, near-syncope, orthopnea, palpitations, paroxysmal nocturnal dyspnea and syncope.   Respiratory:  Negative for cough, hemoptysis, shortness of breath, sleep disturbances due to breathing, snoring, sputum production and wheezing.    Endocrine: Negative for cold intolerance, heat intolerance and polydipsia.   Hematologic/Lymphatic: Negative for bleeding problem. Does not bruise/bleed easily.   Skin:  Negative for color change, dry skin and rash.   Musculoskeletal:  Negative for joint swelling and muscle cramps.   Gastrointestinal:  Negative for bloating, abdominal pain, constipation, diarrhea, dysphagia, melena, nausea and vomiting.   Genitourinary:  Negative for flank pain and urgency.   Neurological:  Negative for dizziness, focal weakness, headaches, light-headedness, loss of balance, seizures and weakness.   Psychiatric/Behavioral:  Negative for altered mental status and memory loss. The patient is not nervous/anxious.      Family History   Problem Relation Age of Onset    Cancer Mother     Early death Mother     Arthritis Father     Diabetes Father     Heart disease Father     Hyperlipidemia Father     Hypertension Father      Past Medical History:   Diagnosis Date    Allergy     Anticoagulant long-term use      Aortic stenosis     Arthritis     Asthma     Atrial fibrillation     Bronchitis     Cataract     COPD (chronic obstructive pulmonary disease)     Coronary artery disease     stent    General anesthetics causing adverse effect in therapeutic use     hard to awaken    GERD (gastroesophageal reflux disease)     Gout, chronic     Hypertension     Mixed hyperlipidemia     Sleep apnea     Type 2 diabetes mellitus with diabetic peripheral angiopathy without gangrene, unspecified whether long term insulin use 2023     Social History     Socioeconomic History    Marital status:    Occupational History     Employer: [employer]   Tobacco Use    Smoking status: Former     Current packs/day: 0.00     Average packs/day: 1.5 packs/day for 22.0 years (33.0 ttl pk-yrs)     Types: Cigarettes     Start date: 1970     Quit date: 1992     Years since quittin.5    Smokeless tobacco: Never   Substance and Sexual Activity    Alcohol use: No    Drug use: No    Sexual activity: Yes     Partners: Male     Current Outpatient Medications on File Prior to Visit   Medication Sig Dispense Refill    albuterol (PROVENTIL/VENTOLIN HFA) 90 mcg/actuation inhaler Inhale 2 puffs into the lungs every 6 (six) hours as needed for Wheezing or Shortness of Breath. Rescue 18 g 11    albuterol-ipratropium (DUO-NEB) 2.5 mg-0.5 mg/3 mL nebulizer solution Take 3 mLs by nebulization every 6 (six) hours as needed for Wheezing or Shortness of Breath. Rescue 120 vial 5    allopurinoL (ZYLOPRIM) 300 MG tablet TAKE 1 TABLET BY MOUTH EVERY DAY 90 tablet 2    amLODIPine (NORVASC) 5 MG tablet Take 0.5 tablets (2.5 mg total) by mouth once daily. 60 tablet 2    apixaban (ELIQUIS) 5 mg Tab Take 1 tablet (5 mg total) by mouth 2 (two) times daily. 180 tablet 3    aspirin (ECOTRIN) 81 MG EC tablet Take 1 tablet (81 mg total) by mouth once daily.      atorvastatin (LIPITOR) 40 MG tablet TK 1 T PO QD 90 tablet 3    cloNIDine (CATAPRES) 0.1  MG tablet Take 1 tablet (0.1 mg total) by mouth every 6 (six) hours as needed (for systolic blood pressure greater than 165). 60 tablet 11    coenzyme Q10 100 mg capsule Take 200 mg by mouth once daily.      fluocinonide (LIDEX) 0.05 % gel SMARTSI-2 Gram(s) Topical Twice Daily      fluticasone furoate-vilanteroL (BREO ELLIPTA) 200-25 mcg/dose DsDv diskus inhaler INHALE 1 PUFF BY MOUTH AT THE SAME TIME EVERY DAY 30 each 11    furosemide (LASIX) 20 MG tablet Take 1 tablet (20 mg total) by mouth once daily. 90 tablet 3    ketoconazole (NIZORAL) 2 % cream Apply topically.      lactobacillus comb no.10 (PROBIOTIC) 20 billion cell Cap Take 1 capsule by mouth once daily. Or as directed on bottle      metoprolol succinate (TOPROL-XL) 25 MG 24 hr tablet Take 1 tablet (25 mg total) by mouth once daily. 90 tablet 3    montelukast (SINGULAIR) 10 mg tablet Take 1 tablet (10 mg total) by mouth once daily. 30 tablet 1    multivitamin (THERAGRAN) per tablet Take 1 tablet by mouth once daily.      naproxen (NAPROSYN) 500 MG tablet Take 1 tablet (500 mg total) by mouth 2 (two) times daily as needed (pain). 60 tablet 2    omeprazole (PRILOSEC) 10 MG capsule Take 10 mg by mouth once daily.      potassium chloride (KLOR-CON) 10 MEQ TbSR TK 1 T PO ONCE A DAY. 90 tablet 3    predniSONE (DELTASONE) 2.5 MG tablet Take 5 mg by mouth once daily.      telmisartan (MICARDIS) 80 MG Tab Take 1 tablet (80 mg total) by mouth once daily. 90 tablet 3    tiotropium bromide (SPIRIVA RESPIMAT) 2.5 mcg/actuation inhaler Inhale 2 puffs into the lungs Daily. Controller 4 g 11    tiZANidine (ZANAFLEX) 4 MG tablet Take 1 tablet (4 mg total) by mouth every 8 (eight) hours as needed (muscle pain). 30 tablet 0    TOBRADEX 0.3-0.1 % Oint       urea (CARMOL) 40 % Crea 2 (two) times daily as needed.  0     Current Facility-Administered Medications on File Prior to Visit   Medication Dose Route Frequency Provider Last Rate Last Admin    sodium chloride 0.9%  flush 10 mL  10 mL Intravenous PRN Luis Enrique Rangel MD         Review of patient's allergies indicates:   Allergen Reactions    Sulfa (sulfonamide antibiotics)      Hives    Azithromycin      Diarrhea      Cefaclor Other (See Comments)     Other reaction(s): Hives    Iodine and iodide containing products      Other reaction(s): Unknown    Ivp dye  [iodinated contrast media] Other (See Comments)    Doxycycline Rash       Objective:     Physical Exam  Eyes:      Pupils: Pupils are equal, round, and reactive to light.   Neck:      Trachea: No tracheal deviation.   Cardiovascular:      Rate and Rhythm: Normal rate and regular rhythm.      Pulses: Intact distal pulses.           Carotid pulses are 2+ on the right side and 2+ on the left side.       Radial pulses are 2+ on the right side and 2+ on the left side.        Femoral pulses are 2+ on the right side and 2+ on the left side.       Popliteal pulses are 2+ on the right side and 2+ on the left side.        Dorsalis pedis pulses are 2+ on the right side and 2+ on the left side.        Posterior tibial pulses are 2+ on the right side and 2+ on the left side.      Heart sounds: Normal heart sounds. No murmur heard.     No friction rub. No gallop.   Pulmonary:      Effort: Pulmonary effort is normal. No respiratory distress.      Breath sounds: Normal breath sounds. No stridor. No wheezing or rales.   Chest:      Chest wall: No tenderness.   Abdominal:      General: There is no distension.      Tenderness: There is no abdominal tenderness. There is no rebound.   Musculoskeletal:         General: No tenderness.      Cervical back: Normal range of motion.   Skin:     General: Skin is warm and dry.   Neurological:      Mental Status: He is alert and oriented to person, place, and time.         Assessment:     1. S/P TAVR (transcatheter aortic valve replacement)    2. Abnormal cardiovascular stress test    3. Former smoker    4. Atherosclerosis of native coronary artery  of native heart with unstable angina pectoris    5. Obstructive sleep apnea syndrome    6. Chronic obstructive pulmonary disease, unspecified COPD type    7. Essential hypertension    8. SOB (shortness of breath)    9. Type 2 diabetes mellitus with diabetic peripheral angiopathy without gangrene, unspecified whether long term insulin use    10. PAF (paroxysmal atrial fibrillation)    11. CAD, multiple vessel    12. Longstanding persistent atrial fibrillation    13. Aortic atherosclerosis        Plan:     S/P TAVR (transcatheter aortic valve replacement)    Abnormal cardiovascular stress test    Former smoker    Atherosclerosis of native coronary artery of native heart with unstable angina pectoris    Obstructive sleep apnea syndrome    Chronic obstructive pulmonary disease, unspecified COPD type    Essential hypertension    SOB (shortness of breath)    Type 2 diabetes mellitus with diabetic peripheral angiopathy without gangrene, unspecified whether long term insulin use    PAF (paroxysmal atrial fibrillation)    CAD, multiple vessel    Longstanding persistent atrial fibrillation    Aortic atherosclerosis        Impression 1. CAD stable no changes no chest pain call patient continues on aspirin and beta-blocker.  2. Hypertension stable current medications : Patient continues on amlodipine.  3 atrial fibrillation now back in AFib and on Eliquis for cardio protection will plan to increase metoprolol follow-up evaluation with the EP for further evaluation stable this time shortness breath or edema today.

## 2023-12-05 ENCOUNTER — PATIENT MESSAGE (OUTPATIENT)
Dept: CARDIOLOGY | Facility: CLINIC | Age: 73
End: 2023-12-05
Payer: MEDICARE

## 2023-12-10 DIAGNOSIS — Z01.810 PREOP CARDIOVASCULAR EXAM: ICD-10-CM

## 2023-12-10 DIAGNOSIS — I10 ESSENTIAL HYPERTENSION: ICD-10-CM

## 2023-12-10 DIAGNOSIS — I65.23 BILATERAL CAROTID ARTERY STENOSIS: ICD-10-CM

## 2023-12-10 DIAGNOSIS — I48.11 LONGSTANDING PERSISTENT ATRIAL FIBRILLATION: ICD-10-CM

## 2023-12-10 DIAGNOSIS — G47.33 OBSTRUCTIVE SLEEP APNEA SYNDROME: ICD-10-CM

## 2023-12-10 DIAGNOSIS — E78.2 MIXED HYPERLIPIDEMIA: ICD-10-CM

## 2023-12-10 DIAGNOSIS — I35.0 NONRHEUMATIC AORTIC (VALVE) STENOSIS: ICD-10-CM

## 2023-12-10 DIAGNOSIS — J43.8 OTHER EMPHYSEMA: ICD-10-CM

## 2023-12-10 DIAGNOSIS — J44.9 CHRONIC OBSTRUCTIVE PULMONARY DISEASE, UNSPECIFIED COPD TYPE: ICD-10-CM

## 2023-12-10 DIAGNOSIS — Z87.891 FORMER SMOKER: ICD-10-CM

## 2023-12-10 DIAGNOSIS — I35.0 NONRHEUMATIC AORTIC VALVE STENOSIS: ICD-10-CM

## 2023-12-10 DIAGNOSIS — Z20.822 ENCOUNTER FOR LABORATORY TESTING FOR COVID-19 VIRUS: ICD-10-CM

## 2023-12-10 DIAGNOSIS — I25.10 CORONARY ARTERY DISEASE INVOLVING NATIVE CORONARY ARTERY OF NATIVE HEART WITHOUT ANGINA PECTORIS: ICD-10-CM

## 2023-12-10 DIAGNOSIS — R94.39 ABNORMAL CARDIOVASCULAR STRESS TEST: ICD-10-CM

## 2023-12-10 DIAGNOSIS — I48.0 PAF (PAROXYSMAL ATRIAL FIBRILLATION): ICD-10-CM

## 2023-12-11 ENCOUNTER — OFFICE VISIT (OUTPATIENT)
Dept: CARDIOLOGY | Facility: CLINIC | Age: 73
End: 2023-12-11
Payer: MEDICARE

## 2023-12-11 VITALS
BODY MASS INDEX: 33.77 KG/M2 | WEIGHT: 255.94 LBS | SYSTOLIC BLOOD PRESSURE: 150 MMHG | DIASTOLIC BLOOD PRESSURE: 80 MMHG

## 2023-12-11 DIAGNOSIS — I65.23 BILATERAL CAROTID ARTERY STENOSIS: ICD-10-CM

## 2023-12-11 DIAGNOSIS — J45.40 MODERATE PERSISTENT ASTHMA WITHOUT COMPLICATION: ICD-10-CM

## 2023-12-11 DIAGNOSIS — I35.0 NONRHEUMATIC AORTIC VALVE STENOSIS: ICD-10-CM

## 2023-12-11 DIAGNOSIS — I70.0 AORTIC ATHEROSCLEROSIS: ICD-10-CM

## 2023-12-11 DIAGNOSIS — Z95.2 HISTORY OF TRANSCATHETER AORTIC VALVE REPLACEMENT (TAVR): ICD-10-CM

## 2023-12-11 DIAGNOSIS — I50.32 CHRONIC DIASTOLIC HEART FAILURE: ICD-10-CM

## 2023-12-11 DIAGNOSIS — I48.0 PAF (PAROXYSMAL ATRIAL FIBRILLATION): Primary | ICD-10-CM

## 2023-12-11 DIAGNOSIS — I25.10 CORONARY ARTERY DISEASE INVOLVING NATIVE CORONARY ARTERY OF NATIVE HEART WITHOUT ANGINA PECTORIS: ICD-10-CM

## 2023-12-11 DIAGNOSIS — I10 ESSENTIAL HYPERTENSION: ICD-10-CM

## 2023-12-11 DIAGNOSIS — K21.9 GASTROESOPHAGEAL REFLUX DISEASE, UNSPECIFIED WHETHER ESOPHAGITIS PRESENT: ICD-10-CM

## 2023-12-11 DIAGNOSIS — I51.89 DIASTOLIC DYSFUNCTION: ICD-10-CM

## 2023-12-11 DIAGNOSIS — Z87.891 FORMER SMOKER: ICD-10-CM

## 2023-12-11 DIAGNOSIS — E11.51 TYPE 2 DIABETES MELLITUS WITH DIABETIC PERIPHERAL ANGIOPATHY WITHOUT GANGRENE, UNSPECIFIED WHETHER LONG TERM INSULIN USE: ICD-10-CM

## 2023-12-11 PROCEDURE — 99215 PR OFFICE/OUTPT VISIT, EST, LEVL V, 40-54 MIN: ICD-10-PCS | Mod: S$PBB,,, | Performed by: INTERNAL MEDICINE

## 2023-12-11 PROCEDURE — 99215 OFFICE O/P EST HI 40 MIN: CPT | Mod: S$PBB,,, | Performed by: INTERNAL MEDICINE

## 2023-12-11 PROCEDURE — 99214 OFFICE O/P EST MOD 30 MIN: CPT | Mod: PBBFAC | Performed by: INTERNAL MEDICINE

## 2023-12-11 PROCEDURE — 99999 PR PBB SHADOW E&M-EST. PATIENT-LVL IV: ICD-10-PCS | Mod: PBBFAC,,, | Performed by: INTERNAL MEDICINE

## 2023-12-11 PROCEDURE — 99999 PR PBB SHADOW E&M-EST. PATIENT-LVL IV: CPT | Mod: PBBFAC,,, | Performed by: INTERNAL MEDICINE

## 2023-12-11 RX ORDER — TELMISARTAN 80 MG/1
80 TABLET ORAL
Qty: 90 TABLET | Refills: 3 | Status: SHIPPED | OUTPATIENT
Start: 2023-12-11

## 2023-12-11 NOTE — H&P (VIEW-ONLY)
Subjective:   Patient ID:  Roshan Menard is a 73 y.o. male     Chief complaint: AF - pers    HPI  New patient to me. (12/11/2023 )  Referred by Dylon for evaluation and management of AF   --   Background as gleaned from patient's records :  History of temporal arteritis, paroxysmal atrial fibrillation and YRN/cardioversion in the past.  History of essential hypertension, coronary disease with past stents and now without angina (cath April 2023: Prox LAD lesion 50% , Prox RCA 50% - no PCI) and evidence of aortic stenosis that led to a TAVR in May 2023.  March 2023: Abnormal myocardial perfusion scan. There is a moderate to severe intensity, small to moderate sized, reversible perfusion abnormality that is consistent with ischemia in the basal to mid inferolateral wall(s).    Former smoker, no history of drug or alcohol abuse no syncope or near syncopal episodes.  History of obesity lost over 100 lb.    Irregular heart rhythm  prompted evaluation on 11/30/23   >>  Back in atrial fibrillation continues on Eliquis, aspirin, beta-blocker which increased to  metoprolol 50 mg daily.    He had been on amiodarone in the past (DCed 2/2022).  Additional details gleaned from today's interview :  Labs are OK - including TSH, CMP, CBC and a slightly elevated BNP with a normal NT Pro BNP   Has pers RBBB/LAHB since at least 2021 - TAVR did not lead toPPM need  He has had a total of 3 cardioversions over the years.  The last 1 was in 2021 by Dr. Vincent jerome.  Prior to the Dr. De La Rosa had performed the 2 other cardioversions.  He does not feel any different in AFib.  No changes in quality of life, dyspnea etc..  However, he noticed the that his resting heart rate in the morning is about 20 points higher.  This has been going on for 2 weeks.  He has been fully anticoagulated without missing any dosing on his Eliquis.  Current Outpatient Medications   Medication Sig    albuterol (PROVENTIL/VENTOLIN HFA) 90 mcg/actuation inhaler  Inhale 2 puffs into the lungs every 6 (six) hours as needed for Wheezing or Shortness of Breath. Rescue    allopurinoL (ZYLOPRIM) 300 MG tablet TAKE 1 TABLET BY MOUTH EVERY DAY    amLODIPine (NORVASC) 5 MG tablet Take 0.5 tablets (2.5 mg total) by mouth once daily.    apixaban (ELIQUIS) 5 mg Tab Take 1 tablet (5 mg total) by mouth 2 (two) times daily.    aspirin (ECOTRIN) 81 MG EC tablet Take 1 tablet (81 mg total) by mouth once daily.    atorvastatin (LIPITOR) 40 MG tablet TK 1 T PO QD    cloNIDine (CATAPRES) 0.1 MG tablet Take 1 tablet (0.1 mg total) by mouth every 6 (six) hours as needed (for systolic blood pressure greater than 165).    coenzyme Q10 100 mg capsule Take 200 mg by mouth once daily.    fluocinonide (LIDEX) 0.05 % gel SMARTSI-2 Gram(s) Topical Twice Daily    fluticasone furoate-vilanteroL (BREO ELLIPTA) 200-25 mcg/dose DsDv diskus inhaler INHALE 1 PUFF BY MOUTH AT THE SAME TIME EVERY DAY    furosemide (LASIX) 20 MG tablet Take 1 tablet (20 mg total) by mouth once daily.    ketoconazole (NIZORAL) 2 % cream Apply topically.    lactobacillus comb no.10 (PROBIOTIC) 20 billion cell Cap Take 1 capsule by mouth once daily. Or as directed on bottle    metoprolol succinate (TOPROL-XL) 50 MG 24 hr tablet Take 2 tablets (100 mg total) by mouth once daily.    montelukast (SINGULAIR) 10 mg tablet Take 1 tablet (10 mg total) by mouth once daily.    multivitamin (THERAGRAN) per tablet Take 1 tablet by mouth once daily.    naproxen (NAPROSYN) 500 MG tablet Take 1 tablet (500 mg total) by mouth 2 (two) times daily as needed (pain).    omeprazole (PRILOSEC) 10 MG capsule Take 10 mg by mouth once daily.    potassium chloride (KLOR-CON) 10 MEQ TbSR TK 1 T PO ONCE A DAY.    predniSONE (DELTASONE) 2.5 MG tablet Take 5 mg by mouth once daily.    tiotropium bromide (SPIRIVA RESPIMAT) 2.5 mcg/actuation inhaler Inhale 2 puffs into the lungs Daily. Controller    tiZANidine (ZANAFLEX) 4 MG tablet Take 1 tablet (4 mg total)  by mouth every 8 (eight) hours as needed (muscle pain).    TOBRADEX 0.3-0.1 % Oint     urea (CARMOL) 40 % Crea 2 (two) times daily as needed.    albuterol-ipratropium (DUO-NEB) 2.5 mg-0.5 mg/3 mL nebulizer solution Take 3 mLs by nebulization every 6 (six) hours as needed for Wheezing or Shortness of Breath. Rescue    telmisartan (MICARDIS) 80 MG Tab TAKE 1 TABLET(80 MG) BY MOUTH EVERY DAY     Current Facility-Administered Medications   Medication    sodium chloride 0.9% flush 10 mL       Review of Systems     Constitutional: Reviewed  for decreased appetite, weight gain and weight loss.   HENT: Reviewed for nosebleeds.    Eyes:  Reviewed for blurred vision and visual disturbance.   Cardiovascular: Reviewed for chest pain, claudication, cyanosis,dyspnea on exertion, leg swelling, orthopnea,paroxysmal nocturnal dyspnearregular heartbeats, palpitations, near-syncope, and syncope.   Respiratory: Reviewed for cough, shortness of breath, wheezing, sleep disturbances due to breathing and snoring, .    Endocrine: Reviewed for heat intolerance.   Hematologic/Lymphatic: Reviewed for easy bruisability/bleeding.   Skin: Reviewed for rash.   Musculoskeletal: Reviewed for muscle weakness and myalgias.   Gastrointestinal: Reviewed for abdominal pain, anorexia, melena, nausea and vomiting.   Genitourinary: Reviewed for hesitancy, frequency, nocturia and incontinence.   Neurological: Reviewed for excessive daytime sleepiness, dizziness, vertigo, weakness, headaches, loss of balance and seizures,   Psychiatric/Behavioral:  Reviewed for insomnia, altered mental status, depression, anxiety and nervousness.       All symptoms reviewed above were negative except for palpitations, some leg swelling, wheezing and sputum, lightheadedness, occasional loss of balance, arthritic issues.       Social History     Tobacco Use   Smoking Status Former    Current packs/day: 0.00    Average packs/day: 1.5 packs/day for 22.0 years (33.0 ttl pk-yrs)     Types: Cigarettes    Start date: 1970    Quit date: 1992    Years since quittin.6   Smokeless Tobacco Never       reports no history of alcohol use.   Past Medical History:   Diagnosis Date    Allergy     Anticoagulant long-term use     Aortic stenosis     Arthritis     Asthma     Atrial fibrillation     Bronchitis     Cataract     COPD (chronic obstructive pulmonary disease)     Coronary artery disease     stent    General anesthetics causing adverse effect in therapeutic use     hard to awaken    GERD (gastroesophageal reflux disease)     Gout, chronic     Hypertension     Mixed hyperlipidemia     Sleep apnea     Type 2 diabetes mellitus with diabetic peripheral angiopathy without gangrene, unspecified whether long term insulin use 2023     Family History   Problem Relation Age of Onset    Cancer Mother     Early death Mother     Arthritis Father     Diabetes Father     Heart disease Father     Hyperlipidemia Father     Hypertension Father      Social History     Socioeconomic History    Marital status:    Occupational History     Employer: [employer]   Tobacco Use    Smoking status: Former     Current packs/day: 0.00     Average packs/day: 1.5 packs/day for 22.0 years (33.0 ttl pk-yrs)     Types: Cigarettes     Start date: 1970     Quit date: 1992     Years since quittin.6    Smokeless tobacco: Never   Substance and Sexual Activity    Alcohol use: No    Drug use: No    Sexual activity: Yes     Partners: Male     Social Determinants of Health     Financial Resource Strain: Unknown (12/10/2023)    Overall Financial Resource Strain (CARDIA)     Difficulty of Paying Living Expenses: Patient refused   Food Insecurity: Unknown (12/10/2023)    Hunger Vital Sign     Worried About Running Out of Food in the Last Year: Patient refused     Ran Out of Food in the Last Year: Patient refused   Transportation Needs: No Transportation Needs (12/10/2023)    PRAPARE -  Transportation     Lack of Transportation (Medical): No     Lack of Transportation (Non-Medical): No   Physical Activity: Insufficiently Active (12/10/2023)    Exercise Vital Sign     Days of Exercise per Week: 2 days     Minutes of Exercise per Session: 60 min   Stress: No Stress Concern Present (12/10/2023)    [employer] [place] [employer] - Occupational Stress Questionnaire     Feeling of Stress : Not at all   Social Connections: Unknown (12/10/2023)    Social Connection and Isolation Panel [NHANES]     Frequency of Communication with Friends and Family: More than three times a week     Frequency of Social Gatherings with Friends and Family: More than three times a week     Attends Club or Organization Meetings: More than 4 times per year     Marital Status:    Housing Stability: Low Risk  (12/10/2023)    Housing Stability Vital Sign     Unable to Pay for Housing in the Last Year: No     Number of Places Lived in the Last Year: 1     Unstable Housing in the Last Year: No     Past Surgical History:   Procedure Laterality Date    ANGIOGRAM, CORONARY, WITH LEFT HEART CATHETERIZATION  09/27/2021    Procedure: Angiogram, Coronary, with Left Heart Cath;  Surgeon: Vincent Gonzalez MD;  Location: [facility] CATH;  Service: Cardiology;;    BACK SURGERY      L3-5; ruptured disc; laminectomy x 2 surgery    CARDIAC CATH COSURGEON N/A 05/16/2023    Procedure: Cardiac Cath Cosurgeon;  Surgeon: Holly Pleitez MD;  Location: [facility] CATH LAB;  Service: Cardiothoracic;  Laterality: N/A;    CARDIAC VALVE REPLACEMENT  5/16/23    TAVR    CARDIAC VALVE SURGERY      CARDIOVERSION      CHOLECYSTECTOMY      CORONARY ANGIOPLASTY WITH STENT PLACEMENT      EYE SURGERY  2012    Cateract    JOINT REPLACEMENT  '08    RT  KNEE    LEFT HEART CATHETERIZATION Left 04/04/2023    Procedure: Left heart cath;  Surgeon: Aleksey Krishnan MD;  Location: [facility] CATH LAB;  Service: Cardiology;  Laterality: Left;    SPINE SURGERY  1988/2004     Lamanectomy '88/Capps '04    TONSILLECTOMY      TOTAL KNEE ARTHROPLASTY      TRANSCATHETER AORTIC VALVE REPLACEMENT (TAVR) N/A 05/16/2023    Procedure: REPLACEMENT, AORTIC VALVE, TRANSCATHETER (TAVR);  Surgeon: Luis Enrique Ji MD;  Location: [facility] CATH LAB;  Service: Cardiology;  Laterality: N/A;    VASECTOMY         Objective:   Physical Exam  Vitals and nursing note reviewed.   Constitutional:       Appearance: Normal appearance. He is well-developed. He is not diaphoretic.      Comments: overweight   HENT:      Head: Normocephalic and atraumatic.      Right Ear: External ear normal.      Left Ear: External ear normal.   Eyes:      General:         Right eye: No discharge.         Left eye: No discharge.      Conjunctiva/sclera: Conjunctivae normal.      Right eye: Right conjunctiva is not injected.      Left eye: Left conjunctiva is not injected. No hemorrhage.     Pupils: Pupils are equal, round, and reactive to light.   Neck:      Thyroid: No thyromegaly.      Vascular: No JVD.   Cardiovascular:      Rate and Rhythm: Normal rate and regular rhythm.      Chest Wall: PMI is not displaced.      Pulses: Intact distal pulses.           Carotid pulses are 2+ on the right side and 2+ on the left side.       Radial pulses are 2+ on the right side and 2+ on the left side.        Dorsalis pedis pulses are 2+ on the right side and 2+ on the left side.        Posterior tibial pulses are 2+ on the right side and 2+ on the left side.      Heart sounds: Normal heart sounds. No midsystolic click and no opening snap. No murmur heard.     No friction rub. No gallop.   Pulmonary:      Effort: Pulmonary effort is normal. No respiratory distress.      Breath sounds: Normal breath sounds. No wheezing or rales.   Chest:      Chest wall: No tenderness.   Abdominal:      Palpations: Abdomen is soft. Abdomen is not rigid. There is no hepatomegaly.      Tenderness: There is no abdominal tenderness. There is no guarding or rebound.       Comments: Obese abdomen   Musculoskeletal:         General: No tenderness. Normal range of motion.      Cervical back: Neck supple.      Right knee: No swelling.      Left knee: No swelling.      Right lower leg: No swelling.      Left lower leg: No swelling.      Right ankle: No swelling.      Left ankle: No swelling.      Right foot: No swelling.      Left foot: No swelling.   Skin:     General: Skin is warm and dry.      Coloration: Skin is not pale.      Findings: No rash.   Neurological:      General: No focal deficit present.      Mental Status: He is alert and oriented to person, place, and time.      Cranial Nerves: No cranial nerve deficit.      Coordination: Coordination normal.      Deep Tendon Reflexes: Reflexes are normal and symmetric.   Psychiatric:         Mood and Affect: Mood normal.         Behavior: Behavior normal.       BP (!) 150/80 (BP Location: Right arm, Patient Position: Sitting)   Wt 116.1 kg (255 lb 15.3 oz)   BMI 33.77 kg/m²       Results for orders placed during the hospital encounter of 06/15/23    Echo    Interpretation Summary  · The left ventricle is normal in size with normal systolic function.  · The estimated ejection fraction is 65%.  · Grade II left ventricular diastolic dysfunction.  · Normal right ventricular size with normal right ventricular systolic function.  · Mild left atrial enlargement.  · There is a 26 mm Medtronic EVolutFX transcutaneously-placed aortic bioprosthesis present. There is trivial paravalvular aortic insufficiency present.  · The aortic valve mean gradient is 10 mmHg with a dimensionless index of 0.49.  · Mild tricuspid regurgitation.  · The estimated PA systolic pressure is 32 mmHg.  · Normal central venous pressure (3 mmHg).  · Trivial lateral pericardial effusion.    WBC   Date Value Ref Range Status   06/15/2023 10.45 3.90 - 12.70 K/uL Final     Hematocrit   Date Value Ref Range Status   06/15/2023 44.3 40.0 - 54.0 % Final     Hemoglobin   Date  Value Ref Range Status   06/15/2023 13.7 (L) 14.0 - 18.0 g/dL Final     Lab Results   Component Value Date     06/15/2023     Lab Results   Component Value Date    CREATININE 1.0 06/15/2023    EGFRNORACEVR >60.0 06/15/2023    K 4.2 06/15/2023     Lab Results   Component Value Date     (H) 05/16/2023            Assessment:    Although he is asymptomatic, ventricular response was relatively poorly controlled.  In addition, since is fully anticoagulated and the since his past history suggests that each cardioversion results in sustained sinus rhythm for may be a couple years, we will proceed with an elective cardioversion and see how he does.  1. Persistent atrial fibrillation    2. Nonrheumatic aortic valve stenosis    3. History of transcatheter aortic valve replacement (TAVR)    4. Essential hypertension    5. Diastolic dysfunction    6. Coronary artery disease involving native coronary artery of native heart without angina pectoris    7. Chronic diastolic heart failure    8. Bilateral carotid artery stenosis    9. Moderate persistent asthma without complication    10. Aortic atherosclerosis    11. Type 2 diabetes mellitus with diabetic peripheral angiopathy without gangrene, unspecified whether long term insulin use    12. Gastroesophageal reflux disease, unspecified whether esophagitis present    13. Former smoker        Plan:    DCCV - no YRN  I have discussed the procedure in detail with the patient. I described its benefits and risks. I reviewed alternative therapies and discussed their potential value. The patient was given ample opportunity to express concerns and ask questions and I provided appropriate responses and  answers to such.The patient understands and agrees to proceed.  Consent form was signed  by patient and myself and appropriately witnessed.     Orders Placed This Encounter   Procedures    Holter monitor - 48 hour     Standing Status:   Future     Standing Expiration Date:    2024     Order Specific Question:   Release to patient     Answer:   Immediate     Follow up if symptoms worsen or fail to improve, for post op.  There are no discontinued medications.  Outpatient Encounter Medications as of 2023   Medication Sig Dispense Refill    albuterol (PROVENTIL/VENTOLIN HFA) 90 mcg/actuation inhaler Inhale 2 puffs into the lungs every 6 (six) hours as needed for Wheezing or Shortness of Breath. Rescue 18 g 11    allopurinoL (ZYLOPRIM) 300 MG tablet TAKE 1 TABLET BY MOUTH EVERY DAY 90 tablet 2    amLODIPine (NORVASC) 5 MG tablet Take 0.5 tablets (2.5 mg total) by mouth once daily. 60 tablet 2    apixaban (ELIQUIS) 5 mg Tab Take 1 tablet (5 mg total) by mouth 2 (two) times daily. 180 tablet 3    aspirin (ECOTRIN) 81 MG EC tablet Take 1 tablet (81 mg total) by mouth once daily.      atorvastatin (LIPITOR) 40 MG tablet TK 1 T PO QD 90 tablet 3    cloNIDine (CATAPRES) 0.1 MG tablet Take 1 tablet (0.1 mg total) by mouth every 6 (six) hours as needed (for systolic blood pressure greater than 165). 60 tablet 11    coenzyme Q10 100 mg capsule Take 200 mg by mouth once daily.      fluocinonide (LIDEX) 0.05 % gel SMARTSI-2 Gram(s) Topical Twice Daily      fluticasone furoate-vilanteroL (BREO ELLIPTA) 200-25 mcg/dose DsDv diskus inhaler INHALE 1 PUFF BY MOUTH AT THE SAME TIME EVERY DAY 30 each 11    furosemide (LASIX) 20 MG tablet Take 1 tablet (20 mg total) by mouth once daily. 90 tablet 3    ketoconazole (NIZORAL) 2 % cream Apply topically.      lactobacillus comb no.10 (PROBIOTIC) 20 billion cell Cap Take 1 capsule by mouth once daily. Or as directed on bottle      metoprolol succinate (TOPROL-XL) 50 MG 24 hr tablet Take 2 tablets (100 mg total) by mouth once daily. 90 tablet 1    montelukast (SINGULAIR) 10 mg tablet Take 1 tablet (10 mg total) by mouth once daily. 30 tablet 1    multivitamin (THERAGRAN) per tablet Take 1 tablet by mouth once daily.      naproxen (NAPROSYN) 500 MG  tablet Take 1 tablet (500 mg total) by mouth 2 (two) times daily as needed (pain). 60 tablet 2    omeprazole (PRILOSEC) 10 MG capsule Take 10 mg by mouth once daily.      potassium chloride (KLOR-CON) 10 MEQ TbSR TK 1 T PO ONCE A DAY. 90 tablet 3    predniSONE (DELTASONE) 2.5 MG tablet Take 5 mg by mouth once daily.      tiotropium bromide (SPIRIVA RESPIMAT) 2.5 mcg/actuation inhaler Inhale 2 puffs into the lungs Daily. Controller 4 g 11    tiZANidine (ZANAFLEX) 4 MG tablet Take 1 tablet (4 mg total) by mouth every 8 (eight) hours as needed (muscle pain). 30 tablet 0    TOBRADEX 0.3-0.1 % Oint       urea (CARMOL) 40 % Crea 2 (two) times daily as needed.  0    [DISCONTINUED] telmisartan (MICARDIS) 80 MG Tab Take 1 tablet (80 mg total) by mouth once daily. 90 tablet 3    albuterol-ipratropium (DUO-NEB) 2.5 mg-0.5 mg/3 mL nebulizer solution Take 3 mLs by nebulization every 6 (six) hours as needed for Wheezing or Shortness of Breath. Rescue 120 vial 5     Facility-Administered Encounter Medications as of 12/11/2023   Medication Dose Route Frequency Provider Last Rate Last Admin    sodium chloride 0.9% flush 10 mL  10 mL Intravenous PRN Luis Enrique Rangel MD         Medication List with Changes/Refills   Current Medications    ALBUTEROL (PROVENTIL/VENTOLIN HFA) 90 MCG/ACTUATION INHALER    Inhale 2 puffs into the lungs every 6 (six) hours as needed for Wheezing or Shortness of Breath. Rescue    ALBUTEROL-IPRATROPIUM (DUO-NEB) 2.5 MG-0.5 MG/3 ML NEBULIZER SOLUTION    Take 3 mLs by nebulization every 6 (six) hours as needed for Wheezing or Shortness of Breath. Rescue    ALLOPURINOL (ZYLOPRIM) 300 MG TABLET    TAKE 1 TABLET BY MOUTH EVERY DAY    AMLODIPINE (NORVASC) 5 MG TABLET    Take 0.5 tablets (2.5 mg total) by mouth once daily.    APIXABAN (ELIQUIS) 5 MG TAB    Take 1 tablet (5 mg total) by mouth 2 (two) times daily.    ASPIRIN (ECOTRIN) 81 MG EC TABLET    Take 1 tablet (81 mg total) by mouth once daily.     ATORVASTATIN (LIPITOR) 40 MG TABLET    TK 1 T PO QD    CLONIDINE (CATAPRES) 0.1 MG TABLET    Take 1 tablet (0.1 mg total) by mouth every 6 (six) hours as needed (for systolic blood pressure greater than 165).    COENZYME Q10 100 MG CAPSULE    Take 200 mg by mouth once daily.    FLUOCINONIDE (LIDEX) 0.05 % GEL    SMARTSI-2 Gram(s) Topical Twice Daily    FLUTICASONE FUROATE-VILANTEROL (BREO ELLIPTA) 200-25 MCG/DOSE DSDV DISKUS INHALER    INHALE 1 PUFF BY MOUTH AT THE SAME TIME EVERY DAY    FUROSEMIDE (LASIX) 20 MG TABLET    Take 1 tablet (20 mg total) by mouth once daily.    KETOCONAZOLE (NIZORAL) 2 % CREAM    Apply topically.    LACTOBACILLUS COMB NO.10 (PROBIOTIC) 20 BILLION CELL CAP    Take 1 capsule by mouth once daily. Or as directed on bottle    METOPROLOL SUCCINATE (TOPROL-XL) 50 MG 24 HR TABLET    Take 2 tablets (100 mg total) by mouth once daily.    MONTELUKAST (SINGULAIR) 10 MG TABLET    Take 1 tablet (10 mg total) by mouth once daily.    MULTIVITAMIN (THERAGRAN) PER TABLET    Take 1 tablet by mouth once daily.    NAPROXEN (NAPROSYN) 500 MG TABLET    Take 1 tablet (500 mg total) by mouth 2 (two) times daily as needed (pain).    OMEPRAZOLE (PRILOSEC) 10 MG CAPSULE    Take 10 mg by mouth once daily.    POTASSIUM CHLORIDE (KLOR-CON) 10 MEQ TBSR    TK 1 T PO ONCE A DAY.    PREDNISONE (DELTASONE) 2.5 MG TABLET    Take 5 mg by mouth once daily.    TIOTROPIUM BROMIDE (SPIRIVA RESPIMAT) 2.5 MCG/ACTUATION INHALER    Inhale 2 puffs into the lungs Daily. Controller    TIZANIDINE (ZANAFLEX) 4 MG TABLET    Take 1 tablet (4 mg total) by mouth every 8 (eight) hours as needed (muscle pain).    TOBRADEX 0.3-0.1 % OINT        UREA (CARMOL) 40 % CREA    2 (two) times daily as needed.   Changed and/or Refilled Medications    Modified Medication Previous Medication    TELMISARTAN (MICARDIS) 80 MG TAB telmisartan (MICARDIS) 80 MG Tab       TAKE 1 TABLET(80 MG) BY MOUTH EVERY DAY    Take 1 tablet (80 mg total) by mouth once  daily.          This note is at least partially dictated using the M*Modal Fluency Direct word recognition program. There are word recognition mistakes that are occasionally missed on review.

## 2023-12-11 NOTE — PROGRESS NOTES
Subjective:   Patient ID:  Roshan Menard is a 73 y.o. male     Chief complaint: AF - pers    HPI  New patient to me. (12/11/2023 )  Referred by Dylon for evaluation and management of AF   --   Background as gleaned from patient's records :  History of temporal arteritis, paroxysmal atrial fibrillation and YRN/cardioversion in the past.  History of essential hypertension, coronary disease with past stents and now without angina (cath April 2023: Prox LAD lesion 50% , Prox RCA 50% - no PCI) and evidence of aortic stenosis that led to a TAVR in May 2023.  March 2023: Abnormal myocardial perfusion scan. There is a moderate to severe intensity, small to moderate sized, reversible perfusion abnormality that is consistent with ischemia in the basal to mid inferolateral wall(s).    Former smoker, no history of drug or alcohol abuse no syncope or near syncopal episodes.  History of obesity lost over 100 lb.    Irregular heart rhythm  prompted evaluation on 11/30/23   >>  Back in atrial fibrillation continues on Eliquis, aspirin, beta-blocker which increased to  metoprolol 50 mg daily.    He had been on amiodarone in the past (DCed 2/2022).  Additional details gleaned from today's interview :  Labs are OK - including TSH, CMP, CBC and a slightly elevated BNP with a normal NT Pro BNP   Has pers RBBB/LAHB since at least 2021 - TAVR did not lead toPPM need  He has had a total of 3 cardioversions over the years.  The last 1 was in 2021 by Dr. Vincent jerome.  Prior to the Dr. De La Rosa had performed the 2 other cardioversions.  He does not feel any different in AFib.  No changes in quality of life, dyspnea etc..  However, he noticed the that his resting heart rate in the morning is about 20 points higher.  This has been going on for 2 weeks.  He has been fully anticoagulated without missing any dosing on his Eliquis.  Current Outpatient Medications   Medication Sig    albuterol (PROVENTIL/VENTOLIN HFA) 90 mcg/actuation inhaler  Inhale 2 puffs into the lungs every 6 (six) hours as needed for Wheezing or Shortness of Breath. Rescue    allopurinoL (ZYLOPRIM) 300 MG tablet TAKE 1 TABLET BY MOUTH EVERY DAY    amLODIPine (NORVASC) 5 MG tablet Take 0.5 tablets (2.5 mg total) by mouth once daily.    apixaban (ELIQUIS) 5 mg Tab Take 1 tablet (5 mg total) by mouth 2 (two) times daily.    aspirin (ECOTRIN) 81 MG EC tablet Take 1 tablet (81 mg total) by mouth once daily.    atorvastatin (LIPITOR) 40 MG tablet TK 1 T PO QD    cloNIDine (CATAPRES) 0.1 MG tablet Take 1 tablet (0.1 mg total) by mouth every 6 (six) hours as needed (for systolic blood pressure greater than 165).    coenzyme Q10 100 mg capsule Take 200 mg by mouth once daily.    fluocinonide (LIDEX) 0.05 % gel SMARTSI-2 Gram(s) Topical Twice Daily    fluticasone furoate-vilanteroL (BREO ELLIPTA) 200-25 mcg/dose DsDv diskus inhaler INHALE 1 PUFF BY MOUTH AT THE SAME TIME EVERY DAY    furosemide (LASIX) 20 MG tablet Take 1 tablet (20 mg total) by mouth once daily.    ketoconazole (NIZORAL) 2 % cream Apply topically.    lactobacillus comb no.10 (PROBIOTIC) 20 billion cell Cap Take 1 capsule by mouth once daily. Or as directed on bottle    metoprolol succinate (TOPROL-XL) 50 MG 24 hr tablet Take 2 tablets (100 mg total) by mouth once daily.    montelukast (SINGULAIR) 10 mg tablet Take 1 tablet (10 mg total) by mouth once daily.    multivitamin (THERAGRAN) per tablet Take 1 tablet by mouth once daily.    naproxen (NAPROSYN) 500 MG tablet Take 1 tablet (500 mg total) by mouth 2 (two) times daily as needed (pain).    omeprazole (PRILOSEC) 10 MG capsule Take 10 mg by mouth once daily.    potassium chloride (KLOR-CON) 10 MEQ TbSR TK 1 T PO ONCE A DAY.    predniSONE (DELTASONE) 2.5 MG tablet Take 5 mg by mouth once daily.    tiotropium bromide (SPIRIVA RESPIMAT) 2.5 mcg/actuation inhaler Inhale 2 puffs into the lungs Daily. Controller    tiZANidine (ZANAFLEX) 4 MG tablet Take 1 tablet (4 mg total)  by mouth every 8 (eight) hours as needed (muscle pain).    TOBRADEX 0.3-0.1 % Oint     urea (CARMOL) 40 % Crea 2 (two) times daily as needed.    albuterol-ipratropium (DUO-NEB) 2.5 mg-0.5 mg/3 mL nebulizer solution Take 3 mLs by nebulization every 6 (six) hours as needed for Wheezing or Shortness of Breath. Rescue    telmisartan (MICARDIS) 80 MG Tab TAKE 1 TABLET(80 MG) BY MOUTH EVERY DAY     Current Facility-Administered Medications   Medication    sodium chloride 0.9% flush 10 mL       Review of Systems     Constitutional: Reviewed  for decreased appetite, weight gain and weight loss.   HENT: Reviewed for nosebleeds.    Eyes:  Reviewed for blurred vision and visual disturbance.   Cardiovascular: Reviewed for chest pain, claudication, cyanosis,dyspnea on exertion, leg swelling, orthopnea,paroxysmal nocturnal dyspnearregular heartbeats, palpitations, near-syncope, and syncope.   Respiratory: Reviewed for cough, shortness of breath, wheezing, sleep disturbances due to breathing and snoring, .    Endocrine: Reviewed for heat intolerance.   Hematologic/Lymphatic: Reviewed for easy bruisability/bleeding.   Skin: Reviewed for rash.   Musculoskeletal: Reviewed for muscle weakness and myalgias.   Gastrointestinal: Reviewed for abdominal pain, anorexia, melena, nausea and vomiting.   Genitourinary: Reviewed for hesitancy, frequency, nocturia and incontinence.   Neurological: Reviewed for excessive daytime sleepiness, dizziness, vertigo, weakness, headaches, loss of balance and seizures,   Psychiatric/Behavioral:  Reviewed for insomnia, altered mental status, depression, anxiety and nervousness.       All symptoms reviewed above were negative except for palpitations, some leg swelling, wheezing and sputum, lightheadedness, occasional loss of balance, arthritic issues.       Social History     Tobacco Use   Smoking Status Former    Current packs/day: 0.00    Average packs/day: 1.5 packs/day for 22.0 years (33.0 ttl pk-yrs)     Types: Cigarettes    Start date: 1970    Quit date: 1992    Years since quittin.6   Smokeless Tobacco Never       reports no history of alcohol use.   Past Medical History:   Diagnosis Date    Allergy     Anticoagulant long-term use     Aortic stenosis     Arthritis     Asthma     Atrial fibrillation     Bronchitis     Cataract     COPD (chronic obstructive pulmonary disease)     Coronary artery disease     stent    General anesthetics causing adverse effect in therapeutic use     hard to awaken    GERD (gastroesophageal reflux disease)     Gout, chronic     Hypertension     Mixed hyperlipidemia     Sleep apnea     Type 2 diabetes mellitus with diabetic peripheral angiopathy without gangrene, unspecified whether long term insulin use 2023     Family History   Problem Relation Age of Onset    Cancer Mother     Early death Mother     Arthritis Father     Diabetes Father     Heart disease Father     Hyperlipidemia Father     Hypertension Father      Social History     Socioeconomic History    Marital status:    Occupational History     Employer: [employer]   Tobacco Use    Smoking status: Former     Current packs/day: 0.00     Average packs/day: 1.5 packs/day for 22.0 years (33.0 ttl pk-yrs)     Types: Cigarettes     Start date: 1970     Quit date: 1992     Years since quittin.6    Smokeless tobacco: Never   Substance and Sexual Activity    Alcohol use: No    Drug use: No    Sexual activity: Yes     Partners: Male     Social Determinants of Health     Financial Resource Strain: Unknown (12/10/2023)    Overall Financial Resource Strain (CARDIA)     Difficulty of Paying Living Expenses: Patient refused   Food Insecurity: Unknown (12/10/2023)    Hunger Vital Sign     Worried About Running Out of Food in the Last Year: Patient refused     Ran Out of Food in the Last Year: Patient refused   Transportation Needs: No Transportation Needs (12/10/2023)    PRAPARE -  Transportation     Lack of Transportation (Medical): No     Lack of Transportation (Non-Medical): No   Physical Activity: Insufficiently Active (12/10/2023)    Exercise Vital Sign     Days of Exercise per Week: 2 days     Minutes of Exercise per Session: 60 min   Stress: No Stress Concern Present (12/10/2023)    [employer] - Occupational Stress Questionnaire     Feeling of Stress : Not at all   Social Connections: Unknown (12/10/2023)    Social Connection and Isolation Panel [NHANES]     Frequency of Communication with Friends and Family: More than three times a week     Frequency of Social Gatherings with Friends and Family: More than three times a week     Attends Club or Organization Meetings: More than 4 times per year     Marital Status:    Housing Stability: Low Risk  (12/10/2023)    Housing Stability Vital Sign     Unable to Pay for Housing in the Last Year: No     Number of Places Lived in the Last Year: 1     Unstable Housing in the Last Year: No     Past Surgical History:   Procedure Laterality Date    ANGIOGRAM, CORONARY, WITH LEFT HEART CATHETERIZATION  09/27/2021    Procedure: Angiogram, Coronary, with Left Heart Cath;  Surgeon: Vincent Gonzalez MD;  Location: [facility] CATH;  Service: Cardiology;;    BACK SURGERY      L3-5; ruptured disc; laminectomy x 2 surgery    CARDIAC CATH COSURGEON N/A 05/16/2023    Procedure: Cardiac Cath Cosurgeon;  Surgeon: Holly Pleitez MD;  Location: [facility] CATH LAB;  Service: Cardiothoracic;  Laterality: N/A;    CARDIAC VALVE REPLACEMENT  5/16/23    TAVR    CARDIAC VALVE SURGERY      CARDIOVERSION      CHOLECYSTECTOMY      CORONARY ANGIOPLASTY WITH STENT PLACEMENT      EYE SURGERY  2012    Cateract    JOINT REPLACEMENT  '08    RT  KNEE    LEFT HEART CATHETERIZATION Left 04/04/2023    Procedure: Left heart cath;  Surgeon: Aleksey Krishnan MD;  Location[facility] CATH LAB;  Service: Cardiology;  Laterality: Left;    SPINE SURGERY  1988/2004     Lamanectomy '88/Capps '04    TONSILLECTOMY      TOTAL KNEE ARTHROPLASTY      TRANSCATHETER AORTIC VALVE REPLACEMENT (TAVR) N/A 05/16/2023    Procedure: REPLACEMENT, AORTIC VALVE, TRANSCATHETER (TAVR);  Surgeon: Luis Enrique Ji MD;  Location: [facility] CATH LAB;  Service: Cardiology;  Laterality: N/A;    VASECTOMY         Objective:   Physical Exam  Vitals and nursing note reviewed.   Constitutional:       Appearance: Normal appearance. He is well-developed. He is not diaphoretic.      Comments: overweight   HENT:      Head: Normocephalic and atraumatic.      Right Ear: External ear normal.      Left Ear: External ear normal.   Eyes:      General:         Right eye: No discharge.         Left eye: No discharge.      Conjunctiva/sclera: Conjunctivae normal.      Right eye: Right conjunctiva is not injected.      Left eye: Left conjunctiva is not injected. No hemorrhage.     Pupils: Pupils are equal, round, and reactive to light.   Neck:      Thyroid: No thyromegaly.      Vascular: No JVD.   Cardiovascular:      Rate and Rhythm: Normal rate and regular rhythm.      Chest Wall: PMI is not displaced.      Pulses: Intact distal pulses.           Carotid pulses are 2+ on the right side and 2+ on the left side.       Radial pulses are 2+ on the right side and 2+ on the left side.        Dorsalis pedis pulses are 2+ on the right side and 2+ on the left side.        Posterior tibial pulses are 2+ on the right side and 2+ on the left side.      Heart sounds: Normal heart sounds. No midsystolic click and no opening snap. No murmur heard.     No friction rub. No gallop.   Pulmonary:      Effort: Pulmonary effort is normal. No respiratory distress.      Breath sounds: Normal breath sounds. No wheezing or rales.   Chest:      Chest wall: No tenderness.   Abdominal:      Palpations: Abdomen is soft. Abdomen is not rigid. There is no hepatomegaly.      Tenderness: There is no abdominal tenderness. There is no guarding or rebound.       Comments: Obese abdomen   Musculoskeletal:         General: No tenderness. Normal range of motion.      Cervical back: Neck supple.      Right knee: No swelling.      Left knee: No swelling.      Right lower leg: No swelling.      Left lower leg: No swelling.      Right ankle: No swelling.      Left ankle: No swelling.      Right foot: No swelling.      Left foot: No swelling.   Skin:     General: Skin is warm and dry.      Coloration: Skin is not pale.      Findings: No rash.   Neurological:      General: No focal deficit present.      Mental Status: He is alert and oriented to person, place, and time.      Cranial Nerves: No cranial nerve deficit.      Coordination: Coordination normal.      Deep Tendon Reflexes: Reflexes are normal and symmetric.   Psychiatric:         Mood and Affect: Mood normal.         Behavior: Behavior normal.       BP (!) 150/80 (BP Location: Right arm, Patient Position: Sitting)   Wt 116.1 kg (255 lb 15.3 oz)   BMI 33.77 kg/m²       Results for orders placed during the hospital encounter of 06/15/23    Echo    Interpretation Summary  · The left ventricle is normal in size with normal systolic function.  · The estimated ejection fraction is 65%.  · Grade II left ventricular diastolic dysfunction.  · Normal right ventricular size with normal right ventricular systolic function.  · Mild left atrial enlargement.  · There is a 26 mm Medtronic EVolutFX transcutaneously-placed aortic bioprosthesis present. There is trivial paravalvular aortic insufficiency present.  · The aortic valve mean gradient is 10 mmHg with a dimensionless index of 0.49.  · Mild tricuspid regurgitation.  · The estimated PA systolic pressure is 32 mmHg.  · Normal central venous pressure (3 mmHg).  · Trivial lateral pericardial effusion.    WBC   Date Value Ref Range Status   06/15/2023 10.45 3.90 - 12.70 K/uL Final     Hematocrit   Date Value Ref Range Status   06/15/2023 44.3 40.0 - 54.0 % Final     Hemoglobin   Date  Value Ref Range Status   06/15/2023 13.7 (L) 14.0 - 18.0 g/dL Final     Lab Results   Component Value Date     06/15/2023     Lab Results   Component Value Date    CREATININE 1.0 06/15/2023    EGFRNORACEVR >60.0 06/15/2023    K 4.2 06/15/2023     Lab Results   Component Value Date     (H) 05/16/2023            Assessment:    Although he is asymptomatic, ventricular response was relatively poorly controlled.  In addition, since is fully anticoagulated and the since his past history suggests that each cardioversion results in sustained sinus rhythm for may be a couple years, we will proceed with an elective cardioversion and see how he does.  1. Persistent atrial fibrillation    2. Nonrheumatic aortic valve stenosis    3. History of transcatheter aortic valve replacement (TAVR)    4. Essential hypertension    5. Diastolic dysfunction    6. Coronary artery disease involving native coronary artery of native heart without angina pectoris    7. Chronic diastolic heart failure    8. Bilateral carotid artery stenosis    9. Moderate persistent asthma without complication    10. Aortic atherosclerosis    11. Type 2 diabetes mellitus with diabetic peripheral angiopathy without gangrene, unspecified whether long term insulin use    12. Gastroesophageal reflux disease, unspecified whether esophagitis present    13. Former smoker        Plan:    DCCV - no YRN  I have discussed the procedure in detail with the patient. I described its benefits and risks. I reviewed alternative therapies and discussed their potential value. The patient was given ample opportunity to express concerns and ask questions and I provided appropriate responses and  answers to such.The patient understands and agrees to proceed.  Consent form was signed  by patient and myself and appropriately witnessed.     Orders Placed This Encounter   Procedures    Holter monitor - 48 hour     Standing Status:   Future     Standing Expiration Date:    2024     Order Specific Question:   Release to patient     Answer:   Immediate     Follow up if symptoms worsen or fail to improve, for post op.  There are no discontinued medications.  Outpatient Encounter Medications as of 2023   Medication Sig Dispense Refill    albuterol (PROVENTIL/VENTOLIN HFA) 90 mcg/actuation inhaler Inhale 2 puffs into the lungs every 6 (six) hours as needed for Wheezing or Shortness of Breath. Rescue 18 g 11    allopurinoL (ZYLOPRIM) 300 MG tablet TAKE 1 TABLET BY MOUTH EVERY DAY 90 tablet 2    amLODIPine (NORVASC) 5 MG tablet Take 0.5 tablets (2.5 mg total) by mouth once daily. 60 tablet 2    apixaban (ELIQUIS) 5 mg Tab Take 1 tablet (5 mg total) by mouth 2 (two) times daily. 180 tablet 3    aspirin (ECOTRIN) 81 MG EC tablet Take 1 tablet (81 mg total) by mouth once daily.      atorvastatin (LIPITOR) 40 MG tablet TK 1 T PO QD 90 tablet 3    cloNIDine (CATAPRES) 0.1 MG tablet Take 1 tablet (0.1 mg total) by mouth every 6 (six) hours as needed (for systolic blood pressure greater than 165). 60 tablet 11    coenzyme Q10 100 mg capsule Take 200 mg by mouth once daily.      fluocinonide (LIDEX) 0.05 % gel SMARTSI-2 Gram(s) Topical Twice Daily      fluticasone furoate-vilanteroL (BREO ELLIPTA) 200-25 mcg/dose DsDv diskus inhaler INHALE 1 PUFF BY MOUTH AT THE SAME TIME EVERY DAY 30 each 11    furosemide (LASIX) 20 MG tablet Take 1 tablet (20 mg total) by mouth once daily. 90 tablet 3    ketoconazole (NIZORAL) 2 % cream Apply topically.      lactobacillus comb no.10 (PROBIOTIC) 20 billion cell Cap Take 1 capsule by mouth once daily. Or as directed on bottle      metoprolol succinate (TOPROL-XL) 50 MG 24 hr tablet Take 2 tablets (100 mg total) by mouth once daily. 90 tablet 1    montelukast (SINGULAIR) 10 mg tablet Take 1 tablet (10 mg total) by mouth once daily. 30 tablet 1    multivitamin (THERAGRAN) per tablet Take 1 tablet by mouth once daily.      naproxen (NAPROSYN) 500 MG  tablet Take 1 tablet (500 mg total) by mouth 2 (two) times daily as needed (pain). 60 tablet 2    omeprazole (PRILOSEC) 10 MG capsule Take 10 mg by mouth once daily.      potassium chloride (KLOR-CON) 10 MEQ TbSR TK 1 T PO ONCE A DAY. 90 tablet 3    predniSONE (DELTASONE) 2.5 MG tablet Take 5 mg by mouth once daily.      tiotropium bromide (SPIRIVA RESPIMAT) 2.5 mcg/actuation inhaler Inhale 2 puffs into the lungs Daily. Controller 4 g 11    tiZANidine (ZANAFLEX) 4 MG tablet Take 1 tablet (4 mg total) by mouth every 8 (eight) hours as needed (muscle pain). 30 tablet 0    TOBRADEX 0.3-0.1 % Oint       urea (CARMOL) 40 % Crea 2 (two) times daily as needed.  0    [DISCONTINUED] telmisartan (MICARDIS) 80 MG Tab Take 1 tablet (80 mg total) by mouth once daily. 90 tablet 3    albuterol-ipratropium (DUO-NEB) 2.5 mg-0.5 mg/3 mL nebulizer solution Take 3 mLs by nebulization every 6 (six) hours as needed for Wheezing or Shortness of Breath. Rescue 120 vial 5     Facility-Administered Encounter Medications as of 12/11/2023   Medication Dose Route Frequency Provider Last Rate Last Admin    sodium chloride 0.9% flush 10 mL  10 mL Intravenous PRN Luis Enrique Rangel MD         Medication List with Changes/Refills   Current Medications    ALBUTEROL (PROVENTIL/VENTOLIN HFA) 90 MCG/ACTUATION INHALER    Inhale 2 puffs into the lungs every 6 (six) hours as needed for Wheezing or Shortness of Breath. Rescue    ALBUTEROL-IPRATROPIUM (DUO-NEB) 2.5 MG-0.5 MG/3 ML NEBULIZER SOLUTION    Take 3 mLs by nebulization every 6 (six) hours as needed for Wheezing or Shortness of Breath. Rescue    ALLOPURINOL (ZYLOPRIM) 300 MG TABLET    TAKE 1 TABLET BY MOUTH EVERY DAY    AMLODIPINE (NORVASC) 5 MG TABLET    Take 0.5 tablets (2.5 mg total) by mouth once daily.    APIXABAN (ELIQUIS) 5 MG TAB    Take 1 tablet (5 mg total) by mouth 2 (two) times daily.    ASPIRIN (ECOTRIN) 81 MG EC TABLET    Take 1 tablet (81 mg total) by mouth once daily.     ATORVASTATIN (LIPITOR) 40 MG TABLET    TK 1 T PO QD    CLONIDINE (CATAPRES) 0.1 MG TABLET    Take 1 tablet (0.1 mg total) by mouth every 6 (six) hours as needed (for systolic blood pressure greater than 165).    COENZYME Q10 100 MG CAPSULE    Take 200 mg by mouth once daily.    FLUOCINONIDE (LIDEX) 0.05 % GEL    SMARTSI-2 Gram(s) Topical Twice Daily    FLUTICASONE FUROATE-VILANTEROL (BREO ELLIPTA) 200-25 MCG/DOSE DSDV DISKUS INHALER    INHALE 1 PUFF BY MOUTH AT THE SAME TIME EVERY DAY    FUROSEMIDE (LASIX) 20 MG TABLET    Take 1 tablet (20 mg total) by mouth once daily.    KETOCONAZOLE (NIZORAL) 2 % CREAM    Apply topically.    LACTOBACILLUS COMB NO.10 (PROBIOTIC) 20 BILLION CELL CAP    Take 1 capsule by mouth once daily. Or as directed on bottle    METOPROLOL SUCCINATE (TOPROL-XL) 50 MG 24 HR TABLET    Take 2 tablets (100 mg total) by mouth once daily.    MONTELUKAST (SINGULAIR) 10 MG TABLET    Take 1 tablet (10 mg total) by mouth once daily.    MULTIVITAMIN (THERAGRAN) PER TABLET    Take 1 tablet by mouth once daily.    NAPROXEN (NAPROSYN) 500 MG TABLET    Take 1 tablet (500 mg total) by mouth 2 (two) times daily as needed (pain).    OMEPRAZOLE (PRILOSEC) 10 MG CAPSULE    Take 10 mg by mouth once daily.    POTASSIUM CHLORIDE (KLOR-CON) 10 MEQ TBSR    TK 1 T PO ONCE A DAY.    PREDNISONE (DELTASONE) 2.5 MG TABLET    Take 5 mg by mouth once daily.    TIOTROPIUM BROMIDE (SPIRIVA RESPIMAT) 2.5 MCG/ACTUATION INHALER    Inhale 2 puffs into the lungs Daily. Controller    TIZANIDINE (ZANAFLEX) 4 MG TABLET    Take 1 tablet (4 mg total) by mouth every 8 (eight) hours as needed (muscle pain).    TOBRADEX 0.3-0.1 % OINT        UREA (CARMOL) 40 % CREA    2 (two) times daily as needed.   Changed and/or Refilled Medications    Modified Medication Previous Medication    TELMISARTAN (MICARDIS) 80 MG TAB telmisartan (MICARDIS) 80 MG Tab       TAKE 1 TABLET(80 MG) BY MOUTH EVERY DAY    Take 1 tablet (80 mg total) by mouth once  daily.          This note is at least partially dictated using the M*Modal Fluency Direct word recognition program. There are word recognition mistakes that are occasionally missed on review.

## 2023-12-12 ENCOUNTER — HOSPITAL ENCOUNTER (OUTPATIENT)
Dept: CARDIOLOGY | Facility: HOSPITAL | Age: 73
Discharge: HOME | End: 2023-12-12
Attending: INTERNAL MEDICINE
Payer: MEDICARE

## 2023-12-12 DIAGNOSIS — I48.0 PAF (PAROXYSMAL ATRIAL FIBRILLATION): ICD-10-CM

## 2023-12-12 PROCEDURE — 93227 XTRNL ECG REC<48 HR R&I: CPT | Mod: ,,, | Performed by: INTERNAL MEDICINE

## 2023-12-12 PROCEDURE — 93227 HOLTER MONITOR - 48 HOUR (CUPID ONLY): ICD-10-PCS | Mod: ,,, | Performed by: INTERNAL MEDICINE

## 2023-12-12 PROCEDURE — 93226 XTRNL ECG REC<48 HR SCAN A/R: CPT | Mod: PO

## 2023-12-15 DIAGNOSIS — I48.0 PAF (PAROXYSMAL ATRIAL FIBRILLATION): ICD-10-CM

## 2023-12-15 DIAGNOSIS — I48.19 PERSISTENT ATRIAL FIBRILLATION: Primary | ICD-10-CM

## 2023-12-15 DIAGNOSIS — R06.02 SOB (SHORTNESS OF BREATH): ICD-10-CM

## 2023-12-15 LAB
OHS CV EVENT MONITOR DAY: 0
OHS CV HOLTER LENGTH DECIMAL HOURS: 47.98
OHS CV HOLTER LENGTH HOURS: 47
OHS CV HOLTER LENGTH MINUTES: 59
OHS CV HOLTER SINUS AVERAGE HR: 95
OHS CV HOLTER SINUS MAX HR: 169
OHS CV HOLTER SINUS MIN HR: 50

## 2023-12-19 ENCOUNTER — HOSPITAL ENCOUNTER (OUTPATIENT)
Facility: HOSPITAL | Age: 73
Discharge: HOME | End: 2023-12-19
Attending: INTERNAL MEDICINE | Admitting: INTERNAL MEDICINE
Payer: MEDICARE

## 2023-12-19 ENCOUNTER — ANESTHESIA EVENT (OUTPATIENT)
Dept: CARDIOLOGY | Facility: HOSPITAL | Age: 73
End: 2023-12-19
Payer: MEDICARE

## 2023-12-19 ENCOUNTER — ANESTHESIA (OUTPATIENT)
Dept: CARDIOLOGY | Facility: HOSPITAL | Age: 73
End: 2023-12-19
Payer: MEDICARE

## 2023-12-19 DIAGNOSIS — I48.19 PERSISTENT ATRIAL FIBRILLATION: ICD-10-CM

## 2023-12-19 DIAGNOSIS — I48.91 ATRIAL FIBRILLATION: ICD-10-CM

## 2023-12-19 DIAGNOSIS — R06.02 SOB (SHORTNESS OF BREATH): ICD-10-CM

## 2023-12-19 DIAGNOSIS — Z95.2 S/P TAVR (TRANSCATHETER AORTIC VALVE REPLACEMENT): ICD-10-CM

## 2023-12-19 DIAGNOSIS — I48.0 PAF (PAROXYSMAL ATRIAL FIBRILLATION): ICD-10-CM

## 2023-12-19 LAB
ANION GAP SERPL CALC-SCNC: 11 MMOL/L (ref 8–16)
BUN SERPL-MCNC: 18 MG/DL (ref 8–23)
CALCIUM SERPL-MCNC: 9.3 MG/DL (ref 8.7–10.5)
CHLORIDE SERPL-SCNC: 104 MMOL/L (ref 95–110)
CO2 SERPL-SCNC: 27 MMOL/L (ref 23–29)
CREAT SERPL-MCNC: 1.1 MG/DL (ref 0.5–1.4)
EST. GFR  (NO RACE VARIABLE): >60 ML/MIN/1.73 M^2
GLUCOSE SERPL-MCNC: 107 MG/DL (ref 70–110)
POTASSIUM SERPL-SCNC: 4.1 MMOL/L (ref 3.5–5.1)
SODIUM SERPL-SCNC: 142 MMOL/L (ref 136–145)

## 2023-12-19 PROCEDURE — 99213 OFFICE O/P EST LOW 20 MIN: CPT | Mod: ,,, | Performed by: INTERNAL MEDICINE

## 2023-12-19 PROCEDURE — 92960 CARDIOVERSION ELECTRIC EXT: CPT | Performed by: INTERNAL MEDICINE

## 2023-12-19 PROCEDURE — 93010 EKG 12-LEAD: ICD-10-PCS | Mod: ,,, | Performed by: INTERNAL MEDICINE

## 2023-12-19 PROCEDURE — 63600175 PHARM REV CODE 636 W HCPCS: Performed by: NURSE ANESTHETIST, CERTIFIED REGISTERED

## 2023-12-19 PROCEDURE — 93010 ELECTROCARDIOGRAM REPORT: CPT | Mod: ,,, | Performed by: INTERNAL MEDICINE

## 2023-12-19 PROCEDURE — 92960 CARDIOVERSION ELECTRIC EXT: CPT | Mod: ,,, | Performed by: INTERNAL MEDICINE

## 2023-12-19 PROCEDURE — 99213 PR OFFICE/OUTPT VISIT, EST, LEVL III, 20-29 MIN: ICD-10-PCS | Mod: ,,, | Performed by: INTERNAL MEDICINE

## 2023-12-19 PROCEDURE — 25000003 PHARM REV CODE 250: Performed by: NURSE ANESTHETIST, CERTIFIED REGISTERED

## 2023-12-19 PROCEDURE — 80048 BASIC METABOLIC PNL TOTAL CA: CPT | Performed by: INTERNAL MEDICINE

## 2023-12-19 PROCEDURE — 37000008 HC ANESTHESIA 1ST 15 MINUTES: Performed by: INTERNAL MEDICINE

## 2023-12-19 PROCEDURE — 92960 PR CARDIOVERSION, ELECTIVE;EXTERN: ICD-10-PCS | Mod: ,,, | Performed by: INTERNAL MEDICINE

## 2023-12-19 PROCEDURE — 93005 ELECTROCARDIOGRAM TRACING: CPT | Mod: 59

## 2023-12-19 RX ORDER — PROPOFOL 10 MG/ML
VIAL (ML) INTRAVENOUS
Status: DISCONTINUED | OUTPATIENT
Start: 2023-12-19 | End: 2023-12-19

## 2023-12-19 RX ADMIN — PROPOFOL 50 MG: 10 INJECTION, EMULSION INTRAVENOUS at 10:12

## 2023-12-19 RX ADMIN — PROPOFOL 20 MG: 10 INJECTION, EMULSION INTRAVENOUS at 10:12

## 2023-12-19 RX ADMIN — SODIUM CHLORIDE: 9 INJECTION, SOLUTION INTRAVENOUS at 10:12

## 2023-12-19 NOTE — ANESTHESIA POSTPROCEDURE EVALUATION
Anesthesia Post Evaluation    Patient: Roshan Menard    Procedure(s) Performed: Procedure(s) (LRB):  Cardioversion or Defibrillation (N/A)    Final Anesthesia Type: MAC      Patient location during evaluation: Cath Lab ([facility])  Patient participation: Yes- Able to Participate  Level of consciousness: awake and alert  Post-procedure vital signs: reviewed and stable  Pain management: adequate  Airway patency: patent    PONV status at discharge: No PONV  Anesthetic complications: no      Cardiovascular status: hemodynamically stable  Respiratory status: unassisted, room air and spontaneous ventilation  Hydration status: euvolemic  Follow-up not needed.              Vitals Value Taken Time   /80 12/19/23 0858   Temp 37.3 °C (99.1 °F) 12/19/23 0858   Pulse 103 12/19/23 0858   Resp 18 12/19/23 0858   SpO2 98 % 12/19/23 0858         No case tracking events are documented in the log.      Pain/Doni Score: No data recorded

## 2023-12-19 NOTE — TRANSFER OF CARE
"Anesthesia Transfer of Care Note    Patient: Roshan Menard    Procedure(s) Performed: Procedure(s) (LRB):  Cardioversion or Defibrillation (N/A)    Patient location: Other: [facility]    Anesthesia Type: MAC    Transport from OR: Transported from OR on room air with adequate spontaneous ventilation    Post pain: adequate analgesia    Post assessment: no apparent anesthetic complications    Post vital signs: stable    Level of consciousness: responds to stimulation and awake    Nausea/Vomiting: no nausea/vomiting    Complications: none    Transfer of care protocol was followed      Last vitals: Visit Vitals  BP (!) 143/80 (BP Location: Left arm, Patient Position: Lying)   Pulse 103   Temp 37.3 °C (99.1 °F) (Temporal)   Resp 18   Ht 6' 1" (1.854 m)   Wt 115.7 kg (255 lb)   SpO2 98%   BMI 33.64 kg/m²     "

## 2023-12-19 NOTE — PLAN OF CARE
Right FA saline lock discontinued with canula intact; tolerated well.   Pt ambulated in unit at baseline without issue.   Discharge instructions, home medication list, and post discharge follow up appointments discussed with pt and wife.  Discharged to wife, via wheelchair, in no apparent distress. All personal belongings taken with pt.   Encouraged continued compliance with MD directives.

## 2023-12-19 NOTE — ANESTHESIA PREPROCEDURE EVALUATION
12/19/2023  Roshan Menard is a 73 y.o., male.      Pre-op Assessment    I have reviewed the Patient Summary Reports.     I have reviewed the Nursing Notes. I have reviewed the NPO Status.   I have reviewed the Medications.     Review of Systems  Anesthesia Hx:  No problems with previous Anesthesia                Social:  Non-Smoker, No Alcohol Use       Cardiovascular:     Hypertension Valvular problems/Murmurs (s/p TAVR)  CAD    Dysrhythmias atrial fibrillation  Denies Angina. CHF    hyperlipidemia    Bilateral carotid artery stenosis     Atrial fibrillation with rapid ventricular response   Left axis deviation   Right bundle branch block   Abnormal ECG   When compared with ECG of 30-NOV-2023 14:12,   Previous ECG has undetermined rhythm, needs review     Referred By: MICHAEL SCHROEDER           Confirmed By:                            Pulmonary:   COPD    Denies Shortness of breath.  Sleep Apnea, CPAP                Renal/:  Renal/ Normal                 Hepatic/GI:     GERD, well controlled             Musculoskeletal:  Arthritis               Neurological:  Neurology Normal                                      Endocrine:  Diabetes, well controlled, type 2           Psych:  Psychiatric Normal                    Physical Exam  General: Well nourished, Cooperative, Alert and Oriented    Airway:  Mallampati: II   Mouth Opening: Normal  TM Distance: Normal  Tongue: Normal  Neck ROM: Normal ROM    Dental:  Intact        Anesthesia Plan  Type of Anesthesia, risks & benefits discussed:    Anesthesia Type: MAC  Intra-op Monitoring Plan: Standard ASA Monitors  Induction:  IV  Informed Consent: Informed consent signed with the Patient and all parties understand the risks and agree with anesthesia plan.  All questions answered.   ASA Score: 3  Day of Surgery Review of History & Physical: H&P Update referred to  the surgeon/provider.I have interviewed and examined the patient. I have reviewed the patient's H&P dated: There are no significant changes.     Ready For Surgery From Anesthesia Perspective.     .

## 2023-12-20 NOTE — DISCHARGE SUMMARY
[facility] (Hospital)  Discharge Note  Short Stay    Procedure(s) (LRB):  Cardioversion or Defibrillation (N/A)      OUTCOME: Patient tolerated treatment/procedure well without complication and is now ready for discharge.    DISPOSITION: Home or Self Care    FINAL DIAGNOSIS:  Pers AF - now in NSR post DCCV     FOLLOWUP: In clinic  Pat to assess QOL/Sx in NSR vs prior state while in AF     DISCHARGE INSTRUCTIONS:  No discharge procedures on file.     TIME SPENT ON DISCHARGE:   20 minutes

## 2023-12-26 ENCOUNTER — HOSPITAL ENCOUNTER (OUTPATIENT)
Dept: CARDIOLOGY | Facility: HOSPITAL | Age: 73
Discharge: HOME | End: 2023-12-26
Attending: INTERNAL MEDICINE
Payer: MEDICARE

## 2023-12-26 DIAGNOSIS — I48.0 PAF (PAROXYSMAL ATRIAL FIBRILLATION): ICD-10-CM

## 2023-12-26 DIAGNOSIS — Z01.818 PRE-OP TESTING: Primary | ICD-10-CM

## 2023-12-26 DIAGNOSIS — I48.19 PERSISTENT ATRIAL FIBRILLATION: ICD-10-CM

## 2023-12-26 DIAGNOSIS — R06.02 SOB (SHORTNESS OF BREATH): ICD-10-CM

## 2023-12-26 PROCEDURE — 93010 EKG 12-LEAD: ICD-10-PCS | Mod: ,,, | Performed by: INTERNAL MEDICINE

## 2023-12-26 PROCEDURE — 93005 ELECTROCARDIOGRAM TRACING: CPT | Mod: PO

## 2023-12-26 PROCEDURE — 93010 ELECTROCARDIOGRAM REPORT: CPT | Mod: ,,, | Performed by: INTERNAL MEDICINE

## 2023-12-27 ENCOUNTER — TELEPHONE (OUTPATIENT)
Dept: CARDIOLOGY | Facility: CLINIC | Age: 73
End: 2023-12-27
Payer: MEDICARE

## 2023-12-27 NOTE — TELEPHONE ENCOUNTER
Pt states that he feels fine. He states that he does not feel anything different. And pt is taking his eliquis and baby asa daily. He states his is not on any anti arrhythmia meds and he is willing to take it. pb    ----- Message from MARIBELL Urias sent at 12/27/2023  8:31 AM CST -----  Patient is back in AFIB    Can we find out if he felt better in nsr?    If he did, then we can try a different anti-arrhythmia medication to help maintain NSR.    THanks  Elena

## 2024-01-02 ENCOUNTER — OFFICE VISIT (OUTPATIENT)
Dept: CARDIOLOGY | Facility: CLINIC | Age: 74
End: 2024-01-02
Payer: MEDICARE

## 2024-01-02 VITALS
OXYGEN SATURATION: 98 % | DIASTOLIC BLOOD PRESSURE: 74 MMHG | SYSTOLIC BLOOD PRESSURE: 142 MMHG | WEIGHT: 257.38 LBS | HEART RATE: 92 BPM | HEIGHT: 73 IN | BODY MASS INDEX: 34.11 KG/M2

## 2024-01-02 DIAGNOSIS — Z95.2 HISTORY OF TRANSCATHETER AORTIC VALVE REPLACEMENT (TAVR): ICD-10-CM

## 2024-01-02 DIAGNOSIS — R60.0 EDEMA LEG: ICD-10-CM

## 2024-01-02 DIAGNOSIS — I35.0 NONRHEUMATIC AORTIC VALVE STENOSIS: ICD-10-CM

## 2024-01-02 DIAGNOSIS — I25.10 CORONARY ARTERY DISEASE INVOLVING NATIVE CORONARY ARTERY OF NATIVE HEART WITHOUT ANGINA PECTORIS: ICD-10-CM

## 2024-01-02 DIAGNOSIS — I48.0 PAF (PAROXYSMAL ATRIAL FIBRILLATION): ICD-10-CM

## 2024-01-02 DIAGNOSIS — I70.0 AORTIC ATHEROSCLEROSIS: Primary | ICD-10-CM

## 2024-01-02 DIAGNOSIS — I10 ESSENTIAL HYPERTENSION: ICD-10-CM

## 2024-01-02 DIAGNOSIS — I48.19 PERSISTENT ATRIAL FIBRILLATION: ICD-10-CM

## 2024-01-02 PROCEDURE — 99999 PR PBB SHADOW E&M-EST. PATIENT-LVL IV: CPT | Mod: PBBFAC,,, | Performed by: INTERNAL MEDICINE

## 2024-01-02 PROCEDURE — 99214 OFFICE O/P EST MOD 30 MIN: CPT | Mod: PBBFAC,PO | Performed by: INTERNAL MEDICINE

## 2024-01-02 PROCEDURE — 99215 OFFICE O/P EST HI 40 MIN: CPT | Mod: S$PBB,,, | Performed by: INTERNAL MEDICINE

## 2024-01-02 RX ORDER — METOPROLOL SUCCINATE 50 MG/1
50 TABLET, EXTENDED RELEASE ORAL 2 TIMES DAILY
Qty: 180 TABLET | Refills: 3 | Status: SHIPPED | OUTPATIENT
Start: 2024-01-02 | End: 2024-02-05 | Stop reason: SDUPTHER

## 2024-01-02 RX ORDER — METOPROLOL SUCCINATE 50 MG/1
50 TABLET, EXTENDED RELEASE ORAL DAILY
COMMUNITY
End: 2024-01-02 | Stop reason: SDUPTHER

## 2024-01-02 RX ORDER — AMLODIPINE BESYLATE 5 MG/1
5 TABLET ORAL DAILY
COMMUNITY

## 2024-01-02 NOTE — PROGRESS NOTES
Subjective:   Patient ID:  Roshan Menard is a 73 y.o. male who presents for follow-up of Follow-up      HPI73 yo WM with persistent AF, CAD and previous TAVR. Was cardioverted 12- but within a few days was back in AF. States this was his fourth cardioversion. Is currently followed by EP DR Duggan. Patient states he feels well. Has lower extremity edema especially left leg. No SOB or CP.       Cardioverted for persistent atrial fib 12-     Summary    The left ventricle is normal in size with normal systolic function.  The estimated ejection fraction is 65%.  Grade II left ventricular diastolic dysfunction.  Normal right ventricular size with normal right ventricular systolic function.  Mild left atrial enlargement.  There is a 26 mm Medtronic EVolutFX transcutaneously-placed aortic bioprosthesis present. There is trivial paravalvular aortic insufficiency present.  The aortic valve mean gradient is 10 mmHg with a dimensionless index of 0.49.  Mild tricuspid regurgitation.  The estimated PA systolic pressure is 32 mmHg.  Normal central venous pressure (3 mmHg).  Trivial lateral pericardial effusion.      Summary     3-2023 Known previous stents    The Prox LAD lesion was 50% stenosed.    The Prox RCA lesion was 50% stenosed.    The ejection fraction was calculated to be 60%.    The pre-procedure left ventricular end diastolic pressure was 25.    The post-procedure left ventricular end diastolic pressure was 28.    The estimated blood loss was none.    There was two vessel coronary artery disease.    There was diastolic dysfunction.    There was no mitral valve stenosis.    There was moderate aortic valve stenosis.    There was no mitral valve prolapse evident. The annulus was normal. There was normal mitral valve motion.    5-2023  Summary:     Successful transfemoral aortic valve replacement with a 26 mm EvolutFX valve.  Acute on chronic diastolic heart failure with LVEDP 30 mmHg pre procedure      Recommendations:     Routine post TAVR care  Transfer to CCU  EP consultation  Physical therapy for early ambulation  Antibiotics X 3 doses  Antithrombotics: Apixaban only    Review of Systems   Constitutional: Negative for decreased appetite, fever, malaise/fatigue, weight gain and weight loss.   HENT:  Negative for hearing loss and nosebleeds.    Eyes:  Negative for visual disturbance.   Cardiovascular:  Positive for irregular heartbeat and leg swelling. Negative for chest pain, claudication, cyanosis, dyspnea on exertion, near-syncope, orthopnea, palpitations, paroxysmal nocturnal dyspnea and syncope.   Respiratory:  Negative for cough, hemoptysis, shortness of breath, sleep disturbances due to breathing, snoring and wheezing.    Endocrine: Negative for cold intolerance, heat intolerance, polydipsia and polyuria.   Hematologic/Lymphatic: Negative for adenopathy and bleeding problem. Does not bruise/bleed easily.   Skin:  Negative for color change, itching, poor wound healing, rash and suspicious lesions.   Musculoskeletal:  Negative for arthritis, back pain, falls, joint pain, joint swelling, muscle cramps, muscle weakness and myalgias.   Gastrointestinal:  Negative for bloating, abdominal pain, change in bowel habit, constipation, flatus, heartburn, hematemesis, hematochezia, hemorrhoids, jaundice, melena, nausea and vomiting.   Genitourinary:  Negative for bladder incontinence, decreased libido, frequency, hematuria, hesitancy and urgency.   Neurological:  Negative for brief paralysis, difficulty with concentration, excessive daytime sleepiness, dizziness, focal weakness, headaches, light-headedness, loss of balance, numbness, vertigo and weakness.   Psychiatric/Behavioral:  Negative for altered mental status, depression and memory loss. The patient does not have insomnia and is not nervous/anxious.    Allergic/Immunologic: Negative for environmental allergies, hives and persistent infections.  "      Objective:   Physical Exam  Constitutional:       General: He is not in acute distress.     Appearance: He is well-developed. He is obese. He is not diaphoretic.      Comments: BP (!) 142/74   Pulse 92   Ht 6' 1" (1.854 m)   Wt 116.8 kg (257 lb 6.4 oz)   SpO2 98%   BMI 33.96 kg/m²      HENT:      Head: Normocephalic and atraumatic.   Eyes:      General: Lids are normal. No scleral icterus.        Right eye: No discharge.      Conjunctiva/sclera: Conjunctivae normal.      Pupils: Pupils are equal, round, and reactive to light.   Neck:      Thyroid: No thyromegaly.      Vascular: No JVD.      Trachea: No tracheal deviation.   Cardiovascular:      Rate and Rhythm: Tachycardia present. Rhythm irregularly irregular.      Pulses: Intact distal pulses.           Carotid pulses are 2+ on the right side and 2+ on the left side.       Radial pulses are 2+ on the right side and 2+ on the left side.        Femoral pulses are 2+ on the right side and 2+ on the left side.       Popliteal pulses are 2+ on the right side and 2+ on the left side.        Dorsalis pedis pulses are 2+ on the right side and 2+ on the left side.        Posterior tibial pulses are 2+ on the right side and 2+ on the left side.      Heart sounds: Normal heart sounds, S1 normal and S2 normal. No murmur heard.     No friction rub. No gallop.   Pulmonary:      Effort: Pulmonary effort is normal. No respiratory distress.      Breath sounds: Normal breath sounds. No wheezing or rales.   Chest:      Chest wall: No tenderness.   Abdominal:      General: Bowel sounds are normal. There is no distension.      Palpations: Abdomen is soft. There is no hepatomegaly or mass.      Tenderness: There is no abdominal tenderness. There is no guarding or rebound.   Musculoskeletal:         General: No tenderness. Normal range of motion.      Cervical back: Normal range of motion and neck supple.      Right lower leg: Edema present.      Left lower leg: Edema " present.   Lymphadenopathy:      Cervical: No cervical adenopathy.   Skin:     General: Skin is warm and dry.      Coloration: Skin is not pale.      Findings: No erythema or rash.   Neurological:      Mental Status: He is alert and oriented to person, place, and time.      Cranial Nerves: No cranial nerve deficit.      Coordination: Coordination normal.      Deep Tendon Reflexes: Reflexes are normal and symmetric.   Psychiatric:         Speech: Speech normal.         Behavior: Behavior normal.         Thought Content: Thought content normal.         Judgment: Judgment normal.         Assessment:      1. Aortic atherosclerosis    2. PAF (paroxysmal atrial fibrillation)    3. Coronary artery disease involving native coronary artery of native heart without angina pectoris    4. Essential hypertension    5. Persistent atrial fibrillation    6. History of transcatheter aortic valve replacement (TAVR)    7. Nonrheumatic aortic valve stenosis    8. Edema leg        Plan:   Risk of stroke from atrial fib has been discussed as well as bleeding risk from alternative anticoagulation regiments as well as risk and benefits of rate control vs cardioversion   Increase metoprolol to BID for better rate control. Increase lasix to 40 mg   Low salt diet. States has not done well with salt during the holidays   Has a follow up with EP.     Orders Placed This Encounter   Procedures    IN OFFICE EKG 12-LEAD (to [facility])     Follow up in about 6 months (around 7/2/2024).

## 2024-01-04 VITALS
DIASTOLIC BLOOD PRESSURE: 78 MMHG | WEIGHT: 255 LBS | TEMPERATURE: 99 F | HEIGHT: 73 IN | SYSTOLIC BLOOD PRESSURE: 138 MMHG | RESPIRATION RATE: 18 BRPM | BODY MASS INDEX: 33.8 KG/M2 | OXYGEN SATURATION: 97 % | HEART RATE: 77 BPM

## 2024-01-19 ENCOUNTER — HOSPITAL ENCOUNTER (OUTPATIENT)
Dept: CARDIOLOGY | Facility: HOSPITAL | Age: 74
Discharge: HOME | End: 2024-01-19
Attending: INTERNAL MEDICINE
Payer: MEDICARE

## 2024-01-19 DIAGNOSIS — R06.02 SOB (SHORTNESS OF BREATH): ICD-10-CM

## 2024-01-19 DIAGNOSIS — I48.0 PAF (PAROXYSMAL ATRIAL FIBRILLATION): ICD-10-CM

## 2024-01-19 DIAGNOSIS — I48.19 PERSISTENT ATRIAL FIBRILLATION: ICD-10-CM

## 2024-01-19 PROCEDURE — 93010 ELECTROCARDIOGRAM REPORT: CPT | Mod: ,,, | Performed by: INTERNAL MEDICINE

## 2024-01-19 PROCEDURE — 93005 ELECTROCARDIOGRAM TRACING: CPT | Mod: PO

## 2024-02-04 NOTE — TELEPHONE ENCOUNTER
Care Due:                  Date            Visit Type   Department     Provider  --------------------------------------------------------------------------------                                [facility]  Last Visit: 05-      FOLLOW UP    MEDICINE       Catrina Yanez  Next Visit: None Scheduled  None         None Found                                                            Last  Test          Frequency    Reason                     Performed    Due Date  --------------------------------------------------------------------------------    Uric Acid...  12 months..  allopurinoL..............  01- 01-    [facility] Embedded Care Due Messages. Reference number: [id number].   2/04/2024 2:15:26 PM CST

## 2024-02-05 ENCOUNTER — OFFICE VISIT (OUTPATIENT)
Dept: CARDIOLOGY | Facility: CLINIC | Age: 74
End: 2024-02-05
Payer: MEDICARE

## 2024-02-05 ENCOUNTER — HOSPITAL ENCOUNTER (OUTPATIENT)
Dept: CARDIOLOGY | Facility: HOSPITAL | Age: 74
Discharge: HOME | End: 2024-02-05
Attending: INTERNAL MEDICINE
Payer: MEDICARE

## 2024-02-05 VITALS
OXYGEN SATURATION: 97 % | SYSTOLIC BLOOD PRESSURE: 140 MMHG | BODY MASS INDEX: 33.68 KG/M2 | WEIGHT: 255.31 LBS | HEART RATE: 119 BPM | DIASTOLIC BLOOD PRESSURE: 70 MMHG

## 2024-02-05 DIAGNOSIS — I70.0 AORTIC ATHEROSCLEROSIS: ICD-10-CM

## 2024-02-05 DIAGNOSIS — E11.51 TYPE 2 DIABETES MELLITUS WITH DIABETIC PERIPHERAL ANGIOPATHY WITHOUT GANGRENE, UNSPECIFIED WHETHER LONG TERM INSULIN USE: ICD-10-CM

## 2024-02-05 DIAGNOSIS — I51.89 DIASTOLIC DYSFUNCTION: ICD-10-CM

## 2024-02-05 DIAGNOSIS — I48.19 PERSISTENT ATRIAL FIBRILLATION: Primary | ICD-10-CM

## 2024-02-05 DIAGNOSIS — Z95.2 HISTORY OF TRANSCATHETER AORTIC VALVE REPLACEMENT (TAVR): ICD-10-CM

## 2024-02-05 DIAGNOSIS — E78.2 MIXED HYPERLIPIDEMIA: ICD-10-CM

## 2024-02-05 DIAGNOSIS — I48.0 PAF (PAROXYSMAL ATRIAL FIBRILLATION): ICD-10-CM

## 2024-02-05 DIAGNOSIS — I50.32 CHRONIC DIASTOLIC HEART FAILURE: ICD-10-CM

## 2024-02-05 DIAGNOSIS — I50.33 ACUTE ON CHRONIC DIASTOLIC (CONGESTIVE) HEART FAILURE: ICD-10-CM

## 2024-02-05 DIAGNOSIS — I35.0 NONRHEUMATIC AORTIC VALVE STENOSIS: ICD-10-CM

## 2024-02-05 DIAGNOSIS — I20.81 ANGINA PECTORIS WITH CORONARY MICROVASCULAR DYSFUNCTION: ICD-10-CM

## 2024-02-05 DIAGNOSIS — I48.19 PERSISTENT ATRIAL FIBRILLATION: ICD-10-CM

## 2024-02-05 DIAGNOSIS — R06.02 SOB (SHORTNESS OF BREATH): ICD-10-CM

## 2024-02-05 PROCEDURE — 99999 PR PBB SHADOW E&M-EST. PATIENT-LVL III: CPT | Mod: PBBFAC,,, | Performed by: NURSE PRACTITIONER

## 2024-02-05 PROCEDURE — 99213 OFFICE O/P EST LOW 20 MIN: CPT | Mod: PBBFAC | Performed by: NURSE PRACTITIONER

## 2024-02-05 PROCEDURE — 93010 ELECTROCARDIOGRAM REPORT: CPT | Mod: ,,, | Performed by: INTERNAL MEDICINE

## 2024-02-05 PROCEDURE — 99214 OFFICE O/P EST MOD 30 MIN: CPT | Mod: S$PBB,,, | Performed by: NURSE PRACTITIONER

## 2024-02-05 PROCEDURE — 93005 ELECTROCARDIOGRAM TRACING: CPT

## 2024-02-05 RX ORDER — ALLOPURINOL 300 MG/1
TABLET ORAL
Qty: 90 TABLET | Refills: 3 | Status: SHIPPED | OUTPATIENT
Start: 2024-02-05

## 2024-02-05 RX ORDER — METOPROLOL SUCCINATE 25 MG/1
75 TABLET, EXTENDED RELEASE ORAL 2 TIMES DAILY
Qty: 180 TABLET | Refills: 11 | Status: SHIPPED | OUTPATIENT
Start: 2024-02-05

## 2024-02-05 NOTE — PROGRESS NOTES
Subjective:   Patient ID:  Roshan Menard is a 74 y.o. male who presents for evaluation of No chief complaint on file.      HPI    Roshan Menard is a 74 year old male who presents to Ar[facility]ia clinic for hospital follow up.    His current medical conditions include PAF on Eliquis/BB, HTN, HLP, JOHNATHAN, DM Type II, h/o TAVR, CHF, Temporal arteritis.     Recently his BB was increased to Toprol XL 50 mg BID, minimally noted improvement with V rate since that time. Of note, he is taking Prednisone 2.5 mg daily.     Denies chest pain or anginal equivalents. No shortness of breath, GLASER or palpitations. Denies orthopnea, PND or abdominal bloating. Reports regular walking without any issues lately. NO leg swelling or claudications. No recent falls, syncope or near syncopal events. Reports compliance with medications and dietary restrictions. NO CNS complaints to suggest TIA or CVA today. No signs of abnormal bleeding on eliquis.     Past Medical History:   Diagnosis Date    Allergy     Anticoagulant long-term use     Aortic stenosis     Arthritis     Asthma     Atrial fibrillation     Bronchitis     Cataract     COPD (chronic obstructive pulmonary disease)     Coronary artery disease     stent    General anesthetics causing adverse effect in therapeutic use     hard to awaken    GERD (gastroesophageal reflux disease)     Gout, chronic     Hypertension     Mixed hyperlipidemia     Sleep apnea     Type 2 diabetes mellitus with diabetic peripheral angiopathy without gangrene, unspecified whether long term insulin use 05/02/2023       Past Surgical History:   Procedure Laterality Date    ANGIOGRAM, CORONARY, WITH LEFT HEART CATHETERIZATION  09/27/2021    Procedure: Angiogram, Coronary, with Left Heart Cath;  Surgeon: Vincent Gonzalez MD;  Location: [facility];  Service: Cardiology;;    BACK SURGERY      L3-5; ruptured disc; laminectomy x 2 surgery    CARDIAC CATH COSURGEON N/A 05/16/2023    Procedure: Cardiac Cath Cosurgeon;   Surgeon: Holly Pleitez MD;  Location: [facility] CATH LAB;  Service: Cardiothoracic;  Laterality: N/A;    CARDIAC VALVE REPLACEMENT  23    TAVR    CARDIAC VALVE SURGERY      CARDIOVERSION      CHOLECYSTECTOMY      CORONARY ANGIOPLASTY WITH STENT PLACEMENT      EYE SURGERY      Cateract    JOINT REPLACEMENT      RT  KNEE    LEFT HEART CATHETERIZATION Left 2023    Procedure: Left heart cath;  Surgeon: Aleksey Krishnan MD;  Location: [facility] CATH LAB;  Service: Cardiology;  Laterality: Left;    SPINE SURGERY      Lamanectomy     TONSILLECTOMY      TOTAL KNEE ARTHROPLASTY      TRANSCATHETER AORTIC VALVE REPLACEMENT (TAVR) N/A 2023    Procedure: REPLACEMENT, AORTIC VALVE, TRANSCATHETER (TAVR);  Surgeon: Luis Enrique Ji MD;  Location: [facility] CATH LAB;  Service: Cardiology;  Laterality: N/A;    TREATMENT OF CARDIAC ARRHYTHMIA N/A 2023    Procedure: Cardioversion or Defibrillation;  Surgeon: Theo Ferreira MD;  Location: [facility] CATH LAB;  Service: Cardiology;  Laterality: N/A;    VASECTOMY         Social History     Tobacco Use    Smoking status: Former     Current packs/day: 0.00     Average packs/day: 1.5 packs/day for 22.0 years (33.0 ttl pk-yrs)     Types: Cigarettes     Start date: 1970     Quit date: 1992     Years since quittin.7    Smokeless tobacco: Never   Substance Use Topics    Alcohol use: No    Drug use: No       Family History   Problem Relation Age of Onset    Cancer Mother     Early death Mother     Arthritis Father     Diabetes Father     Heart disease Father     Hyperlipidemia Father     Hypertension Father        Wt Readings from Last 3 Encounters:   24 115.8 kg (255 lb 4.7 oz)   24 116.8 kg (257 lb 6.4 oz)   23 115.7 kg (255 lb)     Temp Readings from Last 3 Encounters:   23 99 °F (37.2 °C) (Temporal)   23 97.8 °F (36.6 °C) (Tympanic)   23 97.9 °F (36.6 °C)     BP Readings from Last 3 Encounters:    24 (!) 140/70   24 (!) 142/74   23 138/78     Pulse Readings from Last 3 Encounters:   24 (!) 119   24 92   23 77       Current Outpatient Medications on File Prior to Visit   Medication Sig Dispense Refill    albuterol (PROVENTIL/VENTOLIN HFA) 90 mcg/actuation inhaler Inhale 2 puffs into the lungs every 6 (six) hours as needed for Wheezing or Shortness of Breath. Rescue 18 g 11    allopurinoL (ZYLOPRIM) 300 MG tablet TAKE 1 TABLET BY MOUTH EVERY DAY 90 tablet 2    amLODIPine (NORVASC) 5 MG tablet Take 5 mg by mouth once daily.      apixaban (ELIQUIS) 5 mg Tab Take 1 tablet (5 mg total) by mouth 2 (two) times daily. 180 tablet 3    aspirin (ECOTRIN) 81 MG EC tablet Take 1 tablet (81 mg total) by mouth once daily.      atorvastatin (LIPITOR) 40 MG tablet TK 1 T PO QD 90 tablet 3    cloNIDine (CATAPRES) 0.1 MG tablet Take 1 tablet (0.1 mg total) by mouth every 6 (six) hours as needed (for systolic blood pressure greater than 165). 60 tablet 11    coenzyme Q10 100 mg capsule Take 200 mg by mouth once daily.      fluocinonide (LIDEX) 0.05 % gel SMARTSI-2 Gram(s) Topical Twice Daily      fluticasone furoate-vilanteroL (BREO ELLIPTA) 200-25 mcg/dose DsDv diskus inhaler INHALE 1 PUFF BY MOUTH AT THE SAME TIME EVERY DAY 30 each 11    furosemide (LASIX) 20 MG tablet Take 1 tablet (20 mg total) by mouth once daily. 90 tablet 3    ketoconazole (NIZORAL) 2 % cream Apply topically.      lactobacillus comb no.10 (PROBIOTIC) 20 billion cell Cap Take 1 capsule by mouth once daily. Or as directed on bottle      montelukast (SINGULAIR) 10 mg tablet Take 1 tablet (10 mg total) by mouth once daily. 30 tablet 1    multivitamin (THERAGRAN) per tablet Take 1 tablet by mouth once daily.      omeprazole (PRILOSEC) 10 MG capsule Take 10 mg by mouth once daily.      potassium chloride (KLOR-CON) 10 MEQ TbSR TK 1 T PO ONCE A DAY. 90 tablet 3    predniSONE (DELTASONE) 2.5 MG tablet Take 5 mg by mouth  once daily.      telmisartan (MICARDIS) 80 MG Tab TAKE 1 TABLET(80 MG) BY MOUTH EVERY DAY 90 tablet 3    tiotropium bromide (SPIRIVA RESPIMAT) 2.5 mcg/actuation inhaler Inhale 2 puffs into the lungs Daily. Controller 4 g 11    TOBRADEX 0.3-0.1 % Oint       urea (CARMOL) 40 % Crea 2 (two) times daily as needed.  0    [DISCONTINUED] metoprolol succinate (TOPROL-XL) 50 MG 24 hr tablet Take 1 tablet (50 mg total) by mouth 2 (two) times daily. 180 tablet 3    albuterol-ipratropium (DUO-NEB) 2.5 mg-0.5 mg/3 mL nebulizer solution Take 3 mLs by nebulization every 6 (six) hours as needed for Wheezing or Shortness of Breath. Rescue 120 vial 5     Current Facility-Administered Medications on File Prior to Visit   Medication Dose Route Frequency Provider Last Rate Last Admin    sodium chloride 0.9% flush 10 mL  10 mL Intravenous PRN Luis Enrique Rangel MD           Holter monitor - 48 hour    Result Date: 12/15/2023    Heart rates varied between 50 and 169 BPM with an average of 95 BPM.   Atrial fibrillation    There were very rare PVCs totalling 135 and averaging 2.81 per hour.        Cardiac event monitor    Result Date: 6/28/2023  At baseline, in the absence of symptoms, the rhythm was sinus at 78 beats   per minute, with a wide QRS complex (persistent).  No symptom was   reported.  There were several auto trigger events without any symptom   reported.  These all showed sinus rhythm with rates ranging between 75 and   126 beats per minute.  There were PACs and a period of atrial bigeminy.    Once again, no symptom was reported.          Display both the narrative and impression [both]   Sinus rhythm with PACs.  See above for detail.       Results for orders placed during the hospital encounter of 06/15/23    Echo    Interpretation Summary  · The left ventricle is normal in size with normal systolic function.  · The estimated ejection fraction is 65%.  · Grade II left ventricular diastolic dysfunction.  · Normal right  ventricular size with normal right ventricular systolic function.  · Mild left atrial enlargement.  · There is a 26 mm Medtronic EVolutFX transcutaneously-placed aortic bioprosthesis present. There is trivial paravalvular aortic insufficiency present.  · The aortic valve mean gradient is 10 mmHg with a dimensionless index of 0.49.  · Mild tricuspid regurgitation.  · The estimated PA systolic pressure is 32 mmHg.  · Normal central venous pressure (3 mmHg).  · Trivial lateral pericardial effusion.    Results for orders placed during the hospital encounter of 03/17/23    Nuclear Stress - Cardiology Interpreted    Interpretation Summary    Abnormal myocardial perfusion scan.    There is a moderate to severe intensity, small to moderate sized, reversible perfusion abnormality that is consistent with ischemia in the basal to mid inferolateral wall(s).    There are no other significant perfusion abnormalities.    The gated perfusion images showed an ejection fraction of 72% post stress.    There is normal wall motion post stress.    LV cavity size is normal at rest and normal at stress.    The ECG portion of the study is negative for ischemia.    The patient reported no chest pain during the stress test.        Results for orders placed or performed during the hospital encounter of 01/19/24   SCHEDULED EKG 12-LEAD (to Muse)    Collection Time: 01/19/24  2:28 PM    Narrative    Test Reason : I48.0,R06.02,I48.19,    Vent. Rate : 098 BPM     Atrial Rate : 119 BPM     P-R Int : 000 ms          QRS Dur : 138 ms      QT Int : 324 ms       P-R-T Axes : 000 -76 037 degrees     QTc Int : 413 ms    Atrial fibrillation  Left axis deviation  Right bundle branch block  Abnormal ECG  When compared with ECG of 26-DEC-2023 11:08,  QT has shortened  Confirmed by RACHELLE FULTON MD ([id number]) on 1/19/2024 4:11:23 PM    Referred By: MICHAEL SCHROEDER           Confirmed By:RACHELLE FULTON MD         Review of Systems   Constitutional: Positive  for malaise/fatigue.   HENT:  Negative for hearing loss and hoarse voice.    Eyes:  Negative for blurred vision and visual disturbance.   Cardiovascular:  Positive for irregular heartbeat. Negative for chest pain, claudication, dyspnea on exertion, leg swelling, near-syncope, orthopnea, palpitations, paroxysmal nocturnal dyspnea and syncope.   Respiratory:  Negative for cough, hemoptysis, shortness of breath, sleep disturbances due to breathing, snoring and wheezing.    Endocrine: Negative for cold intolerance and heat intolerance.   Hematologic/Lymphatic: Does not bruise/bleed easily.   Skin:  Negative for color change, dry skin and nail changes.   Musculoskeletal:  Positive for arthritis and back pain. Negative for joint pain and myalgias.   Gastrointestinal:  Negative for bloating, abdominal pain, constipation, nausea and vomiting.   Genitourinary:  Negative for dysuria, flank pain, hematuria and hesitancy.   Neurological:  Negative for headaches, light-headedness, loss of balance, numbness, paresthesias and weakness.   Psychiatric/Behavioral:  Negative for altered mental status.    Allergic/Immunologic: Negative for environmental allergies.         Objective:BP (!) 140/70 (BP Location: Left arm, Patient Position: Sitting)   Pulse (!) 119   Wt 115.8 kg (255 lb 4.7 oz)   SpO2 97%   BMI 33.68 kg/m²      Physical Exam  Vitals and nursing note reviewed.   Constitutional:       General: He is not in acute distress.     Appearance: Normal appearance. He is well-developed. He is obese. He is not ill-appearing.   HENT:      Head: Normocephalic and atraumatic.      Nose: Nose normal.      Mouth/Throat:      Mouth: Mucous membranes are moist.   Eyes:      Pupils: Pupils are equal, round, and reactive to light.   Neck:      Thyroid: No thyromegaly.      Vascular: No JVD.      Trachea: No tracheal deviation.   Cardiovascular:      Rate and Rhythm: Tachycardia present. Rhythm irregularly irregular.      Chest Wall: PMI is  not displaced.      Pulses: Intact distal pulses.           Radial pulses are 2+ on the right side and 2+ on the left side.        Dorsalis pedis pulses are 2+ on the right side and 2+ on the left side.      Heart sounds: S1 normal and S2 normal. Heart sounds not distant. No murmur heard.     Comments: AFIB, V rate 100  Pulmonary:      Effort: Pulmonary effort is normal. No respiratory distress.      Breath sounds: Normal breath sounds. No wheezing.   Abdominal:      General: Bowel sounds are normal. There is no distension.      Palpations: Abdomen is soft.      Tenderness: There is no abdominal tenderness.   Musculoskeletal:         General: No swelling. Normal range of motion.      Cervical back: Full passive range of motion without pain, normal range of motion and neck supple.      Right lower leg: No edema.      Left lower leg: No edema.      Right ankle: No swelling.      Left ankle: No swelling.   Skin:     General: Skin is warm and dry.      Capillary Refill: Capillary refill takes less than 2 seconds.      Nails: There is no clubbing.   Neurological:      General: No focal deficit present.      Mental Status: He is alert and oriented to person, place, and time.   Psychiatric:         Speech: Speech normal.         Behavior: Behavior normal.         Thought Content: Thought content normal.         Judgment: Judgment normal.         Lab Results   Component Value Date    CHOL 156 04/03/2023    CHOL 187 01/20/2023    CHOL 147 03/30/2022     Lab Results   Component Value Date    HDL 39 (L) 04/03/2023    HDL 40 01/20/2023    HDL 35 (L) 03/30/2022     Lab Results   Component Value Date    LDLCALC 89.6 04/03/2023    LDLCALC 101.4 01/20/2023    LDLCALC 76.2 03/30/2022     Lab Results   Component Value Date    TRIG 137 04/03/2023    TRIG 228 (H) 01/20/2023    TRIG 179 (H) 03/30/2022     Lab Results   Component Value Date    CHOLHDL 25.0 04/03/2023    CHOLHDL 21.4 01/20/2023    CHOLHDL 23.8 03/30/2022       Chemistry         Component Value Date/Time     12/19/2023 0902    K 4.1 12/19/2023 0902     12/19/2023 0902    CO2 27 12/19/2023 0902    BUN 18 12/19/2023 0902    CREATININE 1.1 12/19/2023 0902     12/19/2023 0902        Component Value Date/Time    CALCIUM 9.3 12/19/2023 0902    ALKPHOS 75 05/17/2023 0519    AST 28 05/17/2023 0519    ALT 26 05/17/2023 0519    BILITOT 0.6 05/17/2023 0519    ESTGFRAFRICA >60.0 03/30/2022 0736    EGFRNONAA >60.0 03/30/2022 0736          Lab Results   Component Value Date    TSH 1.648 01/28/2013     Lab Results   Component Value Date    INR 1.0 05/16/2023    INR 1.1 04/17/2023    INR 1.1 03/27/2023     Lab Results   Component Value Date    WBC 10.45 06/15/2023    HGB 13.7 (L) 06/15/2023    HCT 44.3 06/15/2023    MCV 90 06/15/2023     06/15/2023          Assessment:      1. Persistent atrial fibrillation    2. Chronic diastolic heart failure    3. Acute on chronic diastolic (congestive) heart failure    4. Angina pectoris with coronary microvascular dysfunction    5. Mixed hyperlipidemia    6. PAF (paroxysmal atrial fibrillation)    7. Nonrheumatic aortic valve stenosis    8. History of transcatheter aortic valve replacement (TAVR)    9. Aortic atherosclerosis    10. Diastolic dysfunction    11. Type 2 diabetes mellitus with diabetic peripheral angiopathy without gangrene, unspecified whether long term insulin use        Plan:     Continue Eliquis for cardio-embolic protection  Increase Toprol XL 75 mg BID  Continue all other medications for now  Dash diet 2 gm sodium restriction  Profile BP at home  Phone review in 2 weeks      Nicole May, FNP-C [facility]

## 2024-02-05 NOTE — TELEPHONE ENCOUNTER
Refill Routing Note   Medication(s) are not appropriate for processing by [facility] for the following reason(s):        Required labs outdated    [facility] action(s):  Defer     Requires labs : Yes             Appointments  past 12m or future 3m with PCP    Date Provider   Last Visit   5/24/2023 Catrina Yanez MD   Next Visit   Visit date not found Catrina Yanez MD   ED visits in past 90 days: 0        Note composed:4:08 AM 02/05/2024

## 2024-02-06 LAB
OHS QRS DURATION: 136 MS
OHS QTC CALCULATION: 492 MS

## 2024-02-19 ENCOUNTER — PATIENT MESSAGE (OUTPATIENT)
Dept: CARDIOLOGY | Facility: CLINIC | Age: 74
End: 2024-02-19
Payer: MEDICARE

## 2024-02-19 ENCOUNTER — TELEPHONE (OUTPATIENT)
Dept: CARDIOLOGY | Facility: CLINIC | Age: 74
End: 2024-02-19
Payer: MEDICARE

## 2024-02-19 NOTE — TELEPHONE ENCOUNTER
Called patient to let him now to continue with increased doses of mentioned medication and to follow up in clinic in 3 months or sooner if needed. Patient verbalized understanding. No questions or concerns. KA    Called patient for phone review. Patient stated that he is taking Toprol XL 75mg in the morning and 50mg at night. His SBP are running 140s and DBP are running in the 70s. He did not provide me with an actual log but stated that he get his readings everyday. Patient verbalized no SOB, chest pains, or palpitations. He also stated that he has cut down on caffine and that seems to be helping a lot. He said that he is also still taking the amlodipine at night as well. Ans wants to know where he goes from here. Please advise.

## 2024-02-27 DIAGNOSIS — Z00.00 ENCOUNTER FOR MEDICARE ANNUAL WELLNESS EXAM: ICD-10-CM

## 2024-02-28 ENCOUNTER — OFFICE VISIT (OUTPATIENT)
Dept: FAMILY MEDICINE | Facility: CLINIC | Age: 74
End: 2024-02-28
Payer: MEDICARE

## 2024-02-28 VITALS
HEART RATE: 60 BPM | DIASTOLIC BLOOD PRESSURE: 77 MMHG | TEMPERATURE: 98 F | HEIGHT: 73 IN | WEIGHT: 256 LBS | BODY MASS INDEX: 33.93 KG/M2 | SYSTOLIC BLOOD PRESSURE: 139 MMHG

## 2024-02-28 DIAGNOSIS — I50.32 CHRONIC DIASTOLIC (CONGESTIVE) HEART FAILURE: ICD-10-CM

## 2024-02-28 DIAGNOSIS — E11.51 TYPE 2 DIABETES MELLITUS WITH DIABETIC PERIPHERAL ANGIOPATHY WITHOUT GANGRENE, UNSPECIFIED WHETHER LONG TERM INSULIN USE: ICD-10-CM

## 2024-02-28 DIAGNOSIS — R91.1 PULMONARY NODULE: ICD-10-CM

## 2024-02-28 DIAGNOSIS — I35.0 NONRHEUMATIC AORTIC VALVE STENOSIS: ICD-10-CM

## 2024-02-28 DIAGNOSIS — E78.2 MIXED HYPERLIPIDEMIA: ICD-10-CM

## 2024-02-28 DIAGNOSIS — I10 ESSENTIAL HYPERTENSION: Primary | ICD-10-CM

## 2024-02-28 DIAGNOSIS — M31.6 TEMPORAL ARTERITIS: ICD-10-CM

## 2024-02-28 DIAGNOSIS — J45.40 MODERATE PERSISTENT ASTHMA WITHOUT COMPLICATION: ICD-10-CM

## 2024-02-28 DIAGNOSIS — I48.0 PAF (PAROXYSMAL ATRIAL FIBRILLATION): ICD-10-CM

## 2024-02-28 DIAGNOSIS — J30.9 ALLERGIC RHINITIS, UNSPECIFIED SEASONALITY, UNSPECIFIED TRIGGER: ICD-10-CM

## 2024-02-28 DIAGNOSIS — I25.10 CORONARY ARTERY DISEASE INVOLVING NATIVE CORONARY ARTERY OF NATIVE HEART WITHOUT ANGINA PECTORIS: ICD-10-CM

## 2024-02-28 DIAGNOSIS — K21.9 GASTROESOPHAGEAL REFLUX DISEASE, UNSPECIFIED WHETHER ESOPHAGITIS PRESENT: ICD-10-CM

## 2024-02-28 PROCEDURE — 99214 OFFICE O/P EST MOD 30 MIN: CPT | Mod: S$PBB,,, | Performed by: PHYSICIAN ASSISTANT

## 2024-02-28 PROCEDURE — 99999 PR PBB SHADOW E&M-EST. PATIENT-LVL V: CPT | Mod: PBBFAC,,, | Performed by: PHYSICIAN ASSISTANT

## 2024-02-28 PROCEDURE — 99215 OFFICE O/P EST HI 40 MIN: CPT | Mod: PBBFAC,PO | Performed by: PHYSICIAN ASSISTANT

## 2024-02-28 RX ORDER — MONTELUKAST SODIUM 10 MG/1
10 TABLET ORAL DAILY
Qty: 90 TABLET | Refills: 3 | Status: SHIPPED | OUTPATIENT
Start: 2024-02-28

## 2024-02-28 RX ORDER — HYDROGEN PEROXIDE 3 %
20 SOLUTION, NON-ORAL MISCELLANEOUS DAILY PRN
COMMUNITY

## 2024-02-28 RX ORDER — NAPROXEN 500 MG/1
500 TABLET ORAL
COMMUNITY
Start: 2024-01-14

## 2024-02-28 NOTE — PROGRESS NOTES
Assessment/Plan:    Problem List Items Addressed This Visit          Pulmonary    Moderate persistent asthma without complication    Overview     -managed by pulmonology  -remains on breo and spiriva  -also on daily singulair  -respiratory symptoms stable         Relevant Medications    montelukast (SINGULAIR) 10 mg tablet    Pulmonary nodule    Overview     -incidental finding on previous CT  -CTA April 2023- radiologist recommending repeat in 6-12 months  -due for repeat CT chest         Relevant Orders    CT Chest Without Contrast       Cardiac/Vascular    Coronary artery disease involving native coronary artery of native heart without angina pectoris    Overview     -followed by cardiology  -hx of PCI with LUIS  -remains on ASA and eliquis (hx of a fib)  -also on statin  -denies recent symptoms of angina or dyspnea         Essential hypertension - Primary    Overview     Hypertension Medications               amLODIPine (NORVASC) 5 MG tablet Take 5 mg by mouth once daily.    cloNIDine (CATAPRES) 0.1 MG tablet Take 1 tablet (0.1 mg total) by mouth every 6 (six) hours as needed (for systolic blood pressure greater than 165).    furosemide (LASIX) 20 MG tablet Take 1 tablet (20 mg total) by mouth once daily.    metoprolol succinate (TOPROL-XL) 25 MG 24 hr tablet Take 3 tablets (75 mg total) by mouth 2 (two) times daily.    telmisartan (MICARDIS) 80 MG Tab TAKE 1 TABLET(80 MG) BY MOUTH EVERY DAY        -at goal today  -intolerant to amlodipine and spironolactone in the past  -continue lifestyle modification with low sodium diet and exercise   -discussed hypertension disease course and importance of treating high blood pressure  -patient understood and advised of risk of untreated blood pressure. ER precautions were given for symptoms of hypertensive urgency and emergency.           Mixed hyperlipidemia    Overview       Hyperlipidemia Medications               atorvastatin (LIPITOR) 40 MG tablet TK 1 T PO QD    -chronic condition. Currently stable.    -reports compliance with hyperlipidemia treatment as prescribed  -denies any known adverse effects of medications  -most recent labs listed below:  Lab Results   Component Value Date    CHOL 156 04/03/2023     Lab Results   Component Value Date    HDL 39 (L) 04/03/2023     Lab Results   Component Value Date    LDLCALC 89.6 04/03/2023     Lab Results   Component Value Date    TRIG 137 04/03/2023     Lab Results   Component Value Date    ALT 26 05/17/2023    AST 28 05/17/2023    ALKPHOS 75 05/17/2023    BILITOT 0.6 05/17/2023            Relevant Orders    Lipid Panel    PAF (paroxysmal atrial fibrillation)    Overview     -followed by cardiology and EP specialist  -s/p cardioversion 12/2023-failed  -on toprol for rate control and eliquis for anticoagulation  -asymptomatic         Nonrheumatic aortic valve stenosis    Overview     -moderate AS on TTE March 2022  -followed by cardiology         Chronic diastolic (congestive) heart failure    Overview     -TTE 6/2023- grade II left ventricular diastolic dysfunction with EF 65%  -compensated on exam   -followed by cardiology            Immunology/Multi System    Temporal arteritis    Overview     -followed now by retinal specialist  -s/p temporal biopsy normal, however, improved with prednisone  -remains on pred 2.5 mg QD         Relevant Orders    Sedimentation rate    C-Reactive Protein       Endocrine    Type 2 diabetes mellitus with diabetic peripheral angiopathy without gangrene, unspecified whether long term insulin use    Overview     -condition is currently controlled without medications  -last A1c normal (5.3)  -see diabetic health maintenance listed below  -on statin: Yes  -on ACE-I/ARB: Yes  -counseling provided on importance of diabetic diet and medication compliance in order to treat diabetes  -discussed diabetes disease course and potential complications         Relevant Orders    Hemoglobin A1C       GI     Gastroesophageal reflux disease    Overview     -symptoms controlled with daily PPI  -denies alarm symptoms, such as dysphagia, weight loss or N/V  -continue lifestyle modification with avoidance of acidic foods, caffeine, late night eating          Other Visit Diagnoses       Allergic rhinitis, unspecified seasonality, unspecified trigger        Relevant Medications    montelukast (SINGULAIR) 10 mg tablet            Follow up in about 6 months (around 8/28/2024), or if symptoms worsen or fail to improve.    Aide Stephens PA-C  _____________________________________________________________________________________________________________________________________________________    CC: follow up for chronic conditions     HPI: Patient is in clinic today as an established patient here for follow up for chronic conditions.     Since last visit, patient underwent another cardioversion in 12/2023, but was back in afib shortly after that time. He continues to follow up with cardiology and EP specialist. Denies any current chest pain or shortness of breath. He also continues to follow up with other specialists including pulmonology and ophthalmology.     Remaining chronic conditions have been reviewed and remain stable. Further detail as stated above.     No new complaints today.    Past Medical History:   Diagnosis Date    Allergy     Anticoagulant long-term use     Aortic stenosis     Arthritis     Asthma     Atrial fibrillation     Bronchitis     Cataract     COPD (chronic obstructive pulmonary disease)     Coronary artery disease     stent    General anesthetics causing adverse effect in therapeutic use     hard to awaken    GERD (gastroesophageal reflux disease)     Gout, chronic     Hypertension     Mixed hyperlipidemia     Sleep apnea     Type 2 diabetes mellitus with diabetic peripheral angiopathy without gangrene, unspecified whether long term insulin use 05/02/2023     Past Surgical History:   Procedure  Laterality Date    ANGIOGRAM, CORONARY, WITH LEFT HEART CATHETERIZATION  09/27/2021    Procedure: Angiogram, Coronary, with Left Heart Cath;  Surgeon: Vincent Gonzalez MD;  Location: [facility] CATH;  Service: Cardiology;;    BACK SURGERY      L3-5; ruptured disc; laminectomy x 2 surgery    CARDIAC CATH COSURGEON N/A 05/16/2023    Procedure: Cardiac Cath Cosurgeon;  Surgeon: Holly Pleitez MD;  Location: [facility] CATH LAB;  Service: Cardiothoracic;  Laterality: N/A;    CARDIAC VALVE REPLACEMENT  5/16/23    TAVR    CARDIAC VALVE SURGERY      CARDIOVERSION      CHOLECYSTECTOMY      CORONARY ANGIOPLASTY WITH STENT PLACEMENT      EYE SURGERY  2012    Cateract    JOINT REPLACEMENT  '08    RT  KNEE    LEFT HEART CATHETERIZATION Left 04/04/2023    Procedure: Left heart cath;  Surgeon: Aleksey Krishnan MD;  Location: [facility] CATH LAB;  Service: Cardiology;  Laterality: Left;    SPINE SURGERY  1988/2004    Lamanectomy '88/Lam '04    TONSILLECTOMY      TOTAL KNEE ARTHROPLASTY      TRANSCATHETER AORTIC VALVE REPLACEMENT (TAVR) N/A 05/16/2023    Procedure: REPLACEMENT, AORTIC VALVE, TRANSCATHETER (TAVR);  Surgeon: Luis Enrique Ji MD;  Location: [facility] CATH LAB;  Service: Cardiology;  Laterality: N/A;    TREATMENT OF CARDIAC ARRHYTHMIA N/A 12/19/2023    Procedure: Cardioversion or Defibrillation;  Surgeon: Theo Ferreira MD;  Location: [facility] CATH LAB;  Service: Cardiology;  Laterality: N/A;    VASECTOMY       Review of patient's allergies indicates:   Allergen Reactions    Sulfa (sulfonamide antibiotics)      Hives    Azithromycin      Diarrhea      Cefaclor Other (See Comments)     Other reaction(s): Hives    Iodine and iodide containing products      Other reaction(s): Unknown    Ivp dye  [iodinated contrast media] Other (See Comments)    Pravastatin sodium Other (See Comments)    Sulfacetamide sod-sulfur-urea Other (See Comments)    Doxycycline Rash    Sulfamethoxazole-trimethoprim Rash     Social History     Tobacco Use     Smoking status: Former     Current packs/day: 0.00     Average packs/day: 1.5 packs/day for 22.0 years (33.0 ttl pk-yrs)     Types: Cigarettes     Start date: 1970     Quit date: 1992     Years since quittin.8    Smokeless tobacco: Never   Substance Use Topics    Alcohol use: No    Drug use: No     Family History   Problem Relation Age of Onset    Cancer Mother     Early death Mother     Arthritis Father     Diabetes Father     Heart disease Father     Hyperlipidemia Father     Hypertension Father      Current Outpatient Medications on File Prior to Visit   Medication Sig Dispense Refill    albuterol (PROVENTIL/VENTOLIN HFA) 90 mcg/actuation inhaler Inhale 2 puffs into the lungs every 6 (six) hours as needed for Wheezing or Shortness of Breath. Rescue 18 g 11    albuterol-ipratropium (DUO-NEB) 2.5 mg-0.5 mg/3 mL nebulizer solution Take 3 mLs by nebulization every 6 (six) hours as needed for Wheezing or Shortness of Breath. Rescue 120 vial 5    allopurinoL (ZYLOPRIM) 300 MG tablet TAKE 1 TABLET BY MOUTH EVERY DAY 90 tablet 3    amLODIPine (NORVASC) 5 MG tablet Take 5 mg by mouth once daily.      apixaban (ELIQUIS) 5 mg Tab Take 1 tablet (5 mg total) by mouth 2 (two) times daily. 180 tablet 3    aspirin (ECOTRIN) 81 MG EC tablet Take 1 tablet (81 mg total) by mouth once daily.      atorvastatin (LIPITOR) 40 MG tablet TK 1 T PO QD 90 tablet 3    cloNIDine (CATAPRES) 0.1 MG tablet Take 1 tablet (0.1 mg total) by mouth every 6 (six) hours as needed (for systolic blood pressure greater than 165). 60 tablet 11    coenzyme Q10 100 mg capsule Take 200 mg by mouth once daily.      esomeprazole (NEXIUM) 20 MG capsule Take 20 mg by mouth daily as needed.      fluocinonide (LIDEX) 0.05 % gel SMARTSI-2 Gram(s) Topical Twice Daily      fluticasone furoate-vilanteroL (BREO ELLIPTA) 200-25 mcg/dose DsDv diskus inhaler INHALE 1 PUFF BY MOUTH AT THE SAME TIME EVERY DAY 30 each 11    furosemide (LASIX) 20 MG tablet Take  1 tablet (20 mg total) by mouth once daily. 90 tablet 3    ketoconazole (NIZORAL) 2 % cream Apply topically.      lactobacillus comb no.10 (PROBIOTIC) 20 billion cell Cap Take 1 capsule by mouth once daily. Or as directed on bottle      metoprolol succinate (TOPROL-XL) 25 MG 24 hr tablet Take 3 tablets (75 mg total) by mouth 2 (two) times daily. 180 tablet 11    multivitamin (THERAGRAN) per tablet Take 1 tablet by mouth once daily.      naproxen (NAPROSYN) 500 MG tablet Take 500 mg by mouth as needed.      omeprazole (PRILOSEC) 10 MG capsule Take 10 mg by mouth once daily.      potassium chloride (KLOR-CON) 10 MEQ TbSR TK 1 T PO ONCE A DAY. 90 tablet 3    predniSONE (DELTASONE) 2.5 MG tablet Take 5 mg by mouth once daily.      telmisartan (MICARDIS) 80 MG Tab TAKE 1 TABLET(80 MG) BY MOUTH EVERY DAY 90 tablet 3    tiotropium bromide (SPIRIVA RESPIMAT) 2.5 mcg/actuation inhaler Inhale 2 puffs into the lungs Daily. Controller 4 g 11    TOBRADEX 0.3-0.1 % Oint       urea (CARMOL) 40 % Crea 2 (two) times daily as needed.  0    [DISCONTINUED] montelukast (SINGULAIR) 10 mg tablet Take 1 tablet (10 mg total) by mouth once daily. 30 tablet 1     Current Facility-Administered Medications on File Prior to Visit   Medication Dose Route Frequency Provider Last Rate Last Admin    sodium chloride 0.9% flush 10 mL  10 mL Intravenous PRN Luis Enrique Rangel MD           Review of Systems   Constitutional:  Negative for chills, diaphoresis, fatigue and fever.   HENT:  Negative for congestion, ear pain, postnasal drip, sinus pain and sore throat.    Eyes:  Negative for pain and redness.   Respiratory:  Negative for cough, chest tightness and shortness of breath.    Cardiovascular:  Negative for chest pain and leg swelling.   Gastrointestinal:  Negative for abdominal pain, constipation, diarrhea, nausea and vomiting.   Genitourinary:  Negative for dysuria and hematuria.   Musculoskeletal:  Negative for arthralgias and joint swelling.  "  Skin:  Negative for rash.   Neurological:  Negative for dizziness, syncope and headaches.   Psychiatric/Behavioral:  Negative for dysphoric mood. The patient is not nervous/anxious.        Vitals:    02/28/24 0936   BP: 139/77   Pulse: 60   Temp: 97.9 °F (36.6 °C)   Weight: 116.1 kg (256 lb)   Height: 6' 1" (1.854 m)       Wt Readings from Last 3 Encounters:   02/28/24 116.1 kg (256 lb)   02/05/24 115.8 kg (255 lb 4.7 oz)   01/02/24 116.8 kg (257 lb 6.4 oz)       Physical Exam  Constitutional:       General: He is not in acute distress.     Appearance: Normal appearance. He is well-developed.   HENT:      Head: Normocephalic and atraumatic.   Eyes:      Conjunctiva/sclera: Conjunctivae normal.   Cardiovascular:      Rate and Rhythm: Normal rate and regular rhythm.      Pulses: Normal pulses.      Heart sounds: Normal heart sounds. No murmur heard.  Pulmonary:      Effort: Pulmonary effort is normal. No respiratory distress.      Breath sounds: Normal breath sounds.   Abdominal:      General: Bowel sounds are normal. There is no distension.      Palpations: Abdomen is soft.      Tenderness: There is no abdominal tenderness.   Musculoskeletal:         General: Normal range of motion.      Cervical back: Normal range of motion and neck supple.   Skin:     General: Skin is warm and dry.      Findings: No rash.   Neurological:      General: No focal deficit present.      Mental Status: He is alert and oriented to person, place, and time.   Psychiatric:         Mood and Affect: Mood normal.         Behavior: Behavior normal.         Health Maintenance   Topic Date Due    Lipid Panel  04/03/2024    High Dose Statin  02/28/2025    Colorectal Cancer Screening  08/26/2029    TETANUS VACCINE  10/09/2031    Hepatitis C Screening  Completed    Shingles Vaccine  Completed    Abdominal Aortic Aneurysm Screening  Completed     "

## 2024-02-29 ENCOUNTER — LAB VISIT (OUTPATIENT)
Dept: LAB | Facility: HOSPITAL | Age: 74
End: 2024-02-29
Attending: PHYSICIAN ASSISTANT
Payer: MEDICARE

## 2024-02-29 DIAGNOSIS — E78.2 MIXED HYPERLIPIDEMIA: ICD-10-CM

## 2024-02-29 DIAGNOSIS — M31.6 TEMPORAL ARTERITIS: ICD-10-CM

## 2024-02-29 DIAGNOSIS — E11.51 TYPE 2 DIABETES MELLITUS WITH DIABETIC PERIPHERAL ANGIOPATHY WITHOUT GANGRENE, UNSPECIFIED WHETHER LONG TERM INSULIN USE: ICD-10-CM

## 2024-02-29 LAB
CHOLEST SERPL-MCNC: 139 MG/DL (ref 120–199)
CHOLEST/HDLC SERPL: 4.2 {RATIO} (ref 2–5)
CRP SERPL-MCNC: 1.2 MG/L (ref 0–8.2)
ERYTHROCYTE [SEDIMENTATION RATE] IN BLOOD BY WESTERGREN METHOD: 12 MM/HR (ref 0–10)
ESTIMATED AVG GLUCOSE: 117 MG/DL (ref 68–131)
HBA1C MFR BLD: 5.7 % (ref 4–5.6)
HDLC SERPL-MCNC: 33 MG/DL (ref 40–75)
HDLC SERPL: 23.7 % (ref 20–50)
LDLC SERPL CALC-MCNC: 76.6 MG/DL (ref 63–159)
NONHDLC SERPL-MCNC: 106 MG/DL
TRIGL SERPL-MCNC: 147 MG/DL (ref 30–150)

## 2024-02-29 PROCEDURE — 83036 HEMOGLOBIN GLYCOSYLATED A1C: CPT | Performed by: PHYSICIAN ASSISTANT

## 2024-02-29 PROCEDURE — 86140 C-REACTIVE PROTEIN: CPT | Performed by: PHYSICIAN ASSISTANT

## 2024-02-29 PROCEDURE — 36415 COLL VENOUS BLD VENIPUNCTURE: CPT | Mod: PO | Performed by: PHYSICIAN ASSISTANT

## 2024-02-29 PROCEDURE — 80061 LIPID PANEL: CPT | Performed by: PHYSICIAN ASSISTANT

## 2024-02-29 PROCEDURE — 85651 RBC SED RATE NONAUTOMATED: CPT | Mod: PO | Performed by: PHYSICIAN ASSISTANT

## 2024-03-01 NOTE — PROGRESS NOTES
Results have been released via [employer]. Please verify that these have been viewed by patient. If not, please call patient with results.     I have sent a message to them with the following interpretation (see below).    I have reviewed your recent blood work.     A1C NORMAL-The A1c has slightly increased, but remains in prediabetes range. Repeat an a1c in 6 months.     LIPID NORMAL ON MEDS-The cholesterol panel screening showed levels that are considered at target with the current medications. Continue meds & check annually.  Sed rate slightly elevated, but stable.  CRP normal.     Please do not hesitate to call or message with any additional questions or concerns.    Aide Stephens PA-C

## 2024-03-15 DIAGNOSIS — Z95.2 S/P TAVR (TRANSCATHETER AORTIC VALVE REPLACEMENT): Primary | ICD-10-CM

## 2024-04-12 ENCOUNTER — OFFICE VISIT (OUTPATIENT)
Dept: FAMILY MEDICINE | Facility: CLINIC | Age: 74
End: 2024-04-12
Payer: MEDICARE

## 2024-04-12 VITALS
RESPIRATION RATE: 17 BRPM | WEIGHT: 253.19 LBS | HEIGHT: 73 IN | HEART RATE: 89 BPM | DIASTOLIC BLOOD PRESSURE: 75 MMHG | BODY MASS INDEX: 33.55 KG/M2 | SYSTOLIC BLOOD PRESSURE: 133 MMHG | OXYGEN SATURATION: 97 %

## 2024-04-12 DIAGNOSIS — E11.51 TYPE 2 DIABETES MELLITUS WITH DIABETIC PERIPHERAL ANGIOPATHY WITHOUT GANGRENE, UNSPECIFIED WHETHER LONG TERM INSULIN USE: ICD-10-CM

## 2024-04-12 DIAGNOSIS — J45.40 MODERATE PERSISTENT ASTHMA WITHOUT COMPLICATION: ICD-10-CM

## 2024-04-12 DIAGNOSIS — I20.81 ANGINA PECTORIS WITH CORONARY MICROVASCULAR DYSFUNCTION: ICD-10-CM

## 2024-04-12 DIAGNOSIS — Z95.2 HISTORY OF TRANSCATHETER AORTIC VALVE REPLACEMENT (TAVR): ICD-10-CM

## 2024-04-12 DIAGNOSIS — Z87.891 FORMER SMOKER: ICD-10-CM

## 2024-04-12 DIAGNOSIS — R91.1 PULMONARY NODULE: ICD-10-CM

## 2024-04-12 DIAGNOSIS — I48.0 PAF (PAROXYSMAL ATRIAL FIBRILLATION): ICD-10-CM

## 2024-04-12 DIAGNOSIS — Z00.00 ENCOUNTER FOR MEDICARE ANNUAL WELLNESS EXAM: Primary | ICD-10-CM

## 2024-04-12 DIAGNOSIS — I10 ESSENTIAL HYPERTENSION: ICD-10-CM

## 2024-04-12 DIAGNOSIS — E78.2 MIXED HYPERLIPIDEMIA: ICD-10-CM

## 2024-04-12 DIAGNOSIS — I50.32 CHRONIC DIASTOLIC (CONGESTIVE) HEART FAILURE: ICD-10-CM

## 2024-04-12 DIAGNOSIS — I65.23 BILATERAL CAROTID ARTERY STENOSIS: ICD-10-CM

## 2024-04-12 DIAGNOSIS — I25.10 CORONARY ARTERY DISEASE INVOLVING NATIVE CORONARY ARTERY OF NATIVE HEART WITHOUT ANGINA PECTORIS: ICD-10-CM

## 2024-04-12 DIAGNOSIS — K21.9 GASTROESOPHAGEAL REFLUX DISEASE, UNSPECIFIED WHETHER ESOPHAGITIS PRESENT: ICD-10-CM

## 2024-04-12 DIAGNOSIS — I70.0 AORTIC ATHEROSCLEROSIS: ICD-10-CM

## 2024-04-12 DIAGNOSIS — M31.6 TEMPORAL ARTERITIS: ICD-10-CM

## 2024-04-12 DIAGNOSIS — M10.00 IDIOPATHIC GOUT, UNSPECIFIED CHRONICITY, UNSPECIFIED SITE: ICD-10-CM

## 2024-04-12 DIAGNOSIS — I35.0 NONRHEUMATIC AORTIC VALVE STENOSIS: ICD-10-CM

## 2024-04-12 PROCEDURE — G0439 PPPS, SUBSEQ VISIT: HCPCS | Mod: ,,, | Performed by: PHYSICIAN ASSISTANT

## 2024-04-12 PROCEDURE — 99215 OFFICE O/P EST HI 40 MIN: CPT | Mod: PBBFAC,PO | Performed by: PHYSICIAN ASSISTANT

## 2024-04-12 PROCEDURE — 99999 PR PBB SHADOW E&M-EST. PATIENT-LVL V: CPT | Mod: PBBFAC,,, | Performed by: PHYSICIAN ASSISTANT

## 2024-04-12 NOTE — PATIENT INSTRUCTIONS
Mk Islas,     If you are due for any health screening(s) below please notify me so we can arrange them to be ordered and scheduled. Most healthy patients at your age complete them, but you are free to accept or refuse.     If you can't do it, I'll definitely understand. If you can, I'd certainly appreciate it!    All of your core healthy metrics are met.

## 2024-04-12 NOTE — PROGRESS NOTES
"  Roshan Menard presented for a  Medicare AWV and comprehensive Health Risk Assessment today. The following components were reviewed and updated:    Medical history  Family History  Social history  Allergies and Current Medications  Health Risk Assessment  Health Maintenance  Care Team         ** See Completed Assessments for Annual Wellness Visit within the encounter summary.**         The following assessments were completed:  Living Situation  CAGE  Depression Screening  Timed Get Up and Go  Whisper Test  Cognitive Function Screening  Nutrition Screening  ADL Screening  PAQ Screening        Vitals:    04/12/24 1055   BP: 133/75   Pulse: 89   Resp: 17   SpO2: 97%   Weight: 114.9 kg (253 lb 3.2 oz)   Height: 6' 1" (1.854 m)     Body mass index is 33.41 kg/m².  Physical Exam  Constitutional:       General: He is not in acute distress.     Appearance: Normal appearance. He is well-developed.   HENT:      Head: Normocephalic and atraumatic.   Eyes:      Conjunctiva/sclera: Conjunctivae normal.   Cardiovascular:      Rate and Rhythm: Normal rate and regular rhythm.      Pulses: Normal pulses.      Heart sounds: Normal heart sounds. No murmur heard.  Pulmonary:      Effort: Pulmonary effort is normal. No respiratory distress.      Breath sounds: Normal breath sounds.   Abdominal:      General: Bowel sounds are normal. There is no distension.      Palpations: Abdomen is soft.      Tenderness: There is no abdominal tenderness.   Musculoskeletal:         General: Normal range of motion.      Cervical back: Normal range of motion and neck supple.   Skin:     General: Skin is warm and dry.      Findings: No rash.   Neurological:      General: No focal deficit present.      Mental Status: He is alert and oriented to person, place, and time.   Psychiatric:         Mood and Affect: Mood normal.         Behavior: Behavior normal.               Diagnoses and health risks identified today and associated " recommendations/orders:    1. Encounter for Medicare annual wellness exam  Complete history and physical was completed today. Complete and thorough medication reconciliation was performed. Discussed risks and benefits of medications. Advised patient on orders and health maintenance. Continue current medications listed on your summary sheet.  - Ambulatory Referral/Consult to Enhanced Annual Wellness Visit (eAWV)    2. Moderate persistent asthma without complication  Chronic. Stable  Remains on Breo, Spiriva and Singulair  Followed by pulmonology  Continue current medications and plan of care per PCP and specialists     3. Pulmonary nodule  Chronic. Stable  Plans for repeat CT chest soon  Followed by pulmonology  Continue current medications and plan of care per PCP and specialists     4. Angina pectoris with coronary microvascular dysfunction  Chronic. Stable  Followed by cardiology  Continue current medications and plan of care per PCP and specialists     5. Aortic atherosclerosis  Chronic. Stable  Remains on ASA and statin  Followed by cardiology  Continue current medications and plan of care per PCP and specialists     6. Bilateral carotid artery stenosis  Chronic. Stable  Remains on ASA and statin  Followed by cardiology  Continue current medications and plan of care per PCP and specialists     7. Chronic diastolic (congestive) heart failure  Chronic. Stable  Appears compensated on exam  Followed by cardiology  Continue current medications and plan of care per PCP and specialists     8. Coronary artery disease involving native coronary artery of native heart without angina pectoris  Chronic. Stable  S/p PCI with LUIS  Remains on ASA and statin  Followed by cardiology  Continue current medications and plan of care per PCP and specialists     9. Essential hypertension  Chronic. Stable  BP at goal today  Continue lifestyle modification with low sodium diet and exercise   Continue current medications and plan of care  per PCP and specialists   Hypertension Medications               amLODIPine (NORVASC) 5 MG tablet Take 5 mg by mouth once daily.    cloNIDine (CATAPRES) 0.1 MG tablet Take 1 tablet (0.1 mg total) by mouth every 6 (six) hours as needed (for systolic blood pressure greater than 165).    furosemide (LASIX) 20 MG tablet Take 1 tablet (20 mg total) by mouth once daily.    metoprolol succinate (TOPROL-XL) 25 MG 24 hr tablet Take 3 tablets (75 mg total) by mouth 2 (two) times daily.    telmisartan (MICARDIS) 80 MG Tab TAKE 1 TABLET(80 MG) BY MOUTH EVERY DAY     10. History of transcatheter aortic valve replacement (TAVR)  Chronic. Stable  Followed by cardiology  Continue current medications and plan of care per PCP and specialists     11. Mixed hyperlipidemia  Chronic. Stable  Remains on Lipitor  Continue current medications and plan of care per PCP and specialists   Most recent labs listed below:  Lab Results   Component Value Date    CHOL 139 02/29/2024     Lab Results   Component Value Date    HDL 33 (L) 02/29/2024     Lab Results   Component Value Date    LDLCALC 76.6 02/29/2024     Lab Results   Component Value Date    TRIG 147 02/29/2024     Lab Results   Component Value Date    ALT 26 05/17/2023    AST 28 05/17/2023    ALKPHOS 75 05/17/2023    BILITOT 0.6 05/17/2023     12. Nonrheumatic aortic valve stenosis  Chronic. Stable  Followed by cardiology  Continue current medications and plan of care per PCP and specialists     13. PAF (paroxysmal atrial fibrillation)  Chronic. Stable  Remains on Eliquis and Toprol  Followed by cardiology and EP specialist   Continue current medications and plan of care per PCP and specialists     14. Temporal arteritis  Chronic. Stable  Remains on Prednisone  Followed by retina specialist  Continue current medications and plan of care per PCP and specialists   - Sedimentation rate; Future    15. Type 2 diabetes mellitus with diabetic peripheral angiopathy without gangrene, unspecified  whether long term insulin use  Chronic. Stable  Managed with diet and exercise  Counseling provided on importance of diabetic diet and medication compliance in order to treat diabetes  Discussed diabetes disease course and potential complications  Continue current medications and plan of care per PCP and specialists   Lab Results   Component Value Date    LABA1C 5.1 04/09/2018    HGBA1C 5.7 (H) 02/29/2024   - Hemoglobin A1C; Future    16. Gastroesophageal reflux disease, unspecified whether esophagitis present  Chronic. Stable   Remains on daily PPI  Continue lifestyle modification with avoidance of acidic foods, caffeine, late night eating  Continue current medications and plan of care per PCP and specialists     17. Idiopathic gout, unspecified chronicity, unspecified site  Chronic. Stable  Remains on Allopurinol  Continue current medications and plan of care per PCP and specialists   Lab Results   Component Value Date    URICACID 8.7 (H) 01/20/2023     18. Former smoker  Chronic. Stable  Continue current medications and plan of care per PCP and specialists         I offered to discuss end of life issues, including information on how to make advance directives that the patient could use to name someone who would make medical decisions on their behalf if they became too ill to make themselves.    ___Patient declined - already done.    _x__Patient is interested, I provided paperwork and offered to discuss.      Provided Roshan with a 5-10 year written screening schedule and personal prevention plan. Recommendations were developed using the USPSTF age appropriate recommendations. Education, counseling, and referrals were provided as needed. After Visit Summary printed and given to patient which includes a list of additional screenings\tests needed.    Follow up in about 6 months (around 10/12/2024), or if symptoms worsen or fail to improve.    Aide Stephens PA-C

## 2024-04-15 ENCOUNTER — OFFICE VISIT (OUTPATIENT)
Dept: FAMILY MEDICINE | Facility: CLINIC | Age: 74
End: 2024-04-15
Payer: MEDICARE

## 2024-04-15 ENCOUNTER — HOSPITAL ENCOUNTER (OUTPATIENT)
Dept: RADIOLOGY | Facility: HOSPITAL | Age: 74
Discharge: HOME | End: 2024-04-15
Attending: PHYSICIAN ASSISTANT
Payer: MEDICARE

## 2024-04-15 VITALS
OXYGEN SATURATION: 96 % | SYSTOLIC BLOOD PRESSURE: 115 MMHG | WEIGHT: 253 LBS | BODY MASS INDEX: 33.53 KG/M2 | RESPIRATION RATE: 17 BRPM | HEIGHT: 73 IN | HEART RATE: 86 BPM | TEMPERATURE: 98 F | DIASTOLIC BLOOD PRESSURE: 70 MMHG

## 2024-04-15 DIAGNOSIS — B96.89 BACTERIAL RESPIRATORY INFECTION: Primary | ICD-10-CM

## 2024-04-15 DIAGNOSIS — B96.89 BACTERIAL RESPIRATORY INFECTION: ICD-10-CM

## 2024-04-15 DIAGNOSIS — J18.9 PNEUMONIA OF LEFT LUNG DUE TO INFECTIOUS ORGANISM, UNSPECIFIED PART OF LUNG: Primary | ICD-10-CM

## 2024-04-15 DIAGNOSIS — J45.40 MODERATE PERSISTENT ASTHMA WITHOUT COMPLICATION: ICD-10-CM

## 2024-04-15 DIAGNOSIS — R06.2 WHEEZING: ICD-10-CM

## 2024-04-15 DIAGNOSIS — J98.8 BACTERIAL RESPIRATORY INFECTION: Primary | ICD-10-CM

## 2024-04-15 DIAGNOSIS — J98.8 BACTERIAL RESPIRATORY INFECTION: ICD-10-CM

## 2024-04-15 DIAGNOSIS — R09.89 CHEST CONGESTION: ICD-10-CM

## 2024-04-15 PROCEDURE — 71046 X-RAY EXAM CHEST 2 VIEWS: CPT | Mod: 26,,, | Performed by: RADIOLOGY

## 2024-04-15 PROCEDURE — 71046 X-RAY EXAM CHEST 2 VIEWS: CPT | Mod: TC,PO

## 2024-04-15 PROCEDURE — 87502 INFLUENZA DNA AMP PROBE: CPT | Mod: PBBFAC,PO | Performed by: PHYSICIAN ASSISTANT

## 2024-04-15 PROCEDURE — 99214 OFFICE O/P EST MOD 30 MIN: CPT | Mod: PBBFAC,25,PO | Performed by: PHYSICIAN ASSISTANT

## 2024-04-15 PROCEDURE — 87635 SARS-COV-2 COVID-19 AMP PRB: CPT | Mod: PBBFAC,PO | Performed by: PHYSICIAN ASSISTANT

## 2024-04-15 PROCEDURE — 99999PBSHW: Mod: PBBFAC,,,

## 2024-04-15 PROCEDURE — 99999 PR PBB SHADOW E&M-EST. PATIENT-LVL IV: CPT | Mod: PBBFAC,,, | Performed by: PHYSICIAN ASSISTANT

## 2024-04-15 PROCEDURE — 99999PBSHW POCT INFLUENZA A/B MOLECULAR: Mod: PBBFAC,,,

## 2024-04-15 PROCEDURE — 99213 OFFICE O/P EST LOW 20 MIN: CPT | Mod: S$PBB,,, | Performed by: PHYSICIAN ASSISTANT

## 2024-04-15 RX ORDER — AMOXICILLIN AND CLAVULANATE POTASSIUM 875; 125 MG/1; MG/1
1 TABLET, FILM COATED ORAL EVERY 12 HOURS
Qty: 20 TABLET | Refills: 0 | Status: SHIPPED | OUTPATIENT
Start: 2024-04-15 | End: 2024-04-25

## 2024-04-15 RX ORDER — ALBUTEROL SULFATE 90 UG/1
2 AEROSOL, METERED RESPIRATORY (INHALATION) EVERY 6 HOURS PRN
Qty: 54 G | Refills: 0 | Status: SHIPPED | OUTPATIENT
Start: 2024-04-15

## 2024-04-15 RX ORDER — IPRATROPIUM BROMIDE AND ALBUTEROL SULFATE 2.5; .5 MG/3ML; MG/3ML
3 SOLUTION RESPIRATORY (INHALATION) EVERY 6 HOURS PRN
Qty: 75 ML | Refills: 0 | Status: SHIPPED | OUTPATIENT
Start: 2024-04-15 | End: 2025-04-15

## 2024-04-15 NOTE — TELEPHONE ENCOUNTER
Provider Staff:  Action required for this patient    Requires labs      Please see care gap opportunities below in Care Due Message.    Thanks!  [facility]     Appointments      Date Provider   Last Visit   5/24/2023 Ctarina Yanez MD   Next Visit   Visit date not found Catrina Yanez MD     Refill Decision Note   Roshan Menard  is requesting a refill authorization.  Brief Assessment and Rationale for Refill:  Approve     Medication Therapy Plan:         Alert overridden per protocol: Yes   Comments:     Note composed:11:28 AM 04/15/2024             Appointments     Last Visit   5/24/2023 Catrina Yanez MD   Next Visit   Visit date not found Catrina Yanez MD

## 2024-04-15 NOTE — TELEPHONE ENCOUNTER
Care Due:                  Date            Visit Type   Department     Provider  --------------------------------------------------------------------------------                                HOSPITAL     [facility] FAMILY  Last Visit: 05-      FOLLOW UP    MEDICINE       Catrina Yanez  Next Visit: None Scheduled  None         None Found                                                            Last  Test          Frequency    Reason                     Performed    Due Date  --------------------------------------------------------------------------------    CBC.........  12 months..  allopurinoL..............  06-   06-    CMP.........  12 months..  allopurinoL..............  05- 05-    Uric Acid...  12 months..  allopurinoL..............  01- 01-    [facility] Embedded Care Due Messages. Reference number: [id number].   4/15/2024 11:21:51 AM CDT

## 2024-04-15 NOTE — TELEPHONE ENCOUNTER
Please let patient know that there was an opacity seen throughout the left mid lung and left lung base concerning for pneumonia. There was also another area of concern in his right mid to lower lung. Please start the antibiotics as prescribed at his visit. Please let me know if symptoms worsen or do not improve. If respiratory symptoms significantly worsen, I do recommend going to the ED. We will plan to repeat a CXR in 4 weeks to make sure this has resolved.     I have signed for the following orders AND/OR meds.  Please call the patient and ask the patient to schedule the testing AND/OR inform about any medications that were sent. Medications have been sent to pharmacy listed below      Orders Placed This Encounter   Procedures    X-Ray Chest PA And Lateral     Standing Status:   Future     Standing Expiration Date:   4/15/2025     Order Specific Question:   May the Radiologist modify the order per protocol to meet the clinical needs of the patient?     Answer:   Yes     Order Specific Question:   Release to patient     Answer:   Immediate       Medications Ordered This Encounter   Medications    albuterol (PROVENTIL/VENTOLIN HFA) 90 mcg/actuation inhaler     Sig: Inhale 2 puffs into the lungs every 6 (six) hours as needed for Wheezing or Shortness of Breath. Rescue     Dispense:  54 g     Refill:  0         [id number] - [place] - [id number]  [address]  [place] [address]  Phone: [phone] Fax: [phone]

## 2024-04-15 NOTE — PROGRESS NOTES
Assessment/Plan:    Problem List Items Addressed This Visit    None  Visit Diagnoses       Bacterial respiratory infection    -  Primary    Relevant Medications    amoxicillin-clavulanate 875-125mg (AUGMENTIN) 875-125 mg per tablet    Other Relevant Orders    X-Ray Chest PA And Lateral (Completed)    Chest congestion        Relevant Medications    albuterol-ipratropium (DUO-NEB) 2.5 mg-0.5 mg/3 mL nebulizer solution    Other Relevant Orders    POCT COVID-19 Rapid Screening (Completed)    POCT Influenza A/B Molecular (Completed)    X-Ray Chest PA And Lateral (Completed)    Wheezing        Relevant Medications    albuterol-ipratropium (DUO-NEB) 2.5 mg-0.5 mg/3 mL nebulizer solution    Other Relevant Orders    X-Ray Chest PA And Lateral (Completed)        -start antibiotics as prescribed  -will obtain CXR today   -recommend Flonase and Mucinex  -supportive care- rest, increase hydration with water, OTC Tylenol/Ibuprofen for pain/fever  -follow up if no improvement in symptoms   -ER precautions for severe or worsening of symptoms     Follow up if symptoms worsen or fail to improve.    Aide Stephens PA-C  _____________________________________________________________________________________________________________________________________________________    CC: chest congestion    HPI: Patient is in clinic today as an established patient here for chest congestion. This is a new problem. The current episode started >4 days ago. The problem is gradually worsening. Associated symptoms include fever (Tmax 100), chills, rhinorrhea, coughing, sneezing and wheezing. Pertinent negatives include no diaphoresis, ear pain, headaches, hoarse voice, neck pain, shortness of breath, sore throat or swollen glands. Past treatments include Tylenol. The treatment provided some relief. He has had no known sick contacts. He does have a history of asthma in which he remains on PRN albuterol, breo, spiriva and singulair. He is also on  steroids for hx of temporal arteritis. No other complaints today.    Past Medical History:   Diagnosis Date    Allergy     Anticoagulant long-term use     Aortic stenosis     Arthritis     Asthma     Atrial fibrillation     Bronchitis     Cataract     COPD (chronic obstructive pulmonary disease)     Coronary artery disease     stent    General anesthetics causing adverse effect in therapeutic use     hard to awaken    GERD (gastroesophageal reflux disease)     Gout, chronic     Hypertension     Mixed hyperlipidemia     Sleep apnea     Type 2 diabetes mellitus with diabetic peripheral angiopathy without gangrene, unspecified whether long term insulin use 05/02/2023     Past Surgical History:   Procedure Laterality Date    ANGIOGRAM, CORONARY, WITH LEFT HEART CATHETERIZATION  09/27/2021    Procedure: Angiogram, Coronary, with Left Heart Cath;  Surgeon: Vincent Gonzalez MD;  Location: [facility] CATH;  Service: Cardiology;;    BACK SURGERY      L3-5; ruptured disc; laminectomy x 2 surgery    CARDIAC CATH COSURGEON N/A 05/16/2023    Procedure: Cardiac Cath Cosurgeon;  Surgeon: Holly Pleitez MD;  Location: [facility] CATH LAB;  Service: Cardiothoracic;  Laterality: N/A;    CARDIAC VALVE REPLACEMENT  5/16/23    TAVR    CARDIAC VALVE SURGERY      CARDIOVERSION      CHOLECYSTECTOMY      CORONARY ANGIOPLASTY WITH STENT PLACEMENT      EYE SURGERY  2012    Cateract    JOINT REPLACEMENT  '08    RT  KNEE    LEFT HEART CATHETERIZATION Left 04/04/2023    Procedure: Left heart cath;  Surgeon: Aleksey Krishnan MD;  Location: [facility] CATH LAB;  Service: Cardiology;  Laterality: Left;    SPINE SURGERY  1988/2004    Lamanectomy '88/Lam '04    TONSILLECTOMY      TOTAL KNEE ARTHROPLASTY      TRANSCATHETER AORTIC VALVE REPLACEMENT (TAVR) N/A 05/16/2023    Procedure: REPLACEMENT, AORTIC VALVE, TRANSCATHETER (TAVR);  Surgeon: Luis Enrique Ji MD;  Location: [facility] CATH LAB;  Service: Cardiology;  Laterality: N/A;    TREATMENT OF CARDIAC ARRHYTHMIA N/A  2023    Procedure: Cardioversion or Defibrillation;  Surgeon: Theo Ferreira MD;  Location: [facility] CATH LAB;  Service: Cardiology;  Laterality: N/A;    VASECTOMY       Review of patient's allergies indicates:   Allergen Reactions    Sulfa (sulfonamide antibiotics)      Hives    Azithromycin      Diarrhea      Cefaclor Other (See Comments)     Other reaction(s): Hives    Iodine and iodide containing products      Other reaction(s): Unknown    Ivp dye  [iodinated contrast media] Other (See Comments)    Sulfacetamide sod-sulfur-urea Other (See Comments)    Doxycycline Rash    Sulfamethoxazole-trimethoprim Rash     Social History     Tobacco Use    Smoking status: Former     Current packs/day: 0.00     Average packs/day: 1.5 packs/day for 22.0 years (33.0 ttl pk-yrs)     Types: Cigarettes     Start date: 1970     Quit date: 1992     Years since quittin.9    Smokeless tobacco: Never   Substance Use Topics    Alcohol use: No    Drug use: No     Family History   Problem Relation Name Age of Onset    Cancer Mother Lori     Early death Mother Lori     Arthritis Father [place] R.     Diabetes Father [place] R.     Heart disease Father [place] R.     Hyperlipidemia Father [place] R.     Hypertension Father Brendan R.      Current Outpatient Medications on File Prior to Visit   Medication Sig Dispense Refill    allopurinoL (ZYLOPRIM) 300 MG tablet TAKE 1 TABLET BY MOUTH EVERY DAY 90 tablet 3    amLODIPine (NORVASC) 5 MG tablet Take 5 mg by mouth once daily.      apixaban (ELIQUIS) 5 mg Tab Take 1 tablet (5 mg total) by mouth 2 (two) times daily. 180 tablet 3    aspirin (ECOTRIN) 81 MG EC tablet Take 1 tablet (81 mg total) by mouth once daily.      atorvastatin (LIPITOR) 40 MG tablet TK 1 T PO QD 90 tablet 3    cloNIDine (CATAPRES) 0.1 MG tablet Take 1 tablet (0.1 mg total) by mouth every 6 (six) hours as needed (for systolic blood pressure greater than 165). 60 tablet 11    coenzyme Q10 100 mg capsule Take 200 mg by  mouth once daily.      esomeprazole (NEXIUM) 20 MG capsule Take 20 mg by mouth daily as needed.      fluocinonide (LIDEX) 0.05 % gel SMARTSI-2 Gram(s) Topical Twice Daily      fluticasone furoate-vilanteroL (BREO ELLIPTA) 200-25 mcg/dose DsDv diskus inhaler INHALE 1 PUFF BY MOUTH AT THE SAME TIME EVERY DAY 30 each 11    furosemide (LASIX) 20 MG tablet Take 1 tablet (20 mg total) by mouth once daily. 90 tablet 3    ketoconazole (NIZORAL) 2 % cream Apply topically.      lactobacillus comb no.10 (PROBIOTIC) 20 billion cell Cap Take 1 capsule by mouth once daily. Or as directed on bottle      metoprolol succinate (TOPROL-XL) 25 MG 24 hr tablet Take 3 tablets (75 mg total) by mouth 2 (two) times daily. 180 tablet 11    montelukast (SINGULAIR) 10 mg tablet Take 1 tablet (10 mg total) by mouth once daily. 90 tablet 3    multivitamin (THERAGRAN) per tablet Take 1 tablet by mouth once daily.      naproxen (NAPROSYN) 500 MG tablet Take 500 mg by mouth as needed.      omeprazole (PRILOSEC) 10 MG capsule Take 10 mg by mouth once daily.      potassium chloride (KLOR-CON) 10 MEQ TbSR TK 1 T PO ONCE A DAY. 90 tablet 3    predniSONE (DELTASONE) 2.5 MG tablet Take 5 mg by mouth once daily.      telmisartan (MICARDIS) 80 MG Tab TAKE 1 TABLET(80 MG) BY MOUTH EVERY DAY 90 tablet 3    tiotropium bromide (SPIRIVA RESPIMAT) 2.5 mcg/actuation inhaler Inhale 2 puffs into the lungs Daily. Controller 4 g 11    urea (CARMOL) 40 % Crea 2 (two) times daily as needed.  0    [DISCONTINUED] albuterol (PROVENTIL/VENTOLIN HFA) 90 mcg/actuation inhaler Inhale 2 puffs into the lungs every 6 (six) hours as needed for Wheezing or Shortness of Breath. Rescue 18 g 11     Current Facility-Administered Medications on File Prior to Visit   Medication Dose Route Frequency Provider Last Rate Last Admin    sodium chloride 0.9% flush 10 mL  10 mL Intravenous PRN Luis Enrique Rangel MD           Review of Systems   Constitutional:  Positive for fever. Negative  "for chills, diaphoresis and fatigue.   HENT:  Positive for congestion, rhinorrhea and sneezing. Negative for ear pain, postnasal drip, sinus pain and sore throat.    Eyes:  Negative for pain and redness.   Respiratory:  Positive for cough and wheezing. Negative for chest tightness and shortness of breath.    Cardiovascular:  Negative for chest pain and leg swelling.   Gastrointestinal:  Negative for abdominal pain, constipation, diarrhea, nausea and vomiting.   Genitourinary:  Negative for dysuria and hematuria.   Musculoskeletal:  Negative for arthralgias and joint swelling.   Skin:  Negative for rash.   Neurological:  Negative for dizziness, syncope and headaches.   Psychiatric/Behavioral:  Negative for dysphoric mood. The patient is not nervous/anxious.        Vitals:    04/15/24 0925 04/15/24 0958   BP: 139/86 115/70   Pulse: (!) 116 86   Resp: 16 17   Temp:  98.1 °F (36.7 °C)   TempSrc:  Oral   SpO2: (!) 93% 96%   Weight: 114.8 kg (253 lb)    Height: 6' 1" (1.854 m)        Wt Readings from Last 3 Encounters:   04/15/24 114.8 kg (253 lb)   04/12/24 114.9 kg (253 lb 3.2 oz)   02/28/24 116.1 kg (256 lb)       Physical Exam  Constitutional:       General: He is not in acute distress.     Appearance: Normal appearance. He is well-developed.   HENT:      Head: Normocephalic and atraumatic.      Right Ear: Tympanic membrane normal.      Left Ear: Tympanic membrane normal.      Nose: Congestion present.      Mouth/Throat:      Mouth: Mucous membranes are moist.      Pharynx: Posterior oropharyngeal erythema present.   Eyes:      Conjunctiva/sclera: Conjunctivae normal.   Cardiovascular:      Rate and Rhythm: Normal rate and regular rhythm.      Pulses: Normal pulses.      Heart sounds: Normal heart sounds. No murmur heard.  Pulmonary:      Effort: Pulmonary effort is normal. No respiratory distress.      Breath sounds: Wheezing present.   Abdominal:      General: Bowel sounds are normal. There is no distension.      " Palpations: Abdomen is soft.      Tenderness: There is no abdominal tenderness.   Musculoskeletal:         General: Normal range of motion.      Cervical back: Normal range of motion and neck supple.   Lymphadenopathy:      Cervical: No cervical adenopathy.   Skin:     General: Skin is warm and dry.      Findings: No rash.   Neurological:      General: No focal deficit present.      Mental Status: He is alert and oriented to person, place, and time.   Psychiatric:         Mood and Affect: Mood normal.         Behavior: Behavior normal.         Health Maintenance   Topic Date Due    Lipid Panel  02/28/2025    High Dose Statin  04/12/2025    Colorectal Cancer Screening  08/26/2029    TETANUS VACCINE  10/09/2031    Hepatitis C Screening  Completed    Shingles Vaccine  Completed    Abdominal Aortic Aneurysm Screening  Completed

## 2024-04-15 NOTE — TELEPHONE ENCOUNTER
----- Message from Tiffani Torres sent at 4/15/2024 11:03 AM CDT -----  Pt forgot to mention that he needs to refill the proscription albuterol 90 mcg/ actuation inhaler please advise, If you have any questions call pt at [phone]. Thank you

## 2024-04-16 ENCOUNTER — PATIENT MESSAGE (OUTPATIENT)
Dept: CARDIOLOGY | Facility: CLINIC | Age: 74
End: 2024-04-16
Payer: MEDICARE

## 2024-04-26 ENCOUNTER — OFFICE VISIT (OUTPATIENT)
Dept: FAMILY MEDICINE | Facility: CLINIC | Age: 74
End: 2024-04-26
Payer: MEDICARE

## 2024-04-26 VITALS
WEIGHT: 246 LBS | RESPIRATION RATE: 18 BRPM | BODY MASS INDEX: 32.6 KG/M2 | DIASTOLIC BLOOD PRESSURE: 88 MMHG | HEIGHT: 73 IN | OXYGEN SATURATION: 99 % | HEART RATE: 81 BPM | SYSTOLIC BLOOD PRESSURE: 132 MMHG

## 2024-04-26 DIAGNOSIS — J18.9 PNEUMONIA OF LEFT LUNG DUE TO INFECTIOUS ORGANISM, UNSPECIFIED PART OF LUNG: Primary | ICD-10-CM

## 2024-04-26 DIAGNOSIS — I48.0 PAF (PAROXYSMAL ATRIAL FIBRILLATION): ICD-10-CM

## 2024-04-26 PROCEDURE — 99999 PR PBB SHADOW E&M-EST. PATIENT-LVL V: CPT | Mod: PBBFAC,,, | Performed by: PHYSICIAN ASSISTANT

## 2024-04-26 PROCEDURE — 99213 OFFICE O/P EST LOW 20 MIN: CPT | Mod: S$PBB,,, | Performed by: PHYSICIAN ASSISTANT

## 2024-04-26 PROCEDURE — 99215 OFFICE O/P EST HI 40 MIN: CPT | Mod: PBBFAC,PO | Performed by: PHYSICIAN ASSISTANT

## 2024-04-26 NOTE — PROGRESS NOTES
Assessment/Plan:    Problem List Items Addressed This Visit          Cardiac/Vascular    PAF (paroxysmal atrial fibrillation)    Overview     -followed by cardiology and EP specialist  -s/p cardioversion 12/2023-failed  -on toprol for rate control and eliquis for anticoagulation  -asymptomatic         Relevant Medications    apixaban (ELIQUIS) 5 mg Tab     Other Visit Diagnoses       Pneumonia of left lung due to infectious organism, unspecified part of lung    -  Primary        -recent CXR showed opacity in the L mid lung and L lung base concerning for pneumonia  -completed antibiotics (Augmentin) w/ improvement in symptoms  -no alarm symptoms or signs present on exam  -CT chest scheduled for next week   -supportive care- rest, increase hydration with water, OTC Tylenol/Ibuprofen for pain/fever  -follow up if no improvement in symptoms   -ER precautions for severe or worsening of symptoms     Follow up if symptoms worsen or fail to improve.    Aide Stephens PA-C  _____________________________________________________________________________________________________________________________________________________    CC: follow up for pneumonia     HPI: Patient is in clinic today as an established patient here for follow up for pneumonia. Patient was seen in the clinic on 4/15/24 for fever, coughing and congestion. CXR showed an opacity seen throughout the left mid lung and left lung base concerning for possible pneumonia. He was treated with oral antibiotics (Augmentin) at that time. Today, patient reports overall improvement in symptoms. He denies any current fever, chills, coughing, diaphoresis, ear pain, headaches, hoarse voice, neck pain, shortness of breath, sneezing, sore throat or swollen glands. He did report noticing some R sided rib pain yesterday, but this has resolved. He has a CT chest scheduled for next week for evaluation of pulmonary nodules seen on previous imaging. No other complaints  today.    Past Medical History:   Diagnosis Date    Allergy     Anticoagulant long-term use     Aortic stenosis     Arthritis     Asthma     Atrial fibrillation     Bronchitis     Cataract     COPD (chronic obstructive pulmonary disease)     Coronary artery disease     stent    General anesthetics causing adverse effect in therapeutic use     hard to awaken    GERD (gastroesophageal reflux disease)     Gout, chronic     Hypertension     Mixed hyperlipidemia     Sleep apnea     Type 2 diabetes mellitus with diabetic peripheral angiopathy without gangrene, unspecified whether long term insulin use 05/02/2023     Past Surgical History:   Procedure Laterality Date    ANGIOGRAM, CORONARY, WITH LEFT HEART CATHETERIZATION  09/27/2021    Procedure: Angiogram, Coronary, with Left Heart Cath;  Surgeon: Vincent Gonzalez MD;  Location: [facility] CATH;  Service: Cardiology;;    BACK SURGERY      L3-5; ruptured disc; laminectomy x 2 surgery    CARDIAC CATH COSURGEON N/A 05/16/2023    Procedure: Cardiac Cath Cosurgeon;  Surgeon: Holly Pleitez MD;  Location: [facility] CATH LAB;  Service: Cardiothoracic;  Laterality: N/A;    CARDIAC VALVE REPLACEMENT  5/16/23    TAVR    CARDIAC VALVE SURGERY      CARDIOVERSION      CHOLECYSTECTOMY      CORONARY ANGIOPLASTY WITH STENT PLACEMENT      EYE SURGERY  2012    Cateract    JOINT REPLACEMENT  '08    RT  KNEE    LEFT HEART CATHETERIZATION Left 04/04/2023    Procedure: Left heart cath;  Surgeon: Aleksey Krishnan MD;  Location: [facility] CATH LAB;  Service: Cardiology;  Laterality: Left;    SPINE SURGERY  1988/2004    Lamanectomy '88/Lam '04    TONSILLECTOMY      TOTAL KNEE ARTHROPLASTY      TRANSCATHETER AORTIC VALVE REPLACEMENT (TAVR) N/A 05/16/2023    Procedure: REPLACEMENT, AORTIC VALVE, TRANSCATHETER (TAVR);  Surgeon: Luis Enrique Ji MD;  Location: [facility] CATH LAB;  Service: Cardiology;  Laterality: N/A;    TREATMENT OF CARDIAC ARRHYTHMIA N/A 12/19/2023    Procedure: Cardioversion or Defibrillation;   Surgeon: Theo Ferreira MD;  Location: [facility] CATH LAB;  Service: Cardiology;  Laterality: N/A;    VASECTOMY       Review of patient's allergies indicates:   Allergen Reactions    Sulfa (sulfonamide antibiotics)      Hives    Azithromycin      Diarrhea      Cefaclor Other (See Comments)     Other reaction(s): Hives    Iodine and iodide containing products      Other reaction(s): Unknown    Ivp dye  [iodinated contrast media] Other (See Comments)    Sulfacetamide sod-sulfur-urea Other (See Comments)    Doxycycline Rash    Sulfamethoxazole-trimethoprim Rash     Social History     Tobacco Use    Smoking status: Former     Current packs/day: 0.00     Average packs/day: 1.5 packs/day for 22.0 years (33.0 ttl pk-yrs)     Types: Cigarettes     Start date: 1970     Quit date: 1992     Years since quittin.9    Smokeless tobacco: Never   Substance Use Topics    Alcohol use: No    Drug use: No     Family History   Problem Relation Name Age of Onset    Cancer Mother Lori     Early death Mother Lori     Arthritis Father Polkton R.     Diabetes Father Brendan R.     Heart disease Father Polkton R.     Hyperlipidemia Father Polkton R.     Hypertension Father Brendan R.      Current Outpatient Medications on File Prior to Visit   Medication Sig Dispense Refill    albuterol (PROVENTIL/VENTOLIN HFA) 90 mcg/actuation inhaler Inhale 2 puffs into the lungs every 6 (six) hours as needed for Wheezing or Shortness of Breath. Rescue 54 g 0    albuterol-ipratropium (DUO-NEB) 2.5 mg-0.5 mg/3 mL nebulizer solution Take 3 mLs by nebulization every 6 (six) hours as needed for Wheezing. Rescue 75 mL 0    allopurinoL (ZYLOPRIM) 300 MG tablet TAKE 1 TABLET BY MOUTH EVERY DAY 90 tablet 3    amLODIPine (NORVASC) 5 MG tablet Take 5 mg by mouth once daily.      aspirin (ECOTRIN) 81 MG EC tablet Take 1 tablet (81 mg total) by mouth once daily.      atorvastatin (LIPITOR) 40 MG tablet TK 1 T PO QD 90 tablet 3    cloNIDine (CATAPRES) 0.1 MG tablet Take  1 tablet (0.1 mg total) by mouth every 6 (six) hours as needed (for systolic blood pressure greater than 165). 60 tablet 11    coenzyme Q10 100 mg capsule Take 200 mg by mouth once daily.      esomeprazole (NEXIUM) 20 MG capsule Take 20 mg by mouth daily as needed.      fluocinonide (LIDEX) 0.05 % gel SMARTSI-2 Gram(s) Topical Twice Daily      fluticasone furoate-vilanteroL (BREO ELLIPTA) 200-25 mcg/dose DsDv diskus inhaler INHALE 1 PUFF BY MOUTH AT THE SAME TIME EVERY DAY 30 each 11    furosemide (LASIX) 20 MG tablet Take 1 tablet (20 mg total) by mouth once daily. 90 tablet 3    ketoconazole (NIZORAL) 2 % cream Apply topically.      lactobacillus comb no.10 (PROBIOTIC) 20 billion cell Cap Take 1 capsule by mouth once daily. Or as directed on bottle      metoprolol succinate (TOPROL-XL) 25 MG 24 hr tablet Take 3 tablets (75 mg total) by mouth 2 (two) times daily. 180 tablet 11    montelukast (SINGULAIR) 10 mg tablet Take 1 tablet (10 mg total) by mouth once daily. 90 tablet 3    multivitamin (THERAGRAN) per tablet Take 1 tablet by mouth once daily.      naproxen (NAPROSYN) 500 MG tablet Take 500 mg by mouth as needed.      omeprazole (PRILOSEC) 10 MG capsule Take 10 mg by mouth once daily.      potassium chloride (KLOR-CON) 10 MEQ TbSR TK 1 T PO ONCE A DAY. 90 tablet 3    predniSONE (DELTASONE) 2.5 MG tablet Take 5 mg by mouth once daily.      telmisartan (MICARDIS) 80 MG Tab TAKE 1 TABLET(80 MG) BY MOUTH EVERY DAY 90 tablet 3    tiotropium bromide (SPIRIVA RESPIMAT) 2.5 mcg/actuation inhaler Inhale 2 puffs into the lungs Daily. Controller 4 g 11    urea (CARMOL) 40 % Crea 2 (two) times daily as needed.  0    [DISCONTINUED] apixaban (ELIQUIS) 5 mg Tab Take 1 tablet (5 mg total) by mouth 2 (two) times daily. 180 tablet 3    [] amoxicillin-clavulanate 875-125mg (AUGMENTIN) 875-125 mg per tablet Take 1 tablet by mouth every 12 (twelve) hours. for 10 days 20 tablet 0     Current Facility-Administered  "Medications on File Prior to Visit   Medication Dose Route Frequency Provider Last Rate Last Admin    sodium chloride 0.9% flush 10 mL  10 mL Intravenous PRN Luis Enrique Rangel MD           Review of Systems   Constitutional:  Negative for chills, diaphoresis, fatigue and fever.   HENT:  Negative for congestion, ear pain, postnasal drip, sinus pain and sore throat.    Eyes:  Negative for pain and redness.   Respiratory:  Negative for cough, chest tightness and shortness of breath.    Cardiovascular:  Negative for chest pain and leg swelling.   Gastrointestinal:  Negative for abdominal pain, constipation, diarrhea, nausea and vomiting.   Genitourinary:  Negative for dysuria and hematuria.   Musculoskeletal:  Negative for arthralgias and joint swelling.   Skin:  Negative for rash.   Neurological:  Negative for dizziness, syncope and headaches.   Psychiatric/Behavioral:  Negative for dysphoric mood. The patient is not nervous/anxious.        Vitals:    04/26/24 0924   BP: 132/88   Pulse: 81   Resp: 18   SpO2: 99%   Weight: 111.6 kg (246 lb)   Height: 6' 1" (1.854 m)       Wt Readings from Last 3 Encounters:   04/26/24 111.6 kg (246 lb)   04/15/24 114.8 kg (253 lb)   04/12/24 114.9 kg (253 lb 3.2 oz)       Physical Exam  Constitutional:       General: He is not in acute distress.     Appearance: Normal appearance. He is well-developed.   HENT:      Head: Normocephalic and atraumatic.      Right Ear: Tympanic membrane normal.      Left Ear: Tympanic membrane normal.      Nose: Nose normal.      Mouth/Throat:      Mouth: Mucous membranes are moist.      Pharynx: Oropharynx is clear.   Eyes:      Conjunctiva/sclera: Conjunctivae normal.   Cardiovascular:      Rate and Rhythm: Normal rate and regular rhythm.      Pulses: Normal pulses.      Heart sounds: Normal heart sounds. No murmur heard.  Pulmonary:      Effort: Pulmonary effort is normal. No respiratory distress.      Breath sounds: Normal breath sounds. No wheezing. "   Abdominal:      General: Bowel sounds are normal. There is no distension.      Palpations: Abdomen is soft.      Tenderness: There is no abdominal tenderness.   Musculoskeletal:         General: Normal range of motion.      Cervical back: Normal range of motion and neck supple.   Skin:     General: Skin is warm and dry.      Findings: No rash.   Neurological:      General: No focal deficit present.      Mental Status: He is alert and oriented to person, place, and time.   Psychiatric:         Mood and Affect: Mood normal.         Behavior: Behavior normal.         Health Maintenance   Topic Date Due    Lipid Panel  02/28/2025    High Dose Statin  04/26/2025    Colorectal Cancer Screening  08/26/2029    TETANUS VACCINE  10/09/2031    Hepatitis C Screening  Completed    Shingles Vaccine  Completed    Abdominal Aortic Aneurysm Screening  Completed

## 2024-04-29 ENCOUNTER — TELEPHONE (OUTPATIENT)
Dept: FAMILY MEDICINE | Facility: CLINIC | Age: 74
End: 2024-04-29
Payer: MEDICARE

## 2024-04-29 ENCOUNTER — HOSPITAL ENCOUNTER (OUTPATIENT)
Dept: RADIOLOGY | Facility: HOSPITAL | Age: 74
Discharge: HOME | End: 2024-04-29
Attending: PHYSICIAN ASSISTANT
Payer: MEDICARE

## 2024-04-29 DIAGNOSIS — R91.1 PULMONARY NODULE: ICD-10-CM

## 2024-04-29 DIAGNOSIS — R91.1 PULMONARY NODULE: Primary | ICD-10-CM

## 2024-04-29 PROCEDURE — 71250 CT THORAX DX C-: CPT | Mod: TC,PO

## 2024-04-29 PROCEDURE — 71250 CT THORAX DX C-: CPT | Mod: 26,,, | Performed by: RADIOLOGY

## 2024-04-29 NOTE — TELEPHONE ENCOUNTER
Please let patient know that his CT chest showed multifocal scattered clusters of nodules throughout both lungs which could be suspicious for infectious or inflammatory bronchiolitis (likely from recent infection). His previously demonstrated 9 mm left lower lobe nodule has resolved with interval development of a 13 mm nodular opacity in the left lower lobe. It was recommended that we repeat a CT chest in 3 months to assess stability. There was also mild scattered bronchial wall thickening and mucous plugging in the left lower lobe with a consolidation. This is likely due to recent pneumonia. It can take several weeks for pneumonia to resolve on imaging so as long as symptoms have improved, I am not too concerned about this. Other nonspecific findings include: nonobstructive L kidney stone, aortic valve replacement and a L renal cyst. We will repeat a CT chest in 3 months as recommended.     I have signed for the following orders AND/OR meds.  Please call the patient and ask the patient to schedule the testing AND/OR inform about any medications that were sent. Medications have been sent to pharmacy listed below      Orders Placed This Encounter   Procedures    CT Chest Without Contrast     Standing Status:   Future     Standing Expiration Date:   4/29/2025     Order Specific Question:   May the Radiologist modify the order per protocol to meet the clinical needs of the patient?     Answer:   Yes              [id number] - [place] - [id number]  [id number] [place] [id number]  Phone: [phone] Fax: [phone]

## 2024-04-30 ENCOUNTER — TELEPHONE (OUTPATIENT)
Dept: FAMILY MEDICINE | Facility: CLINIC | Age: 74
End: 2024-04-30
Payer: MEDICARE

## 2024-05-15 ENCOUNTER — HOSPITAL ENCOUNTER (OUTPATIENT)
Dept: RADIOLOGY | Facility: HOSPITAL | Age: 74
Discharge: HOME | End: 2024-05-15
Attending: PHYSICIAN ASSISTANT
Payer: MEDICARE

## 2024-05-15 DIAGNOSIS — J18.9 PNEUMONIA OF LEFT LUNG DUE TO INFECTIOUS ORGANISM, UNSPECIFIED PART OF LUNG: ICD-10-CM

## 2024-05-15 PROCEDURE — 71046 X-RAY EXAM CHEST 2 VIEWS: CPT | Mod: 26,,, | Performed by: RADIOLOGY

## 2024-05-15 PROCEDURE — 71046 X-RAY EXAM CHEST 2 VIEWS: CPT | Mod: TC,PO

## 2024-05-29 ENCOUNTER — HOSPITAL ENCOUNTER (OUTPATIENT)
Dept: CARDIOLOGY | Facility: HOSPITAL | Age: 74
Discharge: HOME | End: 2024-05-29
Attending: INTERNAL MEDICINE
Payer: MEDICARE

## 2024-05-29 VITALS
HEIGHT: 73 IN | WEIGHT: 246 LBS | BODY MASS INDEX: 32.6 KG/M2 | SYSTOLIC BLOOD PRESSURE: 132 MMHG | HEART RATE: 83 BPM | DIASTOLIC BLOOD PRESSURE: 88 MMHG

## 2024-05-29 DIAGNOSIS — Z95.2 S/P TAVR (TRANSCATHETER AORTIC VALVE REPLACEMENT): ICD-10-CM

## 2024-05-29 LAB
AORTIC ROOT ANNULUS: 2.92 CM
AORTIC VALVE CUSP SEPERATION: 1.98 CM
ASCENDING AORTA: 2.46 CM
AV INDEX (PROSTH): 0.44
AV MEAN GRADIENT: 11 MMHG
AV PEAK GRADIENT: 18 MMHG
AV VALVE AREA BY VELOCITY RATIO: 2.01 CM²
AV VALVE AREA: 1.99 CM²
AV VELOCITY RATIO: 0.44
BSA FOR ECHO PROCEDURE: 2.4 M2
CV ECHO LV RWT: 0.69 CM
DOP CALC AO PEAK VEL: 2.15 M/S
DOP CALC AO VTI: 47 CM
DOP CALC LVOT AREA: 4.6 CM2
DOP CALC LVOT DIAMETER: 2.41 CM
DOP CALC LVOT PEAK VEL: 0.95 M/S
DOP CALC LVOT STROKE VOLUME: 93.47 CM3
DOP CALC MV VTI: 35 CM
DOP CALCLVOT PEAK VEL VTI: 20.5 CM
E/E' RATIO: 17.18 M/S
ECHO LV POSTERIOR WALL: 1.42 CM (ref 0.6–1.1)
EJECTION FRACTION: 55 %
FRACTIONAL SHORTENING: 28 % (ref 28–44)
INTERVENTRICULAR SEPTUM: 1.22 CM (ref 0.6–1.1)
IVC DIAMETER: 2.16 CM
LA MAJOR: 5.39 CM
LA MINOR: 5.98 CM
LA WIDTH: 5.5 CM
LEFT ATRIUM SIZE: 4.88 CM
LEFT ATRIUM VOLUME INDEX MOD: 50.9 ML/M2
LEFT ATRIUM VOLUME INDEX: 55 ML/M2
LEFT ATRIUM VOLUME MOD: 119.52 CM3
LEFT ATRIUM VOLUME: 129.35 CM3
LEFT INTERNAL DIMENSION IN SYSTOLE: 2.96 CM (ref 2.1–4)
LEFT VENTRICLE DIASTOLIC VOLUME INDEX: 31.39 ML/M2
LEFT VENTRICLE DIASTOLIC VOLUME: 73.76 ML
LEFT VENTRICLE MASS INDEX: 84 G/M2
LEFT VENTRICLE SYSTOLIC VOLUME INDEX: 14.5 ML/M2
LEFT VENTRICLE SYSTOLIC VOLUME: 33.99 ML
LEFT VENTRICULAR INTERNAL DIMENSION IN DIASTOLE: 4.09 CM (ref 3.5–6)
LEFT VENTRICULAR MASS: 197.29 G
LV LATERAL E/E' RATIO: 16.22 M/S
LV SEPTAL E/E' RATIO: 18.25 M/S
LVOT MG: 2.08 MMHG
LVOT MV: 0.69 CM/S
MV MEAN GRADIENT: 6 MMHG
MV PEAK E VEL: 1.46 M/S
MV PEAK GRADIENT: 11 MMHG
MV STENOSIS PRESSURE HALF TIME: 71.02 MS
MV VALVE AREA BY CONTINUITY EQUATION: 2.67 CM2
MV VALVE AREA P 1/2 METHOD: 3.1 CM2
OHS CV RV/LV RATIO: 0.63 CM
OHS LV EJECTION FRACTION SIMPSONS BIPLANE MOD: 52 %
PISA MRMAX VEL: 4.8 M/S
PISA TR MAX VEL: 3.29 M/S
PULM VEIN S/D RATIO: 0.43
PV MV: 0.58 M/S
PV PEAK D VEL: 0.51 M/S
PV PEAK GRADIENT: 3 MMHG
PV PEAK S VEL: 0.22 M/S
PV PEAK VELOCITY: 0.83 M/S
RA MAJOR: 5.64 CM
RA PRESSURE ESTIMATED: 3 MMHG
RA WIDTH: 5.41 CM
RIGHT VENTRICULAR END-DIASTOLIC DIMENSION: 2.56 CM
RV TB RVSP: 6 MMHG
STJ: 2.41 CM
TDI LATERAL: 0.09 M/S
TDI SEPTAL: 0.08 M/S
TDI: 0.09 M/S
TR MAX PG: 43 MMHG
TRICUSPID ANNULAR PLANE SYSTOLIC EXCURSION: 2.41 CM
TV REST PULMONARY ARTERY PRESSURE: 46 MMHG
Z-SCORE OF LEFT VENTRICULAR DIMENSION IN END DIASTOLE: -8.77
Z-SCORE OF LEFT VENTRICULAR DIMENSION IN END SYSTOLE: -5.46

## 2024-05-29 PROCEDURE — 93306 TTE W/DOPPLER COMPLETE: CPT | Mod: PO

## 2024-05-29 PROCEDURE — 93306 TTE W/DOPPLER COMPLETE: CPT | Mod: 26,,, | Performed by: INTERNAL MEDICINE

## 2024-06-05 ENCOUNTER — OFFICE VISIT (OUTPATIENT)
Dept: CARDIOLOGY | Facility: CLINIC | Age: 74
End: 2024-06-05
Payer: MEDICARE

## 2024-06-05 ENCOUNTER — LAB VISIT (OUTPATIENT)
Dept: LAB | Facility: HOSPITAL | Age: 74
End: 2024-06-05
Attending: INTERNAL MEDICINE
Payer: MEDICARE

## 2024-06-05 VITALS
WEIGHT: 248.88 LBS | BODY MASS INDEX: 32.84 KG/M2 | OXYGEN SATURATION: 99 % | DIASTOLIC BLOOD PRESSURE: 90 MMHG | SYSTOLIC BLOOD PRESSURE: 144 MMHG | HEART RATE: 89 BPM

## 2024-06-05 DIAGNOSIS — Z95.2 S/P TAVR (TRANSCATHETER AORTIC VALVE REPLACEMENT): Primary | ICD-10-CM

## 2024-06-05 DIAGNOSIS — Z95.2 S/P TAVR (TRANSCATHETER AORTIC VALVE REPLACEMENT): ICD-10-CM

## 2024-06-05 DIAGNOSIS — E11.51 TYPE 2 DIABETES MELLITUS WITH DIABETIC PERIPHERAL ANGIOPATHY WITHOUT GANGRENE, UNSPECIFIED WHETHER LONG TERM INSULIN USE: ICD-10-CM

## 2024-06-05 DIAGNOSIS — M31.6 TEMPORAL ARTERITIS: ICD-10-CM

## 2024-06-05 LAB
CREAT SERPL-MCNC: 0.9 MG/DL (ref 0.5–1.4)
ERYTHROCYTE [DISTWIDTH] IN BLOOD BY AUTOMATED COUNT: 14.5 % (ref 11.5–14.5)
ERYTHROCYTE [SEDIMENTATION RATE] IN BLOOD BY PHOTOMETRIC METHOD: 24 MM/HR (ref 0–23)
EST. GFR  (NO RACE VARIABLE): >60 ML/MIN/1.73 M^2
ESTIMATED AVG GLUCOSE: 114 MG/DL (ref 68–131)
HBA1C MFR BLD: 5.6 % (ref 4–5.6)
HCT VFR BLD AUTO: 45.2 % (ref 40–54)
HGB BLD-MCNC: 14.6 G/DL (ref 14–18)
MCH RBC QN AUTO: 28.3 PG (ref 27–31)
MCHC RBC AUTO-ENTMCNC: 32.3 G/DL (ref 32–36)
MCV RBC AUTO: 88 FL (ref 82–98)
PLATELET # BLD AUTO: 296 K/UL (ref 150–450)
PMV BLD AUTO: 9.5 FL (ref 9.2–12.9)
RBC # BLD AUTO: 5.15 M/UL (ref 4.6–6.2)
WBC # BLD AUTO: 9.15 K/UL (ref 3.9–12.7)

## 2024-06-05 PROCEDURE — 85652 RBC SED RATE AUTOMATED: CPT | Performed by: PHYSICIAN ASSISTANT

## 2024-06-05 PROCEDURE — 83036 HEMOGLOBIN GLYCOSYLATED A1C: CPT | Performed by: PHYSICIAN ASSISTANT

## 2024-06-05 PROCEDURE — 99999 PR PBB SHADOW E&M-EST. PATIENT-LVL IV: CPT | Mod: PBBFAC,,, | Performed by: INTERNAL MEDICINE

## 2024-06-05 PROCEDURE — 99214 OFFICE O/P EST MOD 30 MIN: CPT | Mod: S$PBB,,, | Performed by: INTERNAL MEDICINE

## 2024-06-05 PROCEDURE — 85027 COMPLETE CBC AUTOMATED: CPT | Performed by: INTERNAL MEDICINE

## 2024-06-05 PROCEDURE — 82565 ASSAY OF CREATININE: CPT | Performed by: INTERNAL MEDICINE

## 2024-06-05 PROCEDURE — 99214 OFFICE O/P EST MOD 30 MIN: CPT | Mod: PBBFAC | Performed by: INTERNAL MEDICINE

## 2024-06-05 PROCEDURE — 36415 COLL VENOUS BLD VENIPUNCTURE: CPT | Performed by: INTERNAL MEDICINE

## 2024-06-05 NOTE — PROGRESS NOTES
Subjective:     Referring Physician: Dr Osborne (Now Nieves May)    HPI  Roshan Menard is a 74 y.o. male who presents for TAVR follow up.    Hospital Course:  Roshan Menard was admitted and underwent successful placement of a 26 mm EvolutFX TAVR via TF access under MAC sedation on 5/16/23. Please see full cath report for details.    Interval History  Patient is doing very well since TAVR. He denies heart failure symptoms. He is able to do what he wants to do without SOB. Had pneumonia last month and recovered successfully.       NYHA:I CCS:0    Review of Systems   Constitutional: Negative for chills and fever.   HENT:  Negative for sore throat.    Eyes:  Negative for blurred vision.   Cardiovascular:  Negative for chest pain, claudication, cyanosis, dyspnea on exertion, irregular heartbeat, leg swelling, near-syncope, orthopnea, palpitations, paroxysmal nocturnal dyspnea and syncope.   Respiratory:  Negative for cough and sputum production.    Hematologic/Lymphatic: Does not bruise/bleed easily.   Skin:  Negative for itching, rash and suspicious lesions.   Musculoskeletal:  Negative for falls.   Gastrointestinal:  Negative for abdominal pain and change in bowel habit.   Genitourinary:  Negative for dysuria.   Neurological:  Negative for disturbances in coordination, dizziness and loss of balance.   Psychiatric/Behavioral:  Negative for altered mental status.           Past Medical History:   Diagnosis Date    Allergy     Anticoagulant long-term use     Aortic stenosis     Arthritis     Asthma     Atrial fibrillation     Bronchitis     Cataract     COPD (chronic obstructive pulmonary disease)     Coronary artery disease     stent    General anesthetics causing adverse effect in therapeutic use     hard to awaken    GERD (gastroesophageal reflux disease)     Gout, chronic     Hypertension     Mixed hyperlipidemia     Sleep apnea     Type 2 diabetes mellitus with diabetic peripheral angiopathy without gangrene,  unspecified whether long term insulin use 05/02/2023         Objective:    Physical Exam  Vitals reviewed.   Constitutional:       General: He is not in acute distress.     Appearance: He is well-developed. He is not diaphoretic.   HENT:      Head: Normocephalic and atraumatic.   Neck:      Vascular: No JVD.   Cardiovascular:      Rate and Rhythm: Normal rate and regular rhythm.      Pulses: Intact distal pulses.      Heart sounds: No murmur heard.  Pulmonary:      Effort: Pulmonary effort is normal. No respiratory distress.   Musculoskeletal:      Cervical back: Normal range of motion.      Right lower leg: No edema.      Left lower leg: No edema.   Skin:     General: Skin is warm and dry.   Neurological:      Mental Status: He is alert and oriented to person, place, and time.           Vitals:    06/05/24 1119   BP: (!) 144/90   Pulse: 89   SpO2: 99%   Weight: 112.9 kg (248 lb 14.4 oz)     Body mass index is 32.84 kg/m².    Test(s) Reviewed  I have reviewed the following in detail:  [] Stress test   [] Angiography   [x] Echocardiogram   [x] Labs   [] Other       Chemistry        Component Value Date/Time     12/19/2023 0902    K 4.1 12/19/2023 0902     12/19/2023 0902    CO2 27 12/19/2023 0902    BUN 18 12/19/2023 0902    CREATININE 0.9 06/05/2024 0945     12/19/2023 0902        Component Value Date/Time    CALCIUM 9.3 12/19/2023 0902    ALKPHOS 75 05/17/2023 0519    AST 28 05/17/2023 0519    ALT 26 05/17/2023 0519    BILITOT 0.6 05/17/2023 0519    ESTGFRAFRICA >60.0 03/30/2022 0736    EGFRNONAA >60.0 03/30/2022 0736            Assessment:     S/P TAVR (transcatheter aortic valve replacement)  Successful transfemoral aortic valve replacement with a 26 mm EvolutFX valve. TTE shows a well functioning valve.     PAF (paroxysmal atrial fibrillation)  On Eliquis. Failed DCCV in the past.     Coronary artery disease involving native coronary artery of native heart without angina pectoris  Hx LCx LUIS  which was patent on C in 3/2023. Nonobstructive disease in the LAD and RCA.     Chronic diastolic heart failure  Chronic. Controlled. Follow up with Cardiology/PCP.    Aortic atherosclerosis  See imaging. Follow up with Cardiology.     Plan:       Follow up with Elena Golden.  ASA / Eliquis. Will call back if he is interested in [facility].   SBE prophylaxis for life.        Luis Enrique Davenport MD [facility]  Interventional Cardiology  Structural/Valvular heart disease  [phone]

## 2024-06-06 ENCOUNTER — PATIENT MESSAGE (OUTPATIENT)
Dept: CARDIOLOGY | Facility: CLINIC | Age: 74
End: 2024-06-06
Payer: MEDICARE

## 2024-06-06 NOTE — PROGRESS NOTES
Results have been released via [employer]. Please verify that these have been viewed by patient. If not, please call patient with results.     I have sent a message to them with the following interpretation (see below).    I have reviewed your recent blood work.     A1C NORMAL-The A1c is at goal. Repeat an a1c in 6 months.   Sed rate has increased. Please make sure to discuss these results with your retina specialist.     Please do not hesitate to call or message with any additional questions or concerns.    Aide Stephens PA-C

## 2024-06-07 RX ORDER — AMLODIPINE BESYLATE 5 MG/1
5 TABLET ORAL DAILY
Qty: 90 TABLET | Refills: 3 | Status: SHIPPED | OUTPATIENT
Start: 2024-06-07

## 2024-06-25 ENCOUNTER — PATIENT MESSAGE (OUTPATIENT)
Dept: FAMILY MEDICINE | Facility: CLINIC | Age: 74
End: 2024-06-25
Payer: MEDICARE

## 2024-06-26 VITALS — DIASTOLIC BLOOD PRESSURE: 71 MMHG | SYSTOLIC BLOOD PRESSURE: 122 MMHG

## 2024-07-01 ENCOUNTER — OFFICE VISIT (OUTPATIENT)
Dept: CARDIOLOGY | Facility: CLINIC | Age: 74
End: 2024-07-01
Payer: MEDICARE

## 2024-07-01 VITALS
DIASTOLIC BLOOD PRESSURE: 80 MMHG | WEIGHT: 246 LBS | HEART RATE: 94 BPM | HEIGHT: 73 IN | SYSTOLIC BLOOD PRESSURE: 138 MMHG | BODY MASS INDEX: 32.6 KG/M2 | OXYGEN SATURATION: 95 %

## 2024-07-01 DIAGNOSIS — I25.10 CORONARY ARTERY DISEASE INVOLVING NATIVE CORONARY ARTERY OF NATIVE HEART WITHOUT ANGINA PECTORIS: ICD-10-CM

## 2024-07-01 DIAGNOSIS — E78.2 MIXED HYPERLIPIDEMIA: ICD-10-CM

## 2024-07-01 DIAGNOSIS — I70.0 AORTIC ATHEROSCLEROSIS: ICD-10-CM

## 2024-07-01 DIAGNOSIS — I48.19 PERSISTENT ATRIAL FIBRILLATION: ICD-10-CM

## 2024-07-01 DIAGNOSIS — I10 ESSENTIAL HYPERTENSION: ICD-10-CM

## 2024-07-01 DIAGNOSIS — I35.0 NONRHEUMATIC AORTIC VALVE STENOSIS: ICD-10-CM

## 2024-07-01 DIAGNOSIS — I20.81 ANGINA PECTORIS WITH CORONARY MICROVASCULAR DYSFUNCTION: Primary | ICD-10-CM

## 2024-07-01 PROBLEM — I48.0 PAF (PAROXYSMAL ATRIAL FIBRILLATION): Status: RESOLVED | Noted: 2021-10-14 | Resolved: 2024-07-01

## 2024-07-01 PROCEDURE — 99214 OFFICE O/P EST MOD 30 MIN: CPT | Mod: PBBFAC,PO | Performed by: INTERNAL MEDICINE

## 2024-07-01 PROCEDURE — 99999 PR PBB SHADOW E&M-EST. PATIENT-LVL IV: CPT | Mod: PBBFAC,,, | Performed by: INTERNAL MEDICINE

## 2024-07-01 PROCEDURE — 99214 OFFICE O/P EST MOD 30 MIN: CPT | Mod: S$PBB,,, | Performed by: INTERNAL MEDICINE

## 2024-07-01 NOTE — PROGRESS NOTES
Subjective   Patient ID:  Roshan Menard is a 74 y.o. male who presents for follow-up of Follow-up (6 months)      HPI74 yo WM with persistent AF, CAD and previous TAVR. Was cardioverted 12- but within a few days was back in AF. States this was his fourth cardioversion. Is currently followed by EP DR Duggan. States he is doing well. Sometimes has some SOB but basically does not feel the AF. Denies any CP. Some lower extremity edema.       Summary     The left ventricle is normal in size with normal systolic function.  The estimated ejection fraction is 65%.  Grade II left ventricular diastolic dysfunction.  Normal right ventricular size with normal right ventricular systolic function.  Mild left atrial enlargement.  There is a 26 mm Medtronic EVolutFX transcutaneously-placed aortic bioprosthesis present. There is trivial paravalvular aortic insufficiency present.  The aortic valve mean gradient is 10 mmHg with a dimensionless index of 0.49.  Mild tricuspid regurgitation.  The estimated PA systolic pressure is 32 mmHg.  Normal central venous pressure (3 mmHg).  Trivial lateral pericardial effusion.        Summary      3-2023 Known previous stents    The Prox LAD lesion was 50% stenosed.    The Prox RCA lesion was 50% stenosed.    The ejection fraction was calculated to be 60%.    The pre-procedure left ventricular end diastolic pressure was 25.    The post-procedure left ventricular end diastolic pressure was 28.    The estimated blood loss was none.    There was two vessel coronary artery disease.    There was diastolic dysfunction.    There was no mitral valve stenosis.    There was moderate aortic valve stenosis.    There was no mitral valve prolapse evident. The annulus was normal. There was normal mitral valve motion.     5-2023  Summary:     Successful transfemoral aortic valve replacement with a 26 mm EvolutFX valve.  Acute on chronic diastolic heart failure with LVEDP 30 mmHg pre procedure    Review  of Systems   Constitutional: Negative for decreased appetite, fever, malaise/fatigue, weight gain and weight loss.   HENT:  Negative for hearing loss and nosebleeds.    Eyes:  Negative for visual disturbance.   Cardiovascular:  Positive for leg swelling. Negative for chest pain, claudication, cyanosis, dyspnea on exertion, irregular heartbeat, near-syncope, orthopnea, palpitations, paroxysmal nocturnal dyspnea and syncope.   Respiratory:  Positive for shortness of breath. Negative for cough, hemoptysis, sleep disturbances due to breathing, snoring and wheezing.    Endocrine: Negative for cold intolerance, heat intolerance, polydipsia and polyuria.   Hematologic/Lymphatic: Negative for adenopathy and bleeding problem. Does not bruise/bleed easily.   Skin:  Negative for color change, itching, poor wound healing, rash and suspicious lesions.   Musculoskeletal:  Positive for arthritis and joint pain. Negative for back pain, falls, joint swelling, muscle cramps, muscle weakness and myalgias.   Gastrointestinal:  Negative for bloating, abdominal pain, change in bowel habit, constipation, flatus, heartburn, hematemesis, hematochezia, hemorrhoids, jaundice, melena, nausea and vomiting.   Genitourinary:  Negative for bladder incontinence, decreased libido, frequency, hematuria, hesitancy and urgency.   Neurological:  Positive for loss of balance. Negative for brief paralysis, difficulty with concentration, excessive daytime sleepiness, dizziness, focal weakness, headaches, light-headedness, numbness, vertigo and weakness.   Psychiatric/Behavioral:  Negative for altered mental status, depression and memory loss. The patient does not have insomnia and is not nervous/anxious.    Allergic/Immunologic: Negative for environmental allergies, hives and persistent infections.          Objective     Physical Exam  Constitutional:       General: He is not in acute distress.     Appearance: He is well-developed. He is not diaphoretic.  "     Comments: /80 (BP Location: Right arm, Patient Position: Sitting, BP Method: Large (Manual))   Pulse 94   Ht 6' 1" (1.854 m)   Wt 111.6 kg (246 lb)   SpO2 95%   BMI 32.46 kg/m²      HENT:      Head: Normocephalic and atraumatic.   Eyes:      General: Lids are normal. No scleral icterus.        Right eye: No discharge.      Conjunctiva/sclera: Conjunctivae normal.      Pupils: Pupils are equal, round, and reactive to light.   Neck:      Thyroid: No thyromegaly.      Vascular: No JVD.      Trachea: No tracheal deviation.   Cardiovascular:      Rate and Rhythm: Normal rate. Rhythm irregularly irregular.      Pulses: Intact distal pulses.           Carotid pulses are 2+ on the right side and 2+ on the left side.       Radial pulses are 2+ on the right side and 2+ on the left side.        Femoral pulses are 2+ on the right side and 2+ on the left side.       Popliteal pulses are 2+ on the right side and 2+ on the left side.        Dorsalis pedis pulses are 2+ on the right side and 2+ on the left side.        Posterior tibial pulses are 2+ on the right side and 2+ on the left side.      Heart sounds: S1 normal and S2 normal. Murmur heard.      Harsh midsystolic murmur is present with a grade of 2/6 at the upper right sternal border radiating to the neck.      No friction rub. No gallop.   Pulmonary:      Effort: Pulmonary effort is normal. No respiratory distress.      Breath sounds: Normal breath sounds. No wheezing or rales.   Chest:      Chest wall: No tenderness.   Abdominal:      General: Bowel sounds are normal. There is no distension.      Palpations: Abdomen is soft. There is no hepatomegaly or mass.      Tenderness: There is no abdominal tenderness. There is no guarding or rebound.   Musculoskeletal:         General: No tenderness. Normal range of motion.      Cervical back: Normal range of motion and neck supple.      Right lower leg: Edema present.      Left lower leg: Edema present. "   Lymphadenopathy:      Cervical: No cervical adenopathy.   Skin:     General: Skin is warm and dry.      Coloration: Skin is not pale.      Findings: No erythema or rash.   Neurological:      Mental Status: He is alert and oriented to person, place, and time.      Cranial Nerves: No cranial nerve deficit.      Coordination: Coordination normal.      Deep Tendon Reflexes: Reflexes are normal and symmetric.   Psychiatric:         Speech: Speech normal.         Behavior: Behavior normal.         Thought Content: Thought content normal.         Judgment: Judgment normal.            Assessment and Plan     1. Angina pectoris with coronary microvascular dysfunction    2. Aortic atherosclerosis    3. Coronary artery disease involving native coronary artery of native heart without angina pectoris    4. Essential hypertension    5. Mixed hyperlipidemia    6. Nonrheumatic aortic valve stenosis    7. Persistent atrial fibrillation        Plan:  The current medical regimen is effective;  continue present plan and medications.    Low salt diet   Patient advised to modify risk factors such as weight, exercise, diet,  tobacco and alcohol exposure    Risk of stroke from atrial fib has been discussed as well as bleeding risk from alternative anticoagulation regiments as well as risk and benefits of rate control vs cardioversion    No orders of the defined types were placed in this encounter.    Follow up in about 6 months (around 1/1/2025).   Advance Care Planning     Date: 07/01/2024

## 2024-07-12 ENCOUNTER — OFFICE VISIT (OUTPATIENT)
Dept: FAMILY MEDICINE | Facility: CLINIC | Age: 74
End: 2024-07-12
Payer: MEDICARE

## 2024-07-12 ENCOUNTER — HOSPITAL ENCOUNTER (OUTPATIENT)
Dept: RADIOLOGY | Facility: HOSPITAL | Age: 74
Discharge: HOME | End: 2024-07-12
Attending: PHYSICIAN ASSISTANT
Payer: MEDICARE

## 2024-07-12 VITALS
BODY MASS INDEX: 33.27 KG/M2 | OXYGEN SATURATION: 99 % | HEIGHT: 73 IN | WEIGHT: 251 LBS | SYSTOLIC BLOOD PRESSURE: 136 MMHG | DIASTOLIC BLOOD PRESSURE: 78 MMHG | RESPIRATION RATE: 16 BRPM | HEART RATE: 99 BPM

## 2024-07-12 DIAGNOSIS — R07.81 RIB PAIN ON RIGHT SIDE: ICD-10-CM

## 2024-07-12 DIAGNOSIS — R07.81 RIB PAIN ON RIGHT SIDE: Primary | ICD-10-CM

## 2024-07-12 PROCEDURE — 71100 X-RAY EXAM RIBS UNI 2 VIEWS: CPT | Mod: 26,RT,, | Performed by: STUDENT IN AN ORGANIZED HEALTH CARE EDUCATION/TRAINING PROGRAM

## 2024-07-12 PROCEDURE — 71100 X-RAY EXAM RIBS UNI 2 VIEWS: CPT | Mod: TC,PO,RT

## 2024-07-12 PROCEDURE — 99215 OFFICE O/P EST HI 40 MIN: CPT | Mod: PBBFAC,PO,25 | Performed by: PHYSICIAN ASSISTANT

## 2024-07-12 PROCEDURE — 99999 PR PBB SHADOW E&M-EST. PATIENT-LVL V: CPT | Mod: PBBFAC,,, | Performed by: PHYSICIAN ASSISTANT

## 2024-07-12 RX ORDER — TRAMADOL HYDROCHLORIDE 50 MG/1
50 TABLET ORAL EVERY 6 HOURS PRN
Qty: 20 TABLET | Refills: 0 | Status: SHIPPED | OUTPATIENT
Start: 2024-07-12

## 2024-07-12 NOTE — PROGRESS NOTES
Assessment/Plan:    Problem List Items Addressed This Visit    None  Visit Diagnoses       Rib pain on right side    -  Primary    Relevant Medications    traMADoL (ULTRAM) 50 mg tablet    Other Relevant Orders    X-Ray Ribs 2 View Right        -R rib injury >1 day ago  -will obtain XR to r/o fracture  -continue conservative treatment, rest, ice/heat applications, PRN anti-inflammatory medication  -will Rx short course of PRN Tramadol for severe pain. The patient was checked in the [facility] Pharmacy's Prescription Monitoring Program. There appears to be no incongruities with the patient's verbalized history.   -ER precautions for severe or worsening of symptoms    Follow up if symptoms worsen or fail to improve.    Aide Stephens PA-C  _____________________________________________________________________________________________________________________________________________________    CC: R rib pain    HPI: Patient is in clinic today as an established patient here for R rib pain. Symptom onset was 1 day ago. Patient stated that he was using a  on his tractor and went to reach over the bar and heard cracking in his R ribs. He has had pain ever since that time. Pain is located in his R ribs. He stated that the pain is worse with deep breathing and moving in certain positions. Denies bruising or swelling. Also denies shortness of breath or difficulty breathing. He has not tried anything for relief of symptoms. No other complaints today.     Past Medical History:   Diagnosis Date    Allergy     Anticoagulant long-term use     Aortic stenosis     Arthritis     Asthma     Atrial fibrillation     Bronchitis     Cataract     COPD (chronic obstructive pulmonary disease)     Coronary artery disease     stent    General anesthetics causing adverse effect in therapeutic use     hard to awaken    GERD (gastroesophageal reflux disease)     Gout, chronic     Hypertension     Mixed hyperlipidemia      Sleep apnea     Type 2 diabetes mellitus with diabetic peripheral angiopathy without gangrene, unspecified whether long term insulin use 05/02/2023     Past Surgical History:   Procedure Laterality Date    ANGIOGRAM, CORONARY, WITH LEFT HEART CATHETERIZATION  09/27/2021    Procedure: Angiogram, Coronary, with Left Heart Cath;  Surgeon: Vincent Gonzalez MD;  Location: [facility] CATH;  Service: Cardiology;;    BACK SURGERY      L3-5; ruptured disc; laminectomy x 2 surgery    CARDIAC CATH COSURGEON N/A 05/16/2023    Procedure: Cardiac Cath Cosurgeon;  Surgeon: Holly Pleitez MD;  Location: [facility] CATH LAB;  Service: Cardiothoracic;  Laterality: N/A;    CARDIAC VALVE REPLACEMENT  5/16/23    TAVR    CARDIAC VALVE SURGERY      CARDIOVERSION      CHOLECYSTECTOMY      CORONARY ANGIOPLASTY WITH STENT PLACEMENT      EYE SURGERY  2012    Cateract    JOINT REPLACEMENT  '08    RT  KNEE    LEFT HEART CATHETERIZATION Left 04/04/2023    Procedure: Left heart cath;  Surgeon: Aleksey Krishnan MD;  Location: [facility] CATH LAB;  Service: Cardiology;  Laterality: Left;    SPINE SURGERY  1988/2004    Lamanectomy '88/Lam '04    TONSILLECTOMY      TOTAL KNEE ARTHROPLASTY      TRANSCATHETER AORTIC VALVE REPLACEMENT (TAVR) N/A 05/16/2023    Procedure: REPLACEMENT, AORTIC VALVE, TRANSCATHETER (TAVR);  Surgeon: Luis Enrique Ji MD;  Location: [facility] CATH LAB;  Service: Cardiology;  Laterality: N/A;    TREATMENT OF CARDIAC ARRHYTHMIA N/A 12/19/2023    Procedure: Cardioversion or Defibrillation;  Surgeon: Theo Ferreira MD;  Location: [facility] CATH LAB;  Service: Cardiology;  Laterality: N/A;    VASECTOMY       Review of patient's allergies indicates:   Allergen Reactions    Sulfa (sulfonamide antibiotics)      Hives    Azithromycin      Diarrhea      Cefaclor Other (See Comments)     Other reaction(s): Hives    Iodine and iodide containing products      Other reaction(s): Unknown    Ivp dye  [iodinated contrast media] Other (See Comments)     Sulfacetamide sod-sulfur-urea Other (See Comments)    Doxycycline Rash    Sulfamethoxazole-trimethoprim Rash     Social History     Tobacco Use    Smoking status: Former     Current packs/day: 0.00     Average packs/day: 1.5 packs/day for 22.0 years (33.0 ttl pk-yrs)     Types: Cigarettes     Start date: 1970     Quit date: 1992     Years since quittin.2    Smokeless tobacco: Never   Substance Use Topics    Alcohol use: No    Drug use: No     Family History   Problem Relation Name Age of Onset    Cancer Mother Lori     Early death Mother Lori     Arthritis Father Brendan R.     Diabetes Father Brendan R.     Heart disease Father Brendan R.     Hyperlipidemia Father [place] R.     Hypertension Father Brendan R.      Current Outpatient Medications on File Prior to Visit   Medication Sig Dispense Refill    albuterol (PROVENTIL/VENTOLIN HFA) 90 mcg/actuation inhaler Inhale 2 puffs into the lungs every 6 (six) hours as needed for Wheezing or Shortness of Breath. Rescue 54 g 0    albuterol-ipratropium (DUO-NEB) 2.5 mg-0.5 mg/3 mL nebulizer solution Take 3 mLs by nebulization every 6 (six) hours as needed for Wheezing. Rescue 75 mL 0    allopurinoL (ZYLOPRIM) 300 MG tablet TAKE 1 TABLET BY MOUTH EVERY DAY 90 tablet 3    amLODIPine (NORVASC) 5 MG tablet Take 1 tablet (5 mg total) by mouth once daily. 90 tablet 3    apixaban (ELIQUIS) 5 mg Tab Take 1 tablet (5 mg total) by mouth 2 (two) times daily. 180 tablet 3    aspirin (ECOTRIN) 81 MG EC tablet Take 1 tablet (81 mg total) by mouth once daily.      atorvastatin (LIPITOR) 40 MG tablet TK 1 T PO QD 90 tablet 3    coenzyme Q10 100 mg capsule Take 200 mg by mouth once daily.      esomeprazole (NEXIUM) 20 MG capsule Take 20 mg by mouth daily as needed.      fluocinonide (LIDEX) 0.05 % gel SMARTSI-2 Gram(s) Topical Twice Daily      fluticasone furoate-vilanteroL (BREO ELLIPTA) 200-25 mcg/dose DsDv diskus inhaler INHALE 1 PUFF BY MOUTH AT THE SAME TIME EVERY DAY 30 each 11     furosemide (LASIX) 20 MG tablet Take 1 tablet (20 mg total) by mouth once daily. 90 tablet 3    ketoconazole (NIZORAL) 2 % cream Apply topically.      lactobacillus comb no.10 (PROBIOTIC) 20 billion cell Cap Take 1 capsule by mouth once daily. Or as directed on bottle      metoprolol succinate (TOPROL-XL) 25 MG 24 hr tablet Take 3 tablets (75 mg total) by mouth 2 (two) times daily. 180 tablet 11    montelukast (SINGULAIR) 10 mg tablet Take 1 tablet (10 mg total) by mouth once daily. 90 tablet 3    multivitamin (THERAGRAN) per tablet Take 1 tablet by mouth once daily.      naproxen (NAPROSYN) 500 MG tablet Take 500 mg by mouth as needed.      omeprazole (PRILOSEC) 10 MG capsule Take 10 mg by mouth once daily.      potassium chloride (KLOR-CON) 10 MEQ TbSR TK 1 T PO ONCE A DAY. 90 tablet 3    predniSONE (DELTASONE) 2.5 MG tablet Take 5 mg by mouth once daily.      telmisartan (MICARDIS) 80 MG Tab TAKE 1 TABLET(80 MG) BY MOUTH EVERY DAY 90 tablet 3    tiotropium bromide (SPIRIVA RESPIMAT) 2.5 mcg/actuation inhaler Inhale 2 puffs into the lungs Daily. Controller 4 g 11    urea (CARMOL) 40 % Crea 2 (two) times daily as needed.  0    cloNIDine (CATAPRES) 0.1 MG tablet Take 1 tablet (0.1 mg total) by mouth every 6 (six) hours as needed (for systolic blood pressure greater than 165). (Patient not taking: Reported on 6/5/2024) 60 tablet 11     Current Facility-Administered Medications on File Prior to Visit   Medication Dose Route Frequency Provider Last Rate Last Admin    sodium chloride 0.9% flush 10 mL  10 mL Intravenous PRN Luis Enrique Rangel MD           Review of Systems   Constitutional:  Negative for chills, diaphoresis, fatigue and fever.   HENT:  Negative for congestion, ear pain, postnasal drip, sinus pain and sore throat.    Eyes:  Negative for pain and redness.   Respiratory:  Negative for cough, chest tightness and shortness of breath.         +R rib pain   Cardiovascular:  Negative for chest pain and leg  "swelling.   Gastrointestinal:  Negative for abdominal pain, constipation, diarrhea, nausea and vomiting.   Genitourinary:  Negative for dysuria and hematuria.   Musculoskeletal:  Negative for arthralgias and joint swelling.   Skin:  Negative for rash.   Neurological:  Negative for dizziness, syncope and headaches.   Psychiatric/Behavioral:  Negative for dysphoric mood. The patient is not nervous/anxious.        Vitals:    07/12/24 1352   BP: 136/78   Pulse: 99   Resp: 16   SpO2: 99%   Weight: 113.9 kg (251 lb)   Height: 6' 1" (1.854 m)       Wt Readings from Last 3 Encounters:   07/12/24 113.9 kg (251 lb)   07/01/24 111.6 kg (246 lb)   06/05/24 112.9 kg (248 lb 14.4 oz)       Physical Exam  Constitutional:       General: He is not in acute distress.     Appearance: Normal appearance. He is well-developed.   HENT:      Head: Normocephalic and atraumatic.   Eyes:      Conjunctiva/sclera: Conjunctivae normal.   Cardiovascular:      Rate and Rhythm: Normal rate and regular rhythm.      Pulses: Normal pulses.      Heart sounds: Normal heart sounds. No murmur heard.  Pulmonary:      Effort: Pulmonary effort is normal. No respiratory distress.      Breath sounds: Normal breath sounds.   Chest:      Comments: +R rib tenderness  Abdominal:      General: Bowel sounds are normal. There is no distension.      Palpations: Abdomen is soft.      Tenderness: There is no abdominal tenderness.   Musculoskeletal:         General: Normal range of motion.      Cervical back: Normal range of motion and neck supple.   Skin:     General: Skin is warm and dry.      Findings: No rash.   Neurological:      General: No focal deficit present.      Mental Status: He is alert and oriented to person, place, and time.   Psychiatric:         Mood and Affect: Mood normal.         Behavior: Behavior normal.         Health Maintenance   Topic Date Due    Lipid Panel  02/28/2025    High Dose Statin  07/01/2025    Colorectal Cancer Screening  08/26/2029 "    TETANUS VACCINE  10/09/2031    Hepatitis C Screening  Completed    Shingles Vaccine  Completed    Abdominal Aortic Aneurysm Screening  Completed

## 2024-07-12 NOTE — PROGRESS NOTES
Results have been released via [employer]. Please verify that these have been viewed by patient. If not, please call patient with results.     I have sent a message to them with the following interpretation (see below).    I have reviewed your recent R rib XR which showed no fractures. OK to take pain medication as needed. Let me know if symptoms worsen or do not improve.      Please do not hesitate to call or message with any additional questions or concerns.    Aide Stehpens PA-C

## 2024-07-30 ENCOUNTER — HOSPITAL ENCOUNTER (OUTPATIENT)
Dept: RADIOLOGY | Facility: HOSPITAL | Age: 74
Discharge: HOME | End: 2024-07-30
Attending: PHYSICIAN ASSISTANT
Payer: MEDICARE

## 2024-07-30 DIAGNOSIS — R91.8 MULTIPLE LUNG NODULES: ICD-10-CM

## 2024-07-30 DIAGNOSIS — R91.1 PULMONARY NODULE: ICD-10-CM

## 2024-07-30 DIAGNOSIS — R91.8 OTHER NONSPECIFIC ABNORMAL FINDING OF LUNG FIELD: Primary | ICD-10-CM

## 2024-07-30 PROCEDURE — 71250 CT THORAX DX C-: CPT | Mod: TC,PO

## 2024-07-30 PROCEDURE — 71250 CT THORAX DX C-: CPT | Mod: 26,,, | Performed by: STUDENT IN AN ORGANIZED HEALTH CARE EDUCATION/TRAINING PROGRAM

## 2024-07-30 NOTE — PROGRESS NOTES
Results have been released via [employer]. Please verify that these have been viewed by patient. If not, please call patient with results.     Please schedule the following orders: Repeat CT chest in 3 months    I have sent a message to them with the following interpretation (see below).    I have reviewed your recent CT chest which showed findings suggestive for evolving small airways infection/inflammation with mixed interval change. Repeat imaging recommended again in 3 months. We will continue to monitor this.     Please do not hesitate to call or message with any additional questions or concerns.    Aide Stephens PA-C

## 2024-08-06 ENCOUNTER — PATIENT MESSAGE (OUTPATIENT)
Dept: FAMILY MEDICINE | Facility: CLINIC | Age: 74
End: 2024-08-06
Payer: MEDICARE

## 2024-08-06 DIAGNOSIS — M31.6 TEMPORAL ARTERITIS: Primary | ICD-10-CM

## 2024-08-07 ENCOUNTER — LAB VISIT (OUTPATIENT)
Dept: LAB | Facility: HOSPITAL | Age: 74
End: 2024-08-07
Attending: INTERNAL MEDICINE
Payer: MEDICARE

## 2024-08-07 DIAGNOSIS — I10 ESSENTIAL HYPERTENSION: ICD-10-CM

## 2024-08-07 DIAGNOSIS — M31.6 TEMPORAL ARTERITIS: ICD-10-CM

## 2024-08-07 DIAGNOSIS — I48.0 PAF (PAROXYSMAL ATRIAL FIBRILLATION): ICD-10-CM

## 2024-08-07 LAB
ALBUMIN SERPL BCP-MCNC: 3.6 G/DL (ref 3.5–5.2)
ALP SERPL-CCNC: 101 U/L (ref 55–135)
ALT SERPL W/O P-5'-P-CCNC: 22 U/L (ref 10–44)
ANION GAP SERPL CALC-SCNC: 10 MMOL/L (ref 8–16)
AST SERPL-CCNC: 25 U/L (ref 10–40)
BILIRUB SERPL-MCNC: 0.4 MG/DL (ref 0.1–1)
BUN SERPL-MCNC: 16 MG/DL (ref 8–23)
CALCIUM SERPL-MCNC: 9.4 MG/DL (ref 8.7–10.5)
CHLORIDE SERPL-SCNC: 106 MMOL/L (ref 95–110)
CO2 SERPL-SCNC: 25 MMOL/L (ref 23–29)
CREAT SERPL-MCNC: 1 MG/DL (ref 0.5–1.4)
CRP SERPL-MCNC: 2.2 MG/L (ref 0–8.2)
ERYTHROCYTE [SEDIMENTATION RATE] IN BLOOD BY WESTERGREN METHOD: 36 MM/HR (ref 0–10)
EST. GFR  (NO RACE VARIABLE): >60 ML/MIN/1.73 M^2
GLUCOSE SERPL-MCNC: 100 MG/DL (ref 70–110)
POTASSIUM SERPL-SCNC: 4.7 MMOL/L (ref 3.5–5.1)
PROT SERPL-MCNC: 6.9 G/DL (ref 6–8.4)
SODIUM SERPL-SCNC: 141 MMOL/L (ref 136–145)

## 2024-08-07 PROCEDURE — 86140 C-REACTIVE PROTEIN: CPT | Performed by: INTERNAL MEDICINE

## 2024-08-07 PROCEDURE — 80053 COMPREHEN METABOLIC PANEL: CPT | Performed by: INTERNAL MEDICINE

## 2024-08-07 PROCEDURE — 36415 COLL VENOUS BLD VENIPUNCTURE: CPT | Mod: PO | Performed by: INTERNAL MEDICINE

## 2024-08-07 PROCEDURE — 85651 RBC SED RATE NONAUTOMATED: CPT | Mod: PO | Performed by: INTERNAL MEDICINE

## 2024-08-09 NOTE — PROGRESS NOTES
Results have been released via [employer]. Please verify that these have been viewed by patient. If not, please call patient with results.     I have sent a message to them with the following interpretation (see below).    I have reviewed your recent blood work.     CMP/BMP NORMAL-The electrolytes all appear stable at this time. This includes kidney functions along with routine electrolytes like sugar, potassium and sodium. The liver enzymes were noted to be stable also.  Sed rate remains elevated and has increased since last checked.  CRP is normal.    Please do not hesitate to call or message with any additional questions or concerns.    Aide Stephens PA-C

## 2024-08-20 ENCOUNTER — PATIENT OUTREACH (OUTPATIENT)
Dept: ADMINISTRATIVE | Facility: HOSPITAL | Age: 74
End: 2024-08-20
Payer: MEDICARE

## 2024-09-03 ENCOUNTER — LAB VISIT (OUTPATIENT)
Dept: LAB | Facility: HOSPITAL | Age: 74
End: 2024-09-03
Attending: INTERNAL MEDICINE
Payer: MEDICARE

## 2024-09-03 ENCOUNTER — TELEPHONE (OUTPATIENT)
Dept: FAMILY MEDICINE | Facility: CLINIC | Age: 74
End: 2024-09-03
Payer: MEDICARE

## 2024-09-03 ENCOUNTER — PATIENT MESSAGE (OUTPATIENT)
Dept: FAMILY MEDICINE | Facility: CLINIC | Age: 74
End: 2024-09-03
Payer: MEDICARE

## 2024-09-03 DIAGNOSIS — E11.51 TYPE 2 DIABETES MELLITUS WITH DIABETIC PERIPHERAL ANGIOPATHY WITHOUT GANGRENE, UNSPECIFIED WHETHER LONG TERM INSULIN USE: ICD-10-CM

## 2024-09-03 DIAGNOSIS — M31.6 TEMPORAL ARTERITIS: Primary | ICD-10-CM

## 2024-09-03 DIAGNOSIS — M31.6 TEMPORAL ARTERITIS: ICD-10-CM

## 2024-09-03 LAB
ERYTHROCYTE [SEDIMENTATION RATE] IN BLOOD BY WESTERGREN METHOD: 22 MM/HR (ref 0–10)
ESTIMATED AVG GLUCOSE: 111 MG/DL (ref 68–131)
HBA1C MFR BLD: 5.5 % (ref 4–5.6)

## 2024-09-03 PROCEDURE — 36415 COLL VENOUS BLD VENIPUNCTURE: CPT | Mod: PO | Performed by: PHYSICIAN ASSISTANT

## 2024-09-03 PROCEDURE — 36415 COLL VENOUS BLD VENIPUNCTURE: CPT | Mod: PO | Performed by: INTERNAL MEDICINE

## 2024-09-03 PROCEDURE — 85651 RBC SED RATE NONAUTOMATED: CPT | Mod: PO | Performed by: PHYSICIAN ASSISTANT

## 2024-09-03 PROCEDURE — 83036 HEMOGLOBIN GLYCOSYLATED A1C: CPT | Performed by: INTERNAL MEDICINE

## 2024-09-03 NOTE — TELEPHONE ENCOUNTER
Can you check with lab to see if we can add a sed rate?    I have signed for the following orders AND/OR meds.  Please call the patient and ask the patient to schedule the testing AND/OR inform about any medications that were sent. Medications have been sent to pharmacy listed below      Orders Placed This Encounter   Procedures    Sedimentation rate     Standing Status:   Future     Standing Expiration Date:   12/2/2025              [id number] - [place] - [phone] AT SEC OF [address] [address] [id number]  [place] [facility]  [place] [address]  Phone: [phone] Fax: [phone]

## 2024-09-03 NOTE — TELEPHONE ENCOUNTER
----- Message from Mckenzie Rocha sent at 9/3/2024  9:37 AM CDT -----  Regarding: orders for A1C  Roshan Menard had labs this am for an A1C; he also requested a sed rate.  We have the specimen if this is needed; please link to his lab appt if needed. Thanks, Mckenzie [facility] ext [phone]

## 2024-09-03 NOTE — TELEPHONE ENCOUNTER
"NABIL THOMPSON "NAVA" MRN [id number] was here for labs and stated he wanted Sedrate added to his labs the lab has already taken his sample just need to add to his appt      " Show Asc Variables: Yes

## 2024-09-04 NOTE — PROGRESS NOTES
Results have been released via [employer]. Please verify that these have been viewed by patient. If not, please call patient with results.     I have sent a message to them with the following interpretation (see below).    I have reviewed your recent blood work.     A1C NORMAL-The A1c is at goal. Repeat an a1c in 6 months.     Sed rate has decreased since last checked.     Please do not hesitate to call or message with any additional questions or concerns.    Aide Stephens PA-C

## 2024-09-27 DIAGNOSIS — J43.8 OTHER EMPHYSEMA: ICD-10-CM

## 2024-09-27 DIAGNOSIS — I48.11 LONGSTANDING PERSISTENT ATRIAL FIBRILLATION: ICD-10-CM

## 2024-09-27 DIAGNOSIS — Z01.810 PREOP CARDIOVASCULAR EXAM: ICD-10-CM

## 2024-09-27 DIAGNOSIS — Z87.891 FORMER SMOKER: ICD-10-CM

## 2024-09-27 DIAGNOSIS — I65.23 BILATERAL CAROTID ARTERY STENOSIS: ICD-10-CM

## 2024-09-27 DIAGNOSIS — Z20.822 ENCOUNTER FOR LABORATORY TESTING FOR COVID-19 VIRUS: ICD-10-CM

## 2024-09-27 DIAGNOSIS — E78.2 MIXED HYPERLIPIDEMIA: ICD-10-CM

## 2024-09-27 DIAGNOSIS — J44.9 CHRONIC OBSTRUCTIVE PULMONARY DISEASE, UNSPECIFIED COPD TYPE: ICD-10-CM

## 2024-09-27 DIAGNOSIS — I35.0 NONRHEUMATIC AORTIC VALVE STENOSIS: ICD-10-CM

## 2024-09-27 DIAGNOSIS — I48.0 PAF (PAROXYSMAL ATRIAL FIBRILLATION): ICD-10-CM

## 2024-09-27 DIAGNOSIS — I25.10 CORONARY ARTERY DISEASE INVOLVING NATIVE CORONARY ARTERY OF NATIVE HEART WITHOUT ANGINA PECTORIS: ICD-10-CM

## 2024-09-27 DIAGNOSIS — I35.0 NONRHEUMATIC AORTIC (VALVE) STENOSIS: ICD-10-CM

## 2024-09-27 DIAGNOSIS — I10 ESSENTIAL HYPERTENSION: ICD-10-CM

## 2024-09-27 RX ORDER — ATORVASTATIN CALCIUM 40 MG/1
TABLET, FILM COATED ORAL
Qty: 90 TABLET | Refills: 3 | Status: SHIPPED | OUTPATIENT
Start: 2024-09-27

## 2024-10-09 ENCOUNTER — PATIENT OUTREACH (OUTPATIENT)
Dept: ADMINISTRATIVE | Facility: HOSPITAL | Age: 74
End: 2024-10-09
Payer: MEDICARE

## 2024-10-16 ENCOUNTER — LAB VISIT (OUTPATIENT)
Dept: LAB | Facility: HOSPITAL | Age: 74
End: 2024-10-16
Attending: NURSE PRACTITIONER
Payer: MEDICARE

## 2024-10-16 ENCOUNTER — TELEPHONE (OUTPATIENT)
Dept: FAMILY MEDICINE | Facility: CLINIC | Age: 74
End: 2024-10-16
Payer: MEDICARE

## 2024-10-16 DIAGNOSIS — R91.8 ABNORMAL CT SCAN OF LUNG: ICD-10-CM

## 2024-10-16 PROCEDURE — 87206 SMEAR FLUORESCENT/ACID STAI: CPT | Performed by: NURSE PRACTITIONER

## 2024-10-16 PROCEDURE — 87070 CULTURE OTHR SPECIMN AEROBIC: CPT | Performed by: NURSE PRACTITIONER

## 2024-10-16 PROCEDURE — 87205 SMEAR GRAM STAIN: CPT | Performed by: NURSE PRACTITIONER

## 2024-10-16 PROCEDURE — 87186 SC STD MICRODIL/AGAR DIL: CPT | Performed by: NURSE PRACTITIONER

## 2024-10-16 PROCEDURE — 87015 SPECIMEN INFECT AGNT CONCNTJ: CPT | Performed by: NURSE PRACTITIONER

## 2024-10-16 PROCEDURE — 87116 MYCOBACTERIA CULTURE: CPT | Performed by: NURSE PRACTITIONER

## 2024-10-16 NOTE — TELEPHONE ENCOUNTER
I spoke with Stephanie Alcocer NP w/ pulmonology and she would like to cancel his CT chest scheduled on 10/30/24 that was ordered by me. Please reschedule CT chest in 3 months (January 2025) and use her order which was placed today. Patient should be aware of this, but please let him know.

## 2024-10-17 ENCOUNTER — PATIENT MESSAGE (OUTPATIENT)
Dept: CARDIOLOGY | Facility: CLINIC | Age: 74
End: 2024-10-17
Payer: MEDICARE

## 2024-10-17 ENCOUNTER — TELEPHONE (OUTPATIENT)
Dept: CARDIOLOGY | Facility: CLINIC | Age: 74
End: 2024-10-17
Payer: MEDICARE

## 2024-10-17 NOTE — TELEPHONE ENCOUNTER
Dr. Grace is requesting clearance for patient  Procedure: Colonoscopy  Date of procedure: TBD  Please include instructions on length of time patient can hold Eliquis prior to the procedure.      Fax clearance [facility] [phone]

## 2024-10-18 LAB
ACID FAST MOD KINY STN SPEC: NORMAL
MYCOBACTERIUM SPEC QL CULT: NORMAL

## 2024-10-19 LAB
BACTERIA SPEC AEROBE CULT: ABNORMAL
BACTERIA SPEC AEROBE CULT: ABNORMAL
GRAM STN SPEC: ABNORMAL

## 2024-10-22 ENCOUNTER — PATIENT MESSAGE (OUTPATIENT)
Dept: CARDIOLOGY | Facility: CLINIC | Age: 74
End: 2024-10-22
Payer: MEDICARE

## 2024-10-22 ENCOUNTER — TELEPHONE (OUTPATIENT)
Dept: CARDIOLOGY | Facility: CLINIC | Age: 74
End: 2024-10-22
Payer: MEDICARE

## 2024-10-22 NOTE — TELEPHONE ENCOUNTER
----- Message from Caryl sent at 10/22/2024  3:33 PM CDT -----  Contact: self   Patient needs call back. He is trying to be seen before his Jan. 2025 appt. He states DR Rhodes needs to Evaluate him and needs to be seen soon. Please call to advise

## 2024-10-22 NOTE — TELEPHONE ENCOUNTER
Patient states Dr. Grace is requesting clearance for Colonoscopy. Date of procedure: TBD  Please include instructions on length of time patient can hold Eliquis prior to the procedure.    Fax clearance [facility] [phone]

## 2024-10-22 NOTE — TELEPHONE ENCOUNTER
Patient states he would like to schedule sooner appointment with cardiology. States Dr. Rhodes sent message informing him that there are no available appointments sooner than January 2025. Patient states he would be willing to see Shayna Davis NP. Appointment has been scheduled for 10/23/24 at 9:40 A.M. Patient verbalizes understanding of all information.

## 2024-10-22 NOTE — TELEPHONE ENCOUNTER
Needs colonoscopy @ [facility] - needs the OK to stop his Eliquis  Please advise     [facility]- fax: [phone]                                          Dr Jp Grace       His pulmonary MD wanted to increase his lasix - noticed more fluid on him and wanted to increase it to 40mg daily- do you agree?    Please advise:

## 2024-10-23 ENCOUNTER — OFFICE VISIT (OUTPATIENT)
Dept: CARDIOLOGY | Facility: CLINIC | Age: 74
End: 2024-10-23
Payer: MEDICARE

## 2024-10-23 VITALS
BODY MASS INDEX: 33.13 KG/M2 | HEIGHT: 73 IN | RESPIRATION RATE: 98 BRPM | WEIGHT: 250 LBS | DIASTOLIC BLOOD PRESSURE: 80 MMHG | HEART RATE: 83 BPM | SYSTOLIC BLOOD PRESSURE: 132 MMHG

## 2024-10-23 DIAGNOSIS — J45.40 MODERATE PERSISTENT ASTHMA, UNSPECIFIED WHETHER COMPLICATED: ICD-10-CM

## 2024-10-23 DIAGNOSIS — I10 ESSENTIAL HYPERTENSION: ICD-10-CM

## 2024-10-23 DIAGNOSIS — Z87.891 FORMER SMOKER: ICD-10-CM

## 2024-10-23 DIAGNOSIS — I35.0 NONRHEUMATIC AORTIC VALVE STENOSIS: ICD-10-CM

## 2024-10-23 DIAGNOSIS — I65.23 BILATERAL CAROTID ARTERY STENOSIS: ICD-10-CM

## 2024-10-23 DIAGNOSIS — E66.09 CLASS 1 OBESITY DUE TO EXCESS CALORIES WITH SERIOUS COMORBIDITY AND BODY MASS INDEX (BMI) OF 32.0 TO 32.9 IN ADULT: ICD-10-CM

## 2024-10-23 DIAGNOSIS — G47.33 OBSTRUCTIVE SLEEP APNEA SYNDROME: ICD-10-CM

## 2024-10-23 DIAGNOSIS — Z95.2 HISTORY OF TRANSCATHETER AORTIC VALVE REPLACEMENT (TAVR): ICD-10-CM

## 2024-10-23 DIAGNOSIS — E78.2 MIXED HYPERLIPIDEMIA: ICD-10-CM

## 2024-10-23 DIAGNOSIS — E66.811 CLASS 1 OBESITY DUE TO EXCESS CALORIES WITH SERIOUS COMORBIDITY AND BODY MASS INDEX (BMI) OF 32.0 TO 32.9 IN ADULT: ICD-10-CM

## 2024-10-23 DIAGNOSIS — I48.0 PAROXYSMAL ATRIAL FIBRILLATION: ICD-10-CM

## 2024-10-23 DIAGNOSIS — I50.32 CHRONIC DIASTOLIC HEART FAILURE: Primary | ICD-10-CM

## 2024-10-23 DIAGNOSIS — R73.03 PREDIABETES: ICD-10-CM

## 2024-10-23 PROCEDURE — 99999 PR PBB SHADOW E&M-EST. PATIENT-LVL V: CPT | Mod: PBBFAC,,, | Performed by: NURSE PRACTITIONER

## 2024-10-23 PROCEDURE — 99214 OFFICE O/P EST MOD 30 MIN: CPT | Mod: S$PBB,,, | Performed by: NURSE PRACTITIONER

## 2024-10-23 PROCEDURE — 99215 OFFICE O/P EST HI 40 MIN: CPT | Mod: PBBFAC,PO | Performed by: NURSE PRACTITIONER

## 2024-10-23 RX ORDER — FUROSEMIDE 20 MG/1
TABLET ORAL
Qty: 120 TABLET | Refills: 3 | Status: SHIPPED | OUTPATIENT
Start: 2024-10-23

## 2024-10-23 NOTE — PATIENT INSTRUCTIONS
"Patient Education       Low Salt Diet   About this topic   Sodium is a type of mineral found in many foods. It may also be called "salt." Sodium helps balance fluids in your body. Too much sodium may be bad for your health. You may have to limit the amount of sodium in your food.  Salt is known as sodium chloride. It is measured in grams (g) or milligrams (mg). Salt or sodium in our diet comes from 3 main sources:  Some sodium is naturally found in food.  We may add salt to our food when we eat or cook.  Processed foods give us most of the sodium in our diet.         What will the results be?   This diet may help lower your blood pressure. It may also help reduce extra water in your body. This may help kidney, heart, or liver problems.  What changes to diet are needed?   You need to know how much sodium is in the food you eat. Read food labels with care. Choose foods that have 5% or less sodium in one serving. Remember, if you eat more than one serving, you will be getting more sodium. It may take a while for your sense of taste to get used to food with less sodium. Be patient with yourself. You may be surprised at how well you will do.  Try to aim for a diet that has 2,300 mg (2.3 g) or less sodium in it each day. The Food & Drug Administration (FDA) has set up guidelines for food labels. These will help you make healthy choices. Look for these terms on food packages:  Sodium-free: Less than 5 mg in each serving. These are safe to eat.  Very low sodium: 35 mg of sodium or less in each serving. These are safe to eat.  Low sodium: 140 mg of sodium or less in each serving. You need to eat these with care.  Reduced sodium: At least 25% less sodium than is most often found in each serving. These foods can still be high in sodium.  Light in sodium: 50% less sodium in each serving.  Unsalted, no added salt, and without added salt: No salt is added during processing, but the food may still have sodium. Read the food label " and check the sodium content before eating.  What foods are good to eat?   You can control the amount of sodium in foods you make at home. Fresh foods that you cook are most often lower in sodium.  Regular bread, unsalted crackers, dry cereal, cooked rice, pasta, quinoa, and corn tortillas.  Fresh, frozen, low sodium, or salt-free canned vegetables. Limit vegetable juice or tomato juice to 1/2 cup (120 mL) each day.  Fresh, frozen, canned, or dried fruit without salt added  Nonfat or low-fat milk and yogurt, low-sodium cottage cheese, and other cheeses low in sodium.  Fresh or frozen beef, veal, lamb, pork, poultry, fish, shellfish  Low sodium canned meats, frozen dinners with less than 600 mg sodium  Corn, safflower, sunflower, and soybean oils and unsalted nuts and seeds  Egg and egg substitutes without added sodium  Dried peas, beans, and low-sodium peanut butter  All plain oils and low-sodium salad dressing  Low-sodium broths, soups, soy sauce, condiments, and snack foods  Pepper, herbs, spices, vinegar, lemon or lime juice  Low-sodium carbonated drinks  What foods should be limited or avoided?   Foods that are prepackaged or canned are most often high in sodium. Foods to limit or avoid include:  Salted breads, rolls, crackers, biscuits, cornbread  Quick breads, self-rising flours, biscuit mixes, regular bread crumbs, instant hot cereals  Commercially prepared rice, pasta, or stuffing mixes; potatoes and vegetable mixes  Regular canned vegetables and juices, vegetables with sauce, and pickled vegetables  Frozen vegetables with seasonings and sauces  Processed fruits with salt or sodium  Malted and chocolate milk and buttermilk  Regular and processed cheese and spreads  Smoked, cured, salted, or canned meat, fish, or poultry such as dumont, sausages, sardines, chipped beef, hot dogs, cold cuts, and frozen breaded meats  Salted and canned peas, beans, and olives  Salted snack foods and nuts  Oils mixed with  high-sodium parts such as salad dressing  Meat tenderizers, seasoning salt, and most flavored vinegars  Ketchup, bouillon cubes, salt, sea salt, kosher salt, onion salt, garlic salt, and pink Himalayan salt  What can be done to prevent this health problem?   Check with your doctor before using salt substitutes. Also, check the labels when taking over-the-counter (OTC) drugs such as laxatives and antacids. These can have a high sodium content.  When do I need to call the doctor?   Call your doctor if you have questions about this diet. Talk to a dietitian. They can help you find hidden sources of sodium in the food you eat.  Helpful tips   Use the nutrition facts labels as a guide to look for foods lower in sodium.  Do not use salt when eating or cooking.  Select fresh fruits and vegetables for snacks.  If you are eating out, ask the  to cook your food without salt, or choose foods without sauces.  Season your food with herbs and spices.  Drain and rinse canned beans and vegetables that contain sodium.  Eat foods with potassium. Potassium helps counter the effects of sodium. This may help lower your blood pressure. Foods high in potassium include: Potatoes, tomatoes, bananas, oranges, cantaloupe, beans, and greens.  Where can I learn more?   American Heart Association  https://www.heart.org/en/healthy-living/healthy-eating/eat-smart/sodium/how-to-reduce-sodium   American Heart Association  https://www.heart.org/en/healthy-living/healthy-eating/eat-smart/nutrition-basics/food-packaging-claims   NHS  https://www.nhs.uk/live-well/eat-well/tips-for-a-lower-salt-diet/   UpToDate  http://www.[employer].com/contents/low-sodium-diet-beyond-the-basics   Last Reviewed Date   2021-10-11  Consumer Information Use and Disclaimer   This information is not specific medical advice and does not replace information you receive from your health care provider. This is only a brief summary of general information. It does NOT include  all information about conditions, illnesses, injuries, tests, procedures, treatments, therapies, discharge instructions or life-style choices that may apply to you. You must talk with your health care provider for complete information about your health and treatment options. This information should not be used to decide whether or not to accept your health care providers advice, instructions or recommendations. Only your health care provider has the knowledge and training to provide advice that is right for you.  Copyright   Copyright © 2021 [employer] and its affiliates and/or licensors. All rights reserved.

## 2024-10-23 NOTE — PROGRESS NOTES
Subjective:    Patient ID:  Roshan Menard is a 74 y.o. male who presents for evaluation of edema No chief complaint on file.      HPI: Mr. Roshan Menard presents to the clinic for evaluation of edema.   he stated that he he saw pulmonary specialist who indicated that he should see cardiology due to worsening edema and need to increase lasix. He has chronic diastolic heart failure.  Stated that he takes lasix 10mg daily with increase to 20mg prn. In the last week, he has been taking 20mg daily. Stephanie Alcocer, KYRIE's note on 10//16/24 indicates that he missed two consecutive doses.  He denied chest pain or SOB; he cuts grass with riding mower, yard work-weed eater without SOB. He denied palpitations, lightheadedness, or dizziness. He denied orthopnea or PND.  He had a left knee injection on Monday, October 21, 2024. He notices that when he has a knee injection (yearly), he has less edema to LLE.    He is planning colonoscopy. He needs to be off Eliquis for this procedure.      Has H/O temporal arteritis on left and on low-dose prednisone daily.    Last visit with Dr. Rhodes on 7/1/24:  75 yo WM with persistent AF, CAD and previous TAVR. Was cardioverted 12- but within a few days was back in AF. States this was his fourth cardioversion. Is currently followed by EP DR Duggan. States he is doing well. Sometimes has some SOB but basically does not feel the AF. Denies any CP. Some lower extremity edema.   1. Angina pectoris with coronary microvascular dysfunction    2. Aortic atherosclerosis    3. Coronary artery disease involving native coronary artery of native heart without angina pectoris    4. Essential hypertension    5. Mixed hyperlipidemia    6. Nonrheumatic aortic valve stenosis    7. Persistent atrial fibrillation    Plan:  The current medical regimen is effective;  continue present plan and medications.    Low salt diet   Patient advised to modify risk factors such as weight, exercise, diet,  tobacco and  alcohol exposure    Risk of stroke from atrial fib has been discussed as well as bleeding risk from alternative anticoagulation regiments as well as risk and benefits of rate control vs cardioversion    No orders of the defined types were placed in this encounter.   Follow up in about 6 months (around 1/1/2025).       Medications: he is not missing any doses.  Sodium: he does not add salt to foods at the table,  he is not reading labels for sodium content. he does eat salty foods; he eats out at restaurants every day.  Diet: yesterday: subway.  Dietary Sugars: pies occasionally  Exercise: he  is active in the yard every day.  Tobacco: former smoker   Alcohol: no alcohol use     Weight: 113.4 kg (250 lb) he states that his daily weights has been stableBody mass index is 32.98 kg/m². He weighs himself every other day.  Wt Readings from Last 3 Encounters:   10/23/24 113.4 kg (250 lb)   10/16/24 116.6 kg (257 lb)   07/12/24 113.9 kg (251 lb)     BP log: none.      Review of Systems   Constitutional: Negative for chills, decreased appetite, fever, night sweats, weight gain and weight loss.   HENT:  Negative for congestion.    Cardiovascular:  Negative for chest pain, claudication, cyanosis, dyspnea on exertion, irregular heartbeat, leg swelling, near-syncope, orthopnea, palpitations, paroxysmal nocturnal dyspnea and syncope.   Respiratory:  Negative for cough, hemoptysis, shortness of breath, sputum production and wheezing.    Hematologic/Lymphatic: Negative for adenopathy and bleeding problem. Does not bruise/bleed easily.   Skin:  Negative for color change and nail changes.   Gastrointestinal:  Negative for bloating, abdominal pain, change in bowel habit, heartburn, hematochezia, melena, nausea and vomiting.   Genitourinary:  Negative for hematuria.   Neurological:  Negative for dizziness and light-headedness.   Psychiatric/Behavioral:  Negative for altered mental status.        Objective:   Physical  Exam  Constitutional:       General: He is not in acute distress.     Appearance: He is well-developed. He is obese. He is not diaphoretic.   HENT:      Head: Normocephalic and atraumatic.   Eyes:      General: No scleral icterus.     Conjunctiva/sclera: Conjunctivae normal.   Neck:      Thyroid: No thyromegaly.      Vascular: No carotid bruit or JVD.      Trachea: No tracheal deviation.   Cardiovascular:      Rate and Rhythm: Normal rate. Rhythm irregular.      Pulses: Intact distal pulses.      Heart sounds: Normal heart sounds. No murmur heard.     No friction rub. No gallop.   Pulmonary:      Effort: Pulmonary effort is normal. No respiratory distress.      Breath sounds: No stridor. Wheezing (expiratory wheezes heard in bases bilaterally.) present. No rhonchi or rales.   Chest:      Chest wall: No tenderness.   Abdominal:      General: Bowel sounds are normal. There is no distension.      Palpations: Abdomen is soft. There is no mass.      Tenderness: There is no abdominal tenderness. There is no guarding or rebound.   Musculoskeletal:         General: Normal range of motion.      Cervical back: Neck supple.      Comments: 1+ edema to lower extremities bilaterally Left leg slightly more than right.   Lymphadenopathy:      Cervical: No cervical adenopathy.   Skin:     General: Skin is warm and dry.      Capillary Refill: Capillary refill takes less than 2 seconds.      Coloration: Skin is not pale.      Findings: No erythema or rash.      Comments: [place]   Neurological:      Mental Status: He is alert and oriented to person, place, and time.   Psychiatric:         Mood and Affect: Mood normal.        Latest Reference Range & Units 08/07/24 12:09 09/03/24 08:26   Sodium 136 - 145 mmol/L 141    Potassium 3.5 - 5.1 mmol/L 4.7    Chloride 95 - 110 mmol/L 106    CO2 23 - 29 mmol/L 25    Anion Gap 8 - 16 mmol/L 10    BUN 8 - 23 mg/dL 16    Creatinine 0.5 - 1.4 mg/dL 1.0    eGFR >60 mL/min/1.73 m^2 >60.0    Glucose 70 -  110 mg/dL 100    Calcium 8.7 - 10.5 mg/dL 9.4    ALP 55 - 135 U/L 101    PROTEIN TOTAL 6.0 - 8.4 g/dL 6.9    Albumin 3.5 - 5.2 g/dL 3.6    BILIRUBIN TOTAL 0.1 - 1.0 mg/dL 0.4    AST 10 - 40 U/L 25    ALT 10 - 44 U/L 22    CRP 0.0 - 8.2 mg/L 2.2    Hemoglobin A1C External 4.0 - 5.6 %  5.5   Estimated Avg Glucose 68 - 131 mg/dL  111       Echo 5/29/24:    Left Ventricle: The left ventricle is normal in size. Ventricular mass is normal. Mildly increased wall thickness. There is concentric remodeling. There is normal systolic function with a visually estimated ejection fraction of 55 - 60%. Ejection fraction by visual approximation is 55%. Grade II diastolic dysfunction.    Right Ventricle: Mild right ventricular enlargement. Wall thickness is normal. Systolic function is normal.    Left Atrium: Left atrium is severely dilated.    Right Atrium: Right atrium is severely dilated.    Aortic Valve: There is a transcatheter valve replacement in the aortic position that is appropriately positioned. It is reported to be a 26 mm Medtronic valve. Aortic valve area by VTI is 1.99 cm². Aortic valve peak velocity is 2.15 m/s. Mean gradient is 11 mmHg. The dimensionless index is 0.44.    Mitral Valve: There is mild regurgitation.    Tricuspid Valve: There is mild regurgitation.    Pulmonary Artery: There is mild pulmonary hypertension. The estimated pulmonary artery systolic pressure is 46 mmHg.    IVC/SVC: Normal venous pressure at 3 mmHg.     Assessment:      1. Chronic diastolic heart failure    2. Paroxysmal atrial fibrillation    3. Essential hypertension    4. Nonrheumatic aortic valve stenosis    5. History of transcatheter aortic valve replacement (TAVR)    6. Bilateral carotid artery stenosis    7. Mixed hyperlipidemia    8. Former smoker    9. Moderate persistent asthma, unspecified whether complicated    10. Obstructive sleep apnea syndrome    11. Prediabetes    12. Class 1 obesity due to excess calories with serious  comorbidity and body mass index (BMI) of 32.0 to 32.9 in adult      Plan:     Chronic diastolic heart failure  -     furosemide (LASIX) 20 MG tablet; Take one tablet daily; take extra tablet for weight gain of more than 2 pounds in one day or 5 pounds in one week.  Dispense: 120 tablet; Refill: 3    Paroxysmal atrial fibrillation    Essential hypertension    Nonrheumatic aortic valve stenosis    History of transcatheter aortic valve replacement (TAVR)    Bilateral carotid artery stenosis    Mixed hyperlipidemia    Former smoker    Moderate persistent asthma, unspecified whether complicated    Obstructive sleep apnea syndrome    Prediabetes    Class 1 obesity due to excess calories with serious comorbidity and body mass index (BMI) of 32.0 to 32.9 in adult      Increase lasix to 40 mg today, then resume Lasix 20mg QD. INcrease lasix to 40mg for weight gain more than 2 pounds in one day or 5 pounds in one week. Continue potassium.  Continue amlodipine, metoprolol succinate, telmisartan-HTN  Monitor BP at home. BP log.  Sodium restriction encouraged. Discussed sodium content of restaurant food and processed meats. Encouraged him to order food made to order. He verbalized his understanding of this. Handout given.  Continue eliquis, metoprolol succinate-Atrial fibrillation  Continue ASA - CAD  Continue atorvastatin - HLP.   Use inhalers as directed for asthma.  He is in stable condition from CV standpoint for endoscopy. He can hold eliquis for 2 days prior and resume ASAP afterward. If needed, he can hold ASA for 7 days prior to endoscopy and resume afterward. Note to Dr. Grace.  Follow up with Dr. Rhodes in January as planned or call sooner for any problems.  Shayna Davis NP  [facility]     This note has been prepared using a combination of [employer] dictation device and typing.  It has been checked for errors but some errors may still exist within the note as a result of speech recognition errors and/or  typographical errors.

## 2024-10-24 ENCOUNTER — PATIENT MESSAGE (OUTPATIENT)
Dept: CARDIOLOGY | Facility: CLINIC | Age: 74
End: 2024-10-24
Payer: MEDICARE

## 2024-11-14 ENCOUNTER — LAB VISIT (OUTPATIENT)
Dept: LAB | Facility: HOSPITAL | Age: 74
End: 2024-11-14
Attending: NURSE PRACTITIONER
Payer: MEDICARE

## 2024-11-14 DIAGNOSIS — R91.8 ABNORMAL CT SCAN OF LUNG: ICD-10-CM

## 2024-11-14 PROCEDURE — 87206 SMEAR FLUORESCENT/ACID STAI: CPT | Performed by: NURSE PRACTITIONER

## 2024-11-14 PROCEDURE — 87116 MYCOBACTERIA CULTURE: CPT | Performed by: NURSE PRACTITIONER

## 2024-11-14 PROCEDURE — 87015 SPECIMEN INFECT AGNT CONCNTJ: CPT | Performed by: NURSE PRACTITIONER

## 2024-11-15 ENCOUNTER — LAB VISIT (OUTPATIENT)
Dept: LAB | Facility: HOSPITAL | Age: 74
End: 2024-11-15
Attending: NURSE PRACTITIONER
Payer: MEDICARE

## 2024-11-15 DIAGNOSIS — J47.9 BRONCHIECTASIS WITHOUT COMPLICATION: ICD-10-CM

## 2024-11-15 PROCEDURE — 87116 MYCOBACTERIA CULTURE: CPT | Performed by: NURSE PRACTITIONER

## 2024-11-15 PROCEDURE — 87206 SMEAR FLUORESCENT/ACID STAI: CPT | Performed by: NURSE PRACTITIONER

## 2024-11-15 PROCEDURE — 87015 SPECIMEN INFECT AGNT CONCNTJ: CPT | Performed by: NURSE PRACTITIONER

## 2024-11-16 LAB
ACID FAST MOD KINY STN SPEC: NORMAL
MYCOBACTERIUM SPEC QL CULT: NORMAL

## 2024-11-18 LAB
ACID FAST MOD KINY STN SPEC: NORMAL
MYCOBACTERIUM SPEC QL CULT: NORMAL

## 2024-12-26 ENCOUNTER — PATIENT MESSAGE (OUTPATIENT)
Dept: CARDIOLOGY | Facility: CLINIC | Age: 74
End: 2024-12-26
Payer: MEDICARE

## 2024-12-26 DIAGNOSIS — J44.9 CHRONIC OBSTRUCTIVE PULMONARY DISEASE, UNSPECIFIED COPD TYPE: ICD-10-CM

## 2024-12-26 DIAGNOSIS — R94.39 ABNORMAL CARDIOVASCULAR STRESS TEST: ICD-10-CM

## 2024-12-26 DIAGNOSIS — E78.2 MIXED HYPERLIPIDEMIA: ICD-10-CM

## 2024-12-26 DIAGNOSIS — I48.0 PAF (PAROXYSMAL ATRIAL FIBRILLATION): ICD-10-CM

## 2024-12-26 DIAGNOSIS — G47.33 OBSTRUCTIVE SLEEP APNEA SYNDROME: ICD-10-CM

## 2024-12-26 DIAGNOSIS — I25.10 CORONARY ARTERY DISEASE INVOLVING NATIVE CORONARY ARTERY OF NATIVE HEART WITHOUT ANGINA PECTORIS: ICD-10-CM

## 2024-12-26 DIAGNOSIS — I35.0 NONRHEUMATIC AORTIC VALVE STENOSIS: ICD-10-CM

## 2024-12-26 DIAGNOSIS — I48.11 LONGSTANDING PERSISTENT ATRIAL FIBRILLATION: ICD-10-CM

## 2024-12-26 DIAGNOSIS — Z20.822 ENCOUNTER FOR LABORATORY TESTING FOR COVID-19 VIRUS: ICD-10-CM

## 2024-12-26 DIAGNOSIS — Z87.891 FORMER SMOKER: ICD-10-CM

## 2024-12-26 DIAGNOSIS — Z01.810 PREOP CARDIOVASCULAR EXAM: ICD-10-CM

## 2024-12-26 DIAGNOSIS — I65.23 BILATERAL CAROTID ARTERY STENOSIS: ICD-10-CM

## 2024-12-26 DIAGNOSIS — J43.8 OTHER EMPHYSEMA: ICD-10-CM

## 2024-12-26 DIAGNOSIS — I10 ESSENTIAL HYPERTENSION: ICD-10-CM

## 2024-12-26 DIAGNOSIS — I35.0 NONRHEUMATIC AORTIC (VALVE) STENOSIS: ICD-10-CM

## 2024-12-27 RX ORDER — TELMISARTAN 80 MG/1
80 TABLET ORAL DAILY
Qty: 90 TABLET | Refills: 3 | Status: SHIPPED | OUTPATIENT
Start: 2024-12-27

## 2025-01-08 ENCOUNTER — OFFICE VISIT (OUTPATIENT)
Dept: CARDIOLOGY | Facility: CLINIC | Age: 75
End: 2025-01-08
Payer: MEDICARE

## 2025-01-08 VITALS
DIASTOLIC BLOOD PRESSURE: 80 MMHG | HEIGHT: 72 IN | BODY MASS INDEX: 34.81 KG/M2 | WEIGHT: 257 LBS | HEART RATE: 60 BPM | SYSTOLIC BLOOD PRESSURE: 126 MMHG

## 2025-01-08 DIAGNOSIS — E78.2 MIXED HYPERLIPIDEMIA: ICD-10-CM

## 2025-01-08 DIAGNOSIS — I70.0 AORTIC ATHEROSCLEROSIS: ICD-10-CM

## 2025-01-08 DIAGNOSIS — I20.81 ANGINA PECTORIS WITH CORONARY MICROVASCULAR DYSFUNCTION: ICD-10-CM

## 2025-01-08 DIAGNOSIS — I50.32 CHRONIC DIASTOLIC HEART FAILURE: ICD-10-CM

## 2025-01-08 DIAGNOSIS — I10 ESSENTIAL HYPERTENSION: Primary | ICD-10-CM

## 2025-01-08 DIAGNOSIS — I35.0 NONRHEUMATIC AORTIC VALVE STENOSIS: ICD-10-CM

## 2025-01-08 DIAGNOSIS — I48.0 PAROXYSMAL ATRIAL FIBRILLATION: ICD-10-CM

## 2025-01-08 PROCEDURE — 99214 OFFICE O/P EST MOD 30 MIN: CPT | Mod: S$PBB,,, | Performed by: INTERNAL MEDICINE

## 2025-01-08 PROCEDURE — 99999 PR PBB SHADOW E&M-EST. PATIENT-LVL IV: CPT | Mod: PBBFAC,,, | Performed by: INTERNAL MEDICINE

## 2025-01-08 PROCEDURE — 99214 OFFICE O/P EST MOD 30 MIN: CPT | Mod: PBBFAC,PO | Performed by: INTERNAL MEDICINE

## 2025-01-08 NOTE — PROGRESS NOTES
Subjective   Patient ID:  Roshan Menard is a 75 y.o. male who presents for follow-up of Follow-up (6 months)      [id number] yo WM with persistent AF, CAD and previous TAVR. Was cardioverted 12- but within a few days was back in AF. States this was his fourth cardioversion. Denies chest pain, SOB, or edema  Denies palpitations, weak spells, and syncope  Does not feel the AF.     ummary     The left ventricle is normal in size with normal systolic function.  The estimated ejection fraction is 65%.  Grade II left ventricular diastolic dysfunction.  Normal right ventricular size with normal right ventricular systolic function.  Mild left atrial enlargement.  There is a 26 mm Medtronic EVolutFX transcutaneously-placed aortic bioprosthesis present. There is trivial paravalvular aortic insufficiency present.  The aortic valve mean gradient is 10 mmHg with a dimensionless index of 0.49.  Mild tricuspid regurgitation.  The estimated PA systolic pressure is 32 mmHg.  Normal central venous pressure (3 mmHg).  Trivial lateral pericardial effusion.        Summary      3-2023 Known previous stents    The Prox LAD lesion was 50% stenosed.    The Prox RCA lesion was 50% stenosed.    The ejection fraction was calculated to be 60%.    The pre-procedure left ventricular end diastolic pressure was 25.    The post-procedure left ventricular end diastolic pressure was 28.    The estimated blood loss was none.    There was two vessel coronary artery disease.    There was diastolic dysfunction.    There was no mitral valve stenosis.    There was moderate aortic valve stenosis.    There was no mitral valve prolapse evident. The annulus was normal. There was normal mitral valve motion.    Review of Systems   Constitutional: Negative for decreased appetite, fever, malaise/fatigue, weight gain and weight loss.   HENT:  Negative for hearing loss and nosebleeds.    Eyes:  Negative for visual disturbance.   Cardiovascular:  Negative for  chest pain, claudication, cyanosis, dyspnea on exertion, irregular heartbeat, leg swelling, near-syncope, orthopnea, palpitations, paroxysmal nocturnal dyspnea and syncope.   Respiratory:  Negative for cough, hemoptysis, shortness of breath, sleep disturbances due to breathing, snoring and wheezing.    Endocrine: Negative for cold intolerance, heat intolerance, polydipsia and polyuria.   Hematologic/Lymphatic: Negative for adenopathy and bleeding problem. Does not bruise/bleed easily.   Skin:  Negative for color change, itching, poor wound healing, rash and suspicious lesions.   Musculoskeletal:  Positive for arthritis and joint pain. Negative for back pain, falls, joint swelling, muscle cramps, muscle weakness and myalgias.   Gastrointestinal:  Negative for bloating, abdominal pain, change in bowel habit, constipation, flatus, heartburn, hematemesis, hematochezia, hemorrhoids, jaundice, melena, nausea and vomiting.   Genitourinary:  Negative for bladder incontinence, decreased libido, frequency, hematuria, hesitancy and urgency.   Neurological:  Negative for brief paralysis, difficulty with concentration, excessive daytime sleepiness, dizziness, focal weakness, headaches, light-headedness, loss of balance, numbness, vertigo and weakness.   Psychiatric/Behavioral:  Negative for altered mental status, depression and memory loss. The patient does not have insomnia and is not nervous/anxious.    Allergic/Immunologic: Negative for environmental allergies, hives and persistent infections.                Objective     Physical Exam  Constitutional:       General: He is not in acute distress.     Appearance: He is well-developed. He is not diaphoretic.      Comments: /80 (BP Location: Left arm, Patient Position: Sitting)   Pulse 60   Ht 6' (1.829 m)   Wt 116.6 kg (257 lb)   BMI 34.86 kg/m²      HENT:      Head: Normocephalic and atraumatic.   Eyes:      General: Lids are normal. No scleral icterus.        Right  eye: No discharge.      Conjunctiva/sclera: Conjunctivae normal.      Pupils: Pupils are equal, round, and reactive to light.   Neck:      Thyroid: No thyromegaly.      Vascular: No JVD.      Trachea: No tracheal deviation.   Cardiovascular:      Rate and Rhythm: Normal rate. Rhythm irregularly irregular.      Pulses: Intact distal pulses.           Carotid pulses are 2+ on the right side and 2+ on the left side.       Radial pulses are 2+ on the right side and 2+ on the left side.        Femoral pulses are 2+ on the right side and 2+ on the left side.       Popliteal pulses are 2+ on the right side and 2+ on the left side.        Dorsalis pedis pulses are 2+ on the right side and 2+ on the left side.        Posterior tibial pulses are 2+ on the right side and 2+ on the left side.      Heart sounds: S1 normal and S2 normal. Murmur heard.      Harsh midsystolic murmur is present with a grade of 2/6 at the upper right sternal border radiating to the neck.      No friction rub. No gallop.   Pulmonary:      Effort: Pulmonary effort is normal. No respiratory distress.      Breath sounds: Normal breath sounds. No wheezing or rales.   Chest:      Chest wall: No tenderness.   Abdominal:      General: Bowel sounds are normal. There is no distension.      Palpations: Abdomen is soft. There is no hepatomegaly or mass.      Tenderness: There is no abdominal tenderness. There is no guarding or rebound.   Musculoskeletal:         General: No tenderness. Normal range of motion.      Cervical back: Normal range of motion and neck supple.   Lymphadenopathy:      Cervical: No cervical adenopathy.   Skin:     General: Skin is warm and dry.      Coloration: Skin is not pale.      Findings: No erythema or rash.   Neurological:      Mental Status: He is alert and oriented to person, place, and time.      Cranial Nerves: No cranial nerve deficit.      Coordination: Coordination normal.      Deep Tendon Reflexes: Reflexes are normal and  symmetric.   Psychiatric:         Speech: Speech normal.         Behavior: Behavior normal.         Thought Content: Thought content normal.         Judgment: Judgment normal.            Assessment and Plan     1. Chronic diastolic heart failure compensated   2. Paroxysmal atrial fibrillation Now permanent.    3. Angina pectoris with coronary microvascular dysfunction no recent CP   4. Aortic atherosclerosis stable   5      HTN controlled  6      HLD controlled  7      Aortic stenosis Had TAVR    Plan:  The current medical regimen is effective;  continue present plan and medications. For BP and lipids   Patient advised to modify risk factors such as weight, exercise, diet,  tobacco and alcohol exposure   Risk of stroke from atrial fib has been discussed as well as bleeding risk from alternative anticoagulation regiments as well as risk and benefits of rate control vs cardioversion   No orders of the defined types were placed in this encounter.    Follow up in about 6 months (around 7/8/2025).   Advance Care Planning     Date: 01/08/2025

## 2025-02-07 PROBLEM — A31.0 MYCOBACTERIUM AVIUM COMPLEX: Status: ACTIVE | Noted: 2025-02-07

## 2025-02-26 ENCOUNTER — OFFICE VISIT (OUTPATIENT)
Dept: FAMILY MEDICINE | Facility: CLINIC | Age: 75
End: 2025-02-26
Payer: MEDICARE

## 2025-02-26 ENCOUNTER — RESULTS FOLLOW-UP (OUTPATIENT)
Dept: FAMILY MEDICINE | Facility: CLINIC | Age: 75
End: 2025-02-26

## 2025-02-26 ENCOUNTER — HOSPITAL ENCOUNTER (OUTPATIENT)
Dept: RADIOLOGY | Facility: HOSPITAL | Age: 75
Discharge: HOME | End: 2025-02-26
Attending: PHYSICIAN ASSISTANT
Payer: MEDICARE

## 2025-02-26 VITALS
OXYGEN SATURATION: 98 % | DIASTOLIC BLOOD PRESSURE: 78 MMHG | HEIGHT: 73 IN | SYSTOLIC BLOOD PRESSURE: 134 MMHG | BODY MASS INDEX: 34.42 KG/M2 | WEIGHT: 259.69 LBS | HEART RATE: 88 BPM | RESPIRATION RATE: 18 BRPM

## 2025-02-26 DIAGNOSIS — J47.9 BRONCHIECTASIS WITHOUT COMPLICATION: ICD-10-CM

## 2025-02-26 DIAGNOSIS — M54.50 ACUTE RIGHT-SIDED LOW BACK PAIN WITHOUT SCIATICA: ICD-10-CM

## 2025-02-26 DIAGNOSIS — I25.10 CORONARY ARTERY DISEASE INVOLVING NATIVE CORONARY ARTERY OF NATIVE HEART WITHOUT ANGINA PECTORIS: ICD-10-CM

## 2025-02-26 DIAGNOSIS — E78.2 MIXED HYPERLIPIDEMIA: ICD-10-CM

## 2025-02-26 DIAGNOSIS — M31.6 TEMPORAL ARTERITIS: ICD-10-CM

## 2025-02-26 DIAGNOSIS — J45.40 MODERATE PERSISTENT ASTHMA WITHOUT COMPLICATION: ICD-10-CM

## 2025-02-26 DIAGNOSIS — M54.50 ACUTE RIGHT-SIDED LOW BACK PAIN WITHOUT SCIATICA: Primary | ICD-10-CM

## 2025-02-26 DIAGNOSIS — E11.51 TYPE 2 DIABETES MELLITUS WITH DIABETIC PERIPHERAL ANGIOPATHY WITHOUT GANGRENE, UNSPECIFIED WHETHER LONG TERM INSULIN USE: ICD-10-CM

## 2025-02-26 DIAGNOSIS — I10 ESSENTIAL HYPERTENSION: ICD-10-CM

## 2025-02-26 DIAGNOSIS — Z95.2 HISTORY OF TRANSCATHETER AORTIC VALVE REPLACEMENT (TAVR): ICD-10-CM

## 2025-02-26 DIAGNOSIS — I48.19 PERSISTENT ATRIAL FIBRILLATION: ICD-10-CM

## 2025-02-26 DIAGNOSIS — I50.32 CHRONIC DIASTOLIC (CONGESTIVE) HEART FAILURE: ICD-10-CM

## 2025-02-26 LAB
BILIRUB UR QL STRIP: NEGATIVE
CLARITY UR: CLEAR
COLOR UR: YELLOW
GLUCOSE UR QL STRIP: NEGATIVE
HGB UR QL STRIP: NEGATIVE
KETONES UR QL STRIP: NEGATIVE
LEUKOCYTE ESTERASE UR QL STRIP: NEGATIVE
NITRITE UR QL STRIP: NEGATIVE
PH UR STRIP: 6 [PH] (ref 5–8)
PROT UR QL STRIP: NEGATIVE
SP GR UR STRIP: 1 (ref 1–1.03)
URN SPEC COLLECT METH UR: NORMAL

## 2025-02-26 PROCEDURE — 99999 PR PBB SHADOW E&M-EST. PATIENT-LVL V: CPT | Mod: PBBFAC,,, | Performed by: PHYSICIAN ASSISTANT

## 2025-02-26 PROCEDURE — 72100 X-RAY EXAM L-S SPINE 2/3 VWS: CPT | Mod: 26,,, | Performed by: RADIOLOGY

## 2025-02-26 PROCEDURE — 72100 X-RAY EXAM L-S SPINE 2/3 VWS: CPT | Mod: TC,PO

## 2025-02-26 PROCEDURE — 99215 OFFICE O/P EST HI 40 MIN: CPT | Mod: PBBFAC,25,PO | Performed by: PHYSICIAN ASSISTANT

## 2025-02-26 PROCEDURE — 81003 URINALYSIS AUTO W/O SCOPE: CPT | Mod: PO | Performed by: PHYSICIAN ASSISTANT

## 2025-02-26 PROCEDURE — G2211 COMPLEX E/M VISIT ADD ON: HCPCS | Mod: S$PBB,,, | Performed by: PHYSICIAN ASSISTANT

## 2025-02-26 PROCEDURE — 99214 OFFICE O/P EST MOD 30 MIN: CPT | Mod: S$PBB,,, | Performed by: PHYSICIAN ASSISTANT

## 2025-02-26 RX ORDER — TIZANIDINE 4 MG/1
4 TABLET ORAL EVERY 8 HOURS PRN
Qty: 30 TABLET | Refills: 0 | Status: SHIPPED | OUTPATIENT
Start: 2025-02-26

## 2025-02-26 RX ORDER — NAPROXEN 500 MG/1
500 TABLET ORAL 2 TIMES DAILY WITH MEALS
Qty: 30 TABLET | Refills: 2 | Status: SHIPPED | OUTPATIENT
Start: 2025-02-26

## 2025-02-26 NOTE — PROGRESS NOTES
Assessment/Plan:    1. Acute right-sided low back pain without sciatica  Overview:  -acute R sided low back pain >2 weeks  -no alarm symptoms or signs present  -will obtain lumbar XR today  -also checking a UA to r/o infection  -continue conservative treatment, rest, ice/heat applications, PRN anti-inflammatory medication  -consider PT if symptoms persist  -ER precautions for severe or worsening of symptom    Orders:  -     Urinalysis, Reflex to Urine Culture Urine, Clean Catch  -     X-Ray Lumbar Spine 2 Or 3 Views; Future; Expected date: 02/26/2025  -     tiZANidine (ZANAFLEX) 4 MG tablet; Take 1 tablet (4 mg total) by mouth every 8 (eight) hours as needed (muscle pain).  Dispense: 30 tablet; Refill: 0  -     naproxen (NAPROSYN) 500 MG tablet; Take 1 tablet (500 mg total) by mouth 2 (two) times daily with meals.  Dispense: 30 tablet; Refill: 2    2. Bronchiectasis without complication  Overview:  -followed by pulmonology  -recently started brovana and pulmicort nebs      3. Moderate persistent asthma without complication  Overview:  -managed by pulmonology  -remains on spiriva  -also recently started brovana and pulmicort nebs  -on daily singulair  -respiratory symptoms stable      4. Essential hypertension  Overview:  Hypertension Medications              amLODIPine (NORVASC) 5 MG tablet Take 1 tablet (5 mg total) by mouth once daily.    furosemide (LASIX) 20 MG tablet Take one tablet daily; take extra tablet for weight gain of more than 2 pounds in one day or 5 pounds in one week.    metoprolol succinate (TOPROL-XL) 25 MG 24 hr tablet Take 3 tablets (75 mg total) by mouth 2 (two) times daily.    telmisartan (MICARDIS) 80 MG Tab Take 1 tablet (80 mg total) by mouth once daily.    cloNIDine (CATAPRES) 0.1 MG tablet Take 1 tablet (0.1 mg total) by mouth every 6 (six) hours as needed (for systolic blood pressure greater than 165).        -at goal today  -intolerant to amlodipine and spironolactone in the  past  -continue lifestyle modification with low sodium diet and exercise   -discussed hypertension disease course and importance of treating high blood pressure  -patient understood and advised of risk of untreated blood pressure. ER precautions were given for symptoms of hypertensive urgency and emergency.      Orders:  -     CBC Auto Differential; Future; Expected date: 02/26/2025  -     Comprehensive Metabolic Panel; Future; Expected date: 02/26/2025    5. Mixed hyperlipidemia  Overview:    Hyperlipidemia Medications               atorvastatin (LIPITOR) 40 MG tablet TK 1 T PO QD          -chronic condition. Currently stable.    -reports compliance with hyperlipidemia treatment as prescribed  -denies any known adverse effects of medications  -most recent labs listed below:  Lab Results   Component Value Date    CHOL 139 02/29/2024     Lab Results   Component Value Date    HDL 33 (L) 02/29/2024     Lab Results   Component Value Date    LDLCALC 76.6 02/29/2024     Lab Results   Component Value Date    TRIG 147 02/29/2024     Lab Results   Component Value Date    ALT 22 08/07/2024    AST 25 08/07/2024    ALKPHOS 101 08/07/2024    BILITOT 0.4 08/07/2024         Orders:  -     Lipid Panel; Future; Expected date: 02/26/2025    6. Chronic diastolic (congestive) heart failure  Overview:  -TTE 6/2023- grade II left ventricular diastolic dysfunction with EF 65%  -compensated on exam   -followed by cardiology      7. Coronary artery disease involving native coronary artery of native heart without angina pectoris  Overview:  -followed by cardiology  -hx of PCI with LUIS  -remains on ASA and eliquis (hx of afib)  -also on statin  -denies recent symptoms of angina or dyspnea      8. Persistent atrial fibrillation  Overview:  -followed by cardiology and EP specialist  -s/p cardioversion 12/2023-failed  -on toprol for rate control and eliquis for anticoagulation  -asymptomatic      9. History of transcatheter aortic valve  "replacement (TAVR)  Overview:  -s/p TAVR on 5/16/23      10. Temporal arteritis  Overview:  -followed now by retinal specialist  -s/p temporal biopsy normal, however, improved with prednisone  -remains on pred 2.5 mg (decreased to 3 days per week)    Orders:  -     Sedimentation rate; Future; Expected date: 02/26/2025    11. Type 2 diabetes mellitus with diabetic peripheral angiopathy without gangrene, unspecified whether long term insulin use  Overview:  -condition is currently controlled without medications  -last A1c normal (5.5)  -see diabetic health maintenance listed below  -on statin: Yes  -on ACE-I/ARB: Yes  -counseling provided on importance of diabetic diet and medication compliance in order to treat diabetes  -discussed diabetes disease course and potential complications      Visit today included increased complexity associated with the care of the episodic problem addressed and managing the longitudinal care of the patient due to the serious and/or complex managed problem(s).    Follow up in about 6 months (around 8/26/2025), or if symptoms worsen or fail to improve.    Aide Stephens PA-C  _____________________________________________________________________________________________________________________________________________________    CC: low back pain    HPI: Patient is in clinic today as an established patient here for low back pain.     HISTORY OF PRESENT ILLNESS:  He reports lower back pain that started 1.5-2 weeks ago without specific injury, though he had been doing yard work including raking. Pain was initially bilateral in the lower back but has migrated predominantly to the right side with minimal left-sided pain, extending into the right buttocks region. He experiences muscle spasms and notes pain is exacerbated when lying flat but finds relief when turning to side position or placing a pillow under left buttock. He rates current pain as "very low" and denies need for pain " medication.    MEDICAL HISTORY:  He has a history of ruptured disc and arthritis in back with reported exacerbation during cold weather. He has a history of urinary tract infections and small kidney stones. He is borderline diabetic with fasting blood sugar readings ranging from 100-130 mg/dL, with recent readings of 100 mg/dL this morning and 113 mg/dL yesterday.    MEDICATIONS:  He takes Lasix 40mg as needed for swelling, noting increased frequency compared to previous 20mg dosage. He reports past use of Soma for ruptured disc and Tizanidine.    OTHER CONCERNS:  Recent labs showed decreased kidney function. He reports chronic issues with dehydration and believes he is currently dehydrated.    Remaining chronic conditions have been reviewed and remain stable. Further detail as stated above.     No other complaints today.    Past Medical History:   Diagnosis Date    Allergy     Anticoagulant long-term use     Aortic stenosis     Arthritis     Asthma     Atrial fibrillation     Bronchitis     Cataract     COPD (chronic obstructive pulmonary disease)     Coronary artery disease     stent    General anesthetics causing adverse effect in therapeutic use     hard to awaken    GERD (gastroesophageal reflux disease)     Gout, chronic     Hypertension     Mixed hyperlipidemia     Sleep apnea     Type 2 diabetes mellitus with diabetic peripheral angiopathy without gangrene, unspecified whether long term insulin use 05/02/2023     Past Surgical History:   Procedure Laterality Date    ANGIOGRAM, CORONARY, WITH LEFT HEART CATHETERIZATION  09/27/2021    Procedure: Angiogram, Coronary, with Left Heart Cath;  Surgeon: Vincent Gonzalez MD;  Location: [facility] CATH;  Service: Cardiology;;    BACK SURGERY      L3-5; ruptured disc; laminectomy x 2 surgery    CARDIAC CATH COSURGEON N/A 05/16/2023    Procedure: Cardiac Cath Cosurgeon;  Surgeon: Holly Pleitez MD;  Location: [facility] CATH LAB;  Service: Cardiothoracic;  Laterality: N/A;     CARDIAC VALVE REPLACEMENT  5/16/23    TAVR    CARDIAC VALVE SURGERY      CARDIOVERSION      CHOLECYSTECTOMY      CORONARY ANGIOPLASTY WITH STENT PLACEMENT      EYE SURGERY  2012    Cateract    JOINT REPLACEMENT  '08    RT  KNEE    LEFT HEART CATHETERIZATION Left 04/04/2023    Procedure: Left heart cath;  Surgeon: Aleksey Krishnan MD;  Location: [facility] CATH LAB;  Service: Cardiology;  Laterality: Left;    SPINE SURGERY  1988/2004    Lamanectomy '88/Lam '04    TONSILLECTOMY      TOTAL KNEE ARTHROPLASTY      TRANSCATHETER AORTIC VALVE REPLACEMENT (TAVR) N/A 05/16/2023    Procedure: REPLACEMENT, AORTIC VALVE, TRANSCATHETER (TAVR);  Surgeon: Luis Enrique Ji MD;  Location: [facility] CATH LAB;  Service: Cardiology;  Laterality: N/A;    TREATMENT OF CARDIAC ARRHYTHMIA N/A 12/19/2023    Procedure: Cardioversion or Defibrillation;  Surgeon: Theo Ferreira MD;  Location: [facility] CATH LAB;  Service: Cardiology;  Laterality: N/A;    VASECTOMY       Review of patient's allergies indicates:   Allergen Reactions    Iodine Hives    Sulfa (sulfonamide antibiotics)      Hives    Azithromycin      Diarrhea      Cefaclor Other (See Comments)     Other reaction(s): Hives    Iodine and iodide containing products      Other reaction(s): Unknown    Ivp dye  [iodinated contrast media] Other (See Comments)    Sulfacetamide sod-sulfur-urea Other (See Comments)    Doxycycline Rash    Sulfamethoxazole-trimethoprim Rash     Social History[1]  Family History   Problem Relation Name Age of Onset    Cancer Mother Lori     Early death Mother Lori     Arthritis Father [place] R.     Diabetes Father Brendan R.     Heart disease Father [place] R.     Hyperlipidemia Father Brendan R.     Hypertension Father Brendan R.      Medications Ordered Prior to Encounter[2]    Review of Systems   Constitutional:  Negative for chills, diaphoresis, fatigue and fever.   HENT:  Negative for congestion, ear pain, postnasal drip, sinus pain and sore throat.    Eyes:  Negative for pain  "and redness.   Respiratory:  Negative for cough, chest tightness and shortness of breath.    Cardiovascular:  Negative for chest pain and leg swelling.   Gastrointestinal:  Negative for abdominal pain, constipation, diarrhea, nausea and vomiting.   Genitourinary:  Negative for dysuria and hematuria.   Musculoskeletal:  Positive for back pain. Negative for arthralgias and joint swelling.   Skin:  Negative for rash.   Neurological:  Negative for dizziness, syncope and headaches.   Psychiatric/Behavioral:  Negative for dysphoric mood. The patient is not nervous/anxious.        Vitals:    02/26/25 1049   BP: 134/78   Pulse: 88   Resp: 18   SpO2: 98%   Weight: 117.8 kg (259 lb 11.2 oz)   Height: 6' 1" (1.854 m)       Wt Readings from Last 3 Encounters:   02/26/25 117.8 kg (259 lb 11.2 oz)   02/07/25 119.7 kg (264 lb)   01/08/25 116.6 kg (257 lb)       Physical Exam  Constitutional:       General: He is not in acute distress.     Appearance: Normal appearance. He is well-developed.   HENT:      Head: Normocephalic and atraumatic.   Eyes:      Conjunctiva/sclera: Conjunctivae normal.   Cardiovascular:      Rate and Rhythm: Normal rate and regular rhythm.      Pulses: Normal pulses.      Heart sounds: Normal heart sounds. No murmur heard.  Pulmonary:      Effort: Pulmonary effort is normal. No respiratory distress.      Breath sounds: Normal breath sounds.   Abdominal:      General: Bowel sounds are normal. There is no distension.      Palpations: Abdomen is soft.      Tenderness: There is no abdominal tenderness. There is no right CVA tenderness or left CVA tenderness.   Musculoskeletal:         General: Normal range of motion.      Cervical back: Normal range of motion and neck supple.      Lumbar back: Tenderness (R side) present. No swelling or deformity. Normal range of motion.   Skin:     General: Skin is warm and dry.      Findings: No rash.   Neurological:      General: No focal deficit present.      Mental Status: " He is alert and oriented to person, place, and time.   Psychiatric:         Mood and Affect: Mood normal.         Behavior: Behavior normal.         Health Maintenance   Topic Date Due    COVID-19 Vaccine ( season) 2024    Lipid Panel  2025    High Dose Statin  2026    TETANUS VACCINE  10/09/2031    Colorectal Cancer Screening  12/10/2034    Hepatitis C Screening  Completed    Shingles Vaccine  Completed    Influenza Vaccine  Completed    RSV Vaccine (Age 60+ and Pregnant patients)  Completed    Pneumococcal Vaccines (Age 50+)  Completed    Abdominal Aortic Aneurysm Screening  Completed     DISCLAIMER: This note was compiled by using a speech recognition dictation system and therefore please be aware that typographical / speech recognition errors can and do occur.  Please contact me if you see any errors specifically.  Consent was obtained for [employer] recording system prior to the visit.          [1]   Social History  Tobacco Use    Smoking status: Former     Current packs/day: 0.00     Average packs/day: 1.5 packs/day for 22.0 years (33.0 ttl pk-yrs)     Types: Cigarettes     Start date: 1970     Quit date: 1992     Years since quittin.8    Smokeless tobacco: Never   Substance Use Topics    Alcohol use: No    Drug use: No   [2]   Current Outpatient Medications on File Prior to Visit   Medication Sig Dispense Refill    albuterol (PROVENTIL/VENTOLIN HFA) 90 mcg/actuation inhaler Inhale 2 puffs into the lungs every 6 (six) hours as needed for Wheezing or Shortness of Breath. Rescue 54 g 0    albuterol-ipratropium (DUO-NEB) 2.5 mg-0.5 mg/3 mL nebulizer solution Take 3 mLs by nebulization every 6 (six) hours as needed for Wheezing. Rescue 75 mL 0    allopurinoL (ZYLOPRIM) 300 MG tablet TAKE 1 TABLET BY MOUTH EVERY DAY 90 tablet 3    amLODIPine (NORVASC) 5 MG tablet Take 1 tablet (5 mg total) by mouth once daily. 90 tablet 3    apixaban (ELIQUIS) 5 mg Tab Take 1 tablet (5 mg  total) by mouth 2 (two) times daily. 180 tablet 3    arformoteroL (BROVANA) 15 mcg/2 mL Nebu Take 2 mLs (15 mcg total) by nebulization 2 (two) times a day. Controller 120 mL 11    aspirin (ECOTRIN) 81 MG EC tablet Take 1 tablet (81 mg total) by mouth once daily.      atorvastatin (LIPITOR) 40 MG tablet TK 1 T PO QD 90 tablet 3    budesonide (PULMICORT) 0.5 mg/2 mL nebulizer solution Take 2 mLs (0.5 mg total) by nebulization 2 (two) times daily. Controller 120 mL 11    coenzyme Q10 100 mg capsule Take 200 mg by mouth once daily.      docusate sodium (COLACE) 100 MG capsule Take 100 mg by mouth 2 (two) times daily as needed.      esomeprazole (NEXIUM) 20 MG capsule Take 20 mg by mouth daily as needed.      furosemide (LASIX) 20 MG tablet Take one tablet daily; take extra tablet for weight gain of more than 2 pounds in one day or 5 pounds in one week. 120 tablet 3    metoprolol succinate (TOPROL-XL) 25 MG 24 hr tablet Take 3 tablets (75 mg total) by mouth 2 (two) times daily. 180 tablet 11    montelukast (SINGULAIR) 10 mg tablet Take 1 tablet (10 mg total) by mouth once daily. 90 tablet 3    multivitamin (THERAGRAN) per tablet Take 1 tablet by mouth once daily.      potassium chloride (KLOR-CON) 10 MEQ TbSR TK 1 T PO ONCE A DAY. 90 tablet 3    predniSONE (DELTASONE) 2.5 MG tablet Take 5 mg by mouth once daily.      sodium chloride 7% 7 % nebulizer solution Take 4 mLs by nebulization Daily. 120 mL 11    telmisartan (MICARDIS) 80 MG Tab Take 1 tablet (80 mg total) by mouth once daily. 90 tablet 3    tiotropium bromide (SPIRIVA RESPIMAT) 2.5 mcg/actuation inhaler Inhale 2 puffs into the lungs Daily. Controller 4 g 11    [DISCONTINUED] naproxen (NAPROSYN) 500 MG tablet Take 500 mg by mouth as needed.      cloNIDine (CATAPRES) 0.1 MG tablet Take 1 tablet (0.1 mg total) by mouth every 6 (six) hours as needed (for systolic blood pressure greater than 165). (Patient not taking: Reported on 6/5/2024) 60 tablet 11     [DISCONTINUED] fluocinonide (LIDEX) 0.05 % gel SMARTSI-2 Gram(s) Topical Twice Daily       Current Facility-Administered Medications on File Prior to Visit   Medication Dose Route Frequency Provider Last Rate Last Admin    levalbuterol nebulizer solution 1.25 mg  1.25 mg Nebulization 1 time in Clinic/HOD Stephanie Alcocer, KYRIE        sodium chloride 0.9% flush 10 mL  10 mL Intravenous PRN Luis Enrique Rangel MD

## 2025-02-26 NOTE — PROGRESS NOTES
Results have been released via [facility]. Please verify that these have been viewed by patient. If not, please call patient with results.     I have sent a message to them with the following interpretation (see below).    I have reviewed your recent lumbar XR which showed diffuse multilevel degenerative changes otherwise no acute findings or significant abnormalities were seen. Your urinalysis also showed no signs of blood or infection. Please let me know if symptoms worsen or do not improve.     Please do not hesitate to call or message with any additional questions or concerns.    Aide Stephens PA-C

## 2025-03-04 ENCOUNTER — LAB VISIT (OUTPATIENT)
Dept: LAB | Facility: HOSPITAL | Age: 75
End: 2025-03-04
Attending: INTERNAL MEDICINE
Payer: MEDICARE

## 2025-03-04 DIAGNOSIS — M31.6 TEMPORAL ARTERITIS: ICD-10-CM

## 2025-03-04 DIAGNOSIS — E11.51 TYPE 2 DIABETES MELLITUS WITH DIABETIC PERIPHERAL ANGIOPATHY WITHOUT GANGRENE, UNSPECIFIED WHETHER LONG TERM INSULIN USE: ICD-10-CM

## 2025-03-04 DIAGNOSIS — I10 ESSENTIAL HYPERTENSION: ICD-10-CM

## 2025-03-04 DIAGNOSIS — E78.2 MIXED HYPERLIPIDEMIA: ICD-10-CM

## 2025-03-04 LAB
ALBUMIN SERPL BCP-MCNC: 3.6 G/DL (ref 3.5–5.2)
ALP SERPL-CCNC: 97 U/L (ref 40–150)
ALT SERPL W/O P-5'-P-CCNC: 21 U/L (ref 10–44)
ANION GAP SERPL CALC-SCNC: 11 MMOL/L (ref 8–16)
AST SERPL-CCNC: 27 U/L (ref 10–40)
BASOPHILS # BLD AUTO: 0.08 K/UL (ref 0–0.2)
BASOPHILS NFR BLD: 1.2 % (ref 0–1.9)
BILIRUB SERPL-MCNC: 0.6 MG/DL (ref 0.1–1)
BUN SERPL-MCNC: 17 MG/DL (ref 8–23)
CALCIUM SERPL-MCNC: 9.1 MG/DL (ref 8.7–10.5)
CHLORIDE SERPL-SCNC: 104 MMOL/L (ref 95–110)
CHOLEST SERPL-MCNC: 138 MG/DL (ref 120–199)
CHOLEST/HDLC SERPL: 4.8 {RATIO} (ref 2–5)
CO2 SERPL-SCNC: 26 MMOL/L (ref 23–29)
CREAT SERPL-MCNC: 0.9 MG/DL (ref 0.5–1.4)
DIFFERENTIAL METHOD BLD: ABNORMAL
EOSINOPHIL # BLD AUTO: 0.3 K/UL (ref 0–0.5)
EOSINOPHIL NFR BLD: 4.1 % (ref 0–8)
ERYTHROCYTE [DISTWIDTH] IN BLOOD BY AUTOMATED COUNT: 14.6 % (ref 11.5–14.5)
ERYTHROCYTE [SEDIMENTATION RATE] IN BLOOD BY WESTERGREN METHOD: 33 MM/HR (ref 0–10)
EST. GFR  (NO RACE VARIABLE): >60 ML/MIN/1.73 M^2
ESTIMATED AVG GLUCOSE: 114 MG/DL (ref 68–131)
GLUCOSE SERPL-MCNC: 101 MG/DL (ref 70–110)
HBA1C MFR BLD: 5.6 % (ref 4–5.6)
HCT VFR BLD AUTO: 42.3 % (ref 40–54)
HDLC SERPL-MCNC: 29 MG/DL (ref 40–75)
HDLC SERPL: 21 % (ref 20–50)
HGB BLD-MCNC: 13.3 G/DL (ref 14–18)
IMM GRANULOCYTES # BLD AUTO: 0.04 K/UL (ref 0–0.04)
IMM GRANULOCYTES NFR BLD AUTO: 0.6 % (ref 0–0.5)
LDLC SERPL CALC-MCNC: 76.8 MG/DL (ref 63–159)
LYMPHOCYTES # BLD AUTO: 1.3 K/UL (ref 1–4.8)
LYMPHOCYTES NFR BLD: 19.1 % (ref 18–48)
MCH RBC QN AUTO: 28.4 PG (ref 27–31)
MCHC RBC AUTO-ENTMCNC: 31.4 G/DL (ref 32–36)
MCV RBC AUTO: 90 FL (ref 82–98)
MONOCYTES # BLD AUTO: 0.9 K/UL (ref 0.3–1)
MONOCYTES NFR BLD: 13.4 % (ref 4–15)
NEUTROPHILS # BLD AUTO: 4.2 K/UL (ref 1.8–7.7)
NEUTROPHILS NFR BLD: 61.6 % (ref 38–73)
NONHDLC SERPL-MCNC: 109 MG/DL
NRBC BLD-RTO: 0 /100 WBC
PLATELET # BLD AUTO: 259 K/UL (ref 150–450)
PMV BLD AUTO: 10.3 FL (ref 9.2–12.9)
POTASSIUM SERPL-SCNC: 4.2 MMOL/L (ref 3.5–5.1)
PROT SERPL-MCNC: 6.7 G/DL (ref 6–8.4)
RBC # BLD AUTO: 4.68 M/UL (ref 4.6–6.2)
SODIUM SERPL-SCNC: 141 MMOL/L (ref 136–145)
TRIGL SERPL-MCNC: 161 MG/DL (ref 30–150)
WBC # BLD AUTO: 6.86 K/UL (ref 3.9–12.7)

## 2025-03-04 PROCEDURE — 85025 COMPLETE CBC W/AUTO DIFF WBC: CPT | Performed by: PHYSICIAN ASSISTANT

## 2025-03-04 PROCEDURE — 80061 LIPID PANEL: CPT | Performed by: PHYSICIAN ASSISTANT

## 2025-03-04 PROCEDURE — 83036 HEMOGLOBIN GLYCOSYLATED A1C: CPT | Performed by: INTERNAL MEDICINE

## 2025-03-04 PROCEDURE — 80053 COMPREHEN METABOLIC PANEL: CPT | Performed by: PHYSICIAN ASSISTANT

## 2025-03-04 PROCEDURE — 85651 RBC SED RATE NONAUTOMATED: CPT | Mod: PO | Performed by: PHYSICIAN ASSISTANT

## 2025-03-04 PROCEDURE — 36415 COLL VENOUS BLD VENIPUNCTURE: CPT | Mod: PO | Performed by: INTERNAL MEDICINE

## 2025-03-09 NOTE — PROGRESS NOTES
Results have been released via [facility]. Please verify that these have been viewed by patient. If not, please call patient with results.     I have sent a message to them with the following interpretation (see below).    I have reviewed your recent blood work.     A1C NORMAL-The A1c is normal. Repeat an a1c in 6 months.     CBC NORMAL-The CBC appears to be stable at this time with no sign of major anemia, abnormal white count or platelet abnormality.  CMP/BMP NORMAL-The electrolytes all appear stable at this time. This includes kidney functions along with routine electrolytes like sugar, potassium and sodium. The liver enzymes were noted to be stable also.  LIPID NORMAL ON MEDS-The cholesterol panel screening showed levels that are considered at target with the current medications. Continue meds & check annually.  Sed rate is elevated, but remains stable when compared to previous labs.     Please do not hesitate to call or message with any additional questions or concerns.

## 2025-03-24 DIAGNOSIS — Z00.00 ENCOUNTER FOR MEDICARE ANNUAL WELLNESS EXAM: ICD-10-CM

## 2025-04-01 DIAGNOSIS — I48.19 PERSISTENT ATRIAL FIBRILLATION: ICD-10-CM

## 2025-04-01 RX ORDER — METOPROLOL SUCCINATE 25 MG/1
75 TABLET, EXTENDED RELEASE ORAL 2 TIMES DAILY
Qty: 180 TABLET | Refills: 11 | Status: SHIPPED | OUTPATIENT
Start: 2025-04-01

## 2025-06-04 DIAGNOSIS — I48.0 PAF (PAROXYSMAL ATRIAL FIBRILLATION): ICD-10-CM

## 2025-06-04 DIAGNOSIS — J30.9 ALLERGIC RHINITIS, UNSPECIFIED SEASONALITY, UNSPECIFIED TRIGGER: ICD-10-CM

## 2025-06-04 DIAGNOSIS — J45.40 MODERATE PERSISTENT ASTHMA WITHOUT COMPLICATION: ICD-10-CM

## 2025-06-05 RX ORDER — MONTELUKAST SODIUM 10 MG/1
10 TABLET ORAL DAILY
Qty: 90 TABLET | Refills: 3 | Status: SHIPPED | OUTPATIENT
Start: 2025-06-05

## 2025-06-08 RX ORDER — AMLODIPINE BESYLATE 5 MG/1
5 TABLET ORAL DAILY
Qty: 90 TABLET | Refills: 3 | Status: SHIPPED | OUTPATIENT
Start: 2025-06-08

## 2025-07-01 ENCOUNTER — OFFICE VISIT (OUTPATIENT)
Dept: CARDIOLOGY | Facility: CLINIC | Age: 75
End: 2025-07-01
Payer: MEDICARE

## 2025-07-01 VITALS
DIASTOLIC BLOOD PRESSURE: 88 MMHG | HEART RATE: 81 BPM | WEIGHT: 243.69 LBS | HEIGHT: 73 IN | SYSTOLIC BLOOD PRESSURE: 154 MMHG | BODY MASS INDEX: 32.3 KG/M2 | OXYGEN SATURATION: 99 %

## 2025-07-01 DIAGNOSIS — I50.32 CHRONIC DIASTOLIC (CONGESTIVE) HEART FAILURE: ICD-10-CM

## 2025-07-01 DIAGNOSIS — Z95.2 HISTORY OF TRANSCATHETER AORTIC VALVE REPLACEMENT (TAVR): ICD-10-CM

## 2025-07-01 DIAGNOSIS — I70.0 AORTIC ATHEROSCLEROSIS: Primary | ICD-10-CM

## 2025-07-01 DIAGNOSIS — I35.0 NONRHEUMATIC AORTIC VALVE STENOSIS: ICD-10-CM

## 2025-07-01 DIAGNOSIS — I10 ESSENTIAL HYPERTENSION: ICD-10-CM

## 2025-07-01 DIAGNOSIS — E78.2 MIXED HYPERLIPIDEMIA: ICD-10-CM

## 2025-07-01 DIAGNOSIS — I48.19 PERSISTENT ATRIAL FIBRILLATION: ICD-10-CM

## 2025-07-01 DIAGNOSIS — I25.10 CORONARY ARTERY DISEASE INVOLVING NATIVE CORONARY ARTERY OF NATIVE HEART WITHOUT ANGINA PECTORIS: ICD-10-CM

## 2025-07-01 PROCEDURE — 99214 OFFICE O/P EST MOD 30 MIN: CPT | Mod: S$PBB,,, | Performed by: INTERNAL MEDICINE

## 2025-07-01 PROCEDURE — 99214 OFFICE O/P EST MOD 30 MIN: CPT | Mod: PBBFAC,PO | Performed by: INTERNAL MEDICINE

## 2025-07-01 PROCEDURE — 99999 PR PBB SHADOW E&M-EST. PATIENT-LVL IV: CPT | Mod: PBBFAC,,, | Performed by: INTERNAL MEDICINE

## 2025-07-01 NOTE — PROGRESS NOTES
Subjective   Patient ID:  Roshan Menard is a 75 y.o. male who presents for follow-up of No chief complaint on file.      HPI75 yo WM with persistent AF, CAD and previous TAVR. Was cardioverted 12- but within a few days was back in AF. States this was his fourth cardioversion. Denies chest pain, SOB, or edema  Denies palpitations, weak spells, and syncope  Does not feel the AF. States BP up a little today but normally in the 130's.     Review of Systems   Constitutional: Negative for decreased appetite, fever, malaise/fatigue, weight gain and weight loss.   HENT:  Negative for hearing loss and nosebleeds.    Eyes:  Negative for visual disturbance.   Cardiovascular:  Positive for leg swelling. Negative for chest pain, claudication, cyanosis, dyspnea on exertion, irregular heartbeat, near-syncope, orthopnea, palpitations, paroxysmal nocturnal dyspnea and syncope.   Respiratory:  Negative for cough, hemoptysis, shortness of breath, sleep disturbances due to breathing, snoring and wheezing.    Endocrine: Negative for cold intolerance, heat intolerance, polydipsia and polyuria.   Hematologic/Lymphatic: Negative for adenopathy and bleeding problem. Does not bruise/bleed easily.   Skin:  Negative for color change, itching, poor wound healing, rash and suspicious lesions.   Musculoskeletal:  Negative for arthritis, back pain, falls, joint pain, joint swelling, muscle cramps, muscle weakness and myalgias.   Gastrointestinal:  Negative for bloating, abdominal pain, change in bowel habit, constipation, flatus, heartburn, hematemesis, hematochezia, hemorrhoids, jaundice, melena, nausea and vomiting.   Genitourinary:  Negative for bladder incontinence, decreased libido, frequency, hematuria, hesitancy and urgency.   Neurological:  Negative for brief paralysis, difficulty with concentration, excessive daytime sleepiness, dizziness, focal weakness, headaches, light-headedness, loss of balance, numbness, vertigo and  "weakness.   Psychiatric/Behavioral:  Negative for altered mental status, depression and memory loss. The patient does not have insomnia and is not nervous/anxious.    Allergic/Immunologic: Negative for environmental allergies, hives and persistent infections.          Objective     Physical Exam  Constitutional:       General: He is not in acute distress.     Appearance: He is well-developed. He is obese. He is not diaphoretic.      Comments: BP (!) 154/88   Pulse 81   Ht 6' 1" (1.854 m)   Wt 110.5 kg (243 lb 11.2 oz)   SpO2 99%   BMI 32.15 kg/m²      HENT:      Head: Normocephalic and atraumatic.   Eyes:      General: Lids are normal. No scleral icterus.        Right eye: No discharge.      Conjunctiva/sclera: Conjunctivae normal.      Pupils: Pupils are equal, round, and reactive to light.   Neck:      Thyroid: No thyromegaly.      Vascular: No JVD.      Trachea: No tracheal deviation.   Cardiovascular:      Rate and Rhythm: Normal rate. Rhythm irregularly irregular.      Pulses: Intact distal pulses.           Carotid pulses are 2+ on the right side and 2+ on the left side.       Radial pulses are 2+ on the right side and 2+ on the left side.        Femoral pulses are 2+ on the right side and 2+ on the left side.       Popliteal pulses are 2+ on the right side and 2+ on the left side.        Dorsalis pedis pulses are 2+ on the right side and 2+ on the left side.        Posterior tibial pulses are 2+ on the right side and 2+ on the left side.      Heart sounds: Normal heart sounds, S1 normal and S2 normal. No murmur heard.     No friction rub. No gallop.   Pulmonary:      Effort: Pulmonary effort is normal. No respiratory distress.      Breath sounds: Normal breath sounds. No wheezing or rales.   Chest:      Chest wall: No tenderness.   Abdominal:      General: Bowel sounds are normal. There is no distension.      Palpations: Abdomen is soft. There is no hepatomegaly or mass.      Tenderness: There is no " abdominal tenderness. There is no guarding or rebound.   Musculoskeletal:         General: No tenderness. Normal range of motion.      Cervical back: Normal range of motion and neck supple.      Right lower leg: Edema present.      Left lower leg: Edema present.   Lymphadenopathy:      Cervical: No cervical adenopathy.   Skin:     General: Skin is warm and dry.      Coloration: Skin is not pale.      Findings: No erythema or rash.   Neurological:      Mental Status: He is alert and oriented to person, place, and time.      Cranial Nerves: No cranial nerve deficit.      Coordination: Coordination normal.      Deep Tendon Reflexes: Reflexes are normal and symmetric.   Psychiatric:         Speech: Speech normal.         Behavior: Behavior normal.         Thought Content: Thought content normal.         Judgment: Judgment normal.            Assessment and Plan     1. Aortic atherosclerosis risk factor modification   2. Chronic diastolic (congestive) heart failure compensated   3. Coronary artery disease involving native coronary artery of native heart without angina pectoris    4. Essential hypertension states monitors at home   5. Mixed hyperlipidemia controlled   6. Nonrheumatic aortic valve stenosis S/P TAVR   7. Persistent atrial fibrillation    8. History of transcatheter aortic valve replacement (TAVR)        Plan:  The current medical regimen is effective;  continue present plan and medications.    Low salt  No orders of the defined types were placed in this encounter.    Follow up in about 6 months (around 1/1/2026).     Advance Care Planning     Date: 07/01/2025

## 2025-08-11 ENCOUNTER — LAB VISIT (OUTPATIENT)
Dept: LAB | Facility: HOSPITAL | Age: 75
End: 2025-08-11
Attending: PHYSICIAN ASSISTANT
Payer: MEDICARE

## 2025-08-11 ENCOUNTER — OFFICE VISIT (OUTPATIENT)
Dept: FAMILY MEDICINE | Facility: CLINIC | Age: 75
End: 2025-08-11
Payer: MEDICARE

## 2025-08-11 VITALS
BODY MASS INDEX: 32.18 KG/M2 | SYSTOLIC BLOOD PRESSURE: 135 MMHG | HEIGHT: 73 IN | WEIGHT: 242.81 LBS | OXYGEN SATURATION: 96 % | RESPIRATION RATE: 18 BRPM | HEART RATE: 76 BPM | DIASTOLIC BLOOD PRESSURE: 76 MMHG

## 2025-08-11 DIAGNOSIS — J47.9 BRONCHIECTASIS WITHOUT COMPLICATION: Primary | ICD-10-CM

## 2025-08-11 DIAGNOSIS — I10 ESSENTIAL HYPERTENSION: ICD-10-CM

## 2025-08-11 DIAGNOSIS — A31.0 MYCOBACTERIUM AVIUM COMPLEX: ICD-10-CM

## 2025-08-11 DIAGNOSIS — Z95.2 HISTORY OF TRANSCATHETER AORTIC VALVE REPLACEMENT (TAVR): ICD-10-CM

## 2025-08-11 DIAGNOSIS — M10.00 IDIOPATHIC GOUT, UNSPECIFIED CHRONICITY, UNSPECIFIED SITE: ICD-10-CM

## 2025-08-11 DIAGNOSIS — E78.2 MIXED HYPERLIPIDEMIA: ICD-10-CM

## 2025-08-11 DIAGNOSIS — M31.6 TEMPORAL ARTERITIS: ICD-10-CM

## 2025-08-11 DIAGNOSIS — J45.40 MODERATE PERSISTENT ASTHMA WITHOUT COMPLICATION: ICD-10-CM

## 2025-08-11 DIAGNOSIS — I25.10 CORONARY ARTERY DISEASE INVOLVING NATIVE CORONARY ARTERY OF NATIVE HEART WITHOUT ANGINA PECTORIS: ICD-10-CM

## 2025-08-11 DIAGNOSIS — K21.9 GASTROESOPHAGEAL REFLUX DISEASE, UNSPECIFIED WHETHER ESOPHAGITIS PRESENT: ICD-10-CM

## 2025-08-11 DIAGNOSIS — E11.51 TYPE 2 DIABETES MELLITUS WITH DIABETIC PERIPHERAL ANGIOPATHY WITHOUT GANGRENE, UNSPECIFIED WHETHER LONG TERM INSULIN USE: ICD-10-CM

## 2025-08-11 DIAGNOSIS — I50.32 CHRONIC DIASTOLIC (CONGESTIVE) HEART FAILURE: ICD-10-CM

## 2025-08-11 DIAGNOSIS — I48.19 PERSISTENT ATRIAL FIBRILLATION: ICD-10-CM

## 2025-08-11 LAB
ABSOLUTE EOSINOPHIL (OHS): 0.31 K/UL
ABSOLUTE MONOCYTE (OHS): 0.76 K/UL (ref 0.3–1)
ABSOLUTE NEUTROPHIL COUNT (OHS): 4.69 K/UL (ref 1.8–7.7)
ALBUMIN SERPL BCP-MCNC: 4.1 G/DL (ref 3.5–5.2)
ALP SERPL-CCNC: 106 UNIT/L (ref 40–150)
ALT SERPL W/O P-5'-P-CCNC: 26 UNIT/L (ref 0–55)
ANION GAP (OHS): 8 MMOL/L (ref 8–16)
AST SERPL-CCNC: 40 UNIT/L (ref 0–50)
BASOPHILS # BLD AUTO: 0.06 K/UL
BASOPHILS NFR BLD AUTO: 0.9 %
BILIRUB SERPL-MCNC: 0.9 MG/DL (ref 0.1–1)
BUN SERPL-MCNC: 16 MG/DL (ref 8–23)
CALCIUM SERPL-MCNC: 9.2 MG/DL (ref 8.7–10.5)
CHLORIDE SERPL-SCNC: 105 MMOL/L (ref 95–110)
CHOLEST SERPL-MCNC: 128 MG/DL (ref 120–199)
CHOLEST/HDLC SERPL: 4.7 {RATIO} (ref 2–5)
CO2 SERPL-SCNC: 26 MMOL/L (ref 23–29)
CREAT SERPL-MCNC: 1 MG/DL (ref 0.5–1.4)
EAG (OHS): 108 MG/DL (ref 68–131)
ERYTHROCYTE [DISTWIDTH] IN BLOOD BY AUTOMATED COUNT: 14.6 % (ref 11.5–14.5)
ERYTHROCYTE [SEDIMENTATION RATE] IN BLOOD: 28 MM/HR
GFR SERPLBLD CREATININE-BSD FMLA CKD-EPI: >60 ML/MIN/1.73/M2
GLUCOSE SERPL-MCNC: 99 MG/DL (ref 70–110)
HBA1C MFR BLD: 5.4 % (ref 4–5.6)
HCT VFR BLD AUTO: 43.8 % (ref 40–54)
HDLC SERPL-MCNC: 27 MG/DL (ref 40–75)
HDLC SERPL: 21.1 % (ref 20–50)
HGB BLD-MCNC: 14 GM/DL (ref 14–18)
IMM GRANULOCYTES # BLD AUTO: 0.02 K/UL (ref 0–0.04)
IMM GRANULOCYTES NFR BLD AUTO: 0.3 % (ref 0–0.5)
LDLC SERPL CALC-MCNC: 64.8 MG/DL (ref 63–159)
LYMPHOCYTES # BLD AUTO: 1.1 K/UL (ref 1–4.8)
MCH RBC QN AUTO: 29 PG (ref 27–31)
MCHC RBC AUTO-ENTMCNC: 32 G/DL (ref 32–36)
MCV RBC AUTO: 91 FL (ref 82–98)
NONHDLC SERPL-MCNC: 101 MG/DL
NUCLEATED RBC (/100WBC) (OHS): 0 /100 WBC
PLATELET # BLD AUTO: 278 K/UL (ref 150–450)
PMV BLD AUTO: 10.4 FL (ref 9.2–12.9)
POTASSIUM SERPL-SCNC: 4.6 MMOL/L (ref 3.5–5.1)
PROT SERPL-MCNC: 7.3 GM/DL (ref 6–8.4)
RBC # BLD AUTO: 4.82 M/UL (ref 4.6–6.2)
RELATIVE EOSINOPHIL (OHS): 4.5 %
RELATIVE LYMPHOCYTE (OHS): 15.9 % (ref 18–48)
RELATIVE MONOCYTE (OHS): 11 % (ref 4–15)
RELATIVE NEUTROPHIL (OHS): 67.4 % (ref 38–73)
SODIUM SERPL-SCNC: 139 MMOL/L (ref 136–145)
TRIGL SERPL-MCNC: 181 MG/DL (ref 30–150)
URATE SERPL-MCNC: 8.6 MG/DL (ref 3.4–7)
WBC # BLD AUTO: 6.94 K/UL (ref 3.9–12.7)

## 2025-08-11 PROCEDURE — 80061 LIPID PANEL: CPT

## 2025-08-11 PROCEDURE — 36415 COLL VENOUS BLD VENIPUNCTURE: CPT | Mod: PO

## 2025-08-11 PROCEDURE — G2211 COMPLEX E/M VISIT ADD ON: HCPCS | Mod: ,,, | Performed by: PHYSICIAN ASSISTANT

## 2025-08-11 PROCEDURE — 84550 ASSAY OF BLOOD/URIC ACID: CPT

## 2025-08-11 PROCEDURE — 83036 HEMOGLOBIN GLYCOSYLATED A1C: CPT

## 2025-08-11 PROCEDURE — 99215 OFFICE O/P EST HI 40 MIN: CPT | Mod: PBBFAC,PO | Performed by: PHYSICIAN ASSISTANT

## 2025-08-11 PROCEDURE — 99214 OFFICE O/P EST MOD 30 MIN: CPT | Mod: S$PBB,,, | Performed by: PHYSICIAN ASSISTANT

## 2025-08-11 PROCEDURE — 99999 PR PBB SHADOW E&M-EST. PATIENT-LVL V: CPT | Mod: PBBFAC,,, | Performed by: PHYSICIAN ASSISTANT

## 2025-08-11 PROCEDURE — 80053 COMPREHEN METABOLIC PANEL: CPT

## 2025-08-11 PROCEDURE — 85025 COMPLETE CBC W/AUTO DIFF WBC: CPT

## 2025-08-11 PROCEDURE — 85651 RBC SED RATE NONAUTOMATED: CPT | Mod: PO

## (undated) DEVICE — GUIDEWIRE STF .035X180CM ANG

## (undated) DEVICE — KIT MANIFOLD LOW PRESS TUBING

## (undated) DEVICE — DEVICE PERCLOSE SUT CLSR 6FR

## (undated) DEVICE — GUIDEWIRE CONFIDA BECKER CURVE

## (undated) DEVICE — GUIDEWIRE SUPRA CORE 035 190CM

## (undated) DEVICE — CATH IMPULSE PIGTAIL 6F 110CM

## (undated) DEVICE — Device

## (undated) DEVICE — FLEXSHEATH DRYSEAL 14FR 33CM

## (undated) DEVICE — SHEATH INTRODUCER 6FR 11CM

## (undated) DEVICE — CATH AL1 5FR

## (undated) DEVICE — GUIDEWIRE STF .035X260CM STR

## (undated) DEVICE — KIT PERCUTANEOUS SHEATH

## (undated) DEVICE — TRAY CATH LAB OMC

## (undated) DEVICE — ANGIOTOUCH KIT

## (undated) DEVICE — OMNIPAQUE 350MG 150ML VIAL

## (undated) DEVICE — SPIKE CONTRAST CONTROLLER

## (undated) DEVICE — KIT SYR REUSABLE

## (undated) DEVICE — CATH IMPULSE D6F AL2 5PK

## (undated) DEVICE — GUIDEWIRE EMERALD 150CM PTFE

## (undated) DEVICE — LINE 60IN PRESSURE MON.

## (undated) DEVICE — BAND TR COMP DEVICE REG 24CM

## (undated) DEVICE — BLLN CATH TRUE DILATION 18MM

## (undated) DEVICE — KIT CUSTOM MANIFOLD

## (undated) DEVICE — DRAPE PED LAP SURG 108X77IN

## (undated) DEVICE — KIT GLIDESHEATH SLEND 6FR 10CM

## (undated) DEVICE — PAD RADIOLUCENT STAT ADULT

## (undated) DEVICE — GUIDEWIRE X SPORT .014IN 190CM

## (undated) DEVICE — CATH PIG145 INFINITI 5X110CM

## (undated) DEVICE — STOPCOCK 3-WAY

## (undated) DEVICE — CATH DXTERITY IMA 100CM 6FR

## (undated) DEVICE — PACK HEART CATH BR

## (undated) DEVICE — KIT MNTR POLE MT DUL 12&48 MAC

## (undated) DEVICE — CATH TEMP PACER 5.0FR

## (undated) DEVICE — OMNIPAQUE 300MG 150ML VIAL

## (undated) DEVICE — WIRE GUIDE TEFLON 3CM .035 145

## (undated) DEVICE — CATH INFINITI MULTIPAK JR4 5FR

## (undated) DEVICE — PAD DEFIB CADENCE ADULT R2

## (undated) DEVICE — CATH MPA2 INFINITI 4FR 100CM

## (undated) DEVICE — CATH JR4 5FR

## (undated) DEVICE — KIT MICROINTRO 4F .018X40X7CM

## (undated) DEVICE — COVER TABLE 44X90 STERILE

## (undated) DEVICE — CATH JL4 5FR

## (undated) DEVICE — SHEATH INTRODUCER 8FR 11CM

## (undated) DEVICE — CATH ALII 4FR INFINITY

## (undated) DEVICE — DRAPE ANGIO BRACH 38X44IN

## (undated) DEVICE — CABLE PACER